# Patient Record
Sex: FEMALE | Race: WHITE | NOT HISPANIC OR LATINO | Employment: FULL TIME | URBAN - METROPOLITAN AREA
[De-identification: names, ages, dates, MRNs, and addresses within clinical notes are randomized per-mention and may not be internally consistent; named-entity substitution may affect disease eponyms.]

---

## 2021-04-08 ENCOUNTER — HOSPITAL ENCOUNTER (INPATIENT)
Facility: CLINIC | Age: 31
LOS: 7 days | Discharge: HOME | DRG: 271 | End: 2021-04-15
Attending: EMERGENCY MEDICINE | Admitting: HOSPITALIST
Payer: COMMERCIAL

## 2021-04-08 ENCOUNTER — APPOINTMENT (OUTPATIENT)
Dept: CT IMAGING | Facility: CLINIC | Age: 31
DRG: 271 | End: 2021-04-08
Attending: EMERGENCY MEDICINE
Payer: COMMERCIAL

## 2021-04-08 ENCOUNTER — APPOINTMENT (OUTPATIENT)
Dept: INTERVENTIONAL RADIOLOGY/VASCULAR | Facility: CLINIC | Age: 31
DRG: 271 | End: 2021-04-08
Attending: EMERGENCY MEDICINE
Payer: COMMERCIAL

## 2021-04-08 ENCOUNTER — APPOINTMENT (OUTPATIENT)
Dept: ULTRASOUND IMAGING | Facility: CLINIC | Age: 31
DRG: 271 | End: 2021-04-08
Attending: EMERGENCY MEDICINE
Payer: COMMERCIAL

## 2021-04-08 DIAGNOSIS — E78.1 HYPERTRIGLYCERIDEMIA: ICD-10-CM

## 2021-04-08 DIAGNOSIS — I70.209 ACUTE OCCLUSION OF ARTERY OF LOWER EXTREMITY (H): ICD-10-CM

## 2021-04-08 DIAGNOSIS — E03.8 SUBCLINICAL HYPOTHYROIDISM: ICD-10-CM

## 2021-04-08 DIAGNOSIS — I99.8 LIMB ISCHEMIA: Primary | ICD-10-CM

## 2021-04-08 LAB
ABO + RH BLD: NORMAL
ABO + RH BLD: NORMAL
ANION GAP SERPL CALCULATED.3IONS-SCNC: 7 MMOL/L (ref 3–14)
APTT PPP: 26 SEC (ref 22–37)
APTT PPP: 60 SEC (ref 22–37)
BASOPHILS # BLD AUTO: 0.1 10E9/L (ref 0–0.2)
BASOPHILS NFR BLD AUTO: 0.5 %
BLD GP AB SCN SERPL QL: NORMAL
BLOOD BANK CMNT PATIENT-IMP: NORMAL
BUN SERPL-MCNC: 10 MG/DL (ref 7–30)
CALCIUM SERPL-MCNC: 9.6 MG/DL (ref 8.5–10.1)
CHLORIDE SERPL-SCNC: 104 MMOL/L (ref 94–109)
CK SERPL-CCNC: 39 U/L (ref 30–225)
CO2 SERPL-SCNC: 23 MMOL/L (ref 20–32)
CREAT SERPL-MCNC: 0.75 MG/DL (ref 0.52–1.04)
D DIMER PPP FEU-MCNC: 0.3 UG/ML FEU (ref 0–0.5)
DIFFERENTIAL METHOD BLD: ABNORMAL
EOSINOPHIL # BLD AUTO: 0.1 10E9/L (ref 0–0.7)
EOSINOPHIL NFR BLD AUTO: 0.5 %
ERYTHROCYTE [DISTWIDTH] IN BLOOD BY AUTOMATED COUNT: 12.8 % (ref 10–15)
ERYTHROCYTE [DISTWIDTH] IN BLOOD BY AUTOMATED COUNT: 13 % (ref 10–15)
GFR SERPL CREATININE-BSD FRML MDRD: >90 ML/MIN/{1.73_M2}
GLUCOSE SERPL-MCNC: 104 MG/DL (ref 70–99)
HCG SERPL QL: NEGATIVE
HCT VFR BLD AUTO: 33 % (ref 35–47)
HCT VFR BLD AUTO: 35.9 % (ref 35–47)
HGB BLD-MCNC: 10.9 G/DL (ref 11.7–15.7)
HGB BLD-MCNC: 12 G/DL (ref 11.7–15.7)
IMM GRANULOCYTES # BLD: 0 10E9/L (ref 0–0.4)
IMM GRANULOCYTES NFR BLD: 0.3 %
INR PPP: 1.05 (ref 0.86–1.14)
INTERPRETATION ECG - MUSE: NORMAL
LABORATORY COMMENT REPORT: NORMAL
LYMPHOCYTES # BLD AUTO: 2 10E9/L (ref 0.8–5.3)
LYMPHOCYTES NFR BLD AUTO: 14.2 %
MCH RBC QN AUTO: 28.5 PG (ref 26.5–33)
MCH RBC QN AUTO: 28.5 PG (ref 26.5–33)
MCHC RBC AUTO-ENTMCNC: 33 G/DL (ref 31.5–36.5)
MCHC RBC AUTO-ENTMCNC: 33.4 G/DL (ref 31.5–36.5)
MCV RBC AUTO: 85 FL (ref 78–100)
MCV RBC AUTO: 86 FL (ref 78–100)
MONOCYTES # BLD AUTO: 0.5 10E9/L (ref 0–1.3)
MONOCYTES NFR BLD AUTO: 3.2 %
NEUTROPHILS # BLD AUTO: 11.6 10E9/L (ref 1.6–8.3)
NEUTROPHILS NFR BLD AUTO: 81.3 %
NRBC # BLD AUTO: 0 10*3/UL
NRBC BLD AUTO-RTO: 0 /100
PLATELET # BLD AUTO: 447 10E9/L (ref 150–450)
PLATELET # BLD AUTO: 576 10E9/L (ref 150–450)
POTASSIUM SERPL-SCNC: 3.9 MMOL/L (ref 3.4–5.3)
RBC # BLD AUTO: 3.83 10E12/L (ref 3.8–5.2)
RBC # BLD AUTO: 4.21 10E12/L (ref 3.8–5.2)
SARS-COV-2 RNA RESP QL NAA+PROBE: NEGATIVE
SODIUM SERPL-SCNC: 134 MMOL/L (ref 133–144)
SPECIMEN EXP DATE BLD: NORMAL
SPECIMEN SOURCE: NORMAL
WBC # BLD AUTO: 14.2 10E9/L (ref 4–11)
WBC # BLD AUTO: 21.9 10E9/L (ref 4–11)

## 2021-04-08 PROCEDURE — 258N000003 HC RX IP 258 OP 636: Performed by: RADIOLOGY

## 2021-04-08 PROCEDURE — 272N000567 HC SHEATH CR4

## 2021-04-08 PROCEDURE — 86901 BLOOD TYPING SEROLOGIC RH(D): CPT | Performed by: EMERGENCY MEDICINE

## 2021-04-08 PROCEDURE — C1769 GUIDE WIRE: HCPCS

## 2021-04-08 PROCEDURE — 04HN33Z INSERTION OF INFUSION DEVICE INTO LEFT POPLITEAL ARTERY, PERCUTANEOUS APPROACH: ICD-10-PCS | Performed by: RADIOLOGY

## 2021-04-08 PROCEDURE — 82550 ASSAY OF CK (CPK): CPT | Performed by: EMERGENCY MEDICINE

## 2021-04-08 PROCEDURE — 86900 BLOOD TYPING SEROLOGIC ABO: CPT | Performed by: EMERGENCY MEDICINE

## 2021-04-08 PROCEDURE — 3E05317 INTRODUCTION OF OTHER THROMBOLYTIC INTO PERIPHERAL ARTERY, PERCUTANEOUS APPROACH: ICD-10-PCS | Performed by: RADIOLOGY

## 2021-04-08 PROCEDURE — C1757 CATH, THROMBECTOMY/EMBOLECT: HCPCS

## 2021-04-08 PROCEDURE — 93005 ELECTROCARDIOGRAM TRACING: CPT

## 2021-04-08 PROCEDURE — 36415 COLL VENOUS BLD VENIPUNCTURE: CPT | Performed by: HOSPITALIST

## 2021-04-08 PROCEDURE — 272N000116 HC CATH CR1

## 2021-04-08 PROCEDURE — 75625 CONTRAST EXAM ABDOMINL AORTA: CPT

## 2021-04-08 PROCEDURE — 85025 COMPLETE CBC W/AUTO DIFF WBC: CPT | Performed by: EMERGENCY MEDICINE

## 2021-04-08 PROCEDURE — 272N000194 HC ACCESSORY CR3

## 2021-04-08 PROCEDURE — 36247 INS CATH ABD/L-EXT ART 3RD: CPT

## 2021-04-08 PROCEDURE — 37211 THROMBOLYTIC ART THERAPY: CPT

## 2021-04-08 PROCEDURE — 250N000009 HC RX 250: Performed by: EMERGENCY MEDICINE

## 2021-04-08 PROCEDURE — 99285 EMERGENCY DEPT VISIT HI MDM: CPT | Mod: 25

## 2021-04-08 PROCEDURE — 85730 THROMBOPLASTIN TIME PARTIAL: CPT | Performed by: HOSPITALIST

## 2021-04-08 PROCEDURE — 96365 THER/PROPH/DIAG IV INF INIT: CPT | Mod: 59

## 2021-04-08 PROCEDURE — 85610 PROTHROMBIN TIME: CPT | Performed by: EMERGENCY MEDICINE

## 2021-04-08 PROCEDURE — 250N000011 HC RX IP 250 OP 636: Performed by: RADIOLOGY

## 2021-04-08 PROCEDURE — 272N000196 HC ACCESSORY CR5

## 2021-04-08 PROCEDURE — 272N000123 HC CATH CR9

## 2021-04-08 PROCEDURE — 96366 THER/PROPH/DIAG IV INF ADDON: CPT

## 2021-04-08 PROCEDURE — 120N000001 HC R&B MED SURG/OB

## 2021-04-08 PROCEDURE — 87635 SARS-COV-2 COVID-19 AMP PRB: CPT | Performed by: EMERGENCY MEDICINE

## 2021-04-08 PROCEDURE — 96376 TX/PRO/DX INJ SAME DRUG ADON: CPT

## 2021-04-08 PROCEDURE — 75635 CT ANGIO ABDOMINAL ARTERIES: CPT

## 2021-04-08 PROCEDURE — 258N000003 HC RX IP 258 OP 636: Performed by: HOSPITALIST

## 2021-04-08 PROCEDURE — 93971 EXTREMITY STUDY: CPT | Mod: LT

## 2021-04-08 PROCEDURE — 99223 1ST HOSP IP/OBS HIGH 75: CPT | Mod: AI | Performed by: HOSPITALIST

## 2021-04-08 PROCEDURE — 250N000011 HC RX IP 250 OP 636: Performed by: EMERGENCY MEDICINE

## 2021-04-08 PROCEDURE — 80048 BASIC METABOLIC PNL TOTAL CA: CPT | Performed by: EMERGENCY MEDICINE

## 2021-04-08 PROCEDURE — 84703 CHORIONIC GONADOTROPIN ASSAY: CPT | Performed by: EMERGENCY MEDICINE

## 2021-04-08 PROCEDURE — 85027 COMPLETE CBC AUTOMATED: CPT | Performed by: HOSPITALIST

## 2021-04-08 PROCEDURE — 85379 FIBRIN DEGRADATION QUANT: CPT | Performed by: EMERGENCY MEDICINE

## 2021-04-08 PROCEDURE — 272N000570 HC SHEATH CR7

## 2021-04-08 PROCEDURE — 04CN3ZZ EXTIRPATION OF MATTER FROM LEFT POPLITEAL ARTERY, PERCUTANEOUS APPROACH: ICD-10-PCS | Performed by: RADIOLOGY

## 2021-04-08 PROCEDURE — 250N000009 HC RX 250: Performed by: RADIOLOGY

## 2021-04-08 PROCEDURE — 85730 THROMBOPLASTIN TIME PARTIAL: CPT | Performed by: EMERGENCY MEDICINE

## 2021-04-08 PROCEDURE — 86850 RBC ANTIBODY SCREEN: CPT | Performed by: EMERGENCY MEDICINE

## 2021-04-08 PROCEDURE — 96375 TX/PRO/DX INJ NEW DRUG ADDON: CPT

## 2021-04-08 PROCEDURE — 272N000190 HC ACCESSORY CR14

## 2021-04-08 PROCEDURE — 255N000002 HC RX 255 OP 636: Performed by: RADIOLOGY

## 2021-04-08 PROCEDURE — C9803 HOPD COVID-19 SPEC COLLECT: HCPCS

## 2021-04-08 PROCEDURE — 99222 1ST HOSP IP/OBS MODERATE 55: CPT | Performed by: SURGERY

## 2021-04-08 RX ORDER — PROCHLORPERAZINE 25 MG
25 SUPPOSITORY, RECTAL RECTAL EVERY 12 HOURS PRN
Status: DISCONTINUED | OUTPATIENT
Start: 2021-04-08 | End: 2021-04-15

## 2021-04-08 RX ORDER — POLYETHYLENE GLYCOL 3350 17 G/17G
17 POWDER, FOR SOLUTION ORAL DAILY
Status: DISCONTINUED | OUTPATIENT
Start: 2021-04-09 | End: 2021-04-15 | Stop reason: HOSPADM

## 2021-04-08 RX ORDER — NALOXONE HYDROCHLORIDE 0.4 MG/ML
0.2 INJECTION, SOLUTION INTRAMUSCULAR; INTRAVENOUS; SUBCUTANEOUS
Status: DISCONTINUED | OUTPATIENT
Start: 2021-04-08 | End: 2021-04-08

## 2021-04-08 RX ORDER — FERROUS SULFATE 325(65) MG
325 TABLET ORAL DAILY
Status: DISCONTINUED | OUTPATIENT
Start: 2021-04-09 | End: 2021-04-15 | Stop reason: HOSPADM

## 2021-04-08 RX ORDER — AMOXICILLIN 250 MG
1 CAPSULE ORAL 2 TIMES DAILY PRN
Status: DISCONTINUED | OUTPATIENT
Start: 2021-04-08 | End: 2021-04-15 | Stop reason: HOSPADM

## 2021-04-08 RX ORDER — VITAMIN B COMPLEX
1 TABLET ORAL DAILY
COMMUNITY
End: 2024-08-22

## 2021-04-08 RX ORDER — LIDOCAINE 40 MG/G
CREAM TOPICAL
Status: DISCONTINUED | OUTPATIENT
Start: 2021-04-08 | End: 2021-04-11

## 2021-04-08 RX ORDER — ONDANSETRON 4 MG/1
4 TABLET, ORALLY DISINTEGRATING ORAL EVERY 6 HOURS PRN
Status: DISCONTINUED | OUTPATIENT
Start: 2021-04-08 | End: 2021-04-15

## 2021-04-08 RX ORDER — HEPARIN SODIUM 10000 [USP'U]/100ML
500 INJECTION, SOLUTION INTRAVENOUS CONTINUOUS
Status: DISCONTINUED | OUTPATIENT
Start: 2021-04-08 | End: 2021-04-10

## 2021-04-08 RX ORDER — LORAZEPAM 0.5 MG/1
.5-1 TABLET ORAL EVERY 4 HOURS PRN
Status: DISCONTINUED | OUTPATIENT
Start: 2021-04-08 | End: 2021-04-15

## 2021-04-08 RX ORDER — HEPARIN SODIUM 10000 [USP'U]/100ML
0-5000 INJECTION, SOLUTION INTRAVENOUS CONTINUOUS
Status: DISCONTINUED | OUTPATIENT
Start: 2021-04-08 | End: 2021-04-08

## 2021-04-08 RX ORDER — NORGESTIMATE AND ETHINYL ESTRADIOL 7DAYSX3 28
1 KIT ORAL DAILY
Status: ON HOLD | COMMUNITY
End: 2021-04-15

## 2021-04-08 RX ORDER — BUSPIRONE HYDROCHLORIDE 10 MG/1
10 TABLET ORAL 2 TIMES DAILY
Status: DISCONTINUED | OUTPATIENT
Start: 2021-04-08 | End: 2021-04-15 | Stop reason: HOSPADM

## 2021-04-08 RX ORDER — LANOLIN ALCOHOL/MO/W.PET/CERES
3 CREAM (GRAM) TOPICAL
Status: DISCONTINUED | OUTPATIENT
Start: 2021-04-08 | End: 2021-04-15

## 2021-04-08 RX ORDER — HYDROMORPHONE HYDROCHLORIDE 1 MG/ML
.3-.5 INJECTION, SOLUTION INTRAMUSCULAR; INTRAVENOUS; SUBCUTANEOUS
Status: DISCONTINUED | OUTPATIENT
Start: 2021-04-08 | End: 2021-04-15

## 2021-04-08 RX ORDER — AMOXICILLIN 250 MG
2 CAPSULE ORAL 2 TIMES DAILY PRN
Status: DISCONTINUED | OUTPATIENT
Start: 2021-04-08 | End: 2021-04-15 | Stop reason: HOSPADM

## 2021-04-08 RX ORDER — ACETAMINOPHEN 325 MG/1
650 TABLET ORAL EVERY 4 HOURS PRN
Status: DISCONTINUED | OUTPATIENT
Start: 2021-04-08 | End: 2021-04-15 | Stop reason: HOSPADM

## 2021-04-08 RX ORDER — AMOXICILLIN 250 MG
2 CAPSULE ORAL 2 TIMES DAILY
Status: DISCONTINUED | OUTPATIENT
Start: 2021-04-08 | End: 2021-04-15 | Stop reason: HOSPADM

## 2021-04-08 RX ORDER — SODIUM CHLORIDE 9 MG/ML
INJECTION, SOLUTION INTRAVENOUS CONTINUOUS
Status: DISCONTINUED | OUTPATIENT
Start: 2021-04-08 | End: 2021-04-10

## 2021-04-08 RX ORDER — MULTIVITAMIN,THERAPEUTIC
1 TABLET ORAL DAILY
COMMUNITY
End: 2021-10-19

## 2021-04-08 RX ORDER — IODIXANOL 320 MG/ML
150 INJECTION, SOLUTION INTRAVASCULAR ONCE
Status: COMPLETED | OUTPATIENT
Start: 2021-04-08 | End: 2021-04-08

## 2021-04-08 RX ORDER — PNV NO.95/FERROUS FUM/FOLIC AC 28MG-0.8MG
1 TABLET ORAL DAILY
Status: ON HOLD | COMMUNITY
End: 2023-10-23

## 2021-04-08 RX ORDER — OXYCODONE HYDROCHLORIDE 5 MG/1
5-10 TABLET ORAL
Status: DISCONTINUED | OUTPATIENT
Start: 2021-04-08 | End: 2021-04-15 | Stop reason: HOSPADM

## 2021-04-08 RX ORDER — ONDANSETRON 2 MG/ML
4 INJECTION INTRAMUSCULAR; INTRAVENOUS EVERY 6 HOURS PRN
Status: DISCONTINUED | OUTPATIENT
Start: 2021-04-08 | End: 2021-04-15

## 2021-04-08 RX ORDER — FLUMAZENIL 0.1 MG/ML
0.2 INJECTION, SOLUTION INTRAVENOUS
Status: DISCONTINUED | OUTPATIENT
Start: 2021-04-08 | End: 2021-04-08

## 2021-04-08 RX ORDER — BUSPIRONE HYDROCHLORIDE 10 MG/1
10 TABLET ORAL 2 TIMES DAILY
COMMUNITY

## 2021-04-08 RX ORDER — NALOXONE HYDROCHLORIDE 0.4 MG/ML
0.4 INJECTION, SOLUTION INTRAMUSCULAR; INTRAVENOUS; SUBCUTANEOUS
Status: DISCONTINUED | OUTPATIENT
Start: 2021-04-08 | End: 2021-04-08

## 2021-04-08 RX ORDER — HEPARIN SODIUM 200 [USP'U]/100ML
2 INJECTION, SOLUTION INTRAVENOUS CONTINUOUS PRN
Status: DISCONTINUED | OUTPATIENT
Start: 2021-04-08 | End: 2021-04-08

## 2021-04-08 RX ORDER — AMOXICILLIN 250 MG
1 CAPSULE ORAL 2 TIMES DAILY
Status: DISCONTINUED | OUTPATIENT
Start: 2021-04-08 | End: 2021-04-15 | Stop reason: HOSPADM

## 2021-04-08 RX ORDER — LORAZEPAM 2 MG/ML
.5-1 INJECTION INTRAMUSCULAR EVERY 4 HOURS PRN
Status: DISCONTINUED | OUTPATIENT
Start: 2021-04-08 | End: 2021-04-15

## 2021-04-08 RX ORDER — ACETAMINOPHEN 650 MG/1
650 SUPPOSITORY RECTAL EVERY 4 HOURS PRN
Status: DISCONTINUED | OUTPATIENT
Start: 2021-04-08 | End: 2021-04-12

## 2021-04-08 RX ORDER — OMEGA-3 FATTY ACIDS/FISH OIL 300-1000MG
200-800 CAPSULE ORAL EVERY 4 HOURS PRN
Status: ON HOLD | COMMUNITY
End: 2021-04-15

## 2021-04-08 RX ORDER — FENTANYL CITRATE 50 UG/ML
25-50 INJECTION, SOLUTION INTRAMUSCULAR; INTRAVENOUS EVERY 5 MIN PRN
Status: DISCONTINUED | OUTPATIENT
Start: 2021-04-08 | End: 2021-04-08

## 2021-04-08 RX ORDER — SPIRONOLACTONE 50 MG/1
50 TABLET, FILM COATED ORAL DAILY
COMMUNITY
End: 2021-10-19

## 2021-04-08 RX ORDER — PROCHLORPERAZINE MALEATE 5 MG
10 TABLET ORAL EVERY 6 HOURS PRN
Status: DISCONTINUED | OUTPATIENT
Start: 2021-04-08 | End: 2021-04-15

## 2021-04-08 RX ORDER — POLYETHYLENE GLYCOL 3350 17 G/17G
17 POWDER, FOR SOLUTION ORAL DAILY PRN
Status: DISCONTINUED | OUTPATIENT
Start: 2021-04-08 | End: 2021-04-15 | Stop reason: HOSPADM

## 2021-04-08 RX ORDER — CALCIUM CARBONATE/VITAMIN D3 500-10/5ML
LIQUID (ML) ORAL DAILY
COMMUNITY
End: 2022-02-02

## 2021-04-08 RX ORDER — IOPAMIDOL 755 MG/ML
100 INJECTION, SOLUTION INTRAVASCULAR ONCE
Status: COMPLETED | OUTPATIENT
Start: 2021-04-08 | End: 2021-04-08

## 2021-04-08 RX ADMIN — FENTANYL CITRATE 50 MCG: 50 INJECTION, SOLUTION INTRAMUSCULAR; INTRAVENOUS at 19:50

## 2021-04-08 RX ADMIN — FENTANYL CITRATE 50 MCG: 50 INJECTION, SOLUTION INTRAMUSCULAR; INTRAVENOUS at 18:52

## 2021-04-08 RX ADMIN — HEPARIN SODIUM 1800 UNITS/HR: 10000 INJECTION, SOLUTION INTRAVENOUS at 17:28

## 2021-04-08 RX ADMIN — IODIXANOL 60 ML: 320 INJECTION, SOLUTION INTRAVASCULAR at 20:23

## 2021-04-08 RX ADMIN — SODIUM CHLORIDE: 9 INJECTION, SOLUTION INTRAVENOUS at 21:21

## 2021-04-08 RX ADMIN — MIDAZOLAM 0.5 MG: 1 INJECTION INTRAMUSCULAR; INTRAVENOUS at 19:31

## 2021-04-08 RX ADMIN — FENTANYL CITRATE 50 MCG: 50 INJECTION, SOLUTION INTRAMUSCULAR; INTRAVENOUS at 20:12

## 2021-04-08 RX ADMIN — FENTANYL CITRATE 25 MCG: 50 INJECTION, SOLUTION INTRAMUSCULAR; INTRAVENOUS at 19:35

## 2021-04-08 RX ADMIN — MIDAZOLAM 1 MG: 1 INJECTION INTRAMUSCULAR; INTRAVENOUS at 18:43

## 2021-04-08 RX ADMIN — FENTANYL CITRATE 50 MCG: 50 INJECTION, SOLUTION INTRAMUSCULAR; INTRAVENOUS at 18:42

## 2021-04-08 RX ADMIN — LIDOCAINE HYDROCHLORIDE 10 ML: 10 INJECTION, SOLUTION INFILTRATION; PERINEURAL at 18:55

## 2021-04-08 RX ADMIN — MIDAZOLAM 1 MG: 1 INJECTION INTRAMUSCULAR; INTRAVENOUS at 18:51

## 2021-04-08 RX ADMIN — MIDAZOLAM 1 MG: 1 INJECTION INTRAMUSCULAR; INTRAVENOUS at 20:23

## 2021-04-08 RX ADMIN — FENTANYL CITRATE 25 MCG: 50 INJECTION, SOLUTION INTRAMUSCULAR; INTRAVENOUS at 19:31

## 2021-04-08 RX ADMIN — MIDAZOLAM 1 MG: 1 INJECTION INTRAMUSCULAR; INTRAVENOUS at 19:40

## 2021-04-08 RX ADMIN — IOPAMIDOL 100 ML: 755 INJECTION, SOLUTION INTRAVENOUS at 17:03

## 2021-04-08 RX ADMIN — FENTANYL CITRATE 25 MCG: 50 INJECTION, SOLUTION INTRAMUSCULAR; INTRAVENOUS at 19:20

## 2021-04-08 RX ADMIN — ALTEPLASE 10 MG: 2.2 INJECTION, POWDER, LYOPHILIZED, FOR SOLUTION INTRAVENOUS at 19:45

## 2021-04-08 RX ADMIN — FENTANYL CITRATE 50 MCG: 50 INJECTION, SOLUTION INTRAMUSCULAR; INTRAVENOUS at 20:23

## 2021-04-08 RX ADMIN — MIDAZOLAM 0.5 MG: 1 INJECTION INTRAMUSCULAR; INTRAVENOUS at 19:20

## 2021-04-08 RX ADMIN — FENTANYL CITRATE 25 MCG: 50 INJECTION, SOLUTION INTRAMUSCULAR; INTRAVENOUS at 19:07

## 2021-04-08 RX ADMIN — HYDROMORPHONE HYDROCHLORIDE 1 MG: 1 INJECTION, SOLUTION INTRAMUSCULAR; INTRAVENOUS; SUBCUTANEOUS at 20:20

## 2021-04-08 RX ADMIN — FENTANYL CITRATE 50 MCG: 50 INJECTION, SOLUTION INTRAMUSCULAR; INTRAVENOUS at 19:40

## 2021-04-08 RX ADMIN — HEPARIN SODIUM 2 BAG: 200 INJECTION, SOLUTION INTRAVENOUS at 19:34

## 2021-04-08 RX ADMIN — MIDAZOLAM 0.5 MG: 1 INJECTION INTRAMUSCULAR; INTRAVENOUS at 19:07

## 2021-04-08 RX ADMIN — HEPARIN SODIUM 500 UNITS/HR: 10000 INJECTION, SOLUTION INTRAVENOUS at 20:01

## 2021-04-08 RX ADMIN — MIDAZOLAM 0.5 MG: 1 INJECTION INTRAMUSCULAR; INTRAVENOUS at 20:13

## 2021-04-08 RX ADMIN — ALTEPLASE 0.5 MG/HR: 2.2 INJECTION, POWDER, LYOPHILIZED, FOR SOLUTION INTRAVENOUS at 20:14

## 2021-04-08 RX ADMIN — ALTEPLASE 0.5 MG/HR: 2.2 INJECTION, POWDER, LYOPHILIZED, FOR SOLUTION INTRAVENOUS at 20:01

## 2021-04-08 RX ADMIN — SODIUM CHLORIDE 80 ML: 9 INJECTION, SOLUTION INTRAVENOUS at 17:04

## 2021-04-08 RX ADMIN — Medication: at 20:41

## 2021-04-08 SDOH — HEALTH STABILITY: MENTAL HEALTH: HOW OFTEN DO YOU HAVE A DRINK CONTAINING ALCOHOL?: 2-4 TIMES A MONTH

## 2021-04-08 SDOH — HEALTH STABILITY: MENTAL HEALTH: HOW MANY STANDARD DRINKS CONTAINING ALCOHOL DO YOU HAVE ON A TYPICAL DAY?: NOT ASKED

## 2021-04-08 SDOH — HEALTH STABILITY: MENTAL HEALTH: HOW OFTEN DO YOU HAVE 6 OR MORE DRINKS ON ONE OCCASION?: NOT ASKED

## 2021-04-08 ASSESSMENT — ENCOUNTER SYMPTOMS
COLOR CHANGE: 1
NUMBNESS: 1
SHORTNESS OF BREATH: 0

## 2021-04-08 ASSESSMENT — MIFFLIN-ST. JEOR: SCORE: 1999.09

## 2021-04-08 NOTE — ED NOTES
"[facility]  ED Nurse Handoff Report    ED Chief complaint: Leg Pain      ED Diagnosis:   Final diagnoses:   None       Code Status: Full Code    Allergies:   Allergies   Allergen Reactions     Erythromycin      Penicillins        Patient Story: Sruthi Mac is a 30 year old female who presents to the Emergency Department for an evaluation of leg pain.   Focused Assessment:  Cardiac: WDL  Resp: WDL  Neuro: A&Ox4, paraesthesias to LLE  Skin: discoloration and swelling of LLE    Treatments and/or interventions provided: Heparin 1800 units/hr, Heparin 8000 unit bolus  Patient's response to treatments and/or interventions: na    To be done/followed up on inpatient unit:  IR surgery, continue care    Does this patient have any cognitive concerns?: na    Activity level - Baseline/Home:  Independent  Activity Level - Current:   Independent    Patient's Preferred language: English   Needed?: No    Isolation: COVID r/o and special precautions *ASYMPTOMATIC COVID TEST PENDING  Infection: COVID r/o and special precautions *ASYMPTOMATIC COVID TEST PENDING  Patient tested for COVID 19 prior to admission: YES  Bariatric?: Yes    Vital Signs:   Vitals:    04/08/21 1424 04/08/21 1730   BP: (!) 164/92 (!) 132/100   Pulse: 97 98   Resp: 14 10   Temp: 98.8  F (37.1  C)    TempSrc: Oral    SpO2: 96% 98%   Weight: 122.5 kg (270 lb)    Height: 1.753 m (5' 9\")        Cardiac Rhythm:     Was the PSS-3 completed:   Yes  What interventions are required if any?               Family Comments:  at bedside  OBS brochure/video discussed/provided to patient/family: No              Name of person given brochure if not patient: na              Relationship to patient: na    For the majority of the shift this patient's behavior was Green.   Behavioral interventions performed were na.    ED NURSE PHONE NUMBER: *[phone]         "

## 2021-04-08 NOTE — ED PROVIDER NOTES
"  History   Chief Complaint:  Leg Pain       HPI   Sruthi Mac is a 30 year old female who presents with leg pain. Per the patient, five days ago she developed sudden onset left leg pain with discoloration. The patient notes the pain worsens significantly with movement to the point where she cannot walk, and she notes associated intermittent foot numbness. She called her PCP who recommended she present to the ER to rule out a blood clot. She has only taken ibuprofen and Tylenol for the pain. She is on birth control. She denies chest pain, shortness of breath, recent surgeries, or a personal history of DVT. Of note, she recently got her Pfizer COVID vaccine.    Review of Systems   Respiratory: Negative for shortness of breath.    Cardiovascular: Negative for chest pain.        Leg pain   Skin: Positive for color change.   Neurological: Positive for numbness.   All other systems reviewed and are negative.        Allergies:  The patient has no known allergies.     Medications:  Sertraline  Unspecified birth control  Spironolactone  Buspirone    Past Medical History:    Anxiety  The patient denies a personal history of DVT.     Social History:  The patient presents to to the ER with an unidentified female.  The patient works on a farm.    Physical Exam     Patient Vitals for the past 24 hrs:   BP Temp Temp src Pulse Resp SpO2 Height Weight   04/08/21 1812 -- -- -- 99 14 96 % -- --   04/08/21 1807 -- -- -- 89 10 96 % -- --   04/08/21 1800 120/86 -- -- 90 10 96 % -- --   04/08/21 1754 -- -- -- 98 16 97 % -- --   04/08/21 1730 (!) 132/100 -- -- 98 10 98 % -- --   04/08/21 1424 (!) 164/92 98.8  F (37.1  C) Oral 97 14 96 % 1.753 m (5' 9\") 122.5 kg (270 lb)       Physical Exam  Vitals signs and nursing note reviewed.   Constitutional:       Appearance: She is obese.   HENT:      Head: Normocephalic.   Eyes:      Pupils: Pupils are equal, round, and reactive to light.   Cardiovascular:      Rate and Rhythm: Normal " rate and regular rhythm.   Pulmonary:      Effort: Pulmonary effort is normal.      Breath sounds: Normal breath sounds.   Abdominal:      General: Abdomen is flat.      Palpations: Abdomen is soft.   Musculoskeletal:      Comments: There is an intact femoral pulse.  On examination there is some discoloration of the left 5 digits of her toes consistent with cyanosis compared to the right side.  There is decreased DP pulse compared to the right side.  There is mild calf pain but no significant swelling.   Skin:     General: Skin is warm.   Neurological:      General: No focal deficit present.      Mental Status: She is alert and oriented to person, place, and time.   Psychiatric:         Mood and Affect: Mood normal.           Emergency Department Course   ECG  ECG taken at 1716, ECG read at 1723  Normal sinus rhythm  Cannot rule out Anterior infarct, age undetermined  Abnormal ECG   Rate 87 bpm. UT interval 162 ms. QRS duration 98 ms. QT/QTc 354/425 ms. P-R-T axes 39 11 17.     Imaging:    US Lower Extremity Venous Duplex Left  1. Occlusion of the left popliteal artery, recommend CTA with runoff  for further evaluation.  2. No DVT in the left lower extremity.  Reading per radiology.    CTA Chest Abdomen Pelvis Runoff w Contrast  1.  Abrupt cut off of the opacification the left popliteal artery with associated filling defect. Findings consistent with arterial bolus. Suboptimal opacification of the infrapopliteal vasculature. I am uncertain if this is related to suboptimal   collateral flow or poor timing of the arterial bolus.  2.  Suboptimal opacification of the right infrapopliteal vasculature. I suspect this is related to  poor timing of the arterial bolus.  3. 4.3 x 5.7 cm left adnexal cyst. Recommend follow-up pelvic ultrasound.  Reading per radiology.    The above imaging workup was performed.     Laboratory:    CBC: WBC 14.2(H), HGB 12.0, (H)  BMP: Glucose 104(H) o/w WNL (Creatinine 0.75)  CK Total:  39  INR: 1.05  PTT: 26  D Dimer: 0.3   ABO RH Type and Screen: A Pos, antibody Neg  HCG Qualitative: Negative    Asymptomatic COVID19 Virus PCR by nasopharyngeal swab: Negative    Emergency Department Course:    Reviewed:  I reviewed nursing notes and vitals.    Assessments:  1505 I obtained history and examined the patient as noted above.   1607 I rechecked the patient and explained findings.   1734 I rechecked the patient and explained findings and plan.    Consults:   1603 I spoke with Dr. Bui of the interventional radiology service from [facility] regarding patient's ultrasound. She has a clot in her popliteal artery and she recommends CTA.  1720 I spoke with Dr. Ko of the hospitalist service from [facility] regarding patient's presentation, findings, and plan of care.    Interventions:  1728: Heparin, 1800 Units/hr, IV  1729: Heparin, 8000 Units, IV    Disposition:  The patient was admitted to the hospital under the care of Dr. Ko.       Impression & Plan       Medical Decision Making:  Patient presents with left calf pain and swelling.  Patient is an otherwise healthy 30-year-old female with a history of OCP use.  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  Ultimately ultrasound and D-dimer were sent and interestingly both were normal.  Patient's ultrasound of the popliteal artery did demonstrate concerns for vascular accused occlusion and therefore CT angiography was performed and was positive for popliteal arterial occlusion.  Care was discussed with the vascular fellow as well as interventional radiology.  Heparin was initiated emergently and patient was sent to the IR lab for considerations of mechanical thrombectomy and/or TPA.  Patient was admitted in guarded condition due to concerns for embolic event at her young age without clear cause.  Patient will be admitted after interventional radiology has  completed their treatment.    Covid-19  Sruthi Mac was evaluated during a global COVID-19 pandemic, which necessitated consideration that the patient might be at risk for infection with the SARS-CoV-2 virus that causes COVID-19.   Applicable protocols for evaluation were followed during the patient's care.   COVID-19 was considered as part of the patient's evaluation. The plan for testing is:  a test was obtained during this visit.    Diagnosis:    ICD-10-CM    1. Acute occlusion of artery of lower extremity (H)  I70.209 Asymptomatic SARS-CoV-2 COVID-19 Virus (Coronavirus) by PCR     Scribe Disclosure:  IWerner, am serving as a scribe at 3:05 PM on 4/8/2021 to document services personally performed by René Koehler MD based on my observations and the provider's statements to me.        René Koehler MD  04/10/21 7089

## 2021-04-08 NOTE — H&P
[facility]    History and Physical  Hospitalist       Date of Admission:  4/8/2021    Assessment & Plan   Sruthi Mac is a 30 year old female who presents with left foot numbness/pain and toe discoloration, found to have acute limb ischemia.    Acute limb ischemia, left lower extremity  5 day history of numbness and pain in her left leg/foot. Getting worse with walking and not resolving. Noted discoloration of her toes and came to ED. Pulses not palpable in foot EKG: NSR. Risk factors include OCPs and history of palpitations (however has no documented arrhythmias)  *US LLE: occlusion left popliteal artery, no DVT  *CTA CAP with left popliteal artery occlusion. Minimal atheromatous disease without aneurysm. Proximal trifurcation vasculature not well opacified, but distal contrast seen within vessels beyond mid calf  - admit inpatient  - appreciate vascular surgery and IR--plan for arteriography with thrombolysis likely tonight  - high dose heparin gtt  - telemetry to look for arrhythmia, likely plan for 30 day event monitor on discharge as well  - echocardiogram  - NPO until post IR (then okay for regular diet)  - IVF @100ml/hr  - pain medicine available with oxycodone/IV dilaudid    Anxiety  Depression  Hx Panic attacks  PTA buspirone BID and sertraline, both continued. She has done well with less panic attacks since starting buspirone  - IV/PO ativan also available if she has panic attack while here    Thrombocytosis  Leukocytosis  Likely secondary to inflammation with acute limb ischemia  -CBC in AM    Cystic acne   PTA spironolactone, on hold    DVT Prophylaxis: heparin gtt  Code Status: Full Code  Expected discharge: 3-4 days pending work-up for acute limb ischemia and post procedure course.    Karyn Ko DO    Primary Care Physician   Rahel Kirkpatrick    Chief Complaint   Painful and cold foot    History is obtained from the patient    History of Present Illness    Sruthi Mac is a 30 year old female who presents with 5 day history of left foot numbness and left leg pain. Felt like tight pain in back of her leg--almost like a cramp initially, but didn't go away. Got worse with walking. Initially only part of her foot was numb, but then it spread to most of the foot. She also noted that her feet were cold, left worse than right. 4/8 she noticed blue tint to her toes and she came to the ED for evaluation. Denies chest pain, shortness of breath, nausea, vomiting, abdominal pain, fevers, chills, change in bowel or bladder. She reports history of intermittent palpitations or fluttering feeling--usually lasting moments--minutes and resolved on its own. Usually associated with activity or anxiety, but sometimes occurred without provocation.    Past Medical History    I have reviewed this patient's medical history and updated it with pertinent information if needed.   Anxiety, panic attacks, cystic acne, depression    Past Surgical History   I have reviewed this patient's surgical history and updated it with pertinent information if needed.  Past Surgical History:   Procedure Laterality Date     wisdom teeth remova         Prior to Admission Medications   Prior to Admission Medications   Prescriptions Last Dose Informant Patient Reported? Taking?   Ferrous Sulfate (IRON) 325 (65 Fe) MG tablet 4/8/2021 at Unknown time Self Yes Yes   Sig: Take 1 tablet by mouth daily   Lactobacillus (PROBIOTIC ACIDOPHILUS PO) 4/8/2021 at Unknown time Self Yes Yes   Sig: Take by mouth daily   Vitamin D3 (CHOLECALCIFEROL) 25 mcg (1000 units) tablet 4/8/2021 at Unknown time Self Yes Yes   Sig: Take 1 tablet by mouth daily   busPIRone (BUSPAR) 10 MG tablet 4/8/2021 at Unknown time Self Yes Yes   Sig: Take 10 mg by mouth 2 times daily   ibuprofen (ADVIL/MOTRIN) 200 MG capsule 4/8/2021 at Unknown time Self Yes Yes   Sig: Take 200-800 mg by mouth every 4 hours as needed for pain or fever    magnesium oxide 400 MG CAPS 4/8/2021 at Unknown time Self Yes Yes   Sig: Take by mouth daily   multivitamin, therapeutic (THERA-VIT) TABS tablet 4/8/2021 at Unknown time Self Yes Yes   Sig: Take 1 tablet by mouth daily   norgestim-eth estrad triphasic (TRI FEMYNOR) 0.18/0.215/0.25 MG-35 MCG tablet 4/8/2021 at Unknown time Self Yes Yes   Sig: Take 1 tablet by mouth daily   sertraline (ZOLOFT) 50 MG tablet 4/8/2021 at Unknown time Self Yes Yes   Sig: Take 50 mg by mouth daily   spironolactone (ALDACTONE) 50 MG tablet 4/8/2021 at Unknown time Self Yes Yes   Sig: Take 50 mg by mouth daily      Facility-Administered Medications: None     Allergies   Allergies   Allergen Reactions     Erythromycin      Penicillins        Social History   I have reviewed this patient's social history and updated it with pertinent information if needed. Sruthi Mac  reports that she has never smoked. She does not have any smokeless tobacco history on file. She reports current alcohol use. She reports that she does not use drugs.    Family History   I have reviewed this patient's family history and updated it with pertinent information if needed.   Family History   Problem Relation Age of Onset     Hypertension Father      Diabetes Father      Myocardial Infarction Father    She thinks her mom might have hypertension or hyperlipidemia but isn't sure which    Review of Systems   The 10 point Review of Systems is negative other than noted in the HPI    Physical Exam   Temp: 98.8  F (37.1  C) Temp src: Oral BP: 120/86 Pulse: 99   Resp: 14 SpO2: 96 % O2 Device: None (Room air)    Vital Signs with Ranges  Temp:  [98.8  F (37.1  C)] 98.8  F (37.1  C)  Pulse:  [89-99] 99  Resp:  [10-16] 14  BP: (120-164)/() 120/86  SpO2:  [96 %-98 %] 96 %  270 lbs 0 oz    Constitutional: Awake, alert, cooperative, no apparent distress.  Eyes: Conjunctiva and pupils examined and normal.  HEENT: Moist mucous membranes, normal  dentition.  Respiratory: Clear to auscultation bilaterally, no crackles or wheezing.  Cardiovascular: Regular rate and rhythm, normal S1 and S2, and no murmur noted.  GI: Soft, non-distended, non-tender, normal bowel sounds, obese  Lymph/Hematologic: No anterior cervical or supraclavicular adenopathy.  Skin: No rashes, no cyanosis, no edema.  Musculoskeletal: nonpalpable pulses in LLE, faint pulse on right. Left toes with bluish tint. Both feet cool to touch, L>R.  Neurologic: Cranial nerves 2-12 intact, normal strength and sensation.  Psychiatric: Alert, oriented to person, place and time, no obvious anxiety or depression.    Data   Data reviewed today:  I personally reviewed US LLE: occlusion left popliteal artery, no DVT. CTA CAP with left popliteal artery occlusion. Minimal atheromatous disease without aneurysm. Proximal trifurcation vasculature not well opacified, but distal contrast seen within vessels beyond mid calf. EKG sinus rhythm  Recent Labs   Lab 04/08/21  1513   WBC 14.2*   HGB 12.0   MCV 85   *   INR 1.05      POTASSIUM 3.9   CHLORIDE 104   CO2 23   BUN 10   CR 0.75   ANIONGAP 7   CASSI 9.6   *       Recent Results (from the past 24 hour(s))   US Lower Extremity Venous Duplex Left    Narrative    VENOUS ULTRASOUND LEFT LEG  4/8/2021 4:01 PM     HISTORY: Patent left leg, leg pain, phlegmasia    COMPARISON: None.    FINDINGS:    Incidentally noted there is occlusive thrombus in the left popliteal  artery.    Examination of the deep veins with graded compression and color flow  Doppler with spectral wave form analysis shows no evidence of thrombus  in the common femoral vein, femoral vein, popliteal vein or calf  veins.       Impression    IMPRESSION:  1. Occlusion of the left popliteal artery, recommend CTA with runoff  for further evaluation.  2. No DVT in the left lower extremity.    Findings were discussed with Dr. Koehler 4:05 PM on 4/8/2021.    ALDEN BRAND DO

## 2021-04-08 NOTE — CONSULTS
Vascular Surgery Consultation     Sruthi Mac MRN# [id number]   YOB: 1990 Age: 30 year old   Date of Admission: 4/8/2021     Reason for consult: Acute limb ischemia                   Reason for Consult:   Acute limb ischemia  History is obtained from the patient         History of Present Illness:   Ms. Mac is a 30 year old female who presents with a numb left foot. She says that she started feeling numbness in her left foot on Saturday (about 5 days ago), with progressive worsening and now pain when she walks. The pain is in the leg; she does not have any pain in the foot when holding still. She had something similar happen to her right leg about 1 month ago, but this resolved spontaneously. She has noticed toe discoloration today only in the left foot.     She does not play sports or regularly exercise or complete repetitive tasks. She does occasionally feel a sensation of fluttering or chest palpitations, whenever she is anxious; the most recent episode of this was about 1 day ago. She is not aware of any personal cardiac or vascular problems and denies trauma to the leg. She is a nonsmoker.               Past Medical History:   I have reviewed and updated this patient's past medical history  No past medical history on file.   She reports hx of anxiety and is on OCPs.          Past Surgical History:   I have reviewed this patient's past surgical history  No past surgical history on file.   [place] tooth removal.          Social History:     Social History     Tobacco Use     Smoking status: Not on file   Substance Use Topics     Alcohol use: Not on file     Never a smoker. Drinks a few beers at most, perhaps once a month. No illicit drug use. 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           Family History:   No family history on file.   Father is on blood thinners after MI. No known family hx of aneurysms, hemophilias, hypercoagulabilities.           Allergies:      Allergies   Allergen Reactions     Erythromycin      Penicillins              Medications:     Current Facility-Administered Medications Ordered in Epic   Medication Dose Route Frequency Last Rate Last Admin     heparin 25,000 units in 0.45% NaCl 250 mL ANTICOAGULANT infusion  0-5,000 Units/hr Intravenous Continuous         heparin ANTICOAGULANT loading dose for  HIGH INTENSITY TREATMENT* Give BEFORE starting heparin infusion  8,000 Units Intravenous Once         iopamidol (ISOVUE-370) solution 100 mL  100 mL Intravenous Once         Saline Flush - CT  80 mL Intravenous Once         No current [facility]-ordered outpatient medications on file.             Review of Systems:   The 10 point Review of Systems is negative other than noted in the HPI          Physical Exam:   Vitals were reviewed  Temp: 98.8  F (37.1  C) Temp src: Oral BP: (!) 164/92 Pulse: 97   Resp: 14 SpO2: 96 % O2 Device: None (Room air)      General: sitting comfortably on gurney in ED, NAD. Accompanied by family. Obese.  Neuro/Psych: A&O x 4, pleasantly conversant   HENT: EOMI and conjugate. Moist mucous membranes.   Cardiac: Regular rate and rhythm, no m/g appreciated.   Pulm: Lungs CTAB, no w/r/r.  Abd: Soft, non-distended, no tenderness to palpation. No abdominal scars.  Extrem: Grossly normal and symmetric ROM in all four extremities. No edema. Able to wiggle toes BL.   Skin: Clean, dry, no rashes or wounds. Blue discoloration over left toes 1-5. Cool to touch over both feet, more so on left.  Vasc: Nonpalpable pedal pulses bilaterally. I am able to obtain only a monophasic right PT, and no right DP or left DP/PT. There may be faint pulsation at the left popliteal level and I do not feel the right popliteal pulse; femoral pulses bilaterally are difficult to feel secondary to habitus.          Data:          Lab Results   Component Value Date     04/08/2021    Lab Results   Component Value Date    CHLORIDE 104 04/08/2021    Lab Results  "  Component Value Date    BUN 10 04/08/2021      Lab Results   Component Value Date    POTASSIUM 3.9 04/08/2021    Lab Results   Component Value Date    CO2 23 04/08/2021    Lab Results   Component Value Date    CR 0.75 04/08/2021        Lab Results   Component Value Date    WBC 14.2 (H) 04/08/2021    HGB 12.0 04/08/2021    HCT 35.9 04/08/2021    MCV 85 04/08/2021     (H) 04/08/2021     I have reviewed the following imaging studies:   Venous duplex (4/8/21): \"1. Occlusion of the left popliteal artery, recommend CTA with runoff  for further evaluation. 2. No DVT in the left lower extremity.\"           Assessment and Plan:   Ms. Mac is a 30 year old female who presents with acute left foot ischemia, with pain in the leg while walking, a sensation of numbness of the foot and lower leg, and toes that are cold and blue.    I discussed the findings of popliteal artery occlusion with Ms. Mac, along with the unusual pedal pulse exam (in a healthy patient her age, I would anticipate the unaffected side to have strong palpable pulses). I explained that based on her age, a thrombosed popliteal artery aneurysm with thromboembolic phenomena is high on the considerations. Also in the differential are cardiogenic thrombi and more proximal arterial aneurysms with \"blue toe\" thromboembolic events; de bailee arterial thromboses even with a hypercoagulable state are quite unusual. Congenital arterial malformations are also possible.       Recommend STAT CTA with runoff to better clarify flow and potentially etiology    Start cardiac monitoring and continue with telemetry to rule out paroxysmal rhythm anomalies    Begin a STAT high-intensity (therapeutic) heparin gtt    Maintain NPO with IVF    Discussed potential open surgical thromboembolectomy vs catheter-directed thrombolytic therapy. Will discuss with IR as well, and final decision will be based on CTA.     Hannah Gibbs MD    "

## 2021-04-08 NOTE — PLAN OF CARE
INTERVENTIONAL RADIOLOGY    CTA reviewed showing left popliteal artery occlusion.  Minimal atheromatous disease with no apparent popliteal artery aneurysm.  Proximal trifurcation vasculature not well opacified but there is some distal contrast within these vessels beyond the mid calf.  Overall I favor an acute thromboembolic event.  Reviewed with Dr. Gibbs (vascular surgery).  Given the unclear distal runoff we will plan on arteriography with probable thrombolysis tonight.    Juan Kam MD  Vascular and Interventional Radiology

## 2021-04-08 NOTE — PHARMACY-ADMISSION MEDICATION HISTORY
Pharmacy Medication History  Admission medication history interview status for the 4/8/2021  admission is complete. See EPIC admission navigator for prior to admission medications     Location of Interview: Patient room  Medication history sources: Patient and Sure Scripts     Significant changes made to the medication list:      In the past week, patient estimated taking medication this percent of the time: greater than 90%    Additional medication history information:       Medication reconciliation completed by provider prior to medication history? No    Time spent in this activity: 10min     Prior to Admission medications    Medication Sig Last Dose Taking? Auth Provider   busPIRone (BUSPAR) 10 MG tablet Take 10 mg by mouth 2 times daily 4/8/2021 at Unknown time Yes Unknown, Entered By History   Ferrous Sulfate (IRON) 325 (65 Fe) MG tablet Take 1 tablet by mouth daily 4/8/2021 at Unknown time Yes Unknown, Entered By History   ibuprofen (ADVIL/MOTRIN) 200 MG capsule Take 200-800 mg by mouth every 4 hours as needed for pain or fever 4/8/2021 at Unknown time Yes Unknown, Entered By History   Lactobacillus (PROBIOTIC ACIDOPHILUS PO) Take by mouth daily 4/8/2021 at Unknown time Yes Unknown, Entered By History   magnesium oxide 400 MG CAPS Take by mouth daily 4/8/2021 at Unknown time Yes Unknown, Entered By History   multivitamin, therapeutic (THERA-VIT) TABS tablet Take 1 tablet by mouth daily 4/8/2021 at Unknown time Yes Unknown, Entered By History   norgestim-eth estrad triphasic (TRI FEMYNOR) 0.18/0.215/0.25 MG-35 MCG tablet Take 1 tablet by mouth daily 4/8/2021 at Unknown time Yes Unknown, Entered By History   sertraline (ZOLOFT) 50 MG tablet Take 50 mg by mouth daily 4/8/2021 at Unknown time Yes Unknown, Entered By History   spironolactone (ALDACTONE) 50 MG tablet Take 50 mg by mouth daily 4/8/2021 at Unknown time Yes Unknown, Entered By History   Vitamin D3 (CHOLECALCIFEROL) 25 mcg (1000 units) tablet Take 1  tablet by mouth daily 4/8/2021 at Unknown time Yes Unknown, Entered By History       The information provided in this note is only as accurate as the sources available at the time of update(s)   Georgia GonzalezD

## 2021-04-09 ENCOUNTER — APPOINTMENT (OUTPATIENT)
Dept: INTERVENTIONAL RADIOLOGY/VASCULAR | Facility: CLINIC | Age: 31
DRG: 271 | End: 2021-04-09
Attending: NURSE PRACTITIONER
Payer: COMMERCIAL

## 2021-04-09 ENCOUNTER — APPOINTMENT (OUTPATIENT)
Dept: CARDIOLOGY | Facility: CLINIC | Age: 31
DRG: 271 | End: 2021-04-09
Attending: HOSPITALIST
Payer: COMMERCIAL

## 2021-04-09 LAB
ANION GAP SERPL CALCULATED.3IONS-SCNC: 7 MMOL/L (ref 3–14)
BUN SERPL-MCNC: 6 MG/DL (ref 7–30)
CALCIUM SERPL-MCNC: 8.2 MG/DL (ref 8.5–10.1)
CHLORIDE SERPL-SCNC: 105 MMOL/L (ref 94–109)
CO2 SERPL-SCNC: 23 MMOL/L (ref 20–32)
CREAT SERPL-MCNC: 0.56 MG/DL (ref 0.52–1.04)
ERYTHROCYTE [DISTWIDTH] IN BLOOD BY AUTOMATED COUNT: 13.2 % (ref 10–15)
GFR SERPL CREATININE-BSD FRML MDRD: >90 ML/MIN/{1.73_M2}
GLUCOSE SERPL-MCNC: 116 MG/DL (ref 70–99)
HCT VFR BLD AUTO: 33 % (ref 35–47)
HGB BLD-MCNC: 10.8 G/DL (ref 11.7–15.7)
MCH RBC QN AUTO: 28.4 PG (ref 26.5–33)
MCHC RBC AUTO-ENTMCNC: 32.7 G/DL (ref 31.5–36.5)
MCV RBC AUTO: 87 FL (ref 78–100)
PLATELET # BLD AUTO: 414 10E9/L (ref 150–450)
POTASSIUM SERPL-SCNC: 4.2 MMOL/L (ref 3.4–5.3)
RBC # BLD AUTO: 3.8 10E12/L (ref 3.8–5.2)
SODIUM SERPL-SCNC: 135 MMOL/L (ref 133–144)
UFH PPP CHRO-ACNC: <0.1 IU/ML
WBC # BLD AUTO: 20.1 10E9/L (ref 4–11)

## 2021-04-09 PROCEDURE — 36415 COLL VENOUS BLD VENIPUNCTURE: CPT | Performed by: INTERNAL MEDICINE

## 2021-04-09 PROCEDURE — 85613 RUSSELL VIPER VENOM DILUTED: CPT | Performed by: HOSPITALIST

## 2021-04-09 PROCEDURE — 85390 FIBRINOLYSINS SCREEN I&R: CPT | Mod: 26 | Performed by: PATHOLOGY

## 2021-04-09 PROCEDURE — 250N000011 HC RX IP 250 OP 636: Performed by: RADIOLOGY

## 2021-04-09 PROCEDURE — 99152 MOD SED SAME PHYS/QHP 5/>YRS: CPT

## 2021-04-09 PROCEDURE — 86146 BETA-2 GLYCOPROTEIN ANTIBODY: CPT | Performed by: INTERNAL MEDICINE

## 2021-04-09 PROCEDURE — 99223 1ST HOSP IP/OBS HIGH 75: CPT | Performed by: INTERNAL MEDICINE

## 2021-04-09 PROCEDURE — 255N000002 HC RX 255 OP 636: Performed by: RADIOLOGY

## 2021-04-09 PROCEDURE — 85520 HEPARIN ASSAY: CPT | Performed by: HOSPITALIST

## 2021-04-09 PROCEDURE — 250N000013 HC RX MED GY IP 250 OP 250 PS 637: Performed by: HOSPITALIST

## 2021-04-09 PROCEDURE — 258N000003 HC RX IP 258 OP 636: Performed by: RADIOLOGY

## 2021-04-09 PROCEDURE — 85732 THROMBOPLASTIN TIME PARTIAL: CPT | Performed by: HOSPITALIST

## 2021-04-09 PROCEDURE — 83090 ASSAY OF HOMOCYSTEINE: CPT | Performed by: INTERNAL MEDICINE

## 2021-04-09 PROCEDURE — 86147 CARDIOLIPIN ANTIBODY EA IG: CPT | Performed by: INTERNAL MEDICINE

## 2021-04-09 PROCEDURE — 93306 TTE W/DOPPLER COMPLETE: CPT

## 2021-04-09 PROCEDURE — 999N001023 HC STATISTIC INR NC: Performed by: HOSPITALIST

## 2021-04-09 PROCEDURE — 83695 ASSAY OF LIPOPROTEIN(A): CPT | Performed by: INTERNAL MEDICINE

## 2021-04-09 PROCEDURE — 3E05317 INTRODUCTION OF OTHER THROMBOLYTIC INTO PERIPHERAL ARTERY, PERCUTANEOUS APPROACH: ICD-10-PCS | Performed by: RADIOLOGY

## 2021-04-09 PROCEDURE — 258N000003 HC RX IP 258 OP 636: Performed by: HOSPITALIST

## 2021-04-09 PROCEDURE — 250N000013 HC RX MED GY IP 250 OP 250 PS 637: Performed by: PHYSICIAN ASSISTANT

## 2021-04-09 PROCEDURE — 99356 PR PROLONGED SERV,INPATIENT,1ST HR: CPT | Performed by: INTERNAL MEDICINE

## 2021-04-09 PROCEDURE — C1769 GUIDE WIRE: HCPCS

## 2021-04-09 PROCEDURE — G0452 MOLECULAR PATHOLOGY INTERPR: HCPCS | Mod: 26 | Performed by: PATHOLOGY

## 2021-04-09 PROCEDURE — 80048 BASIC METABOLIC PNL TOTAL CA: CPT | Performed by: HOSPITALIST

## 2021-04-09 PROCEDURE — 81291 MTHFR GENE: CPT | Performed by: INTERNAL MEDICINE

## 2021-04-09 PROCEDURE — 93306 TTE W/DOPPLER COMPLETE: CPT | Mod: 26 | Performed by: INTERNAL MEDICINE

## 2021-04-09 PROCEDURE — 85525 HEPARIN NEUTRALIZATION: CPT | Performed by: HOSPITALIST

## 2021-04-09 PROCEDURE — 999N001035 HC STATISTIC THROMBIN TIME NC: Performed by: HOSPITALIST

## 2021-04-09 PROCEDURE — 85027 COMPLETE CBC AUTOMATED: CPT | Performed by: HOSPITALIST

## 2021-04-09 PROCEDURE — 120N000001 HC R&B MED SURG/OB

## 2021-04-09 PROCEDURE — 99233 SBSQ HOSP IP/OBS HIGH 50: CPT | Performed by: INTERNAL MEDICINE

## 2021-04-09 PROCEDURE — 85597 PHOSPHOLIPID PLTLT NEUTRALIZ: CPT | Performed by: HOSPITALIST

## 2021-04-09 PROCEDURE — 272N000194 HC ACCESSORY CR3

## 2021-04-09 PROCEDURE — 250N000013 HC RX MED GY IP 250 OP 250 PS 637: Performed by: RADIOLOGY

## 2021-04-09 PROCEDURE — 36415 COLL VENOUS BLD VENIPUNCTURE: CPT | Performed by: HOSPITALIST

## 2021-04-09 PROCEDURE — 85730 THROMBOPLASTIN TIME PARTIAL: CPT | Performed by: HOSPITALIST

## 2021-04-09 RX ORDER — NALOXONE HYDROCHLORIDE 0.4 MG/ML
0.2 INJECTION, SOLUTION INTRAMUSCULAR; INTRAVENOUS; SUBCUTANEOUS
Status: DISCONTINUED | OUTPATIENT
Start: 2021-04-09 | End: 2021-04-09 | Stop reason: HOSPADM

## 2021-04-09 RX ORDER — NALOXONE HYDROCHLORIDE 0.4 MG/ML
0.4 INJECTION, SOLUTION INTRAMUSCULAR; INTRAVENOUS; SUBCUTANEOUS
Status: DISCONTINUED | OUTPATIENT
Start: 2021-04-09 | End: 2021-04-09 | Stop reason: HOSPADM

## 2021-04-09 RX ORDER — HEPARIN SODIUM 200 [USP'U]/100ML
2 INJECTION, SOLUTION INTRAVENOUS CONTINUOUS PRN
Status: DISCONTINUED | OUTPATIENT
Start: 2021-04-09 | End: 2021-04-09 | Stop reason: HOSPADM

## 2021-04-09 RX ORDER — FLUMAZENIL 0.1 MG/ML
0.2 INJECTION, SOLUTION INTRAVENOUS
Status: DISCONTINUED | OUTPATIENT
Start: 2021-04-09 | End: 2021-04-09 | Stop reason: HOSPADM

## 2021-04-09 RX ORDER — FENTANYL CITRATE 50 UG/ML
25-50 INJECTION, SOLUTION INTRAMUSCULAR; INTRAVENOUS EVERY 5 MIN PRN
Status: DISCONTINUED | OUTPATIENT
Start: 2021-04-09 | End: 2021-04-09 | Stop reason: HOSPADM

## 2021-04-09 RX ORDER — IODIXANOL 320 MG/ML
50 INJECTION, SOLUTION INTRAVASCULAR ONCE
Status: COMPLETED | OUTPATIENT
Start: 2021-04-09 | End: 2021-04-09

## 2021-04-09 RX ORDER — DIPHENHYDRAMINE HCL 25 MG
25 CAPSULE ORAL EVERY 6 HOURS PRN
Status: DISCONTINUED | OUTPATIENT
Start: 2021-04-09 | End: 2021-04-15

## 2021-04-09 RX ADMIN — ALTEPLASE 0.5 MG/HR: 2.2 INJECTION, POWDER, LYOPHILIZED, FOR SOLUTION INTRAVENOUS at 04:18

## 2021-04-09 RX ADMIN — FERROUS SULFATE TAB 325 MG (65 MG ELEMENTAL FE) 325 MG: 325 (65 FE) TAB at 08:57

## 2021-04-09 RX ADMIN — BUSPIRONE HYDROCHLORIDE 10 MG: 10 TABLET ORAL at 20:21

## 2021-04-09 RX ADMIN — HEPARIN SODIUM 2 BAG: 200 INJECTION, SOLUTION INTRAVENOUS at 14:47

## 2021-04-09 RX ADMIN — BUSPIRONE HYDROCHLORIDE 10 MG: 10 TABLET ORAL at 08:56

## 2021-04-09 RX ADMIN — IODIXANOL 30 ML: 320 INJECTION, SOLUTION INTRAVASCULAR at 15:31

## 2021-04-09 RX ADMIN — FENTANYL CITRATE 50 MCG: 50 INJECTION, SOLUTION INTRAMUSCULAR; INTRAVENOUS at 14:49

## 2021-04-09 RX ADMIN — Medication: at 08:30

## 2021-04-09 RX ADMIN — MIDAZOLAM HYDROCHLORIDE 1 MG: 1 INJECTION, SOLUTION INTRAMUSCULAR; INTRAVENOUS at 14:57

## 2021-04-09 RX ADMIN — DIPHENHYDRAMINE HYDROCHLORIDE 25 MG: 25 CAPSULE ORAL at 17:49

## 2021-04-09 RX ADMIN — SODIUM CHLORIDE: 9 INJECTION, SOLUTION INTRAVENOUS at 11:27

## 2021-04-09 RX ADMIN — FENTANYL CITRATE 50 MCG: 50 INJECTION, SOLUTION INTRAMUSCULAR; INTRAVENOUS at 14:57

## 2021-04-09 RX ADMIN — ALTEPLASE 0.5 MG/HR: 2.2 INJECTION, POWDER, LYOPHILIZED, FOR SOLUTION INTRAVENOUS at 16:18

## 2021-04-09 RX ADMIN — SENNOSIDES AND DOCUSATE SODIUM 2 TABLET: 8.6; 5 TABLET ORAL at 08:56

## 2021-04-09 RX ADMIN — SENNOSIDES AND DOCUSATE SODIUM 1 TABLET: 8.6; 5 TABLET ORAL at 20:21

## 2021-04-09 RX ADMIN — SERTRALINE HYDROCHLORIDE 50 MG: 50 TABLET ORAL at 08:56

## 2021-04-09 RX ADMIN — SODIUM CHLORIDE: 9 INJECTION, SOLUTION INTRAVENOUS at 22:10

## 2021-04-09 ASSESSMENT — ACTIVITIES OF DAILY LIVING (ADL)
ADLS_ACUITY_SCORE: 15
ADLS_ACUITY_SCORE: 16
ADLS_ACUITY_SCORE: 14

## 2021-04-09 NOTE — PRE-PROCEDURE
GENERAL PRE-PROCEDURE:   Procedure:  TPA follow up today to left leg   Date/Time:  4/9/2021 8:30 AM    Written consent obtained?: Yes    Risks and benefits: Risks, benefits and alternatives were discussed    Consent given by:  Patient  Patient states understanding of procedure being performed: Yes    Patient's understanding of procedure matches consent: Yes    Procedure consent matches procedure scheduled: Yes    Expected level of sedation:  Moderate  Appropriately NPO:  Yes  ASA Class:  Class 2- mild systemic disease, no acute problems, no functional limitations  Mallampati  :  Grade 1- soft palate, uvula, tonsillar pillars, and posterior pharyngeal wall visible  Lungs:  Lungs clear with good breath sounds bilaterally  Heart:  Normal heart sounds with tachycardia  History & Physical reviewed:  History and physical reviewed and no updates needed  Statement of review:  I have reviewed the lab findings, diagnostic data, medications, and the plan for sedation    Patient is on IR schedule today Friday 4/9/21 for a Catheter directed lytic follow up to the left leg in the afternoon.     She is tolerating the left leg pain with PCA Dilaudid as it has improved overnight. She is comfortable waiting until the afternoon to have the IR f/up re check.     Physical exam:   General: Alert, oriented, pleasant, weepy young woman in some distress  Cardiac and Respiratory as above  Right groin: Dressing is saturated with blood which is unchanged, no ecchymosis or pain with palpation at the site, Heparin and TPA infusing in catheter, sheath and wire.   Bilateral lower extremities: Both = and warm to touch below knees. Skin coloration = through lower extremities. No mottling noted. Tips of left toes slightly reddened. Sensation intact. Slight numbness still.   Pain is mainly in the left calf    -Labs WNL for procedure.    -Orders for NPO will be entered after Sruthi has some breakfast.   -Consent was obtained with patient after  explaining the procedure, risks and benefits and consent is on the patient's chart.     [place] IR [place] at [phone] for procedural related questions.     Discussed with MCKINLEY Garzon today.    P: Reviewed with Dr Mcgovern. Since Sruthi is tolerating the catheter directed lytics with decreased pain will allow the TPA to infuse longer (started at 2030 4/8)  and bring her down to IR in the afternoon.     Thanks [facility] CNP ([phone]) (phone [phone])

## 2021-04-09 NOTE — PLAN OF CARE
VSS ex HTN. 2L NC overnight, desat while sleeping. A&Ox4, anxious at times. TPA infusing through sheath & wire, heparin infusing through sheath into right groin site. Site soft, gauze saturated with blood but did not extend. Bedrest. PCA for pain. When arrived to floor @ 9pm, left foot was numb, mottled/pale, toes purple and absent pulses. Since 2am foot has improved - still pale but toes drake/red, able to feel touch to foot & DP pulse present with doppler. Pain has also improved, still sore. Unable to void, straight cath'd for 1000cc. Tele SR with PVCs. LS clear. +BS. IVFs infusing. NPO since midnight for recheck today.

## 2021-04-09 NOTE — PROCEDURES
[facility]    Procedure: LLE angiogram and tPA    Date/Time: 4/9/2021 4:29 PM  Performed by: René Longoria MD  Authorized by: René Longoria MD     UNIVERSAL PROTOCOL   Site Marked: NA  Prior Images Obtained and Reviewed:  Yes  Required items: Required blood products, implants, devices and special equipment available    Patient identity confirmed:  Verbally with patient, arm band, provided demographic data and hospital-assigned identification number  Patient was reevaluated immediately before administering moderate or deep sedation or anesthesia  Confirmation Checklist:  Patient's identity using two indicators, relevant allergies, procedure was appropriate and matched the consent or emergent situation and correct equipment/implants were available  Time out: Immediately prior to the procedure a time out was called    Universal Protocol: the Joint Commission Universal Protocol was followed    Preparation: Patient was prepped and draped in usual sterile fashion    ESBL (mL):  5         ANESTHESIA    Anesthesia: Local infiltration  Local Anesthetic:  Lidocaine 1% without epinephrine      SEDATION    Patient Sedated: Yes    Sedation Type:  Moderate (conscious) sedation  Vital signs: Vital signs monitored during sedation    See dictated procedure note for full details.  Findings: LLE angiogram.  Multiple filling defects in the popliteal and runoff arteries.  Some improvement from yesterday, though showered into runoff arteries.  Do not have long infusion wires, therefore, unable to extend into runoff arteries.  20 cm infusion catheter throughout popliteal artery and into proximal peroneal artery.  Will plan for tPA at 0.5 mg/hour and heparin as before.    Specimens: none    Complications: None    Condition: Stable    PROCEDURE   Patient Tolerance:  Patient tolerated the procedure well with no immediate complications    Length of time physician/provider present for 1:1  monitoring during sedation: 45

## 2021-04-09 NOTE — PLAN OF CARE
IMC. A/OX4. VSS except slightly hypertensive. Tele NSR. On 1LO2 intermittently when laying flat. LS clear, promoting IS. Regular diet with good appetite. Strict bedrest. TPA infusing at 0.5mg/hr. Heparin running at 500 units/hr both running into sheath of right groin. Covered with gauze and Tegaderm. Site CDI and soft. Pain noted in mainly the right calf. PCA pump utilized with improvement. Pulses palpable with doppler besides Left PT, MD aware. Numbness noted in left foot, making improvement. Unable to void this afternoon, levy catheter placed per RN. Output adequate. Down to IR at 1430 for angiogram. Plan for NPO at midnight and reassessment tomorrow.

## 2021-04-09 NOTE — CONSULTS
[facility]  Vascular Medicine Consultation         Hollis Liu MD, KIKA, [facility]    Sruthi Mac MRN# [id number]   YOB: 1990 Age: 30 year old      Date of Admission:  4/8/2021  Date of Consult: 4/9/2021         Assessment and Plan:     1. Left Popliteal artery Thromboembolic occlusion ? Acute on chronic left leg ischemia   S/p   EMERGENT ABDOMINAL AORTIC AND LEFT LOWER EXTREMITY DIAGNOSTIC  ARTERIOGRAPHY and  MECHANICAL THROMBECTOMY OF THE LEFT POPLITEAL ARTERY  On 4/8/2020:  At present unclear etiology (she is obese, on birth control pills and recent first dose of Covid vaccine, no cardiac arrhythmias, no miscarriage, no family history of hypercoagulable status, Covid test negative) acute on chronic left leg ischemia with asymptomatic suspected prior right leg events, she has no palpable pulses at the time of admission and poor opacification of the right PFA and tibial vessels on CTA runoff. D dimer normal this admission.    Thrombocytosis at the time of admission ? Acute phase reactant ( 576 -->414  On lysis ? Consumption etc)   Follow daily platelets    2.Obesity ( weighs 270 lb)     3. Taking oral contraceptive pills (  For the last 8-9 YEARS)    4. Acne: continue PTA spiranolactone    5. Hx of Depression and Anxiety: continue PTA meds    6. 4.3 x 5.7 cm left adnexal cyst (she needs pelvic ultrasound before the discharge from the hospital and consider getting CA-125)    This is a very pleasant young 30-year-old female obese on oral contraceptive pills received first dose of Pfizer Covid vaccine a week ago presented to the hospital with 5-day history of numbness and pain in her left lower extremity work-up revealed left popliteal artery occlusion underwent emergent IR thrombectomy and initiation of catheter directed thrombolysis on April 8, 2021.  Per report yesterday mottling of toes but today left foot is well-perfused, mottling resolved, dopplerable RT PT and DP  today. No compartmental signs and CPK normal.  Left foot Dopplerable DP and PT this am, well perfused and she is in pain. Access site looks good.  She underwent echocardiogram today, normal LV function, RV function and no valvular abnormalities, dilatation of the inferior vena cava with abnormal respiratory variation ?  Interatrial septum is intact ?? No bubble study     At present or recommendations,  CD lysis check later today, monitor access site, monitor CMS etc  I have added arterial thrombus related APLA, ACLA, beta-2 glycoprotein antibodies, homocysteine, MTHFR and lipoprotein a labs this morning  We will check thyroid function tests  Cardiac monitoring, consider zio patch at the time of discharge and possible DEYVI to look for cardio embolic source and shunt etc (echo today did not comment on bubble study but intact septum?)  Discontinue oral contraceptive pills and avoid estrogen-containing substances consider other nonhormonal options of contraception like IUD etc.  Consider getting right lower extremity arterial duplex given abnormal CTA, suboptimal opacification of the infrapopliteal vasculature.  Check   And pelvic US before discharge (left adnexal cyst)  Rest of the medical management per hospitalist service  At the time of admission she has a thrombocytosis platelet count 576 and dropped to 414 today while getting lysis and possibly due to consumption monitor daily platelet count.    Addendum:  Reviewed lysis recheck, CD lysis continuing for another day  After completion of lysis initiate IV heparin per lysis protocol for 1-2 days  then start oral agents if clinically stable, will ask pharmacy tech to run the cost of DOACS  , lovenox etc    This note was dictated by utilizing Dragon software !    Thank you for the consultation    No vascular medicine rounder this weekend, we will follow this patient on Monday.    Copy of this note to Dr. Gibbs, Dr. Smith, hospitalist service and primary care  physician    Total patient care time spent today more than 100 minutes, prolonged service is due to medical complexity, review of imaging studies, discussion with vascular surgery service documentation etc.    Hollis Liu MD, [facility], [facility]  Vascular Medicine  Clinical Hypertension Specialist  Clinical Lipidologist         Code Status:   Full Code         Primary Care Physician:   Rahel Kirkpatrick [phone]         Requesting Physician:      Elba Smith MD /Dr. Gibbs         Chief Complaint:   Evaluation of arterial thrombosis in a young female non smoker on BCP , obese , admitted with ALI and she received covid vaccine prior to symptoms.     History is obtained from the patient, reviewed epic and discussed with vascular surgery fellow Dr. Smith          History of Present Illness:   Sruthi Mac is a 30 year old very pleasant morbidly obese female working from home as a  weighs approximately 270 pounds on birth control pills for more than 8 to 9 years received Covid vaccine approximately a week ago presented to the hospital with numbness of left foot started 5 days prior to the admission and this was progressively worsened now severe pain when she walks.  Pain is mostly in the leg.  She also gives a history of similar symptoms happened in her right leg but not this severe.  Yesterday at the time of presentation to the ER she also noticed discoloration in her left foot.  She never smoked, no personal history of malignancy, no children and no miscarriages, no family history of hypercoagulable status.  No injury to the knees and no history of athletic activities.     Her D-dimer is normal, COVID-19 test is negative this admission.  She has a mild thrombocytosis at the time of admission platelet count greater than 500    She underwent work-up in the ER , reviewed imaging studies, laboratory data, ER and vascular surgery evaluation.               Past Medical  History:     Past Medical History:   Diagnosis Date     Anxiety      Cystic acne      Depression      Panic attack                Past Surgical History:     Past Surgical History:   Procedure Laterality Date     IR LOWER EXTREMITY ANGIOGRAM LEFT  4/8/2021     wisdom teeth remova                Home Medications:     Prior to Admission medications    Medication Sig Last Dose Taking? Auth Provider   busPIRone (BUSPAR) 10 MG tablet Take 10 mg by mouth 2 times daily 4/8/2021 at Unknown time Yes Unknown, Entered By History   Ferrous Sulfate (IRON) 325 (65 Fe) MG tablet Take 1 tablet by mouth daily 4/8/2021 at Unknown time Yes Unknown, Entered By History   ibuprofen (ADVIL/MOTRIN) 200 MG capsule Take 200-800 mg by mouth every 4 hours as needed for pain or fever 4/8/2021 at Unknown time Yes Unknown, Entered By History   Lactobacillus (PROBIOTIC ACIDOPHILUS PO) Take by mouth daily 4/8/2021 at Unknown time Yes Unknown, Entered By History   magnesium oxide 400 MG CAPS Take by mouth daily 4/8/2021 at Unknown time Yes Unknown, Entered By History   multivitamin, therapeutic (THERA-VIT) TABS tablet Take 1 tablet by mouth daily 4/8/2021 at Unknown time Yes Unknown, Entered By History   norgestim-eth estrad triphasic (TRI FEMYNOR) 0.18/0.215/0.25 MG-35 MCG tablet Take 1 tablet by mouth daily 4/8/2021 at Unknown time Yes Unknown, Entered By History   sertraline (ZOLOFT) 50 MG tablet Take 50 mg by mouth daily 4/8/2021 at Unknown time Yes Unknown, Entered By History   spironolactone (ALDACTONE) 50 MG tablet Take 50 mg by mouth daily 4/8/2021 at Unknown time Yes Unknown, Entered By History   Vitamin D3 (CHOLECALCIFEROL) 25 mcg (1000 units) tablet Take 1 tablet by mouth daily 4/8/2021 at Unknown time Yes Unknown, Entered By History            Current Medications:           busPIRone  10 mg Oral BID     ferrous sulfate  325 mg Oral Daily     hydromorphone  1 mg Intravenous Once     polyethylene glycol  17 g Oral Daily     senna-docusate   "1 tablet Oral BID    Or     senna-docusate  2 tablet Oral BID     sertraline  50 mg Oral Daily     sodium chloride (PF)  3 mL Intracatheter Q8H     acetaminophen, acetaminophen, HYDROmorphone, lidocaine 4%, lidocaine (buffered or not buffered), LORazepam **OR** LORazepam, melatonin, ondansetron **OR** ondansetron, oxyCODONE, - MEDICATION INSTRUCTIONS -, polyethylene glycol, prochlorperazine **OR** prochlorperazine **OR** prochlorperazine, senna-docusate **OR** senna-docusate, sodium chloride (PF), sodium chloride (PF)         Allergies:     Allergies   Allergen Reactions     Erythromycin      Penicillins             Social History:   Sruthi Mac  reports that she has never smoked. She does not have any smokeless tobacco history on file. She reports current alcohol use. She reports that she does not use drugs.            Family History:     Family History   Problem Relation Age of Onset     Hypertension Father      Diabetes Father      Myocardial Infarction Father                Review of Systems:   The 10 point Review of Systems is negative other than noted in the HPI.             Physical Exam:    , Blood pressure (!) 148/86, pulse 98, temperature 98.2  F (36.8  C), temperature source Oral, resp. rate 11, height 1.753 m (5' 9\"), weight 122.5 kg (270 lb), SpO2 94 %.  270 lbs 0 oz    Constitutional:  She still having pain in the leg getting CD lytics the left lower extremity   Psych:  Normal affect and mood but worried   Neuro:  Alert awake oriented x3   Eyes:  PERRLA, EOMI   ENT:  Negative   Lymph:  No palpable lymphadenopathy   Cardiovascular:  RRR no murmurs no gallop no rub   Lungs:  Clear to auscultation   Abdomen:  Obese soft positive bowel sounds   Skin:  NAD   Musculoskeletal:  Ongoing pain in the left lower extremity and this morning her foot looks well perfused and toes looks normal and warm to touch   Other:           Data:   All new lab and imaging data was reviewed.    Results for orders " placed or performed during the hospital encounter of 04/08/21   IR Procedure Note     Status: None    Narrative    Juan Kam MD     4/8/2021  8:39 PM  [facility]    Procedure: IR Procedure Note    Date/Time: 4/8/2021 8:23 PM  Performed by: Juan Kam MD  Authorized by: Juan Kam MD     UNIVERSAL PROTOCOL   Site Marked: NA  Prior Images Obtained and Reviewed:  Yes  Required items: Required blood products, implants, devices and special   equipment available    Patient identity confirmed:  Verbally with patient, arm band, provided   demographic data and hospital-assigned identification number  Patient was reevaluated immediately before administering moderate or deep   sedation or anesthesia  Confirmation Checklist:  Patient's identity using two indicators, relevant   allergies, procedure was appropriate and matched the consent or emergent   situation and correct equipment/implants were available  Time out: Immediately prior to the procedure a time out was called    Universal Protocol: the Joint Commission Universal Protocol was followed    Preparation: Patient was prepped and draped in usual sterile fashion           ANESTHESIA    Anesthesia: Local infiltration  Local Anesthetic:  Lidocaine 1% without epinephrine      SEDATION    Patient Sedated: Yes    Sedation Type:  Moderate (conscious) sedation  Vital signs: Vital signs monitored during sedation    See dictated procedure note for full details.  Findings: Complete occlusion of left popliteal artery with leg below knee   supplied by small anirudh-geniculate collaterals.  Mechanical suction   thrombectomy performed with removal of small amount of clot however   significant residual clot below the knee.  Successful placement of a   coaxial infusion system across the popliteal occlusion and into the   posterior tibial artery at the mid calf (unthrombosed at this level).    Specimens: none    Complications: None and Arterial occlusion /  thrombosis, remote from   puncture site    Condition: Stable (Patient in guarded condition with acutely threatened   Left limb.)    Plan: Guarded condition as it is unclear if the thrombolysis will improve   the situation.  Monitor closely for signs of progressive mottling   (currently only foot and lateral skin below the knee (marked on the skin).    Monitor for compartment syndrome.  Patient's pain is progressing and I am   starting a PCA.  The current events and procedure findings were discussed   with Dr. Gibbs (vascular surgery) who is currently with the on call   physician (Dr. Clark).  At this time they do not feel that the OR is   necessary and recommend allowing the lytics time to work with close   surveillance.    PROCEDURE   Patient Tolerance:  Patient tolerated the procedure well with no immediate   complications    Length of time physician/provider present for 1:1 monitoring during   sedation: 60   US Lower Extremity Venous Duplex Left     Status: None    Narrative    VENOUS ULTRASOUND LEFT LEG  4/8/2021 4:01 PM     HISTORY: Patent left leg, leg pain, phlegmasia    COMPARISON: None.    FINDINGS:    Incidentally noted there is occlusive thrombus in the left popliteal  artery.    Examination of the deep veins with graded compression and color flow  Doppler with spectral wave form analysis shows no evidence of thrombus  in the common femoral vein, femoral vein, popliteal vein or calf  veins.       Impression    IMPRESSION:  1. Occlusion of the left popliteal artery, recommend CTA with runoff  for further evaluation.  2. No DVT in the left lower extremity.    Findings were discussed with Dr. Koehler 4:05 PM on 4/8/2021.    ALDEN BRAND,    CTA Chest Abdomen Pelvis Runoff w Contrast     Status: None    Narrative    EXAM: CTA CHEST ABDOMEN PELVIS RUNOFF W CONTRAST  LOCATION: [facility]  DATE/TIME: 4/8/2021 4:37 PM    INDICATION: Left leg pain.  COMPARISON: None.  TECHNIQUE: CT angiogram  through the abdomen, pelvis, and bilateral lower extremities was performed during the arterial phase of contrast enhancement. Second phase arterial imaging was performed through the bilateral lower extremities. 2D and 3D   reconstructions performed by the CT technologist. Dose reduction techniques were used.  CONTRAST: 100 mL Isovue-370    FINDINGS:   CTA CHEST ABDOMEN/PELVIS: Ascending thoracic aorta unremarkable. Conventional anatomy of the great vessels. Descending thoracic aorta unremarkable. The celiac artery, superior mesenteric artery and inferior mesenteric artery are patent. Single renal   arteries with no evidence of renal artery stenosis. Infrarenal abdominal aorta unremarkable. No evidence of abdominal aortic aneurysm. Common iliac, external iliac and internal iliac arteries are patent bilaterally.    RIGHT LEG: The common femoral, profunda femoral and superficial femoral arteries are patent. Right popliteal artery patent. There is suboptimal opacification of the infrapopliteal vasculature which I suspect is related to timing of the arterial bolus.    LEFT LEG: The common femoral, profunda femoral and superficial femoral arteries are patent. There is abrupt cut off of the opacification of the left popliteal artery with associated filling defect. Findings consistent with arterial embolus. There is   suboptimal opacification of the infrapopliteal vasculature which I am uncertain if this is related to suboptimal collateral flow or poor timing of the arterial bolus.    NONVASCULAR FINDINGS:  MEDIASTINUM: Normal    LUNGS/PLEURA: Normal    ABDOMEN: The liver, gallbladder, pancreas, spleen, adrenal glands and kidneys are unremarkable.    PELVIS: 4.3 x 5.7 cm left adnexal cyst. Uterus unremarkable. Bladder unremarkable.    MUSCULOSKELETAL: Degenerative change of the lumbar spine.      Impression    IMPRESSION:  1.  Abrupt cut off of the opacification the left popliteal artery with associated filling defect.  Findings consistent with arterial bolus. Suboptimal opacification of the infrapopliteal vasculature. I am uncertain if this is related to suboptimal   collateral flow or poor timing of the arterial bolus.  2.  Suboptimal opacification of the right infrapopliteal vasculature. I suspect this is related to  poor timing of the arterial bolus.  3. 4.3 x 5.7 cm left adnexal cyst. Recommend follow-up pelvic ultrasound.    REFERENCE:  Guidelines for incidental benign or probably benign adnexal cyst on CT/MRI.  Managing Incidental Findings on Abdominal and Pelvic CT and MRI, Part 1: White Paper of the ACR Incidental Findings Committee II on Adnexal Findings. J Am Simona Radiol 2013;10:675-681.    Early postmenopausal (under 55 years old):  Less than 3 cm: Benign, no follow up.    Greater than 3 cm: US follow up in 8 weeks.       IR Lower Extremity Angiogram Left     Status: None    Narrative    [facility] [place] [facility]  LOCATION: [facility]  DATE: 4/8/2021    PROCEDURE:   1.  EMERGENT ABDOMINAL AORTIC AND LEFT LOWER EXTREMITY DIAGNOSTIC  ARTERIOGRAPHY.  2.  MECHANICAL THROMBECTOMY OF THE LEFT POPLITEAL ARTERY WITH  EXTIRPATION OF MATTER (THROMBUS).  3.  PLACEMENT OF A COAXIAL INFUSION CATHETER EXTENDING FROM THE LEFT  SUPERFICIAL FEMORAL ARTERY THROUGH THE LEFT POSTERIOR TIBIAL ARTERY  WITH INITIATION OF ARTERIAL THROMBOLYTIC THERAPY TO THE LEFT LOWER  EXTREMITY.  4.  MODERATE SEDATION.    INTERVENTIONAL RADIOLOGIST: Juan Kam MD.    INDICATION: 30-year-old female with acute limb ischemia involving the  left lower extremity. The patient's symptoms initially developed 6  days ago however today she noted mottling of her left foot prompting  evaluation of the emergency room. Ultrasound and CT arteriography  reveal abrupt occlusion of the left popliteal artery with poor  perfusion distally.    CONSENT: The risks, benefits and alternatives of arteriography with  possible angioplasty, stent placement thrombectomy or  thrombolytics  were discussed with the patient and her fiance in detail. All  questions were answered. Informed consent was given to proceed with  the procedure.    MODERATE SEDATION: Versed 6 mg IV; Fentanyl 400 mcg IV.  Under  physician supervision, Versed and fentanyl were administered for  moderate sedation. Pulse oximetry, heart rate and blood pressure were  continuously monitored by an independent trained observer. The  physician spent 52 minutes of face-to-face sedation time with the  patient.    CONTRAST: 60 mL Visipaque 320 intra-arterially.  ANTIBIOTICS: None.  ADDITIONAL MEDICATIONS: TPA 10 mg directly into the left lower  extremity thrombus. Heparin 1800 units per hour infusing peripherally.    FLUOROSCOPIC TIME: 20 minutes.  RADIATION DOSE: Air Kerma: 381 mGy.    COMPLICATIONS: Worsening of the left foot mottling with progressive  pain throughout the procedure.    STERILE BARRIER TECHNIQUE: Maximum sterile barrier technique was used.  Cutaneous antisepsis was performed at the operative site with  application of 2% chlorhexidine and large sterile drape. Prior to the  procedure, the  and assistant performed hand hygiene and wore  hat, mask, sterile gown, and sterile gloves during the entire  procedure.    PROCEDURE:    Using real-time ultrasound guidance, access was achieved into the  right common femoral artery and conversion was made for a 5 Latvian  vascular sheath which was attached to continuous heparinized saline  infusion.    A flush catheter was positioned in the upper abdominal aorta and  digital subtraction abdominal aortography was obtained. The catheter  was repositioned above the aortoiliac bifurcation and bilateral  oblique pelvic digital subtraction arteriography was obtained. The  catheter was manipulated over a wire into the left external iliac  artery (contralateral second order). From this location digital  subtraction left lower extremity arteriography was performed through  the  foot. It was elected to proceed with limb salvage interventions.    The access sheath was exchanged for a 6 Green[place]ic Etienne sheath which was  positioned with its tip in the left external iliac artery. A Kumpe  catheter was then advanced into the distal superficial femoral artery  and repeat digital subtraction arteriography was obtained. Over wire  exchange was made for a CAT 6 penumbra suction thrombectomy device  which was situated within the popliteal thrombus with and without a  separator with extirpation of a small amount of thrombotic matter.  Repeat arteriography was obtained.    Exchange is made for a Kumpe catheter which was then directed through  the tibioperoneal artery and into the posterior tibial artery.  Endoluminal positioning was confirmed with an injection of contrast.  Over-the-wire exchange was then made for a coaxial infusion system  comprised of a 20 cm infusion catheter and 9 cm transverse and  infusion wire. The catheter was positioned extending from the distal  superficial femoral artery through the tibioperoneal artery with the  infusion wire extending into the posterior tibial artery. Arterial  thrombotic therapy was initiated.    FINDINGS:  Ultrasound demonstrates a patent pulsatile right common femoral  artery. A permanent image was stored.    The digital subtraction abdominal aortography shows minimal  atheromatous disease within a nonaneurysmal abdominal aorta. The  celiac, superior mesenteric, inferior mesenteric and renal arteries  are widely patent. No identified thromboembolic source.    The digital subtraction left lower extremity diagnostic arteriography  reveals minimal atheromatous disease. There is a high takeoff of the  profunda femoris artery at the level of the femoral head. Abrupt  occlusion of the mid popliteal artery at the level of the patella with  an endoluminal filling defect with an appearance most suggestive of a  thromboembolic occlusion. This is occluded over a  length of  approximately 20 cm with small diameter geniculate and skin  collaterals ultimately reconstituting the posterior tibial and  anterior tibial arteries at the level of the mid calf. Once  reconstituted there is sluggish antegrade flow within these vessels,  secondary to either poor perfusion pressure or distal thromboemboli in  the foot.    Images obtained during the mechanical suction thrombectomy showed a  CAT 6 device. A small amount of thrombus was removed. Arteriography  following removal of this thrombus show slight interval improvement  however there remains a large amount of thrombus with occlusion of the  distal below-knee popliteal artery and proximal trifurcation arterial  vasculature.    The completion images show the coaxial infusion catheter system as  detailed above.      Impression    IMPRESSION:    1.  Abrupt occlusion of the left popliteal artery extending inferiorly  to involve the tibial peroneal and proximal calf trifurcation arterial  vasculature. The appearances most suggestive of a thromboembolic  occlusion.  2.  Mechanical suction thrombectomy with extirpation of a small amount  of thrombus however a large amount of occlusive thrombus persists  below the knee. Tactilely and visually this thrombus appeared to have  a more from chronic component with superimposed acute thrombus.  3.  Initiation of arterial thrombotic therapy to the left lower  extremity in an effort of limb salvage as detailed above.    PLAN:  The patient did develop progressive mottling of the foot during the  procedure as well as significant pain. Additional pain medication as  well as a Dilaudid PCA have been started. Her condition remains quite  guarded were discussed with vascular surgery with a potential loss of  limb. The findings of the case were discussed with vascular surgery  who is also following.    TASIA YUAN MD   CBC with platelets differential     Status: Abnormal   Result Value Ref Range    WBC  14.2 (H) 4.0 - 11.0 10e9/L    RBC Count 4.21 3.8 - 5.2 10e12/L    Hemoglobin 12.0 11.7 - 15.7 g/dL    Hematocrit 35.9 35.0 - 47.0 %    MCV 85 78 - 100 fl    MCH 28.5 26.5 - 33.0 pg    MCHC 33.4 31.5 - 36.5 g/dL    RDW 12.8 10.0 - 15.0 %    Platelet Count 576 (H) 150 - 450 10e9/L    Diff Method Automated Method     % Neutrophils 81.3 %    % Lymphocytes 14.2 %    % Monocytes 3.2 %    % Eosinophils 0.5 %    % Basophils 0.5 %    % Immature Granulocytes 0.3 %    Nucleated RBCs 0 0 /100    Absolute Neutrophil 11.6 (H) 1.6 - 8.3 10e9/L    Absolute Lymphocytes 2.0 0.8 - 5.3 10e9/L    Absolute Monocytes 0.5 0.0 - 1.3 10e9/L    Absolute Eosinophils 0.1 0.0 - 0.7 10e9/L    Absolute Basophils 0.1 0.0 - 0.2 10e9/L    Abs Immature Granulocytes 0.0 0 - 0.4 10e9/L    Absolute Nucleated RBC 0.0    D dimer quantitative     Status: None   Result Value Ref Range    D Dimer 0.3 0.0 - 0.50 ug/ml FEU   INR     Status: None   Result Value Ref Range    INR 1.05 0.86 - 1.14   Partial thromboplastin time     Status: None   Result Value Ref Range    PTT 26 22 - 37 sec   Basic metabolic panel     Status: Abnormal   Result Value Ref Range    Sodium 134 133 - 144 mmol/L    Potassium 3.9 3.4 - 5.3 mmol/L    Chloride 104 94 - 109 mmol/L    Carbon Dioxide 23 20 - 32 mmol/L    Anion Gap 7 3 - 14 mmol/L    Glucose 104 (H) 70 - 99 mg/dL    Urea Nitrogen 10 7 - 30 mg/dL    Creatinine 0.75 0.52 - 1.04 mg/dL    GFR Estimate >90 >60 mL/min/[1.73_m2]    GFR Estimate If Black >90 >60 mL/min/[1.73_m2]    Calcium 9.6 8.5 - 10.1 mg/dL   CK total     Status: None   Result Value Ref Range    CK Total 39 30 - 225 U/L   HCG qualitative     Status: None   Result Value Ref Range    HCG Qualitative Serum Negative NEG^Negative   Asymptomatic SARS-CoV-2 COVID-19 Virus (Coronavirus) by PCR     Status: None    Specimen: Nasopharyngeal   Result Value Ref Range    SARS-CoV-2 Virus Specimen Source Nasopharyngeal     SARS-CoV-2 PCR Result NEGATIVE     SARS-CoV-2 PCR Comment  (Note)    Partial thromboplastin time     Status: Abnormal   Result Value Ref Range    PTT 60 (H) 22 - 37 sec   CBC with platelets     Status: Abnormal   Result Value Ref Range    WBC 21.9 (H) 4.0 - 11.0 10e9/L    RBC Count 3.83 3.8 - 5.2 10e12/L    Hemoglobin 10.9 (L) 11.7 - 15.7 g/dL    Hematocrit 33.0 (L) 35.0 - 47.0 %    MCV 86 78 - 100 fl    MCH 28.5 26.5 - 33.0 pg    MCHC 33.0 31.5 - 36.5 g/dL    RDW 13.0 10.0 - 15.0 %    Platelet Count 447 150 - 450 10e9/L   Basic metabolic panel     Status: Abnormal   Result Value Ref Range    Sodium 135 133 - 144 mmol/L    Potassium 4.2 3.4 - 5.3 mmol/L    Chloride 105 94 - 109 mmol/L    Carbon Dioxide 23 20 - 32 mmol/L    Anion Gap 7 3 - 14 mmol/L    Glucose 116 (H) 70 - 99 mg/dL    Urea Nitrogen 6 (L) 7 - 30 mg/dL    Creatinine 0.56 0.52 - 1.04 mg/dL    GFR Estimate >90 >60 mL/min/[1.73_m2]    GFR Estimate If Black >90 >60 mL/min/[1.73_m2]    Calcium 8.2 (L) 8.5 - 10.1 mg/dL   CBC with platelets     Status: Abnormal   Result Value Ref Range    WBC 20.1 (H) 4.0 - 11.0 10e9/L    RBC Count 3.80 3.8 - 5.2 10e12/L    Hemoglobin 10.8 (L) 11.7 - 15.7 g/dL    Hematocrit 33.0 (L) 35.0 - 47.0 %    MCV 87 78 - 100 fl    MCH 28.4 26.5 - 33.0 pg    MCHC 32.7 31.5 - 36.5 g/dL    RDW 13.2 10.0 - 15.0 %    Platelet Count 414 150 - 450 10e9/L   Heparin Unfractionated Anti Xa Level     Status: None   Result Value Ref Range    Heparin Unfractionated Anti Xa Level <0.10 IU/mL   EKG 12 lead     Status: None   Result Value Ref Range    Interpretation ECG Click View Image link to view waveform and result    ABO/Rh type and screen     Status: None   Result Value Ref Range    ABO A     RH(D) Pos     Antibody Screen Neg     Test Valid Only At [facility]        Specimen Expires 04/11/2021

## 2021-04-09 NOTE — PROCEDURES
[facility]    Procedure: IR Procedure Note    Date/Time: 4/8/2021 8:23 PM  Performed by: Juan Kam MD  Authorized by: Juan Kam MD     UNIVERSAL PROTOCOL   Site Marked: NA  Prior Images Obtained and Reviewed:  Yes  Required items: Required blood products, implants, devices and special equipment available    Patient identity confirmed:  Verbally with patient, arm band, provided demographic data and hospital-assigned identification number  Patient was reevaluated immediately before administering moderate or deep sedation or anesthesia  Confirmation Checklist:  Patient's identity using two indicators, relevant allergies, procedure was appropriate and matched the consent or emergent situation and correct equipment/implants were available  Time out: Immediately prior to the procedure a time out was called    Universal Protocol: the Joint Commission Universal Protocol was followed    Preparation: Patient was prepped and draped in usual sterile fashion           ANESTHESIA    Anesthesia: Local infiltration  Local Anesthetic:  Lidocaine 1% without epinephrine      SEDATION    Patient Sedated: Yes    Sedation Type:  Moderate (conscious) sedation  Vital signs: Vital signs monitored during sedation    See dictated procedure note for full details.  Findings: Complete occlusion of left popliteal artery with leg below knee supplied by small anirudh-geniculate collaterals.  Mechanical suction thrombectomy performed with removal of small amount of clot however significant residual clot below the knee.  Successful placement of a coaxial infusion system across the popliteal occlusion and into the posterior tibial artery at the mid calf (unthrombosed at this level).    Specimens: none    Complications: None and Arterial occlusion / thrombosis, remote from puncture site    Condition: Stable (Patient in guarded condition with acutely threatened Left limb.)    Plan: Guarded condition as it is unclear if the  thrombolysis will improve the situation.  Monitor closely for signs of progressive mottling (currently only foot and lateral skin below the knee (marked on the skin).  Monitor for compartment syndrome.  Patient's pain is progressing and I am starting a PCA.  The current events and procedure findings were discussed with Dr. Gibbs (vascular surgery) who is currently with the on call physician (Dr. Clark).  At this time they do not feel that the OR is necessary and recommend allowing the lytics time to work with close surveillance.    PROCEDURE   Patient Tolerance:  Patient tolerated the procedure well with no immediate complications    Length of time physician/provider present for 1:1 monitoring during sedation: 60

## 2021-04-09 NOTE — PRE-PROCEDURE
GENERAL PRE-PROCEDURE:   Procedure:  Left leg angiogram  Date/Time:  4/9/2021 2:43 PM    Verbal consent obtained?: Yes    Written consent obtained?: Yes    Risks and benefits: Risks, benefits and alternatives were discussed    Consent given by:  Patient  Patient states understanding of procedure being performed: Yes    Patient's understanding of procedure matches consent: Yes    Procedure consent matches procedure scheduled: Yes    Expected level of sedation:  Moderate  Appropriately NPO:  Yes  ASA Class:  Class 3- Severe systemic disease, definite functional limitations  Mallampati  :  Grade 3- soft palate visible, posterior pharyngeal wall not visible  Lungs:  Lungs clear with good breath sounds bilaterally  Heart:  Normal heart sounds and rate  History & Physical reviewed:  History and physical reviewed and no updates needed  Statement of review:  I have reviewed the lab findings, diagnostic data, medications, and the plan for sedation

## 2021-04-09 NOTE — IR NOTE
Interventional Radiology Intra-procedural Nursing Note    Patient Name: Sruthi Mac  Medical Record Number: [id number]  Today's Date: April 9, 2021    Procedure: Left lower extremity angiogram with possible intervention and intravenous moderate sedation.  Start time: 1449  End time: 1528  Report provided to: Station [facility] RN  Patient depart time and location: 1541 to Station [facility]    Note: Patient entered IR Suite number 2 via cart. Patient awake, alert and orientated.  Left calf circumference pre-procedure: 44.5 cm    Placed on procedural table in supine position. Prepped and draped.  Alteplase and Heparin infusions into sheath paused for procedure.  Dr. Longoria in room. Time out and procedure started. VSS. Telemetry reading SR.    Procedure well tolerated by patient without complications. Procedure end with debrief by Dr. Longoria.  Pressure held until hemostasis achieved. Gauze and tegaderm dressing applied.  Left calf circumference post-procedure: 45 cm    Administered medication totals:  Versed 1 mg  Fentanyl 100 mcg    Last dose of sedation administered at 1457.      Cheri Davison RN

## 2021-04-09 NOTE — PROGRESS NOTES
"VASCULAR SURGERY PROGRESS NOTE    Subjective:  No acute events overnight. Mottling has significantly improved and she has a monophasic L DP signal on exam.  Reports significant pain of her left lower extremity but no signs of compartment syndrome, no significant pain on passive flexion of left foot and compartment is soft. Able to dorsiflex and plantarflex, sensation improved.    Objective:    Intake/Output Summary (Last 24 hours) at 4/9/2021 0719  Last data filed at 4/9/2021 0652  Gross per 24 hour   Intake 1651 ml   Output 1000 ml   Net 651 ml     Labs:  ROUTINE IP LABS (Last four results)  BMP  Recent Labs   Lab 04/09/21  0607 04/08/21  1513    134   POTASSIUM 4.2 3.9   CHLORIDE 105 104   CASSI 8.2* 9.6   CO2 23 23   BUN 6* 10   CR 0.56 0.75   * 104*     CBC  Recent Labs   Lab 04/09/21  0607 04/08/21 2122 04/08/21  1513   WBC 20.1* 21.9* 14.2*   RBC 3.80 3.83 4.21   HGB 10.8* 10.9* 12.0   HCT 33.0* 33.0* 35.9   MCV 87 86 85   MCH 28.4 28.5 28.5   MCHC 32.7 33.0 33.4   RDW 13.2 13.0 12.8    447 576*     INR  Recent Labs   Lab 04/08/21  1513   INR 1.05     PHYSICAL EXAM:  BP (!) 155/93   Pulse 86   Temp 98.4  F (36.9  C) (Oral)   Resp 11   Ht 1.753 m (5' 9\")   Wt 122.5 kg (270 lb)   SpO2 99%   BMI 39.87 kg/m    General: The patient is alert and oriented. Appropriate. No acute distress  Psych: Pleasant affect, Answers questions appropriately  Skin: Color appropriate for race, warm, dry.  Respiratory: Breathing unlabored  GI:  Abdomen soft, nontender to light palpation.  Extremities: monophasic L AT and peroneal signal on exam, no PT. No significant pain on passive flexion of left foot, compartment soft.  Able to dorsiflex and plantarflex, sensation improved. No bleeding or hematoma at right groin site. R DP/PT signals on exam.    Imaging:   No new imaging.    ASSESSMENT:  30-year-old female with history of anxiety/depression, cystic acne present today with 5-day history of numbness and pain " in her left lower extremity, found to have left popliteal artery occlusion status post IR thrombectomy and thrombolysis catheter placement 4/8/21.    Mottling has improved and she has a monophasic L AT and peroneal signal on exam, no PT on exam.  Reports significant pain of her left lower extremity but no signs of compartment syndrome, no significant pain on passive flexion of left foot and compartment soft. Able to dorsiflex and plantarflex, sensation improved.    Unclear source of embolus/thrombosis but reported claudication symptoms for months. RLE also w/ nonpalp pulses and no visualization of profunda on CTA w/ poor outflow (?contrast timing).    Leukocytosis 20, afebrile. Likely reactive.    PLAN:  NPO for IR for thrombolysis check today  Vascular medicine consult to assist in diagnosing origin and postprocedure anticoagulation plan  Continue systemic heparin gtt  Pain control  Echo and telemetry    Elba Smith MD  Vascular Surgery Fellow   Pager: [phone]      Attestation:   Agree with the above. Seen and examined with Dr. Smith this AM.     Right foot: doppler found in PT and DP (I was unable to get a DP signal last night in ED--? Heparin effect or just missed on my exam yesterday). Asymptomatic on right side; CTA with poor tibial visualization.     Left foot: doppler signal now present in AT along lateral calf, toes are warm and pink with cap refill 3 sec. C/o pain in the calf, which is soft; no pain to palpation of the anterior or lateral compartments.     She is to RTOR today for repeat angiogram by IR after her overnight cath-directed thrombolysis. Based on physical exam, she has had clearance of some thrombus already, with significant improvement in the foot.     Idiopathic, suspected acute-on-chronic left leg ischemia with asymptomatic suspected prior right leg events (non-palp pulses, poor opacification of the right PFA and tibial vessels on CTA runoff).       We will ask [facility] to review  her case and assess for hypercoagulability.     She will need TTE --> DEYVI if no thrombus found on TTE.     Continue systemic heparin and will need anticoagulation for minimum 3 months and/or until definitive source found.     Instructed her to hold her OCP; if no other cause found, she should stay away from estrogen-containing preparations.    Needs close monitoring for reperfusion syndromes.     Hannah Gibbs MD

## 2021-04-09 NOTE — PROCEDURES
[facility]  LOCATION: [facility]  DATE: 4/8/2021    PROCEDURE:   EMERGENT ABDOMINAL AORTIC AND LEFT LOWER EXTREMITY DIAGNOSTIC ARTERIOGRAPHY.  MECHANICAL THROMBECTOMY OF THE LEFT POPLITEAL ARTERY WITH EXTIRPATION OF MATTER (THROMBUS).  PLACEMENT OF A COAXIAL INFUSION CATHETER EXTENDING FROM THE LEFT SUPERFICIAL FEMORAL ARTERY THROUGH THE LEFT POSTERIOR TIBIAL ARTERY WITH INITIATION OF ARTERIAL THROMBOLYTIC THERAPY TO THE LEFT LOWER EXTREMITY.  MODERATE SEDATION.    INTERVENTIONAL RADIOLOGIST: Juan Kam MD.    INDICATION: 30-year-old female with acute limb ischemia involving the left lower extremity. The patient's symptoms initially developed 6 days ago however today she noted mottling of her left foot prompting evaluation of the emergency room. Ultrasound and CT arteriography reveal abrupt occlusion of the left popliteal artery with poor perfusion distally.    CONSENT: The risks, benefits and alternatives of arteriography with possible angioplasty, stent placement thrombectomy or thrombolytics were discussed with the patient and her fiance in detail. All questions were answered. Informed consent was given to proceed with the procedure.    MODERATE SEDATION: Versed 6 mg IV; Fentanyl 400 mcg IV.  Under physician supervision, Versed and fentanyl were administered for moderate sedation. Pulse oximetry, heart rate and blood pressure were continuously monitored by an independent trained observer. The physician spent 52 minutes of face-to-face sedation time with the patient.    CONTRAST: 60 mL Visipaque 320 intra-arterially.  ANTIBIOTICS: None.  ADDITIONAL MEDICATIONS: TPA 10 mg directly into the left lower extremity thrombus. Heparin 1800 units per hour infusing peripherally.    FLUOROSCOPIC TIME: 20 minutes.  RADIATION DOSE: Air Kerma: 381 mGy.    COMPLICATIONS: Worsening of the left foot mottling with progressive pain throughout the procedure.    STERILE BARRIER TECHNIQUE: Maximum sterile barrier  technique was used. Cutaneous antisepsis was performed at the operative site with application of 2% chlorhexidine and large sterile drape. Prior to the procedure, the  and assistant performed hand hygiene and wore hat, mask, sterile gown, and sterile gloves during the entire procedure.    PROCEDURE:    Using real-time ultrasound guidance, access was achieved into the right common femoral artery and conversion was made for a 5 Liberian vascular sheath which was attached to continuous heparinized saline infusion.    A flush catheter was positioned in the upper abdominal aorta and digital subtraction abdominal aortography was obtained. The catheter was repositioned above the aortoiliac bifurcation and bilateral oblique pelvic digital subtraction arteriography was obtained. The catheter was manipulated over a wire into the left external iliac artery (contralateral second order). From this location digital subtraction left lower extremity arteriography was performed through the foot. It was elected to proceed with limb salvage interventions.    The access sheath was exchanged for a 6 Liberian Etienne sheath which was positioned with its tip in the left external iliac artery. A Kumpe catheter was then advanced into the distal superficial femoral artery and repeat digital subtraction arteriography was obtained. Over wire exchange was made for a CAT 6 penumbra suction thrombectomy device which was situated within the popliteal thrombus with and without a separator with extirpation of a small amount of thrombotic matter. Repeat arteriography was obtained.    Exchange is made for a Kumpe catheter which was then directed through the tibioperoneal artery and into the posterior tibial artery. Endoluminal positioning was confirmed with an injection of contrast. Over-the-wire exchange was then made for a coaxial infusion system comprised of a 20 cm infusion catheter and 9 cm transverse and infusion wire. The catheter was  positioned extending from the distal superficial femoral artery through the tibioperoneal artery with the infusion wire extending into the posterior tibial artery. Arterial thrombotic therapy was initiated.    FINDINGS:  Ultrasound demonstrates a patent pulsatile right common femoral artery. A permanent image was stored.    The digital subtraction abdominal aortography shows minimal atheromatous disease within a nonaneurysmal abdominal aorta. The celiac, superior mesenteric, inferior mesenteric and renal arteries are widely patent. No identified thromboembolic source.    The digital subtraction left lower extremity diagnostic arteriography reveals minimal atheromatous disease. There is a high takeoff of the profunda femoris artery at the level of the femoral head. Abrupt occlusion of the mid popliteal artery at the level of the patella with an endoluminal filling defect with an appearance most suggestive of a thromboembolic occlusion. This is occluded over a length of approximately 20 cm with small diameter geniculate and skin collaterals ultimately reconstituting the posterior tibial and anterior tibial arteries at the level of the mid calf. Once reconstituted there is sluggish antegrade flow within these vessels, secondary to either poor perfusion pressure or distal thromboemboli in the foot.    Images obtained during the mechanical suction thrombectomy showed a CAT 6 device. A small amount of thrombus was removed. Arteriography following removal of this thrombus show slight interval improvement however there remains a large amount of thrombus with occlusion of the distal below-knee popliteal artery and proximal trifurcation arterial vasculature.    The completion images show the coaxial infusion catheter system as detailed above.    IMPRESSION:    Abrupt occlusion of the left popliteal artery extending inferiorly to involve the tibial peroneal and proximal calf trifurcation arterial vasculature. The appearances  most suggestive of a thromboembolic occlusion.  Mechanical suction thrombectomy with extirpation of a small amount of thrombus however a large amount of occlusive thrombus persists below the knee. Tactilely and visually this thrombus appeared to have a more from chronic component with superimposed acute thrombus.  Initiation of arterial thrombotic therapy to the left lower extremity in an effort of limb salvage as detailed above.    PLAN:  The patient did develop progressive mottling of the foot during the procedure as well as significant pain. Additional pain medication as well as a Dilaudid PCA have been started. Her condition remains quite guarded were discussed with vascular surgery with a potential loss of limb. The findings of the case were discussed with vascular surgery who is also following.

## 2021-04-09 NOTE — IR NOTE
Interventional Radiology Intra-procedural Nursing Note    Patient Name: Sruthi Mac  Medical Record Number: [id number]  Today's Date: April 8, 2021    Start Time: 1853  End of procedure time: 1945  Procedure: left lower extremity angiogram, mechanical thrombectomy, thrombolytics   Report given to: MCKINLEY Lomeli, [facility] [address]  Time pt departs:  2045  : n/a    Other Notes: Patient arrives from ED to room 1 in IR. Dr. Kam discussed procedure with patient and consent was obtained. Of note, I was unable to obtain distal pulses (PT and DP, bilaterally) with doppler. Left foot cool to touch, pale. Patient denies pain at rest, reports pain occurs in left leg with walking. Pt transferred to procedure table, connected to monitoring equipment, bilateral groin areas prepped and pt draped under sterile technique. Pt hypertensive, 170's syst., anxious and crying.      Post-procedure- pt with intense pain in left leg and foot. Fentanyl ineffective. Dilaudid PCA started with bolus dose given, per Dr. Kam's orders. Mottled area above left knee to lower leg outlined with marker. Left foot cool and mottled as well. This improved once tPA began infusing. LLE soft, calf measuring 43 cm. Still unable to obtain pulses with doppler. Procedure access site (right groin) is c/d/i. 6f sheath in place, secure with infusion wire and catheter, under tegaderm dressings  Arvin Lomeli RN, at IR for transfer of care and report given. Patient was taken up to room on Station [address] with myself and Yani, on monitor. Dr. Kam to update pt's fiance.     Versed 6mg IV  Fentanyl 400mcg IV  Heparin IV infusion 1800 units/hr (d/c'ed at end of procedure)  Heparin 500 units/hr to right femoral arterial sheath  tPA 0.5mg/hr to Infusion catheter (left popliteal, arterial)  tPA 0.5mg/hr to Infusion wire (left popliteal, arterial)  tPA 10mg (total dose) IA given by Dr. Kam throughout procedure.  Dilaudid 1mg IV bolus via PCA pump  Dilaudid  PCA 0.3mg q 10min    Brittney Odonnell, RN  Interventional Radiology

## 2021-04-09 NOTE — PROGRESS NOTES
[facility]    Hospitalist Progress Note      Assessment & Plan   Sruthi Mac is a 30 year old female who presents with left foot numbness/pain and toe discoloration, found to have acute limb ischemia.     Acute limb ischemia, left lower extremity  S/p arteriography with mechanical thrombectomy left popliteal artery with initiation of lytic therapy 4/8/21  5 day history of numbness and pain in her left leg/foot. Getting worse with walking and not resolving. Noted discoloration of her toes and came to ED. Pulses not palpable in foot EKG: NSR. Risk factors include OCPs and history of palpitations (however has no documented arrhythmias)  *US LLE: occlusion left popliteal artery, no DVT  *CTA CAP with left popliteal artery occlusion. Minimal atheromatous disease without aneurysm. Proximal trifurcation vasculature not well opacified, but distal contrast seen within vessels beyond mid calf  - Vascular Surgery following, appreciate assistance  -- IR following, appreciate assistance. Plan for follow up study in IR today   --Vascular Medicine consulted  --NPO, plan for IR thombolysis check today   - high dose heparin gtt  - telemetry to look for arrhythmia, likely plan for 30 day event monitor on discharge as well  - echocardiogram pending   --on dilaudid PCA for pain     Urinary retention.   Bladder scans persistently elevated. Will place levy for now. Likely void trial in a few days.      Anxiety  Depression  Hx Panic attacks  PTA buspirone BID and sertraline, both continued. She has done well with less panic attacks since starting buspirone  - IV/PO ativan also available if she has panic attack while here     Leukocytosis  Likely secondary to inflammation with acute limb ischemia. Remains elevated at 20.1 this am.   --continue to monitor      Cystic acne   PTA spironolactone, on hold    DVT Prophylaxis: heparin  Code Status: Full Code    Disposition: Expected discharge in 2+ days  pending clinical course.     Patient was discussed with Dr. Horton who agrees with the above plan     Vidhya Rodriguez PA-C    Interval History   Doing ok this afternoon. Pain control fluctuates. Feeling anxious at times. Sensation of foot remains reduced. Color much improved. Denies dyspnea, dizziness.     -Data reviewed today: I reviewed all new labs and imaging results over the last 24 hours. I personally reviewed no images or EKG's today.    Physical Exam   Temp: 98.5  F (36.9  C) Temp src: Oral BP: (!) 155/93 Pulse: 86   Resp: 11 SpO2: 99 % O2 Device: Nasal cannula Oxygen Delivery: 2 LPM  Vitals:    04/08/21 1424   Weight: 122.5 kg (270 lb)     Vital Signs with Ranges  Temp:  [98.4  F (36.9  C)-98.8  F (37.1  C)] 98.5  F (36.9  C)  Pulse:  [] 86  Resp:  [8-44] 11  BP: (120-177)/() 155/93  SpO2:  [92 %-100 %] 99 %  I/O last 3 completed shifts:  In: 1651 [P.O.:700; I.V.:951]  Out: 1000 [Urine:1000]    Constitutional: Alert and oriented, laying down in bed. Appears comfortable, though tearful at times, and is pleasantly conversant   ENT:  moist mucous membranes  Eyes:  Sclera anicteric, EOMI  Respiratory: Lungs clear to auscultation bilaterally, no increased work of breathing  Cardiovascular: Regular rate and rhythm  GI: active bowel sounds, abdomen soft, non-tender  Skin/Integumen: left foot with food color. Toes mildly erythematous. Dressing to groin is saturated    MSK:  Moves all four extremities. Able to wiggle toes       Medications     alteplase (ACTIVASE) 5 mg/500 mL infusion (LINE 1) 0.5 mg/hr (04/09/21 0418)     alteplase (ACTIVASE) 5 mg/500 mL infusion (LINE 2) 0.5 mg/hr (04/09/21 0418)     heparin 500 Units/hr (04/08/21 2137)     HYDROmorphone       - MEDICATION INSTRUCTIONS -       sodium chloride 100 mL/hr at 04/08/21 2121       busPIRone  10 mg Oral BID     ferrous sulfate  325 mg Oral Daily     hydromorphone  1 mg Intravenous Once     polyethylene glycol  17 g Oral Daily      senna-docusate  1 tablet Oral BID    Or     senna-docusate  2 tablet Oral BID     sertraline  50 mg Oral Daily     sodium chloride (PF)  3 mL Intracatheter Q8H       Data   Recent Labs   Lab 04/09/21  0607 04/08/21 2122 04/08/21  1513   WBC 20.1* 21.9* 14.2*   HGB 10.8* 10.9* 12.0   MCV 87 86 85    447 576*   INR  --   --  1.05     --  134   POTASSIUM 4.2  --  3.9   CHLORIDE 105  --  104   CO2 23  --  23   BUN 6*  --  10   CR 0.56  --  0.75   ANIONGAP 7  --  7   CASSI 8.2*  --  9.6   *  --  104*       Recent Results (from the past 24 hour(s))   US Lower Extremity Venous Duplex Left    Narrative    VENOUS ULTRASOUND LEFT LEG  4/8/2021 4:01 PM     HISTORY: Patent left leg, leg pain, phlegmasia    COMPARISON: None.    FINDINGS:    Incidentally noted there is occlusive thrombus in the left popliteal  artery.    Examination of the deep veins with graded compression and color flow  Doppler with spectral wave form analysis shows no evidence of thrombus  in the common femoral vein, femoral vein, popliteal vein or calf  veins.       Impression    IMPRESSION:  1. Occlusion of the left popliteal artery, recommend CTA with runoff  for further evaluation.  2. No DVT in the left lower extremity.    Findings were discussed with Dr. Koehler 4:05 PM on 4/8/2021.    ALDEN BRAND, DO   CTA Chest Abdomen Pelvis Runoff w Contrast    Narrative    EXAM: CTA CHEST ABDOMEN PELVIS RUNOFF W CONTRAST  LOCATION: [facility]  DATE/TIME: 4/8/2021 4:37 PM    INDICATION: Left leg pain.  COMPARISON: None.  TECHNIQUE: CT angiogram through the abdomen, pelvis, and bilateral lower extremities was performed during the arterial phase of contrast enhancement. Second phase arterial imaging was performed through the bilateral lower extremities. 2D and 3D   reconstructions performed by the CT technologist. Dose reduction techniques were used.  CONTRAST: 100 mL [id number]    FINDINGS:   CTA CHEST ABDOMEN/PELVIS: Ascending  thoracic aorta unremarkable. Conventional anatomy of the great vessels. Descending thoracic aorta unremarkable. The celiac artery, superior mesenteric artery and inferior mesenteric artery are patent. Single renal   arteries with no evidence of renal artery stenosis. Infrarenal abdominal aorta unremarkable. No evidence of abdominal aortic aneurysm. Common iliac, external iliac and internal iliac arteries are patent bilaterally.    RIGHT LEG: The common femoral, profunda femoral and superficial femoral arteries are patent. Right popliteal artery patent. There is suboptimal opacification of the infrapopliteal vasculature which I suspect is related to timing of the arterial bolus.    LEFT LEG: The common femoral, profunda femoral and superficial femoral arteries are patent. There is abrupt cut off of the opacification of the left popliteal artery with associated filling defect. Findings consistent with arterial embolus. There is   suboptimal opacification of the infrapopliteal vasculature which I am uncertain if this is related to suboptimal collateral flow or poor timing of the arterial bolus.    NONVASCULAR FINDINGS:  MEDIASTINUM: Normal    LUNGS/PLEURA: Normal    ABDOMEN: The liver, gallbladder, pancreas, spleen, adrenal glands and kidneys are unremarkable.    PELVIS: 4.3 x 5.7 cm left adnexal cyst. Uterus unremarkable. Bladder unremarkable.    MUSCULOSKELETAL: Degenerative change of the lumbar spine.      Impression    IMPRESSION:  1.  Abrupt cut off of the opacification the left popliteal artery with associated filling defect. Findings consistent with arterial bolus. Suboptimal opacification of the infrapopliteal vasculature. I am uncertain if this is related to suboptimal   collateral flow or poor timing of the arterial bolus.  2.  Suboptimal opacification of the right infrapopliteal vasculature. I suspect this is related to  poor timing of the arterial bolus.  3. 4.3 x 5.7 cm left adnexal cyst. Recommend  follow-up pelvic ultrasound.    REFERENCE:  Guidelines for incidental benign or probably benign adnexal cyst on CT/MRI.  Managing Incidental Findings on Abdominal and Pelvic CT and MRI, Part 1: White Paper of the ACR Incidental Findings Committee II on Adnexal Findings. J Am Simona Radiol 2013;10:675-681.    Early postmenopausal (under 55 years old):  Less than 3 cm: Benign, no follow up.    Greater than 3 cm: US follow up in 8 weeks.       IR Lower Extremity Angiogram Left    [facility] RADIOLOGY  LOCATION: [facility]  DATE: 4/8/2021    PROCEDURE:   1.  EMERGENT ABDOMINAL AORTIC AND LEFT LOWER EXTREMITY DIAGNOSTIC  ARTERIOGRAPHY.  2.  MECHANICAL THROMBECTOMY OF THE LEFT POPLITEAL ARTERY WITH  EXTIRPATION OF MATTER (THROMBUS).  3.  PLACEMENT OF A COAXIAL INFUSION CATHETER EXTENDING FROM THE LEFT  SUPERFICIAL FEMORAL ARTERY THROUGH THE LEFT POSTERIOR TIBIAL ARTERY  WITH INITIATION OF ARTERIAL THROMBOLYTIC THERAPY TO THE LEFT LOWER  EXTREMITY.  4.  MODERATE SEDATION.    INTERVENTIONAL RADIOLOGIST: Juan Kam MD.    INDICATION: 30-year-old female with acute limb ischemia involving the  left lower extremity. The patient's symptoms initially developed 6  days ago however today she noted mottling of her left foot prompting  evaluation of the emergency room. Ultrasound and CT arteriography  reveal abrupt occlusion of the left popliteal artery with poor  perfusion distally.    CONSENT: The risks, benefits and alternatives of arteriography with  possible angioplasty, stent placement thrombectomy or thrombolytics  were discussed with the patient and her fiance in detail. All  questions were answered. Informed consent was given to proceed with  the procedure.    MODERATE SEDATION: Versed 6 mg IV; Fentanyl 400 mcg IV.  Under  physician supervision, Versed and fentanyl were administered for  moderate sedation. Pulse oximetry, heart rate and blood pressure were  continuously monitored by an independent trained  observer. The  physician spent 52 minutes of face-to-face sedation time with the  patient.    CONTRAST: 60 mL Visipaque 320 intra-arterially.  ANTIBIOTICS: None.  ADDITIONAL MEDICATIONS: TPA 10 mg directly into the left lower  extremity thrombus. Heparin 1800 units per hour infusing peripherally.    FLUOROSCOPIC TIME: 20 minutes.  RADIATION DOSE: Air Kerma: 381 mGy.    COMPLICATIONS: Worsening of the left foot mottling with progressive  pain throughout the procedure.    STERILE BARRIER TECHNIQUE: Maximum sterile barrier technique was used.  Cutaneous antisepsis was performed at the operative site with  application of 2% chlorhexidine and large sterile drape. Prior to the  procedure, the  and assistant performed hand hygiene and wore  hat, mask, sterile gown, and sterile gloves during the entire  procedure.    PROCEDURE:    Using real-time ultrasound guidance, access was achieved into the  right common femoral artery and conversion was made for a 5 Citizen of [place]  vascular sheath which was attached to continuous heparinized saline  infusion.    A flush catheter was positioned in the upper abdominal aorta and  digital subtraction abdominal aortography was obtained. The catheter  was repositioned above the aortoiliac bifurcation and bilateral  oblique pelvic digital subtraction arteriography was obtained. The  catheter was manipulated over a wire into the left external iliac  artery (contralateral second order). From this location digital  subtraction left lower extremity arteriography was performed through  the foot. It was elected to proceed with limb salvage interventions.    The access sheath was exchanged for a 6 Citizen of [place] Etienne sheath which was  positioned with its tip in the left external iliac artery. A Kumpe  catheter was then advanced into the distal superficial femoral artery  and repeat digital subtraction arteriography was obtained. Over wire  exchange was made for a CAT 6 penumbra suction thrombectomy  device  which was situated within the popliteal thrombus with and without a  separator with extirpation of a small amount of thrombotic matter.  Repeat arteriography was obtained.    Exchange is made for a Kumpe catheter which was then directed through  the tibioperoneal artery and into the posterior tibial artery.  Endoluminal positioning was confirmed with an injection of contrast.  Over-the-wire exchange was then made for a coaxial infusion system  comprised of a 20 cm infusion catheter and 9 cm transverse and  infusion wire. The catheter was positioned extending from the distal  superficial femoral artery through the tibioperoneal artery with the  infusion wire extending into the posterior tibial artery. Arterial  thrombotic therapy was initiated.    FINDINGS:  Ultrasound demonstrates a patent pulsatile right common femoral  artery. A permanent image was stored.    The digital subtraction abdominal aortography shows minimal  atheromatous disease within a nonaneurysmal abdominal aorta. The  celiac, superior mesenteric, inferior mesenteric and renal arteries  are widely patent. No identified thromboembolic source.    The digital subtraction left lower extremity diagnostic arteriography  reveals minimal atheromatous disease. There is a high takeoff of the  profunda femoris artery at the level of the femoral head. Abrupt  occlusion of the mid popliteal artery at the level of the patella with  an endoluminal filling defect with an appearance most suggestive of a  thromboembolic occlusion. This is occluded over a length of  approximately 20 cm with small diameter geniculate and skin  collaterals ultimately reconstituting the posterior tibial and  anterior tibial arteries at the level of the mid calf. Once  reconstituted there is sluggish antegrade flow within these vessels,  secondary to either poor perfusion pressure or distal thromboemboli in  the foot.    Images obtained during the mechanical suction thrombectomy  showed a  CAT 6 device. A small amount of thrombus was removed. Arteriography  following removal of this thrombus show slight interval improvement  however there remains a large amount of thrombus with occlusion of the  distal below-knee popliteal artery and proximal trifurcation arterial  vasculature.    The completion images show the coaxial infusion catheter system as  detailed above.      Impression    IMPRESSION:    1.  Abrupt occlusion of the left popliteal artery extending inferiorly  to involve the tibial peroneal and proximal calf trifurcation arterial  vasculature. The appearances most suggestive of a thromboembolic  occlusion.  2.  Mechanical suction thrombectomy with extirpation of a small amount  of thrombus however a large amount of occlusive thrombus persists  below the knee. Tactilely and visually this thrombus appeared to have  a more from chronic component with superimposed acute thrombus.  3.  Initiation of arterial thrombotic therapy to the left lower  extremity in an effort of limb salvage as detailed above.    PLAN:  The patient did develop progressive mottling of the foot during the  procedure as well as significant pain. Additional pain medication as  well as a Dilaudid PCA have been started. Her condition remains quite  guarded were discussed with vascular surgery with a potential loss of  limb. The findings of the case were discussed with vascular surgery  who is also following.    TASIA YUAN MD

## 2021-04-10 ENCOUNTER — APPOINTMENT (OUTPATIENT)
Dept: ULTRASOUND IMAGING | Facility: CLINIC | Age: 31
DRG: 271 | End: 2021-04-10
Attending: STUDENT IN AN ORGANIZED HEALTH CARE EDUCATION/TRAINING PROGRAM
Payer: COMMERCIAL

## 2021-04-10 ENCOUNTER — APPOINTMENT (OUTPATIENT)
Dept: INTERVENTIONAL RADIOLOGY/VASCULAR | Facility: CLINIC | Age: 31
DRG: 271 | End: 2021-04-10
Attending: NURSE PRACTITIONER
Payer: COMMERCIAL

## 2021-04-10 LAB
ALBUMIN SERPL-MCNC: 2.6 G/DL (ref 3.4–5)
ALBUMIN SERPL-MCNC: 2.7 G/DL (ref 3.4–5)
ALP SERPL-CCNC: 134 U/L (ref 40–150)
ALP SERPL-CCNC: 96 U/L (ref 40–150)
ALT SERPL W P-5'-P-CCNC: 75 U/L (ref 0–50)
ALT SERPL W P-5'-P-CCNC: 95 U/L (ref 0–50)
ANION GAP SERPL CALCULATED.3IONS-SCNC: 6 MMOL/L (ref 3–14)
APTT PPP: 30 SEC (ref 22–37)
APTT PPP: 30 SEC (ref 22–37)
APTT PPP: 40 SEC (ref 22–37)
AST SERPL W P-5'-P-CCNC: 396 U/L (ref 0–45)
AST SERPL W P-5'-P-CCNC: 399 U/L (ref 0–45)
BILIRUB DIRECT SERPL-MCNC: 0.2 MG/DL (ref 0–0.2)
BILIRUB SERPL-MCNC: 0.5 MG/DL (ref 0.2–1.3)
BILIRUB SERPL-MCNC: 0.5 MG/DL (ref 0.2–1.3)
BUN SERPL-MCNC: 3 MG/DL (ref 7–30)
CALCIUM SERPL-MCNC: 7.7 MG/DL (ref 8.5–10.1)
CANCER AG125 SERPL-ACNC: <5 U/ML (ref 0–30)
CHLORIDE SERPL-SCNC: 102 MMOL/L (ref 94–109)
CHOLEST SERPL-MCNC: 188 MG/DL
CO2 SERPL-SCNC: 27 MMOL/L (ref 20–32)
CREAT SERPL-MCNC: 0.51 MG/DL (ref 0.52–1.04)
ERYTHROCYTE [DISTWIDTH] IN BLOOD BY AUTOMATED COUNT: 13.2 % (ref 10–15)
ERYTHROCYTE [DISTWIDTH] IN BLOOD BY AUTOMATED COUNT: 13.2 % (ref 10–15)
ERYTHROCYTE [DISTWIDTH] IN BLOOD BY AUTOMATED COUNT: 13.3 % (ref 10–15)
GFR SERPL CREATININE-BSD FRML MDRD: >90 ML/MIN/{1.73_M2}
GLUCOSE BLDC GLUCOMTR-MCNC: 97 MG/DL (ref 70–99)
GLUCOSE SERPL-MCNC: 95 MG/DL (ref 70–99)
HCT VFR BLD AUTO: 27 % (ref 35–47)
HCT VFR BLD AUTO: 27.5 % (ref 35–47)
HCT VFR BLD AUTO: 29.6 % (ref 35–47)
HDLC SERPL-MCNC: 42 MG/DL
HGB BLD-MCNC: 9.1 G/DL (ref 11.7–15.7)
HGB BLD-MCNC: 9.2 G/DL (ref 11.7–15.7)
HGB BLD-MCNC: 9.6 G/DL (ref 11.7–15.7)
LDLC SERPL CALC-MCNC: ABNORMAL MG/DL
LDLC SERPL DIRECT ASSAY-MCNC: 80 MG/DL
LPA SERPL-MCNC: 63 MG/DL
MCH RBC QN AUTO: 28.7 PG (ref 26.5–33)
MCH RBC QN AUTO: 28.9 PG (ref 26.5–33)
MCH RBC QN AUTO: 29.7 PG (ref 26.5–33)
MCHC RBC AUTO-ENTMCNC: 32.4 G/DL (ref 31.5–36.5)
MCHC RBC AUTO-ENTMCNC: 33.1 G/DL (ref 31.5–36.5)
MCHC RBC AUTO-ENTMCNC: 34.1 G/DL (ref 31.5–36.5)
MCV RBC AUTO: 87 FL (ref 78–100)
MCV RBC AUTO: 87 FL (ref 78–100)
MCV RBC AUTO: 88 FL (ref 78–100)
NONHDLC SERPL-MCNC: 146 MG/DL
PLATELET # BLD AUTO: 240 10E9/L (ref 150–450)
PLATELET # BLD AUTO: 274 10E9/L (ref 150–450)
PLATELET # BLD AUTO: 275 10E9/L (ref 150–450)
POTASSIUM SERPL-SCNC: 3.5 MMOL/L (ref 3.4–5.3)
PROT SERPL-MCNC: 5.7 G/DL (ref 6.8–8.8)
PROT SERPL-MCNC: 6.1 G/DL (ref 6.8–8.8)
RBC # BLD AUTO: 3.1 10E12/L (ref 3.8–5.2)
RBC # BLD AUTO: 3.15 10E12/L (ref 3.8–5.2)
RBC # BLD AUTO: 3.35 10E12/L (ref 3.8–5.2)
SODIUM SERPL-SCNC: 135 MMOL/L (ref 133–144)
T4 FREE SERPL-MCNC: 1.02 NG/DL (ref 0.76–1.46)
TRIGL SERPL-MCNC: 471 MG/DL
TSH SERPL DL<=0.005 MIU/L-ACNC: 5.25 MU/L (ref 0.4–4)
UFH PPP CHRO-ACNC: <0.1 IU/ML
WBC # BLD AUTO: 11.2 10E9/L (ref 4–11)
WBC # BLD AUTO: 12.8 10E9/L (ref 4–11)
WBC # BLD AUTO: 9.9 10E9/L (ref 4–11)

## 2021-04-10 PROCEDURE — 99152 MOD SED SAME PHYS/QHP 5/>YRS: CPT

## 2021-04-10 PROCEDURE — 85730 THROMBOPLASTIN TIME PARTIAL: CPT | Performed by: STUDENT IN AN ORGANIZED HEALTH CARE EDUCATION/TRAINING PROGRAM

## 2021-04-10 PROCEDURE — 99233 SBSQ HOSP IP/OBS HIGH 50: CPT | Performed by: INTERNAL MEDICINE

## 2021-04-10 PROCEDURE — 80076 HEPATIC FUNCTION PANEL: CPT | Performed by: STUDENT IN AN ORGANIZED HEALTH CARE EDUCATION/TRAINING PROGRAM

## 2021-04-10 PROCEDURE — 258N000003 HC RX IP 258 OP 636: Performed by: RADIOLOGY

## 2021-04-10 PROCEDURE — 250N000009 HC RX 250: Performed by: RADIOLOGY

## 2021-04-10 PROCEDURE — 99233 SBSQ HOSP IP/OBS HIGH 50: CPT | Mod: 57 | Performed by: SURGERY

## 2021-04-10 PROCEDURE — 80053 COMPREHEN METABOLIC PANEL: CPT | Performed by: HOSPITALIST

## 2021-04-10 PROCEDURE — 250N000011 HC RX IP 250 OP 636: Performed by: INTERNAL MEDICINE

## 2021-04-10 PROCEDURE — B41G1ZZ FLUOROSCOPY OF LEFT LOWER EXTREMITY ARTERIES USING LOW OSMOLAR CONTRAST: ICD-10-PCS | Performed by: RADIOLOGY

## 2021-04-10 PROCEDURE — 80061 LIPID PANEL: CPT | Performed by: HOSPITALIST

## 2021-04-10 PROCEDURE — 120N000001 HC R&B MED SURG/OB

## 2021-04-10 PROCEDURE — C1769 GUIDE WIRE: HCPCS

## 2021-04-10 PROCEDURE — 86304 IMMUNOASSAY TUMOR CA 125: CPT | Performed by: HOSPITALIST

## 2021-04-10 PROCEDURE — 86022 PLATELET ANTIBODIES: CPT | Performed by: STUDENT IN AN ORGANIZED HEALTH CARE EDUCATION/TRAINING PROGRAM

## 2021-04-10 PROCEDURE — 85520 HEPARIN ASSAY: CPT | Performed by: HOSPITALIST

## 2021-04-10 PROCEDURE — 258N000003 HC RX IP 258 OP 636: Performed by: HOSPITALIST

## 2021-04-10 PROCEDURE — C1760 CLOSURE DEV, VASC: HCPCS

## 2021-04-10 PROCEDURE — 250N000011 HC RX IP 250 OP 636: Performed by: RADIOLOGY

## 2021-04-10 PROCEDURE — 36415 COLL VENOUS BLD VENIPUNCTURE: CPT | Performed by: RADIOLOGY

## 2021-04-10 PROCEDURE — 85730 THROMBOPLASTIN TIME PARTIAL: CPT | Performed by: RADIOLOGY

## 2021-04-10 PROCEDURE — 85027 COMPLETE CBC AUTOMATED: CPT | Performed by: HOSPITALIST

## 2021-04-10 PROCEDURE — 85027 COMPLETE CBC AUTOMATED: CPT | Performed by: STUDENT IN AN ORGANIZED HEALTH CARE EDUCATION/TRAINING PROGRAM

## 2021-04-10 PROCEDURE — 999N001017 HC STATISTIC GLUCOSE BY METER IP

## 2021-04-10 PROCEDURE — 36415 COLL VENOUS BLD VENIPUNCTURE: CPT | Performed by: HOSPITALIST

## 2021-04-10 PROCEDURE — 85027 COMPLETE CBC AUTOMATED: CPT | Performed by: RADIOLOGY

## 2021-04-10 PROCEDURE — 83721 ASSAY OF BLOOD LIPOPROTEIN: CPT | Performed by: HOSPITALIST

## 2021-04-10 PROCEDURE — B41F1ZZ FLUOROSCOPY OF RIGHT LOWER EXTREMITY ARTERIES USING LOW OSMOLAR CONTRAST: ICD-10-PCS | Performed by: RADIOLOGY

## 2021-04-10 PROCEDURE — 255N000002 HC RX 255 OP 636: Performed by: RADIOLOGY

## 2021-04-10 PROCEDURE — 84443 ASSAY THYROID STIM HORMONE: CPT | Performed by: HOSPITALIST

## 2021-04-10 PROCEDURE — 84439 ASSAY OF FREE THYROXINE: CPT | Performed by: HOSPITALIST

## 2021-04-10 PROCEDURE — 250N000013 HC RX MED GY IP 250 OP 250 PS 637: Performed by: HOSPITALIST

## 2021-04-10 PROCEDURE — 36415 COLL VENOUS BLD VENIPUNCTURE: CPT | Performed by: STUDENT IN AN ORGANIZED HEALTH CARE EDUCATION/TRAINING PROGRAM

## 2021-04-10 PROCEDURE — 93970 EXTREMITY STUDY: CPT

## 2021-04-10 PROCEDURE — 75710 ARTERY X-RAYS ARM/LEG: CPT

## 2021-04-10 RX ORDER — HEPARIN SODIUM 10000 [USP'U]/100ML
0-5000 INJECTION, SOLUTION INTRAVENOUS CONTINUOUS
Status: DISCONTINUED | OUTPATIENT
Start: 2021-04-10 | End: 2021-04-10

## 2021-04-10 RX ORDER — FENTANYL CITRATE 50 UG/ML
25-50 INJECTION, SOLUTION INTRAMUSCULAR; INTRAVENOUS EVERY 5 MIN PRN
Status: DISCONTINUED | OUTPATIENT
Start: 2021-04-10 | End: 2021-04-10

## 2021-04-10 RX ORDER — NALOXONE HYDROCHLORIDE 0.4 MG/ML
0.4 INJECTION, SOLUTION INTRAMUSCULAR; INTRAVENOUS; SUBCUTANEOUS
Status: DISCONTINUED | OUTPATIENT
Start: 2021-04-10 | End: 2021-04-10

## 2021-04-10 RX ORDER — CLINDAMYCIN PHOSPHATE 900 MG/50ML
900 INJECTION, SOLUTION INTRAVENOUS
Status: DISCONTINUED | OUTPATIENT
Start: 2021-04-11 | End: 2021-04-11 | Stop reason: HOSPADM

## 2021-04-10 RX ORDER — SODIUM CHLORIDE 9 MG/ML
INJECTION, SOLUTION INTRAVENOUS CONTINUOUS
Status: DISCONTINUED | OUTPATIENT
Start: 2021-04-10 | End: 2021-04-11

## 2021-04-10 RX ORDER — NALOXONE HYDROCHLORIDE 0.4 MG/ML
0.2 INJECTION, SOLUTION INTRAMUSCULAR; INTRAVENOUS; SUBCUTANEOUS
Status: DISCONTINUED | OUTPATIENT
Start: 2021-04-10 | End: 2021-04-10

## 2021-04-10 RX ORDER — ACETAMINOPHEN 500 MG
500 TABLET ORAL EVERY 6 HOURS PRN
Status: DISCONTINUED | OUTPATIENT
Start: 2021-04-10 | End: 2021-04-11

## 2021-04-10 RX ORDER — FLUMAZENIL 0.1 MG/ML
0.2 INJECTION, SOLUTION INTRAVENOUS
Status: DISCONTINUED | OUTPATIENT
Start: 2021-04-10 | End: 2021-04-10

## 2021-04-10 RX ORDER — IODIXANOL 320 MG/ML
150 INJECTION, SOLUTION INTRAVASCULAR ONCE
Status: COMPLETED | OUTPATIENT
Start: 2021-04-10 | End: 2021-04-10

## 2021-04-10 RX ADMIN — BUSPIRONE HYDROCHLORIDE 10 MG: 10 TABLET ORAL at 21:52

## 2021-04-10 RX ADMIN — LIDOCAINE HYDROCHLORIDE 20 ML: 10 INJECTION, SOLUTION INFILTRATION; PERINEURAL at 11:39

## 2021-04-10 RX ADMIN — FENTANYL CITRATE 50 MCG: 50 INJECTION, SOLUTION INTRAMUSCULAR; INTRAVENOUS at 11:16

## 2021-04-10 RX ADMIN — Medication: at 04:09

## 2021-04-10 RX ADMIN — MIDAZOLAM HYDROCHLORIDE 1 MG: 1 INJECTION, SOLUTION INTRAMUSCULAR; INTRAVENOUS at 11:34

## 2021-04-10 RX ADMIN — ALTEPLASE 0.5 MG/HR: 2.2 INJECTION, POWDER, LYOPHILIZED, FOR SOLUTION INTRAVENOUS at 03:07

## 2021-04-10 RX ADMIN — ARGATROBAN 0.5 MCG/KG/MIN: 50 INJECTION INTRAVENOUS at 16:11

## 2021-04-10 RX ADMIN — SODIUM CHLORIDE: 9 INJECTION, SOLUTION INTRAVENOUS at 21:55

## 2021-04-10 RX ADMIN — SODIUM CHLORIDE: 9 INJECTION, SOLUTION INTRAVENOUS at 08:14

## 2021-04-10 RX ADMIN — FENTANYL CITRATE 50 MCG: 50 INJECTION, SOLUTION INTRAMUSCULAR; INTRAVENOUS at 11:00

## 2021-04-10 RX ADMIN — MIDAZOLAM HYDROCHLORIDE 1 MG: 1 INJECTION, SOLUTION INTRAMUSCULAR; INTRAVENOUS at 11:00

## 2021-04-10 RX ADMIN — BUSPIRONE HYDROCHLORIDE 10 MG: 10 TABLET ORAL at 08:13

## 2021-04-10 RX ADMIN — SERTRALINE HYDROCHLORIDE 50 MG: 50 TABLET ORAL at 08:13

## 2021-04-10 RX ADMIN — FERROUS SULFATE TAB 325 MG (65 MG ELEMENTAL FE) 325 MG: 325 (65 FE) TAB at 08:13

## 2021-04-10 RX ADMIN — IODIXANOL 60 ML: 320 INJECTION, SOLUTION INTRAVASCULAR at 11:43

## 2021-04-10 RX ADMIN — FENTANYL CITRATE 50 MCG: 50 INJECTION, SOLUTION INTRAMUSCULAR; INTRAVENOUS at 11:34

## 2021-04-10 RX ADMIN — FENTANYL CITRATE 50 MCG: 50 INJECTION, SOLUTION INTRAMUSCULAR; INTRAVENOUS at 11:41

## 2021-04-10 ASSESSMENT — ACTIVITIES OF DAILY LIVING (ADL)
ADLS_ACUITY_SCORE: 14
ADLS_ACUITY_SCORE: 16
ADLS_ACUITY_SCORE: 16
ADLS_ACUITY_SCORE: 14
ADLS_ACUITY_SCORE: 16
ADLS_ACUITY_SCORE: 14

## 2021-04-10 NOTE — PROGRESS NOTES
"VASCULAR SURGERY PROGRESS NOTE    Subjective:  Underwent angiogram yesterday for lytics check that showed improved flow through the popliteal artery but showering of thrombus distally.  Lytics catheter placed in popliteal and proximal peroneal artery.  She is doing better although continues with decreased sensation and range of motion.  Biphasic PT signal and monophasic DP signal bilaterally.    Objective:    Intake/Output Summary (Last 24 hours) at 4/9/2021 0719  Last data filed at 4/9/2021 0652  Gross per 24 hour   Intake 1651 ml   Output 1000 ml   Net 651 ml     Labs:  ROUTINE IP LABS (Last four results)  BMP  Recent Labs   Lab 04/10/21  0622 04/09/21  0607 04/08/21  1513    135 134   POTASSIUM 3.5 4.2 3.9   CHLORIDE 102 105 104   CASSI 7.7* 8.2* 9.6   CO2 27 23 23   BUN 3* 6* 10   CR 0.51* 0.56 0.75   GLC 95 116* 104*     CBC  Recent Labs   Lab 04/10/21  0622 04/09/21  0607 04/08/21  2122 04/08/21  1513   WBC 9.9 20.1* 21.9* 14.2*   RBC 3.15* 3.80 3.83 4.21   HGB 9.1* 10.8* 10.9* 12.0   HCT 27.5* 33.0* 33.0* 35.9   MCV 87 87 86 85   MCH 28.9 28.4 28.5 28.5   MCHC 33.1 32.7 33.0 33.4   RDW 13.3 13.2 13.0 12.8    414 447 576*     INR  Recent Labs   Lab 04/08/21  1513   INR 1.05     PHYSICAL EXAM:  /79   Pulse 86   Temp 98.7  F (37.1  C) (Oral)   Resp 16   Ht 1.753 m (5' 9\")   Wt 122.5 kg (270 lb)   SpO2 98%   BMI 39.87 kg/m    General: The patient is alert and oriented. Appropriate. No acute distress  Psych: Pleasant affect, Answers questions appropriately  Skin: Color appropriate for race, warm, dry.  Respiratory: Breathing unlabored  GI:  Abdomen soft, nontender to light palpation.  Extremities: monophasic L AT and peroneal signal on exam, no PT. No significant pain on passive flexion of left foot, compartment soft.  Able to dorsiflex and plantarflex, sensation improved. No bleeding or hematoma at right groin site. R DP/PT signals on exam.    Imaging:   No new " imaging.    ASSESSMENT:  30-year-old female with history of anxiety/depression, cystic acne present today with 5-day history of numbness and pain in her left lower extremity, found to have left popliteal artery occlusion status post IR thrombectomy and thrombolysis catheter placement 4/8/21.    Mottling has improved and she has a monophasic L AT and peroneal signal on exam, no PT on exam.  Reports significant pain of her left lower extremity but no signs of compartment syndrome, no significant pain on passive flexion of left foot and compartment soft. Able to dorsiflex and plantarflex, sensation improved.    Unclear source of embolus/thrombosis but reported claudication symptoms for months. RLE also w/ nonpalp pulses and no visualization of profunda on CTA w/ poor outflow (?contrast timing).    Leukocytosis 9.9, afebrile. Likely reactive.    PLAN:  NPO for IR for thrombolysis re-check today   Vascular medicine on-board to assist in diagnosing origin and postprocedure anticoagulation plan  Continue systemic heparin gtt  Pain control  Echo and telemetry    Elba Smith MD  Vascular Surgery Fellow   Pager: [phone]

## 2021-04-10 NOTE — PROGRESS NOTES
[facility]    HOSPITALIST PROGRESS NOTE :   --------------------------------------------------    Date of Admission:  4/8/2021    Cumulative Summary: Sruthi Mac is a 30 year old female with past medical history significant for anxiety, depression and history of panic attacks who presented to the emergency room with sudden left foot numbness and pain along with toe discoloration and was found to have acute limb ischemia.  She was taken emergently for mechanical thrombectomy with initiation of lytic therapy on April 8 by interventional radiology.  Patient was admitted for further evaluation and management.  Vascular surgery and vascular medicine has also been consulted.    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  She also started to notice discoloration of her toes and came to the emergency room.  Her pulses were not palpable.    * Risk factor included oral contraceptive pills and history of palpitations.  * Ultrasound of left lower extremity: Occlusion of left popliteal artery.  And no DVT.  * CTA chest abdomen and pelvis with runoff contrast: Abrupt cut off of opacification of the left popliteal artery with associated filling defect consistent with arterial bolus 4.3 into 5.7 cm left adnexal cyst recommend follow-up pelvic ultrasound.  * 04/08 :Patient underwent emergent abdominal aortic and left lower extremity diagnostic arteriography, s/p mechanical thrombectomy of left popliteal artery with extirpation of thrombus  * Patient also underwent placement of infusion catheter extending from left superficial femoral artery through the left posterior tibial artery with initiation  of arterial thrombolytic therapy to left lower extremity.  * Also have claudication symptoms for months, right lower extremity also without nonpalpable pulses.  * 04/09: Bilateral lower extremity angiogram she was also noticed to have filling defects in right profundofemoral artery as well as right posterior tibial and peroneal arteries  * 04/09: IR angiogram on the Left side through catheter follow up, showing continued thrombolysis with numerous filling defects throughout the runoff arteries of the left lower extremity.  Improved thrombus burden in popliteal artery, additional length and TPA administration.    --Patient was seen and examined, coming back from interventional radiology, patient's fiance and Dr. Smith from vascular surgery also present in the room.  --Patient continues to have significant lower extremity discomfort, currently her pain is well controlled on PCA pump.  --Patient is not requiring any basal rate on the PCA pump at this time, will leave her on the PCA pump as patient is again planned to undergo left lower extremity surgical thrombectomy from BK popliteal artery, intraoperative angiogram, possible AT or PT cutdown tomorrow morning, Dr. Gonzalez following closely  --Vascular surgery has also discussed regarding getting ultrasound of bilateral lower extremities in case if patient needs to undergo bypass surgery for her right lower extremity which is also showing occlusion but patient is asymptomatic and most of her symptoms are present in left leg.  -- As above, at this time plan for stopping TPA.  --Patient will be continued on anticoagulation with argatroban.  --Continue patient on PCA pump.  --Continue patient on normal saline at 100 mL/h, patient will be n.p.o. after midnight.  --None of care was discussed in detail with patient and her fiancé, vascular surgery also answered all the questions, patient is slightly overwhelmed with her clinical status and plans for intervention but showed  understanding with explanation and would like to proceed with surgery.  --Check BMP and CBC tomorrow morning.  --LDL levels are checked, showing LDL of 80, total cholesterol of 146 and HDL of 42.  --Patient does have hypertriglyceridemia, will probably benefit from diet management first if not then probably need to be started on antitriglyceride medications like Lopid.   --TSH came back 5.25, slightly elevated from acute illness, free T4 is in normal range.  --Vascular medicine is consulted, they will be back on Monday, at this time I am not sure if patient does need DEYVI.  --Echocardiogram results are reviewed which are showing normal LV systolic function, ejection fraction of 55 to 60%, normal RV structure function and size, no pericardial effusion normal wall ventricular wall motion.  --Hypercoagulable work-up is ordered, in process, might need to be repeated in few months once patient is off the anticoagulation.    Urinary retention: Most likely secondary to being in pain and on bedrest.  --Continue Martinez catheter for now.    History of anxiety and depression.  History of panic attacks.  --Continue patient on PTA buspirone twice daily and sertraline.  --Patient has been reporting less panic attacks since the start of risperidone.  --IV/p.o. Ativan is also available in case of panic attack.    Leukocytosis: Most likely secondary to inflammation with acute limb ischemia, patient presented with leukocyte count of 20, counts improved to 9.9    Cystic acne   Ovarian Cyst   --PTA spironolactone, on hold  --Will need outpatient Gyneoncologist     Diet: Advance Diet as Tolerated: Regular Diet Adult    Martinez Catheter: in place, indication: Retention  DVT Prophylaxis: Heparin   Code Status: Full Code    The patient's care was discussed with the Bedside Nurse, Patient, Patient's Family and Vascular surgery Consultant.    Disposition Plan   Expected discharge: 2 - 3 days, recommended to prior living arrangement   More than  35 min of the time was spend in care today, more than 50% of the time was spent in patient care coordination and counseling.    Sydney Horton MD, FACP  Text Page (7am - 6pm)    ----------------------------------------------------------------------------------------------------------------------    Interval History   Was seen and examined, fancy and vascular surgery also present in the room, long discussion with patient regarding IR angiogram results from this morning which continues to show covering of clots and need for surgical intervention tomorrow.  There is also chronic occlusion on the right side which probably will need bypass surgery once left lower extremity thrombus is addressed.    -Data reviewed today: I reviewed all new labs and imaging results over the last 24 hours.    I personally reviewed imaging from this morning which continues to show clots on the left side.    Physical Exam   Temp: 98.7  F (37.1  C) Temp src: Oral BP: (!) 154/90 Pulse: 100   Resp: 14 SpO2: 96 % O2 Device: Nasal cannula Oxygen Delivery: 2 LPM  Vitals:    04/08/21 1424   Weight: 122.5 kg (270 lb)     Vital Signs with Ranges  Temp:  [98.7  F (37.1  C)-98.8  F (37.1  C)] 98.7  F (37.1  C)  Pulse:  [] 100  Resp:  [10-26] 14  BP: (119-184)/() 154/90  SpO2:  [92 %-100 %] 96 %  I/O last 3 completed shifts:  In: 522 [I.V.:522]  Out: 1925 [Urine:1925]    GENERAL: Alert , awake and oriented. NAD. Conversational, appropriate.   HEENT: Normocephalic. EOMI. No icterus or injection. Nares normal.   LUNGS: Clear to auscultation. No dyspnea at rest.   HEART: Regular rate. Extremities perfused.   ABDOMEN: Soft, nontender, and nondistended. Positive bowel sounds.   EXTREMITIES: Left lower extremity is swollen and painful, patient is wiggle the toes, nonpalpable pulses in both bilateral extremities  NEUROLOGIC: Moves extremities x4 on command. No acute focal neurologic abnormalities noted.     Medications     argatroban        HYDROmorphone       - MEDICATION INSTRUCTIONS -       sodium chloride 100 mL/hr at 04/10/21 1332       busPIRone  10 mg Oral BID     ferrous sulfate  325 mg Oral Daily     hydromorphone  1 mg Intravenous Once     polyethylene glycol  17 g Oral Daily     senna-docusate  1 tablet Oral BID    Or     senna-docusate  2 tablet Oral BID     sertraline  50 mg Oral Daily     sodium chloride (PF)  3 mL Intracatheter Q8H       Data   Recent Labs   Lab 04/10/21  1324 04/10/21  0622 04/09/21  0607 04/08/21  1513 04/08/21  1513   WBC 12.8* 9.9 20.1*   < > 14.2*   HGB 9.6* 9.1* 10.8*   < > 12.0   MCV 88 87 87   < > 85    240 414   < > 576*   INR  --   --   --   --  1.05   NA  --  135 135  --  134   POTASSIUM  --  3.5 4.2  --  3.9   CHLORIDE  --  102 105  --  104   CO2  --  27 23  --  23   BUN  --  3* 6*  --  10   CR  --  0.51* 0.56  --  0.75   ANIONGAP  --  6 7  --  7   CASSI  --  7.7* 8.2*  --  9.6   GLC  --  95 116*  --  104*   ALBUMIN  --  2.6*  --   --   --    PROTTOTAL  --  5.7*  --   --   --    BILITOTAL  --  0.5  --   --   --    ALKPHOS  --  96  --   --   --    ALT  --  75*  --   --   --    AST  --  396*  --   --   --     < > = values in this interval not displayed.       Imaging:   Recent Results (from the past 24 hour(s))   IR Angiogram through Catheter Follow Up    Narrative    IR ANGIOGRAM THROUGH CATHETER FOLLOW UP 4/9/2021 3:30 PM    HISTORY: 30-year-old woman with thrombus in the left popliteal artery,  undergoing thrombolysis treatment. Patient has undergone treatment for  approximately 20 hours, request made for repeat angiogram. In review  of CT of the lower extremities performed the day prior, also filling  defects noted in the right profunda femoral artery and likely within  the right posterior tibial and peroneal arteries. This raises greater  suspicion of showered emboli, of uncertain proximal source, though do  suspect both lower extremities are affected with emboli.    TECHNIQUE: Patient was again  brought to interventional radiology  department where informed consent reiterated. Angiogram was performed  through the infusion wire placed into the proximal posterior tibial  artery. Given the catheter in the posterior tibial artery is  occlusive, catheter was pulled back in the proximal posterior tibial  artery where angiogram repeated demonstrating improve blood flow  through the proximal posterior tibial artery, though remains no blood  flow in the mid to distal segments. Wire again pulled back into the  popliteal artery where angiogram performed. This demonstrates a slight  degree of antegrade blood flow through the popliteal artery, though  diffuse small luminal caliber and filling defects noted. Angiograms  then performed through the catheter demonstrating filling defects  throughout all runoff arteries. The intact runoff arteries, the  perineal runoff artery with proximal filling defect, the  reconstitution of the mid to distal segments. Majority of the anterior  tibial and posterior tibial arteries are either not patent or  diminutive in size. Distalmost posterior tibial artery reconstitutes  as does the distalmost anterior tibial artery. By completion, infusion  catheter advanced slightly into the proximal peroneal artery where  angiogram performed demonstrating displaced popliteal artery thrombus  into the bifurcation of peroneal and posterior tibial arteries. Plan  will be for continued TPA administration at this location. There are  no options for 175 cm infusion length wires which would be ideal in  this circumstance, though  of these wires have been  discontinued. Plan will be for TPA through the infusion catheter at  0.5 mg/hour and continued heparin through the sheath.    Sedation: A moderate level of sedation was achieved with 1 mg IV  Versed and 100 mcg IV fentanyl.  Sedation time: 39 minutes  Please note the above medications were administered by the  interventional radiology staff  under my direct supervision. The  patient's vital signs were monitored and remained stable throughout  the procedure.  Fluoroscopic time: 3 minutes  Air (Kerma): 34.7 mGy  Contrast: 30 mL of Visipaque administered intra-arterially without  complication.  Local anesthetic: None    FINDINGS: A total of 10 spot fluoroscopic images obtained throughout  the procedure, though not all imaging sequences are included. Initial  images demonstrate occlusion of the posterior tibial and runoff  arteries. Filling defects are noted throughout the runoff arteries.  The most intact outflow artery is the peroneal artery with a  relatively short segment proximal occlusion, the remaining segments  are patent. The posterior tibial and anterior tibial arteries are  either occluded or diminutive in size throughout the majority of their  lengths. Reconstitution of the distalmost posterior tibial and  anterior tibial arteries. Geniculate arterial branches are identified,  some of which have small filling defects, as well. 20 cm infusion  length catheter extends throughout the popliteal artery, tibioperoneal  trunk, and into the proximal peroneal artery.      Impression    IMPRESSION:   1. Continued thrombolysis with numerous filling defects throughout the  runoff arteries of the left lower extremity. Improved thrombus burden  in the popliteal artery, though additional length and TPA  administration required to improve blood flow.  2. In review of previous CT exam, patient has filling defects in right  profunda femoral artery as well as suspected to be in the right  posterior tibial and peroneal arteries. By completion of thrombolysis,  will perform right lower extremity angiogram.    JOHANNE ROMO MD

## 2021-04-10 NOTE — PLAN OF CARE
A/Ox4. VSS. Down to IR this morning. TPA and heparin discontinued. Angio seal CDI. Site soft with mild bruising around site. Numbness still noted in left foot. Unable to find Left PT pulse over the coarse of the day. MD is aware. Due to platelet drop, heparin products discontinued at 1100 and started on Argatroban drip. Running at 3.68ml/hr. HIT workup to be drawn this evening. PCA pump in use, pain level improving. Left leg/foot pink and warm to the touch. Calf circumference increased slightly to 37cm. Patient off bedrest at 1545. Up to commode with AX1. Martinez in place with good output. BM this shift. Plan for NPO at midnight and surgery tomorrow.

## 2021-04-10 NOTE — PRE-PROCEDURE
GENERAL PRE-PROCEDURE:   Procedure:  BIlateral LE angiogram  Date/Time:  4/10/2021 10:46 AM    Verbal consent obtained?: Yes    Written consent obtained?: Yes    Risks and benefits: Risks, benefits and alternatives were discussed    Consent given by:  Patient  Patient states understanding of procedure being performed: Yes    Patient's understanding of procedure matches consent: Yes    Procedure consent matches procedure scheduled: Yes    Expected level of sedation:  Moderate  Appropriately NPO:  Yes  ASA Class:  Class 3- Severe systemic disease, definite functional limitations  Mallampati  :  Grade 3- soft palate visible, posterior pharyngeal wall not visible  Lungs:  Lungs clear with good breath sounds bilaterally  Heart:  Normal heart sounds and rate  History & Physical reviewed:  History and physical reviewed and no updates needed  Statement of review:  I have reviewed the lab findings, diagnostic data, medications, and the plan for sedation

## 2021-04-10 NOTE — PLAN OF CARE
All pedal pulses palpable per doppler, Left  Dp intermittently though. Pt reports pain and numbness have improved. Right groin site C/D/I. NPO since MN for recheck in IR later this am. PCA Dilaudid effective for pain; rates it at 3-4   General

## 2021-04-10 NOTE — PROCEDURES
[facility]    Procedure: BIlateral LE angiogram    Date/Time: 4/10/2021 1:07 PM  Performed by: René Longoria MD  Authorized by: René Longoria MD     UNIVERSAL PROTOCOL   Site Marked: NA  Prior Images Obtained and Reviewed:  Yes  Required items: Required blood products, implants, devices and special equipment available    Patient identity confirmed:  Verbally with patient, arm band, provided demographic data and hospital-assigned identification number  Patient was reevaluated immediately before administering moderate or deep sedation or anesthesia  Confirmation Checklist:  Patient's identity using two indicators, relevant allergies, procedure was appropriate and matched the consent or emergent situation and correct equipment/implants were available  Time out: Immediately prior to the procedure a time out was called    Universal Protocol: the Joint Commission Universal Protocol was followed    Preparation: Patient was prepped and draped in usual sterile fashion    ESBL (mL):  10         ANESTHESIA    Anesthesia: Local infiltration  Local Anesthetic:  Lidocaine 1% without epinephrine      SEDATION    Patient Sedated: Yes    Sedation Type:  Moderate (conscious) sedation  Vital signs: Vital signs monitored during sedation    See dictated procedure note for full details.  Findings: LLE angiogram:  Thrombus burden has worsened since yesterday.  Poor outflow in runoff arteries with peroneal as dominant runoff.  Patient will require surgical thrombectomy of popliteal artery with balloon clearance of the runoff arteries as best as possible.    RLE angiogram:  Profunda femoral artery occluded at the origin.  Peroneal artery occluded most of length.  PTA occluded proximally, reconstitutes at mid segment.  LATESHA patent proximally, though long segment filling defects.  LATESHA occluded distally.    Angioseal closure device at right groin access site.    Heparin drip to restart at 1  PM.    Specimens: none    Complications: None    Condition: Stable    PROCEDURE   Patient Tolerance:  Patient tolerated the procedure well with no immediate complications    Length of time physician/provider present for 1:1 monitoring during sedation: 60

## 2021-04-10 NOTE — IR NOTE
Interventional Radiology Intra-procedural Nursing Note    Patient Name: Sruthi Mac  Medical Record Number: [id number]  Today's Date: April 10, 2021    Start Time: 1100  End of procedure time: 1145  Procedure: bilateral lower extremity angiogram, thrombolytics follow-up  Report given to: MCKINLEY Kingston, [facility]  Time pt departs:  1220  : n/a    Other Notes:     Versed 2mg IV  Fentanyl 200mcg IV    Patient tolerated procedure well. VSS. Patient alert, respirations regular and unlabored, no c/o pain at this time.     Procedure access site right groin is c/d/i. Right femoral arterial sheath was removed at 1145 with angio-seal closure device deployed, 2x2 quick clot gauze and tegaderm applied.    Patient transferred back to [facility] in stable condition accompanied by [facility] RN and NA.      Brittney Odonnell RN  Interventional Radiology

## 2021-04-10 NOTE — PROGRESS NOTES
VASCULAR SURGERY BRIEF UPDATE    IR thrombolytics check performed none showed worsening  filling defects of the popliteal artery with PT w/ peroneal runoff and collaterals to distal PT and AT. She continues to have some decreased sensation but able to wiggle toes and ankle.     RLE angiogram showed occluded profunda artery with patent SFA flow to BK pop. Only AT open proximally but occluded distally and PT flow reconstituted distally.       Lytics catheter pulled. Lay flat for 4hrs. High dose heparin gtt to restart at 1pm. Plan for surgical thrombectomy from BK popliteal artery, intraoperative angiogram, possible AT or PT cut-down tomorrow. Discussed w/ pt, paolo and Dr. Gonzalez.    Elba Smith MD  Vascular Surgery Fellow  Pager [phone]    STAFF:  As above.  In IR during angiograms.  No improvement left leg.  Trifurcation essentially occluded as mentioned above.  Normal collateral flow.  Left calf is mildly swollen but normal motor function mild tingling.  No evidence of compartment syndrome    Retrograde angiogram right side reveals occlusion of of the profundus femoral.  Thrombus located in the anterior tibial artery occluded distally.  Collaterals reconstitute posterior tibial.    Certainly evidence of distal embolization to both legs.  The right started at least 4 to 5 weeks ago where she noticed some mild discomfort that improved.  This would be very difficult to treat with embolectomy.  For now she is doing well on the right leg and we will follow this.  May eventually require popliteal to posterior tibial bypass if symptomatic.    More concerned about the left leg.  This is relatively acute has been going on for 5 to 6 days.  Fear long-term consequences of poor perfusion to the cath in a very young woman.  Thus, tomorrow morning we will plan popliteal/tibial embolectomy to see if we can improve blood supply.  This is been discussed with patient and all questions answered.    He has been removed from the  right groin.  Closed with Angio-Seal successfully.    Concerns about possible heparin allergy.  Initial platelet count 447 and now 274.  To be safe we have switched her over to argatroban.    Spent over 45 minutes with patient today.       Ulises Gonzalez MD

## 2021-04-11 ENCOUNTER — ANESTHESIA EVENT (OUTPATIENT)
Dept: SURGERY | Facility: CLINIC | Age: 31
DRG: 271 | End: 2021-04-11
Payer: COMMERCIAL

## 2021-04-11 ENCOUNTER — ANESTHESIA (OUTPATIENT)
Dept: SURGERY | Facility: CLINIC | Age: 31
DRG: 271 | End: 2021-04-11
Payer: COMMERCIAL

## 2021-04-11 ENCOUNTER — APPOINTMENT (OUTPATIENT)
Dept: GENERAL RADIOLOGY | Facility: CLINIC | Age: 31
DRG: 271 | End: 2021-04-11
Attending: INTERNAL MEDICINE
Payer: COMMERCIAL

## 2021-04-11 LAB
APTT PPP: 45 SEC (ref 22–37)
APTT PPP: 46 SEC (ref 22–37)
APTT PPP: 48 SEC (ref 22–37)
APTT PPP: 91 SEC (ref 22–37)
APTT PPP: >240 SEC (ref 22–37)
ERYTHROCYTE [DISTWIDTH] IN BLOOD BY AUTOMATED COUNT: 13.2 % (ref 10–15)
HCG UR QL: NEGATIVE
HCT VFR BLD AUTO: 25.8 % (ref 35–47)
HGB BLD-MCNC: 8.1 G/DL (ref 11.7–15.7)
HGB BLD-MCNC: 8.4 G/DL (ref 11.7–15.7)
MCH RBC QN AUTO: 28.2 PG (ref 26.5–33)
MCHC RBC AUTO-ENTMCNC: 32.6 G/DL (ref 31.5–36.5)
MCV RBC AUTO: 87 FL (ref 78–100)
PF4 HEPARIN CMPLX AB SER QL: NEGATIVE
PLATELET # BLD AUTO: 284 10E9/L (ref 150–450)
RBC # BLD AUTO: 2.98 10E12/L (ref 3.8–5.2)
UFH PPP CHRO-ACNC: 0.15 IU/ML
UFH PPP CHRO-ACNC: <0.1 IU/ML
WBC # BLD AUTO: 10.2 10E9/L (ref 4–11)

## 2021-04-11 PROCEDURE — C1757 CATH, THROMBECTOMY/EMBOLECT: HCPCS | Performed by: SURGERY

## 2021-04-11 PROCEDURE — 999N000141 HC STATISTIC PRE-PROCEDURE NURSING ASSESSMENT: Performed by: SURGERY

## 2021-04-11 PROCEDURE — 04CU0ZZ EXTIRPATION OF MATTER FROM LEFT PERONEAL ARTERY, OPEN APPROACH: ICD-10-PCS | Performed by: SURGERY

## 2021-04-11 PROCEDURE — 27602 DECOMPRESSION OF LOWER LEG: CPT | Mod: LT | Performed by: SURGERY

## 2021-04-11 PROCEDURE — 710N000009 HC RECOVERY PHASE 1, LEVEL 1, PER MIN: Performed by: SURGERY

## 2021-04-11 PROCEDURE — 250N000011 HC RX IP 250 OP 636: Performed by: SURGERY

## 2021-04-11 PROCEDURE — 250N000013 HC RX MED GY IP 250 OP 250 PS 637: Performed by: STUDENT IN AN ORGANIZED HEALTH CARE EDUCATION/TRAINING PROGRAM

## 2021-04-11 PROCEDURE — 04CQ0ZZ EXTIRPATION OF MATTER FROM LEFT ANTERIOR TIBIAL ARTERY, OPEN APPROACH: ICD-10-PCS | Performed by: SURGERY

## 2021-04-11 PROCEDURE — 250N000011 HC RX IP 250 OP 636: Performed by: NURSE ANESTHETIST, CERTIFIED REGISTERED

## 2021-04-11 PROCEDURE — 250N000025 HC SEVOFLURANE, PER MIN: Performed by: SURGERY

## 2021-04-11 PROCEDURE — 255N000002 HC RX 255 OP 636: Performed by: SURGERY

## 2021-04-11 PROCEDURE — 250N000009 HC RX 250: Performed by: NURSE ANESTHETIST, CERTIFIED REGISTERED

## 2021-04-11 PROCEDURE — 250N000009 HC RX 250: Performed by: STUDENT IN AN ORGANIZED HEALTH CARE EDUCATION/TRAINING PROGRAM

## 2021-04-11 PROCEDURE — 85730 THROMBOPLASTIN TIME PARTIAL: CPT | Performed by: STUDENT IN AN ORGANIZED HEALTH CARE EDUCATION/TRAINING PROGRAM

## 2021-04-11 PROCEDURE — 85730 THROMBOPLASTIN TIME PARTIAL: CPT | Performed by: INTERNAL MEDICINE

## 2021-04-11 PROCEDURE — 81025 URINE PREGNANCY TEST: CPT | Performed by: ANESTHESIOLOGY

## 2021-04-11 PROCEDURE — 258N000003 HC RX IP 258 OP 636: Performed by: NURSE ANESTHETIST, CERTIFIED REGISTERED

## 2021-04-11 PROCEDURE — 999N000179 XR SURGERY CARM FLUORO LESS THAN 5 MIN W STILLS

## 2021-04-11 PROCEDURE — 120N000001 HC R&B MED SURG/OB

## 2021-04-11 PROCEDURE — 88304 TISSUE EXAM BY PATHOLOGIST: CPT | Mod: 26 | Performed by: PATHOLOGY

## 2021-04-11 PROCEDURE — 250N000011 HC RX IP 250 OP 636: Performed by: ANESTHESIOLOGY

## 2021-04-11 PROCEDURE — 258N000003 HC RX IP 258 OP 636: Performed by: SURGERY

## 2021-04-11 PROCEDURE — 272N000001 HC OR GENERAL SUPPLY STERILE: Performed by: SURGERY

## 2021-04-11 PROCEDURE — 250N000011 HC RX IP 250 OP 636

## 2021-04-11 PROCEDURE — 36415 COLL VENOUS BLD VENIPUNCTURE: CPT | Performed by: STUDENT IN AN ORGANIZED HEALTH CARE EDUCATION/TRAINING PROGRAM

## 2021-04-11 PROCEDURE — 85027 COMPLETE CBC AUTOMATED: CPT | Performed by: HOSPITALIST

## 2021-04-11 PROCEDURE — 370N000017 HC ANESTHESIA TECHNICAL FEE, PER MIN: Performed by: SURGERY

## 2021-04-11 PROCEDURE — 88304 TISSUE EXAM BY PATHOLOGIST: CPT | Mod: TC | Performed by: SURGERY

## 2021-04-11 PROCEDURE — 85520 HEPARIN ASSAY: CPT | Performed by: HOSPITALIST

## 2021-04-11 PROCEDURE — 250N000013 HC RX MED GY IP 250 OP 250 PS 637: Performed by: HOSPITALIST

## 2021-04-11 PROCEDURE — 36415 COLL VENOUS BLD VENIPUNCTURE: CPT | Performed by: INTERNAL MEDICINE

## 2021-04-11 PROCEDURE — 85018 HEMOGLOBIN: CPT | Performed by: HOSPITALIST

## 2021-04-11 PROCEDURE — 36415 COLL VENOUS BLD VENIPUNCTURE: CPT | Performed by: HOSPITALIST

## 2021-04-11 PROCEDURE — 04CN0ZZ EXTIRPATION OF MATTER FROM LEFT POPLITEAL ARTERY, OPEN APPROACH: ICD-10-PCS | Performed by: SURGERY

## 2021-04-11 PROCEDURE — 85347 COAGULATION TIME ACTIVATED: CPT

## 2021-04-11 PROCEDURE — 85730 THROMBOPLASTIN TIME PARTIAL: CPT | Performed by: HOSPITALIST

## 2021-04-11 PROCEDURE — 85520 HEPARIN ASSAY: CPT | Performed by: STUDENT IN AN ORGANIZED HEALTH CARE EDUCATION/TRAINING PROGRAM

## 2021-04-11 PROCEDURE — 258N000003 HC RX IP 258 OP 636: Performed by: ANESTHESIOLOGY

## 2021-04-11 PROCEDURE — 360N000077 HC SURGERY LEVEL 4, PER MIN: Performed by: SURGERY

## 2021-04-11 PROCEDURE — 99232 SBSQ HOSP IP/OBS MODERATE 35: CPT | Performed by: INTERNAL MEDICINE

## 2021-04-11 PROCEDURE — 34203 REMOVAL OF LEG ARTERY CLOT: CPT | Mod: 22 | Performed by: SURGERY

## 2021-04-11 RX ORDER — HEPARIN SODIUM 1000 [USP'U]/ML
INJECTION, SOLUTION INTRAVENOUS; SUBCUTANEOUS PRN
Status: DISCONTINUED | OUTPATIENT
Start: 2021-04-11 | End: 2021-04-11 | Stop reason: HOSPADM

## 2021-04-11 RX ORDER — HEPARIN SODIUM 10000 [USP'U]/100ML
INJECTION, SOLUTION INTRAVENOUS CONTINUOUS
Status: DISCONTINUED | OUTPATIENT
Start: 2021-04-11 | End: 2021-04-11

## 2021-04-11 RX ORDER — NALOXONE HYDROCHLORIDE 0.4 MG/ML
0.4 INJECTION, SOLUTION INTRAMUSCULAR; INTRAVENOUS; SUBCUTANEOUS
Status: DISCONTINUED | OUTPATIENT
Start: 2021-04-11 | End: 2021-04-15 | Stop reason: HOSPADM

## 2021-04-11 RX ORDER — IOPAMIDOL 612 MG/ML
INJECTION, SOLUTION INTRAVASCULAR PRN
Status: DISCONTINUED | OUTPATIENT
Start: 2021-04-11 | End: 2021-04-11 | Stop reason: HOSPADM

## 2021-04-11 RX ORDER — SODIUM CHLORIDE, SODIUM LACTATE, POTASSIUM CHLORIDE, CALCIUM CHLORIDE 600; 310; 30; 20 MG/100ML; MG/100ML; MG/100ML; MG/100ML
INJECTION, SOLUTION INTRAVENOUS CONTINUOUS PRN
Status: DISCONTINUED | OUTPATIENT
Start: 2021-04-11 | End: 2021-04-11

## 2021-04-11 RX ORDER — ARGATROBAN 1 MG/ML
350 INJECTION, SOLUTION INTRAVENOUS ONCE
Status: COMPLETED | OUTPATIENT
Start: 2021-04-11 | End: 2021-04-11

## 2021-04-11 RX ORDER — LIDOCAINE HYDROCHLORIDE 20 MG/ML
INJECTION, SOLUTION INFILTRATION; PERINEURAL PRN
Status: DISCONTINUED | OUTPATIENT
Start: 2021-04-11 | End: 2021-04-11

## 2021-04-11 RX ORDER — NEOSTIGMINE METHYLSULFATE 1 MG/ML
VIAL (ML) INJECTION PRN
Status: DISCONTINUED | OUTPATIENT
Start: 2021-04-11 | End: 2021-04-11

## 2021-04-11 RX ORDER — HEPARIN SODIUM 10000 [USP'U]/100ML
1000 INJECTION, SOLUTION INTRAVENOUS CONTINUOUS
Status: DISCONTINUED | OUTPATIENT
Start: 2021-04-11 | End: 2021-04-12

## 2021-04-11 RX ORDER — NALOXONE HYDROCHLORIDE 0.4 MG/ML
0.2 INJECTION, SOLUTION INTRAMUSCULAR; INTRAVENOUS; SUBCUTANEOUS
Status: DISCONTINUED | OUTPATIENT
Start: 2021-04-11 | End: 2021-04-15 | Stop reason: HOSPADM

## 2021-04-11 RX ORDER — SODIUM CHLORIDE, SODIUM LACTATE, POTASSIUM CHLORIDE, CALCIUM CHLORIDE 600; 310; 30; 20 MG/100ML; MG/100ML; MG/100ML; MG/100ML
INJECTION, SOLUTION INTRAVENOUS CONTINUOUS
Status: DISCONTINUED | OUTPATIENT
Start: 2021-04-11 | End: 2021-04-11 | Stop reason: HOSPADM

## 2021-04-11 RX ORDER — LIDOCAINE 40 MG/G
CREAM TOPICAL
Status: DISCONTINUED | OUTPATIENT
Start: 2021-04-11 | End: 2021-04-15

## 2021-04-11 RX ORDER — CLOPIDOGREL BISULFATE 75 MG/1
75 TABLET ORAL DAILY
Status: DISCONTINUED | OUTPATIENT
Start: 2021-04-11 | End: 2021-04-14

## 2021-04-11 RX ORDER — ONDANSETRON 2 MG/ML
4 INJECTION INTRAMUSCULAR; INTRAVENOUS EVERY 30 MIN PRN
Status: DISCONTINUED | OUTPATIENT
Start: 2021-04-11 | End: 2021-04-11 | Stop reason: HOSPADM

## 2021-04-11 RX ORDER — HEPARIN SODIUM 1000 [USP'U]/ML
INJECTION, SOLUTION INTRAVENOUS; SUBCUTANEOUS PRN
Status: DISCONTINUED | OUTPATIENT
Start: 2021-04-11 | End: 2021-04-11

## 2021-04-11 RX ORDER — HYDROMORPHONE HYDROCHLORIDE 1 MG/ML
.3-.5 INJECTION, SOLUTION INTRAMUSCULAR; INTRAVENOUS; SUBCUTANEOUS EVERY 5 MIN PRN
Status: DISCONTINUED | OUTPATIENT
Start: 2021-04-11 | End: 2021-04-11 | Stop reason: HOSPADM

## 2021-04-11 RX ORDER — PROPOFOL 10 MG/ML
INJECTION, EMULSION INTRAVENOUS PRN
Status: DISCONTINUED | OUTPATIENT
Start: 2021-04-11 | End: 2021-04-11

## 2021-04-11 RX ORDER — FENTANYL CITRATE 50 UG/ML
INJECTION, SOLUTION INTRAMUSCULAR; INTRAVENOUS PRN
Status: DISCONTINUED | OUTPATIENT
Start: 2021-04-11 | End: 2021-04-11

## 2021-04-11 RX ORDER — DEXAMETHASONE SODIUM PHOSPHATE 4 MG/ML
INJECTION, SOLUTION INTRA-ARTICULAR; INTRALESIONAL; INTRAMUSCULAR; INTRAVENOUS; SOFT TISSUE PRN
Status: DISCONTINUED | OUTPATIENT
Start: 2021-04-11 | End: 2021-04-11

## 2021-04-11 RX ORDER — ONDANSETRON 4 MG/1
4 TABLET, ORALLY DISINTEGRATING ORAL EVERY 30 MIN PRN
Status: DISCONTINUED | OUTPATIENT
Start: 2021-04-11 | End: 2021-04-11 | Stop reason: HOSPADM

## 2021-04-11 RX ORDER — CLINDAMYCIN PHOSPHATE 900 MG/50ML
900 INJECTION, SOLUTION INTRAVENOUS EVERY 8 HOURS
Status: COMPLETED | OUTPATIENT
Start: 2021-04-11 | End: 2021-04-12

## 2021-04-11 RX ORDER — ONDANSETRON 2 MG/ML
INJECTION INTRAMUSCULAR; INTRAVENOUS PRN
Status: DISCONTINUED | OUTPATIENT
Start: 2021-04-11 | End: 2021-04-11

## 2021-04-11 RX ORDER — FENTANYL CITRATE 50 UG/ML
25-50 INJECTION, SOLUTION INTRAMUSCULAR; INTRAVENOUS
Status: DISCONTINUED | OUTPATIENT
Start: 2021-04-11 | End: 2021-04-11 | Stop reason: HOSPADM

## 2021-04-11 RX ORDER — GLYCOPYRROLATE 0.2 MG/ML
INJECTION, SOLUTION INTRAMUSCULAR; INTRAVENOUS PRN
Status: DISCONTINUED | OUTPATIENT
Start: 2021-04-11 | End: 2021-04-11

## 2021-04-11 RX ORDER — CLINDAMYCIN PHOSPHATE 900 MG/50ML
INJECTION, SOLUTION INTRAVENOUS PRN
Status: DISCONTINUED | OUTPATIENT
Start: 2021-04-11 | End: 2021-04-11

## 2021-04-11 RX ORDER — ASPIRIN 81 MG/1
81 TABLET ORAL DAILY
Status: DISCONTINUED | OUTPATIENT
Start: 2021-04-12 | End: 2021-04-14

## 2021-04-11 RX ORDER — ARGATROBAN 1 MG/ML
350 INJECTION, SOLUTION INTRAVENOUS ONCE
Status: DISCONTINUED | OUTPATIENT
Start: 2021-04-11 | End: 2021-04-11

## 2021-04-11 RX ORDER — ASPIRIN 600 MG/1
600 SUPPOSITORY RECTAL ONCE
Status: CANCELLED | OUTPATIENT
Start: 2021-04-11 | End: 2021-04-11

## 2021-04-11 RX ORDER — ASPIRIN 81 MG/1
81 TABLET ORAL ONCE
Status: COMPLETED | OUTPATIENT
Start: 2021-04-11 | End: 2021-04-11

## 2021-04-11 RX ORDER — SODIUM CHLORIDE 9 MG/ML
INJECTION, SOLUTION INTRAVENOUS CONTINUOUS
Status: DISCONTINUED | OUTPATIENT
Start: 2021-04-11 | End: 2021-04-13

## 2021-04-11 RX ADMIN — LIDOCAINE HYDROCHLORIDE 100 MG: 20 INJECTION, SOLUTION INFILTRATION; PERINEURAL at 10:09

## 2021-04-11 RX ADMIN — ROCURONIUM BROMIDE 10 MG: 10 INJECTION INTRAVENOUS at 11:31

## 2021-04-11 RX ADMIN — HYDROMORPHONE HYDROCHLORIDE 0.5 MG: 1 INJECTION, SOLUTION INTRAMUSCULAR; INTRAVENOUS; SUBCUTANEOUS at 15:52

## 2021-04-11 RX ADMIN — FENTANYL CITRATE 50 MCG: 50 INJECTION, SOLUTION INTRAMUSCULAR; INTRAVENOUS at 10:28

## 2021-04-11 RX ADMIN — PHENYLEPHRINE HYDROCHLORIDE 150 MCG: 10 INJECTION INTRAVENOUS at 10:09

## 2021-04-11 RX ADMIN — HEPARIN SODIUM 4000 UNITS: 1000 INJECTION INTRAVENOUS; SUBCUTANEOUS at 14:24

## 2021-04-11 RX ADMIN — DEXAMETHASONE SODIUM PHOSPHATE 4 MG: 4 INJECTION, SOLUTION INTRA-ARTICULAR; INTRALESIONAL; INTRAMUSCULAR; INTRAVENOUS; SOFT TISSUE at 10:28

## 2021-04-11 RX ADMIN — SODIUM CHLORIDE, SODIUM LACTATE, POTASSIUM CHLORIDE, CALCIUM CHLORIDE: 600; 310; 30; 20 INJECTION, SOLUTION INTRAVENOUS at 14:08

## 2021-04-11 RX ADMIN — CLINDAMYCIN PHOSPHATE 900 MG: 900 INJECTION, SOLUTION INTRAVENOUS at 18:23

## 2021-04-11 RX ADMIN — PROPOFOL 250 MG: 10 INJECTION, EMULSION INTRAVENOUS at 10:09

## 2021-04-11 RX ADMIN — HYDROMORPHONE HYDROCHLORIDE 0.5 MG: 1 INJECTION, SOLUTION INTRAMUSCULAR; INTRAVENOUS; SUBCUTANEOUS at 11:31

## 2021-04-11 RX ADMIN — ONDANSETRON 4 MG: 2 INJECTION INTRAMUSCULAR; INTRAVENOUS at 10:28

## 2021-04-11 RX ADMIN — SERTRALINE HYDROCHLORIDE 50 MG: 50 TABLET ORAL at 08:17

## 2021-04-11 RX ADMIN — NEOSTIGMINE METHYLSULFATE 5 MG: 1 INJECTION, SOLUTION INTRAVENOUS at 15:20

## 2021-04-11 RX ADMIN — ROCURONIUM BROMIDE 70 MG: 10 INJECTION INTRAVENOUS at 10:09

## 2021-04-11 RX ADMIN — PROPOFOL 50 MG: 10 INJECTION, EMULSION INTRAVENOUS at 14:09

## 2021-04-11 RX ADMIN — FERROUS SULFATE TAB 325 MG (65 MG ELEMENTAL FE) 325 MG: 325 (65 FE) TAB at 08:17

## 2021-04-11 RX ADMIN — SODIUM CHLORIDE, POTASSIUM CHLORIDE, SODIUM LACTATE AND CALCIUM CHLORIDE: 600; 310; 30; 20 INJECTION, SOLUTION INTRAVENOUS at 12:58

## 2021-04-11 RX ADMIN — HEPARIN SODIUM 1000 UNITS/HR: 10000 INJECTION, SOLUTION INTRAVENOUS at 15:17

## 2021-04-11 RX ADMIN — FENTANYL CITRATE 50 MCG: 50 INJECTION, SOLUTION INTRAMUSCULAR; INTRAVENOUS at 10:05

## 2021-04-11 RX ADMIN — CLINDAMYCIN PHOSPHATE 900 MG: 900 INJECTION, SOLUTION INTRAVENOUS at 10:19

## 2021-04-11 RX ADMIN — ONDANSETRON 4 MG: 2 INJECTION INTRAMUSCULAR; INTRAVENOUS at 15:20

## 2021-04-11 RX ADMIN — Medication: at 17:04

## 2021-04-11 RX ADMIN — SENNOSIDES AND DOCUSATE SODIUM 1 TABLET: 8.6; 5 TABLET ORAL at 20:30

## 2021-04-11 RX ADMIN — ARGATROBAN 42880 MCG: 1 INJECTION, SOLUTION INTRAVENOUS at 12:27

## 2021-04-11 RX ADMIN — BUSPIRONE HYDROCHLORIDE 10 MG: 10 TABLET ORAL at 08:15

## 2021-04-11 RX ADMIN — MIDAZOLAM 1 MG: 1 INJECTION INTRAMUSCULAR; INTRAVENOUS at 09:59

## 2021-04-11 RX ADMIN — BUSPIRONE HYDROCHLORIDE 10 MG: 10 TABLET ORAL at 20:30

## 2021-04-11 RX ADMIN — HEPARIN SODIUM 4000 UNITS: 1000 INJECTION INTRAVENOUS; SUBCUTANEOUS at 14:34

## 2021-04-11 RX ADMIN — MIDAZOLAM HYDROCHLORIDE 2 MG: 1 INJECTION, SOLUTION INTRAMUSCULAR; INTRAVENOUS at 09:51

## 2021-04-11 RX ADMIN — SODIUM CHLORIDE, POTASSIUM CHLORIDE, SODIUM LACTATE AND CALCIUM CHLORIDE: 600; 310; 30; 20 INJECTION, SOLUTION INTRAVENOUS at 09:47

## 2021-04-11 RX ADMIN — HYDROMORPHONE HYDROCHLORIDE 0.5 MG: 1 INJECTION, SOLUTION INTRAMUSCULAR; INTRAVENOUS; SUBCUTANEOUS at 14:05

## 2021-04-11 RX ADMIN — ASPIRIN 81 MG: 81 TABLET, DELAYED RELEASE ORAL at 16:22

## 2021-04-11 RX ADMIN — PHENYLEPHRINE HYDROCHLORIDE 100 MCG: 10 INJECTION INTRAVENOUS at 14:26

## 2021-04-11 RX ADMIN — HEPARIN SODIUM 3000 UNITS: 1000 INJECTION INTRAVENOUS; SUBCUTANEOUS at 15:25

## 2021-04-11 RX ADMIN — CLOPIDOGREL BISULFATE 75 MG: 75 TABLET, FILM COATED ORAL at 16:22

## 2021-04-11 RX ADMIN — HYDROMORPHONE HYDROCHLORIDE 0.5 MG: 1 INJECTION, SOLUTION INTRAMUSCULAR; INTRAVENOUS; SUBCUTANEOUS at 16:13

## 2021-04-11 RX ADMIN — ARGATROBAN 0.75 MCG/KG/MIN: 50 INJECTION INTRAVENOUS at 02:48

## 2021-04-11 RX ADMIN — GLYCOPYRROLATE 0.8 MG: 0.2 INJECTION, SOLUTION INTRAMUSCULAR; INTRAVENOUS at 15:20

## 2021-04-11 ASSESSMENT — LIFESTYLE VARIABLES: TOBACCO_USE: 0

## 2021-04-11 ASSESSMENT — ACTIVITIES OF DAILY LIVING (ADL)
ADLS_ACUITY_SCORE: 14

## 2021-04-11 ASSESSMENT — ENCOUNTER SYMPTOMS
SEIZURES: 0
DYSRHYTHMIAS: 0

## 2021-04-11 NOTE — PLAN OF CARE
A/Ox4. AVSS. Tele NSR. Up with AX1 and GB. Minimal weight bearing tolerated on left leg. Left calf mildly swollen and tender. Patient down for surgery this morning. Back at 1730. Pulses now all palpable with doppler. CMS intact.  Dressing to left lower leg CDI. Bear hugger in place for increased profusion. Foot is pink and well perfused. Minimal pain, PCA in place. Heparin running at 1000 units an hour.  Hep10A scheduled for this evening. Martinez in place and patent. regular diet good appetite.

## 2021-04-11 NOTE — PROGRESS NOTES
Patient down to surgery at 0910. Patient's engagement ring and dominga earrings put in container with name on it and locked in med cabinet by charge desk. Patient aware.

## 2021-04-11 NOTE — ANESTHESIA PREPROCEDURE EVALUATION
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]   Procedure : Procedure(s):  EMBOLECTOMY, LOWER EXTREMITY     Past Medical History:   Diagnosis Date     Anxiety      Cystic acne      Depression      Panic attack       Past Surgical History:   Procedure Laterality Date     IR ANGIOGRAM THROUGH CATHETER FOLLOW UP  2021     IR ANGIOGRAM THROUGH CATHETER FOLLOW UP  4/10/2021     IR LOWER EXTREMITY ANGIOGRAM LEFT  2021     wisdom teeth remova        Allergies   Allergen Reactions     Erythromycin Unknown     Heparin      Concern for HIT     Penicillins Unknown      Social History     Tobacco Use     Smoking status: Never Smoker   Substance Use Topics     Alcohol use: Yes     Frequency: 2-4 times a month      Wt Readings from Last 1 Encounters:   21 122.5 kg (270 lb)        Allergies   Allergen Reactions     Erythromycin Unknown     Heparin      Concern for HIT     Penicillins Unknown     Prior to Admission medications    Medication Sig Start Date End Date Taking? Authorizing Provider   busPIRone (BUSPAR) 10 MG tablet Take 10 mg by mouth 2 times daily   Yes Unknown, Entered By History   Ferrous Sulfate (IRON) 325 (65 Fe) MG tablet Take 1 tablet by mouth daily   Yes Unknown, Entered By History   ibuprofen (ADVIL/MOTRIN) 200 MG capsule Take 200-800 mg by mouth every 4 hours as needed for pain or fever   Yes Unknown, Entered By History   Lactobacillus (PROBIOTIC ACIDOPHILUS PO) Take by mouth daily   Yes Unknown, Entered By History   magnesium oxide 400 MG CAPS Take by mouth daily   Yes Unknown, Entered By History   multivitamin, therapeutic (THERA-VIT) TABS tablet Take 1 tablet by mouth daily   Yes Unknown, Entered By History   norgestim-eth estrad triphasic (TRI FEMYNOR) 0.18/0.215/0.25 MG-35 MCG tablet Take 1 tablet by mouth daily   Yes Unknown, Entered By History    sertraline (ZOLOFT) 50 MG tablet Take 50 mg by mouth daily   Yes Unknown, Entered By History   spironolactone (ALDACTONE) 50 MG tablet Take 50 mg by mouth daily   Yes Unknown, Entered By History   Vitamin D3 (CHOLECALCIFEROL) 25 mcg (1000 units) tablet Take 1 tablet by mouth daily   Yes Unknown, Entered By History     RECENT LABS:   ECG:   ECHO:   CXR:      Anesthesia Evaluation   Pt has not had prior anesthetic         ROS/MED HX  ENT/Pulmonary:    (-) tobacco use, asthma and sleep apnea   Neurologic:     (+) migraines,  (-) no seizures and no CVA   Cardiovascular:    (-) hypertension, CAD, CASTILLO, arrhythmias, valvular problems/murmurs and dyslipidemia   METS/Exercise Tolerance: >4 METS    Hematologic:     (+) History of blood clots,     Musculoskeletal:  - neg musculoskeletal ROS     GI/Hepatic:    (-) GERD and liver disease   Renal/Genitourinary:       Endo:    (-) Type I DM, Type II DM, thyroid disease and obesity   Psychiatric/Substance Use:     (+) psychiatric history anxiety and depression     Infectious Disease:    (-) Recent Fever   Malignancy:       Other:            Physical Exam    Airway        Mallampati: II   TM distance: > 3 FB   Neck ROM: full   Mouth opening: > 3 cm    Respiratory Devices and Support         Dental  no notable dental history         Cardiovascular   cardiovascular exam normal       Rhythm and rate: normal     Pulmonary   pulmonary exam normal                OUTSIDE LABS:  CBC:   Lab Results   Component Value Date    WBC 10.2 04/11/2021    WBC 11.2 (H) 04/10/2021    HGB 8.4 (L) 04/11/2021    HGB 9.2 (L) 04/10/2021    HCT 25.8 (L) 04/11/2021    HCT 27.0 (L) 04/10/2021     04/11/2021     04/10/2021     BMP:   Lab Results   Component Value Date     04/10/2021     04/09/2021    POTASSIUM 3.5 04/10/2021    POTASSIUM 4.2 04/09/2021    CHLORIDE 102 04/10/2021    CHLORIDE 105 04/09/2021    CO2 27 04/10/2021    CO2 23 04/09/2021    BUN 3 (L) 04/10/2021    BUN 6 (L)  04/09/2021    CR 0.51 (L) 04/10/2021    CR 0.56 04/09/2021    GLC 95 04/10/2021     (H) 04/09/2021     COAGS:   Lab Results   Component Value Date    PTT 46 (H) 04/11/2021    INR 1.05 04/08/2021     POC:   Lab Results   Component Value Date    BGM 97 04/10/2021    HCG Negative 04/11/2021    HCGS Negative 04/08/2021     HEPATIC:   Lab Results   Component Value Date    ALBUMIN 2.7 (L) 04/10/2021    PROTTOTAL 6.1 (L) 04/10/2021    ALT 95 (H) 04/10/2021     (H) 04/10/2021    ALKPHOS 134 04/10/2021    BILITOTAL 0.5 04/10/2021     OTHER:   Lab Results   Component Value Date    CASSI 7.7 (L) 04/10/2021    TSH 5.25 (H) 04/10/2021    T4 1.02 04/10/2021       Anesthesia Plan    ASA Status:  2   NPO Status:  NPO Appropriate    Anesthesia Type: General.     - Airway: ETT   Induction: Propofol.   Maintenance: Balanced.   Techniques and Equipment:     - Lines/Monitors: 2nd IV     Consents    Anesthesia Plan(s) and associated risks, benefits, and realistic alternatives discussed. Questions answered and patient/representative(s) expressed understanding.     - Discussed with:  Patient         Postoperative Care    Pain management: Multi-modal analgesia.   PONV prophylaxis: Ondansetron (or other 5HT-3), Background Propofol Infusion, Dexamethasone or Solumedrol     Comments:                Lisa Azevedo MD

## 2021-04-11 NOTE — ANESTHESIA POSTPROCEDURE EVALUATION
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    Anesthesia Type:  General    Note:  Disposition: Admission   Postop Pain Control: Uneventful            Sign Out: Well controlled pain   PONV: No   Neuro/Psych: Uneventful            Sign Out: Acceptable/Baseline neuro status   Airway/Respiratory: Uneventful            Sign Out: Acceptable/Baseline resp. status   CV/Hemodynamics: Uneventful            Sign Out: Acceptable CV status   Other NRE: NONE   DID A NON-ROUTINE EVENT OCCUR? No         Last vitals:  Vitals:    04/11/21 1545 04/11/21 1550 04/11/21 1600   BP: (!) 144/88 (!) 148/90 (!) 144/84   Pulse: 116 105 103   Resp: 11 16 10   Temp: 37.1  C (98.7  F)     SpO2: 97% 98% 99%       Last vitals prior to Anesthesia Care Transfer:  CRNA VITALS  4/11/2021 1511 - 4/11/2021 1611      4/11/2021             Resp Rate (set):  10          Electronically Signed By: Lisa Azevedo MD  April 11, 2021  4:24 PM

## 2021-04-11 NOTE — PROGRESS NOTES
Vascular Surgery Progress Note    S: Still some tingling and tightness to the left calf.  Perhaps somewhat better.  Right foot feels fine.       Concerned about her left leg being as problematic where she cannot walk forever.  Patient was normal with no problems with ambulation prior to the events over the last several weeks.  Reported that the right leg which had initial symptoms is not bothersome to her presently.    O: Left leg is elevated throughout.  Alert and appropriate.  Somewhat tearful.  Vitals:  BP  Min: 120/73  Max: 184/109  Temp  Av.6  F (37  C)  Min: 98.3  F (36.8  C)  Max: 98.8  F (37.1  C)  Pulse  Av.1  Min: 85  Max: 107  I/O last 3 completed shifts:  In: 42 [I.V.:42]  Out: 4650 [Urine:4650]    Physical Exam: Right groin access site=A       Biphasic signal in the right ankle PT with very weak DP       Left calf with less swelling.  No evidence of compartment syndrome.  Able to move toes and feet though somewhat painful.  Very weak trace left DP Doppler signal with no PT signal.      Some bruising on left proximal posterior lateral calf.    Platelet= 284 Hgb= 8.4                Reviewed images of yesterday's angiogram of both the right and left legs with patient.    Assessment/Plan: #1.  Concerned about significant thrombus/emboli to left popliteal and trifurcation vessels.  This will not resolve on its own.  She does reconstitute the distal DP though there may be a distal clot in the plantar arch vessels.  The medial and lateral posterior tibial branches reconstitute.  I do not feel that this will improve with time.  Patient has enough blood to flow to prevent amputation however, would expect ongoing claudication type symptoms may 30-year-old patient.  We still feel that surgical embolectomy of the popliteal and tibial vessels would be indicated.  Certainly risk this would not work since we do not know the etiology.  Would not do a distal bypass at this time--see how patient does with  embolectomy and anticoagulation.  Discussed with patient.  She is decided to proceed with surgery which will be performed this morning.              #2.  Suspect same issue in the right leg with emboli to profundus femoral branches and to tibial vessels.  However, collateralization to the ankle PT is very adequate with biphasic Doppler signals.  Would not recommend any specific treatment at this time.              #3.  Decreased platelets from admission though this may be a reactive response.  On argatroban this will be continued.  Will require oral anticoagulation postoperatively.               #4.  Acute blood loss anemia suspected from angiograms.  However, hemoglobin only 10.9 upon admission.  Will require iron supplements.      30 minutes with patient this morning discussing the situation.                     Wm. Lisa MD

## 2021-04-11 NOTE — OR NURSING
Critical value for PTT with a result of >240.  Notified Dr. Gonzalez and he stated patient had just been bolused and was not concerned. Do not stop drip and order a repeat PTT in 2 hours @ 1800.

## 2021-04-11 NOTE — PROGRESS NOTES
[facility]    HOSPITALIST PROGRESS NOTE :   --------------------------------------------------    Date of Admission:  4/8/2021    Cumulative Summary: Sruthi Mac is a 30 year old female with past medical history significant for anxiety, depression and history of panic attacks who presented to the emergency room with sudden left foot numbness and pain along with toe discoloration and was found to have acute limb ischemia.  She was taken emergently for mechanical thrombectomy with initiation of lytic therapy on April 8 by interventional radiology.  Patient was admitted for further evaluation and management.  Vascular surgery and vascular medicine has also been consulted.    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  She also started to notice discoloration of her toes and came to the emergency room.  Her pulses were not palpable.    * Risk factor included oral contraceptive pills and history of palpitations.  * Ultrasound of left lower extremity: Occlusion of left popliteal artery.  And no DVT.  * CTA chest abdomen and pelvis with runoff contrast: Abrupt cut off of opacification of the left popliteal artery with associated filling defect consistent with arterial bolus 4.3 into 5.7 cm left adnexal cyst recommend follow-up pelvic ultrasound.  * 04/08 :Patient underwent emergent abdominal aortic and left lower extremity diagnostic arteriography, s/p mechanical thrombectomy of left popliteal artery with extirpation of thrombus  * Patient also underwent placement of infusion catheter extending from left superficial femoral artery through the left posterior tibial artery with initiation  of arterial thrombolytic therapy to left lower extremity.  * Also have claudication symptoms for months, right lower extremity also without nonpalpable pulses but weak presence on doppler    * 04/09: Bilateral lower extremity angiogram, filling defects in right profundofemoral artery as well as right posterior tibial and peroneal arteries  * 04/09: IR angiogram on the Left side through catheter follow up, showing continued thrombolysis with numerous filling defects throughout the runoff arteries of the left lower extremity.  Improved thrombus burden in popliteal artery, additional length and TPA administration.  * 04/10: IR guided angiogram of Left leg through catheter follow-up showing worsening thrombus burden despite thrombolysis with TPA, thrombolysis was discontinued, concern for thrombus/embolus to be minimum subacute and may be chronic.  Plan for surgical embolectomy with cutdown to popliteal artery and passage of balloons into the runoff arteries was discussed with patient.  * 04/10: Multiple emboli in right lower extremity including the profundofemoral artery which is occluded.    *04/10: Bilateral lower extremity venous mapping done  * 04/11: Patient is currently n.p.o. plan to undergo vascular procedure soon.    --Patient was seen and examined this morning, currently n.p.o.  --Plan to undergo left lower extremity surgical thrombectomy, intraoperative angiogram, possible AT or PT cutdown by Dr. Gonzalez this morning, highly appreciate their help.  --Due to the concern for thrombocytopenia since admission, her heparin has been changed to argatroban.  -- MAHENDRA antibodies have been send , F/U on the results   --PCA pump in place, patient currently is not requiring too much pain medication and does not need any basal rate, will leave her on PCA pump as patient might have more discomfort after the surgery.  --Continue patient on normal saline as currently NPO.  --Check BMP and CBC tomorrow morning.  --LDL levels  are checked, showing LDL of 80, total cholesterol of 146 and HDL of 42.  --Patient does have hypertriglyceridemia, will probably benefit from diet management first if not then probably need to be started on antitriglyceride medications like Lopid.   --TSH came back 5.25, slightly elevated from acute illness, free T4 is in normal range.  --Vascular medicine is consulted, they will be back on Monday, will discuss with vascular medicine if patient will benefit from getting DEYVI before discharge.  --Echocardiogram results are reviewed which are showing normal LV systolic function, ejection fraction of 55 to 60%, normal RV structure function and size, no pericardial effusion normal wall ventricular wall motion.  --Hypercoagulable work-up is ordered, in process, might need to be repeated in few months once patient is off the anticoagulation.    Urinary retention: Most likely secondary to being in pain and on bedrest.  --Continue Martinez catheter for now.    History of anxiety and depression.  History of panic attacks.  --Continue patient on PTA buspirone twice daily and sertraline.  --Patient has been reporting less panic attacks since the start of risperidone.  --IV/p.o. Ativan is also available in case of panic attack.    Leukocytosis: Most likely secondary to inflammation with acute limb ischemia, patient presented with leukocyte count of 20, counts improved to 9.9    Cystic acne   Ovarian Cyst   --PTA spironolactone, on hold  --CA-125 was checked which came back negative  --Will need outpatient Gyneoncologist follow-up for ovarian cyst, she is also taken off the oral contraceptive pills    Diet: NPO per Anesthesia Guidelines for Procedure/Surgery Except for: Meds    Martinez Catheter: in place, indication: Retention  DVT Prophylaxis: Heparin   Code Status: Full Code    The patient's care was discussed with the Bedside Nurse, Patient, Patient's Family and Vascular surgery Consultant.    Disposition Plan   Expected discharge:  2 - 3 days, recommended to prior living arrangement   About 25 minutes of the time was a spent in patient care, more than 50% of the time was a spent in counseling and coordination of the care.    Sydney Horton MD, FACP  Text Page (7am - 6pm)    ----------------------------------------------------------------------------------------------------------------------    Interval History    Patient was seen and examined, currently n.p.o., aware about plan for going to the OR soon, slightly anxious, emotional support was provided she is otherwise denying any worsening of her leg pain, we discussed about her current medications and plan, patient is in agreement.  She is denying any chest pain, palpitations, shortness of breath.  Her mother and sister has also arrived from [place] but are currently in home due to visitors restriction.    -Data reviewed today: I reviewed all new labs and imaging results over the last 24 hours.    I personally reviewed imaging from this morning which continues to show clots on the left side.    Physical Exam   Temp: 98.3  F (36.8  C) Temp src: Oral BP: 126/80 Pulse: 93   Resp: 18 SpO2: 98 % O2 Device: None (Room air) Oxygen Delivery: 2 LPM  Vitals:    04/08/21 1424   Weight: 122.5 kg (270 lb)     Vital Signs with Ranges  Temp:  [98.3  F (36.8  C)-98.8  F (37.1  C)] 98.3  F (36.8  C)  Pulse:  [] 93  Resp:  [11-26] 18  BP: (120-184)/() 126/80  SpO2:  [92 %-100 %] 98 %  I/O last 3 completed shifts:  In: 42 [I.V.:42]  Out: 4650 [Urine:4650]    GENERAL: Alert , awake and oriented. NAD. Conversational, appropriate.   HEENT: Normocephalic. EOMI. No icterus or injection. Nares normal.   LUNGS: Clear to auscultation. No dyspnea at rest.   HEART: Regular rate. Extremities perfused.   ABDOMEN: Soft, nontender, and nondistended. Positive bowel sounds.   EXTREMITIES: Left lower extremity is swollen and painful, patient is wiggle the toes, nonpalpable pulses in both bilateral  extremities  NEUROLOGIC: Moves extremities x4 on command. No acute focal neurologic abnormalities noted.     Medications     argatroban 0.75 mcg/kg/min (04/11/21 0733)     HYDROmorphone       - MEDICATION INSTRUCTIONS -       sodium chloride 100 mL/hr at 04/10/21 2155       busPIRone  10 mg Oral BID     clindamycin  900 mg Intravenous Pre-Op/Pre-procedure x 1 dose     ferrous sulfate  325 mg Oral Daily     hydromorphone  1 mg Intravenous Once     polyethylene glycol  17 g Oral Daily     senna-docusate  1 tablet Oral BID    Or     senna-docusate  2 tablet Oral BID     sertraline  50 mg Oral Daily     sodium chloride (PF)  3 mL Intracatheter Q8H       Data   Recent Labs   Lab 04/11/21  0537 04/10/21  1445 04/10/21  1324 04/10/21  0622 04/09/21  0607 04/08/21  1513 04/08/21  1513   WBC 10.2 11.2* 12.8* 9.9 20.1*   < > 14.2*   HGB 8.4* 9.2* 9.6* 9.1* 10.8*   < > 12.0   MCV 87 87 88 87 87   < > 85    274 275 240 414   < > 576*   INR  --   --   --   --   --   --  1.05   NA  --   --   --  135 135  --  134   POTASSIUM  --   --   --  3.5 4.2  --  3.9   CHLORIDE  --   --   --  102 105  --  104   CO2  --   --   --  27 23  --  23   BUN  --   --   --  3* 6*  --  10   CR  --   --   --  0.51* 0.56  --  0.75   ANIONGAP  --   --   --  6 7  --  7   CASSI  --   --   --  7.7* 8.2*  --  9.6   GLC  --   --   --  95 116*  --  104*   ALBUMIN  --  2.7*  --  2.6*  --   --   --    PROTTOTAL  --  6.1*  --  5.7*  --   --   --    BILITOTAL  --  0.5  --  0.5  --   --   --    ALKPHOS  --  134  --  96  --   --   --    ALT  --  95*  --  75*  --   --   --    AST  --  399*  --  396*  --   --   --     < > = values in this interval not displayed.       Imaging:   Recent Results (from the past 24 hour(s))   IR Angiogram through Catheter Follow Up    Narrative    INTERVENTIONAL RADIOLOGY ANGIOGRAM THROUGH CATHETER FOLLOW UP  4/10/2021 11:42 AM    HISTORY:  30-year-old woman with multiple bilateral lower extremity  areas of thrombus, clearly emboli  given multiple locations. Patient  has undergone nearly 40 hours of TPA administration for acute  occlusive thrombus in left popliteal artery. Fear is that thrombus has  showered the runoff arteries and plan is for follow-up evaluation with  TPA. If TPA unsuccessful, likely surgical intervention.    TECHNIQUE: Patient was brought to the interventional radiology  department and informed consent reiterated. The existing right common  femoral arterial sheath and catheter as well as surrounding skin were  prepped and draped in standard sterile fashion. 1% lidocaine was used  for local anesthesia surrounding the sheath, including use of a  22-gauge spinal needle given depth of tract to the artery. A total of  20 mL of lidocaine was administered during the procedure. Angiogram  was performed initially, at and below the level of the knee through  the infusion catheter. This demonstrates increased thrombus throughout  the popliteal artery relative to previous exam. Outflow remains via  single vessel popliteal with reconstitution after proximal area of  occlusion. Peroneal artery then collateralizes with the dorsalis pedis  artery and the plantar arch of the posterior tibial artery. A  fluoroscopic sequence was held demonstrating stagnant blood flow in  the popliteal artery and trifurcation. The catheter was then pulled  back demonstrating rethrombosis of the popliteal artery at and below  the level of the knee. Again, this is worsened since previous exam  despite thrombolytic treatment. The images and findings were reviewed  with Dr. Gonzalez of vascular surgery. Plan will be for surgical  thrombectomy of the popliteal artery as well as the trifurcation. The  infusion catheter was removed and the sheath was pulled back to the  right external iliac artery where angiogram performed. This  demonstrates filling defect in the profunda femoral artery at/just  beyond the origin of the profunda femoral artery. Angiogram  then  performed throughout the right lower extremity demonstrating  reconstitution of the more distal profunda femoral arterial branches.  The tibioperoneal trunk, proximal posterior tibial, and proximal  peroneal arteries are occluded. Reconstitution of the posterior tibial  artery is noted in the mid to distal segment as well as a mid peroneal  artery, though small in caliber. Proximal anterior tibial artery is  patent, though filling defects throughout the proximal segment.  Anterior tibial artery essentially terminates in occlusion in the  distal lower leg.    An Angio-Seal closure device was used at the access site. Ultrasound  was used to visualize the superficial femoral artery beyond the access  site demonstrating normal-appearing triphasic waveforms. Manual  pressure was applied for an additional 15 minutes. Plan will be for  heparin to restart in 1 hour, high intensity. Patient will have  surgical evaluation the following day, per Dr. Gonzalez.    Sedation: A moderate level of sedation was achieved with 2 mg IV  Versed and 200 mcg IV fentanyl.  Sedation time: 45 minutes  Please note the above medications were administered by the  interventional radiology staff under my direct supervision. The  patient's vital signs were monitored and remained stable throughout  the procedure.  Fluoroscopic time: 3.5 minutes  Air kerma: 142 mGy  Contrast: 60 mL of Visipaque administered intra-arterially without  complication.  Local anesthetic: 20 mL of 1% lidocaine.    FINDINGS: Total of 13 spot fluoroscopic images and angiogram sequences  obtained throughout the procedure. Initial angiogram of the left lower  extremity demonstrates overall worsened appearance. Despite  thrombolysis, filling defects noted throughout the below-knee  popliteal artery and tibioperoneal trunk, this is worsened from the  day prior. Peroneal artery again fills beyond a short segment proximal  occlusion and reconstitutes the distal anterior tibial  artery, though  dorsalis pedis artery appears incomplete with filling defects in  distal branches. Posterior tibial artery is occluded and does not  reconstitute. The plantar artery reconstitutes via the peroneal  artery. The proximal anterior tibial artery is occluded. There is a  short segment proximal segment which appears patent, though small in  caliber. Given worsened appearance with thrombolysis therapy, clearly  this thrombus/embolus not amenable to thrombolysis.    Right lower extremity angiogram performed demonstrating conclusion of  the profunda femoral artery just beyond the origin. Right common  femoral, superficial femoral, and popliteal arteries are patent. There  is reconstitution of distal branches of the profundofemoral artery via  branches of the SFA. The tibioperoneal trunk, proximal peroneal, and  proximal to mid posterior tibial arteries are occluded. Reconstitution  of the posterior tibial artery in the mid segment which is dominant  runoff artery. Peritoneal artery reconstitutes in the proximal to mid  segment, though is small in caliber. The proximal-most anterior tibial  artery is patent, though with filling defects and the LATESHA terminates  in occlusion in the distal lower extremity.      Impression    IMPRESSION:  1. Discontinued thrombolysis. Patient has worsening thrombus burden  despite thrombolysis with TPA. Therefore, consider this  thrombus/embolus to the at minimum subacute, and may be chronic. Plan  will be for surgical embolectomy the following day with cut down to  the popliteal artery and passage of balloons into the runoff arteries  to improve blood flow.  2. Segmental areas of occlusion throughout the left runoff arteries,  majority of the anterior tibial and entirety of the posterior tibial  arteries. There is reconstitution of distal anterior tibial artery and  dorsalis pedis artery, though filling defects in the distal dorsalis  pedis artery. Reconstitution of the plantar  arch via peroneal artery  collaterals.  3. Multiple emboli in the right lower extremity including the profunda  femoral artery which is occluded. The tibioperoneal trunk, proximal  peritoneal, and proximal to mid posterior tibial arteries are  occluded. Reconstitution of the posterior tibial artery in the mid  segment which is the dominant runoff artery to the foot. Filling  defects noted in the proximal anterior tibial artery which is patent,  though LATESHA terminates in occlusion in the distal lower leg.  4. Deployment of Angio-Seal device at right groin arterial access.  Patient will have bed rest for 4 hours and heparin to be restarted in  1 hour.    JOHANNE ROMO MD    Lower Extremity Venous Mapping Bilateral    Narrative    ULTRASOUND BILATERAL LOWER EXTREMITY VENOUS MAPPING  4/10/2021 3:34 PM    HISTORY:  30-year-old woman with multiple emboli throughout both lower  extremity arterial systems, request made for vein evaluation for  possible surgical arterial bypass graft.    FINDINGS: The right common femoral vein ranges from 1.5 to 3.2 mm  throughout the thigh. The vein in the proximal calf is relatively  small measuring 0.8 mm. The vein is difficult to visualize beyond this  level. At least two branches noted in the thigh segment.    Left greater saphenous vein: Diameter measurements range from 3.2 to  5.5 mm throughout the thigh and 2.3 to 3 mm at and below the level of  the knee. At least two branches noted in the proximal thigh segment  and two branches at and below the level of the knee.      Impression    IMPRESSION:  1. Bilateral greater saphenous vein measurements as described.  2. Right greater saphenous vein is relatively small and becomes  particularly small below the level of the knee.    JOHANNE ROMO MD

## 2021-04-11 NOTE — ANESTHESIA CARE TRANSFER NOTE
Patient: Sruthi Mac    Procedure(s):  LEFT LEG ANTERIOR TIBIAL AND POSTERIOR TIBIAL EMBOLECTOMIES, FEMORAL POPITEAL VEIN PATCH, ANGIOGRAM, FASCIOTOMIES    Diagnosis: Acute occlusion of artery of lower extremity (H) [I70.209]  Diagnosis Additional Information: No value filed.    Anesthesia Type:   General     Note:    Oropharynx: oropharynx clear of all foreign objects  Level of Consciousness: awake  Oxygen Supplementation: face mask  Level of Supplemental Oxygen (L/min / FiO2): 8  Independent Airway: airway patency satisfactory and stable  Dentition: dentition unchanged  Vital Signs Stable: post-procedure vital signs reviewed and stable  Report to RN Given: handoff report given  Patient transferred to: PACU    Handoff Report: Identifed the Patient, Identified the Reponsible Provider, Reviewed the pertinent medical history, Discussed the surgical course, Reviewed Intra-OP anesthesia mangement and issues during anesthesia, Set expectations for post-procedure period and Allowed opportunity for questions and acknowledgement of understanding      Vitals: (Last set prior to Anesthesia Care Transfer)  CRNA VITALS  4/11/2021 1511 - 4/11/2021 1549      4/11/2021             Resp Rate (set):  10        Electronically Signed By: VESTA Perales CRNA  April 11, 2021  3:49 PM

## 2021-04-11 NOTE — PLAN OF CARE
Doing well, minimal use of PCA. Argatroban dose adjusted. To have thrombectomy early this am. Pulses per doppler versus palpation weakly.

## 2021-04-11 NOTE — ANESTHESIA PROCEDURE NOTES
Airway       Patient location during procedure: OR  Staff -        Anesthesiologist:  Lisa Azevedo MD       CRNA: Cookie Bellamy       Performed By: CRNA  Consent for Airway        Urgency: elective  Indications and Patient Condition       Indications for airway management: anirudh-procedural       Induction type:intravenous       Mask difficulty assessment: 2 - vent by mask + OA or adjuvant +/- NMBA    Final Airway Details       Final airway type: endotracheal airway       Successful airway: ETT - single and Oral  Endotracheal Airway Details        ETT size (mm): 7.0       Cuffed: yes       Successful intubation technique: direct laryngoscopy       DL Blade Type: MAC 3       Grade View of Cords: 1       Adjucts: stylet       Position: Right       Measured from: lips       Secured at (cm): 22       Bite block used: None    Post intubation assessment        Placement verified by: capnometry, equal breath sounds and chest rise        Number of attempts at approach: 1       Secured with: pink tape       Ease of procedure: easy       Dentition: Intact and Unchanged    Medication(s) Administered   Medication Administration Time: 4/11/2021 10:12 AM

## 2021-04-12 ENCOUNTER — SURGERY (OUTPATIENT)
Age: 31
End: 2021-04-12
Payer: COMMERCIAL

## 2021-04-12 ENCOUNTER — ANESTHESIA (OUTPATIENT)
Dept: SURGERY | Facility: CLINIC | Age: 31
DRG: 271 | End: 2021-04-12
Payer: COMMERCIAL

## 2021-04-12 ENCOUNTER — ANESTHESIA EVENT (OUTPATIENT)
Dept: SURGERY | Facility: CLINIC | Age: 31
DRG: 271 | End: 2021-04-12
Payer: COMMERCIAL

## 2021-04-12 LAB
ABO + RH BLD: NORMAL
ABO + RH BLD: NORMAL
ANION GAP SERPL CALCULATED.3IONS-SCNC: 5 MMOL/L (ref 3–14)
APTT PPP: 31 SEC (ref 22–37)
APTT PPP: 48 SEC (ref 22–37)
APTT PPP: 73 SEC (ref 22–37)
BLD GP AB SCN SERPL QL: NORMAL
BLOOD BANK CMNT PATIENT-IMP: NORMAL
BUN SERPL-MCNC: 3 MG/DL (ref 7–30)
CALCIUM SERPL-MCNC: 8.6 MG/DL (ref 8.5–10.1)
CARDIOLIPIN ANTIBODY IGG: <1.6 GPL-U/ML (ref 0–19.9)
CARDIOLIPIN ANTIBODY IGM: 0.9 MPL-U/ML (ref 0–19.9)
CHLORIDE SERPL-SCNC: 106 MMOL/L (ref 94–109)
CO2 SERPL-SCNC: 29 MMOL/L (ref 20–32)
CREAT SERPL-MCNC: 0.55 MG/DL (ref 0.52–1.04)
ERYTHROCYTE [DISTWIDTH] IN BLOOD BY AUTOMATED COUNT: 13.3 % (ref 10–15)
GFR SERPL CREATININE-BSD FRML MDRD: >90 ML/MIN/{1.73_M2}
GLUCOSE SERPL-MCNC: 112 MG/DL (ref 70–99)
HCT VFR BLD AUTO: 22.7 % (ref 35–47)
HGB BLD-MCNC: 7.5 G/DL (ref 11.7–15.7)
MCH RBC QN AUTO: 29 PG (ref 26.5–33)
MCHC RBC AUTO-ENTMCNC: 33 G/DL (ref 31.5–36.5)
MCV RBC AUTO: 88 FL (ref 78–100)
PLATELET # BLD AUTO: 354 10E9/L (ref 150–450)
POTASSIUM SERPL-SCNC: 3.6 MMOL/L (ref 3.4–5.3)
RBC # BLD AUTO: 2.59 10E12/L (ref 3.8–5.2)
SODIUM SERPL-SCNC: 140 MMOL/L (ref 133–144)
SPECIMEN EXP DATE BLD: NORMAL
UFH PPP CHRO-ACNC: 0.29 IU/ML
UFH PPP CHRO-ACNC: 0.66 IU/ML
UFH PPP CHRO-ACNC: <0.1 IU/ML
WBC # BLD AUTO: 12 10E9/L (ref 4–11)

## 2021-04-12 PROCEDURE — 120N000001 HC R&B MED SURG/OB

## 2021-04-12 PROCEDURE — 250N000013 HC RX MED GY IP 250 OP 250 PS 637: Performed by: STUDENT IN AN ORGANIZED HEALTH CARE EDUCATION/TRAINING PROGRAM

## 2021-04-12 PROCEDURE — 04CN0ZZ EXTIRPATION OF MATTER FROM LEFT POPLITEAL ARTERY, OPEN APPROACH: ICD-10-PCS | Performed by: SURGERY

## 2021-04-12 PROCEDURE — 710N000010 HC RECOVERY PHASE 1, LEVEL 2, PER MIN: Performed by: SURGERY

## 2021-04-12 PROCEDURE — 04CS0ZZ EXTIRPATION OF MATTER FROM LEFT POSTERIOR TIBIAL ARTERY, OPEN APPROACH: ICD-10-PCS | Performed by: SURGERY

## 2021-04-12 PROCEDURE — 99233 SBSQ HOSP IP/OBS HIGH 50: CPT | Performed by: INTERNAL MEDICINE

## 2021-04-12 PROCEDURE — 80048 BASIC METABOLIC PNL TOTAL CA: CPT | Performed by: STUDENT IN AN ORGANIZED HEALTH CARE EDUCATION/TRAINING PROGRAM

## 2021-04-12 PROCEDURE — 370N000017 HC ANESTHESIA TECHNICAL FEE, PER MIN: Performed by: SURGERY

## 2021-04-12 PROCEDURE — 250N000009 HC RX 250: Performed by: STUDENT IN AN ORGANIZED HEALTH CARE EDUCATION/TRAINING PROGRAM

## 2021-04-12 PROCEDURE — 34203 REMOVAL OF LEG ARTERY CLOT: CPT | Mod: 78 | Performed by: SURGERY

## 2021-04-12 PROCEDURE — 86901 BLOOD TYPING SEROLOGIC RH(D): CPT | Performed by: STUDENT IN AN ORGANIZED HEALTH CARE EDUCATION/TRAINING PROGRAM

## 2021-04-12 PROCEDURE — 36415 COLL VENOUS BLD VENIPUNCTURE: CPT | Performed by: INTERNAL MEDICINE

## 2021-04-12 PROCEDURE — 86850 RBC ANTIBODY SCREEN: CPT | Performed by: STUDENT IN AN ORGANIZED HEALTH CARE EDUCATION/TRAINING PROGRAM

## 2021-04-12 PROCEDURE — 250N000011 HC RX IP 250 OP 636: Performed by: STUDENT IN AN ORGANIZED HEALTH CARE EDUCATION/TRAINING PROGRAM

## 2021-04-12 PROCEDURE — 85520 HEPARIN ASSAY: CPT | Performed by: STUDENT IN AN ORGANIZED HEALTH CARE EDUCATION/TRAINING PROGRAM

## 2021-04-12 PROCEDURE — 250N000025 HC SEVOFLURANE, PER MIN: Performed by: SURGERY

## 2021-04-12 PROCEDURE — 272N000001 HC OR GENERAL SUPPLY STERILE: Performed by: SURGERY

## 2021-04-12 PROCEDURE — 258N000001 HC RX 258: Performed by: SURGERY

## 2021-04-12 PROCEDURE — 258N000003 HC RX IP 258 OP 636: Performed by: NURSE ANESTHETIST, CERTIFIED REGISTERED

## 2021-04-12 PROCEDURE — 258N000003 HC RX IP 258 OP 636: Performed by: ANESTHESIOLOGY

## 2021-04-12 PROCEDURE — 85027 COMPLETE CBC AUTOMATED: CPT | Performed by: STUDENT IN AN ORGANIZED HEALTH CARE EDUCATION/TRAINING PROGRAM

## 2021-04-12 PROCEDURE — 250N000011 HC RX IP 250 OP 636: Performed by: SURGERY

## 2021-04-12 PROCEDURE — 36415 COLL VENOUS BLD VENIPUNCTURE: CPT | Performed by: STUDENT IN AN ORGANIZED HEALTH CARE EDUCATION/TRAINING PROGRAM

## 2021-04-12 PROCEDURE — 250N000009 HC RX 250: Performed by: NURSE ANESTHETIST, CERTIFIED REGISTERED

## 2021-04-12 PROCEDURE — 86900 BLOOD TYPING SEROLOGIC ABO: CPT | Performed by: STUDENT IN AN ORGANIZED HEALTH CARE EDUCATION/TRAINING PROGRAM

## 2021-04-12 PROCEDURE — 360N000077 HC SURGERY LEVEL 4, PER MIN: Performed by: SURGERY

## 2021-04-12 PROCEDURE — C1757 CATH, THROMBECTOMY/EMBOLECT: HCPCS | Performed by: SURGERY

## 2021-04-12 PROCEDURE — 250N000011 HC RX IP 250 OP 636: Performed by: NURSE ANESTHETIST, CERTIFIED REGISTERED

## 2021-04-12 PROCEDURE — 85730 THROMBOPLASTIN TIME PARTIAL: CPT | Performed by: INTERNAL MEDICINE

## 2021-04-12 PROCEDURE — 04CQ0ZZ EXTIRPATION OF MATTER FROM LEFT ANTERIOR TIBIAL ARTERY, OPEN APPROACH: ICD-10-PCS | Performed by: SURGERY

## 2021-04-12 PROCEDURE — 85730 THROMBOPLASTIN TIME PARTIAL: CPT | Performed by: STUDENT IN AN ORGANIZED HEALTH CARE EDUCATION/TRAINING PROGRAM

## 2021-04-12 PROCEDURE — 258N000003 HC RX IP 258 OP 636: Performed by: SURGERY

## 2021-04-12 RX ORDER — HYDROMORPHONE HYDROCHLORIDE 1 MG/ML
.3-.5 INJECTION, SOLUTION INTRAMUSCULAR; INTRAVENOUS; SUBCUTANEOUS EVERY 5 MIN PRN
Status: DISCONTINUED | OUTPATIENT
Start: 2021-04-12 | End: 2021-04-12 | Stop reason: HOSPADM

## 2021-04-12 RX ORDER — DEXAMETHASONE SODIUM PHOSPHATE 4 MG/ML
INJECTION, SOLUTION INTRA-ARTICULAR; INTRALESIONAL; INTRAMUSCULAR; INTRAVENOUS; SOFT TISSUE PRN
Status: DISCONTINUED | OUTPATIENT
Start: 2021-04-12 | End: 2021-04-12

## 2021-04-12 RX ORDER — ONDANSETRON 4 MG/1
4 TABLET, ORALLY DISINTEGRATING ORAL EVERY 30 MIN PRN
Status: DISCONTINUED | OUTPATIENT
Start: 2021-04-12 | End: 2021-04-12 | Stop reason: HOSPADM

## 2021-04-12 RX ORDER — FENTANYL CITRATE 50 UG/ML
25-50 INJECTION, SOLUTION INTRAMUSCULAR; INTRAVENOUS
Status: DISCONTINUED | OUTPATIENT
Start: 2021-04-12 | End: 2021-04-12 | Stop reason: HOSPADM

## 2021-04-12 RX ORDER — ONDANSETRON 2 MG/ML
INJECTION INTRAMUSCULAR; INTRAVENOUS PRN
Status: DISCONTINUED | OUTPATIENT
Start: 2021-04-12 | End: 2021-04-12

## 2021-04-12 RX ORDER — ONDANSETRON 2 MG/ML
4 INJECTION INTRAMUSCULAR; INTRAVENOUS EVERY 30 MIN PRN
Status: DISCONTINUED | OUTPATIENT
Start: 2021-04-12 | End: 2021-04-12 | Stop reason: HOSPADM

## 2021-04-12 RX ORDER — LIDOCAINE HYDROCHLORIDE 20 MG/ML
INJECTION, SOLUTION INFILTRATION; PERINEURAL PRN
Status: DISCONTINUED | OUTPATIENT
Start: 2021-04-12 | End: 2021-04-12

## 2021-04-12 RX ORDER — ACETAMINOPHEN 325 MG/1
975 TABLET ORAL EVERY 8 HOURS SCHEDULED
Status: DISCONTINUED | OUTPATIENT
Start: 2021-04-12 | End: 2021-04-15 | Stop reason: HOSPADM

## 2021-04-12 RX ORDER — HEPARIN SODIUM 1000 [USP'U]/ML
INJECTION, SOLUTION INTRAVENOUS; SUBCUTANEOUS PRN
Status: DISCONTINUED | OUTPATIENT
Start: 2021-04-12 | End: 2021-04-12

## 2021-04-12 RX ORDER — CLOPIDOGREL BISULFATE 75 MG/1
150 TABLET ORAL ONCE
Status: COMPLETED | OUTPATIENT
Start: 2021-04-12 | End: 2021-04-12

## 2021-04-12 RX ORDER — SODIUM CHLORIDE, SODIUM LACTATE, POTASSIUM CHLORIDE, CALCIUM CHLORIDE 600; 310; 30; 20 MG/100ML; MG/100ML; MG/100ML; MG/100ML
INJECTION, SOLUTION INTRAVENOUS CONTINUOUS
Status: DISCONTINUED | OUTPATIENT
Start: 2021-04-12 | End: 2021-04-12 | Stop reason: HOSPADM

## 2021-04-12 RX ORDER — FENTANYL CITRATE 50 UG/ML
INJECTION, SOLUTION INTRAMUSCULAR; INTRAVENOUS PRN
Status: DISCONTINUED | OUTPATIENT
Start: 2021-04-12 | End: 2021-04-12

## 2021-04-12 RX ORDER — PROPOFOL 10 MG/ML
INJECTION, EMULSION INTRAVENOUS PRN
Status: DISCONTINUED | OUTPATIENT
Start: 2021-04-12 | End: 2021-04-12

## 2021-04-12 RX ORDER — HEPARIN SODIUM 10000 [USP'U]/100ML
0-5000 INJECTION, SOLUTION INTRAVENOUS CONTINUOUS
Status: DISCONTINUED | OUTPATIENT
Start: 2021-04-12 | End: 2021-04-14

## 2021-04-12 RX ORDER — SODIUM CHLORIDE, SODIUM LACTATE, POTASSIUM CHLORIDE, CALCIUM CHLORIDE 600; 310; 30; 20 MG/100ML; MG/100ML; MG/100ML; MG/100ML
INJECTION, SOLUTION INTRAVENOUS CONTINUOUS PRN
Status: DISCONTINUED | OUTPATIENT
Start: 2021-04-12 | End: 2021-04-12

## 2021-04-12 RX ADMIN — HEPARIN SODIUM 1700 UNITS/HR: 10000 INJECTION, SOLUTION INTRAVENOUS at 09:12

## 2021-04-12 RX ADMIN — BUSPIRONE HYDROCHLORIDE 10 MG: 10 TABLET ORAL at 21:22

## 2021-04-12 RX ADMIN — HEPARIN SODIUM 1850 UNITS/HR: 10000 INJECTION, SOLUTION INTRAVENOUS at 22:44

## 2021-04-12 RX ADMIN — SUCCINYLCHOLINE CHLORIDE 140 MG: 20 INJECTION, SOLUTION INTRAMUSCULAR; INTRAVENOUS; PARENTERAL at 02:24

## 2021-04-12 RX ADMIN — CLINDAMYCIN PHOSPHATE 900 MG: 900 INJECTION, SOLUTION INTRAVENOUS at 09:13

## 2021-04-12 RX ADMIN — SENNOSIDES AND DOCUSATE SODIUM 1 TABLET: 8.6; 5 TABLET ORAL at 21:22

## 2021-04-12 RX ADMIN — PHENYLEPHRINE HYDROCHLORIDE 100 MCG: 10 INJECTION INTRAVENOUS at 02:48

## 2021-04-12 RX ADMIN — ONDANSETRON 4 MG: 2 INJECTION INTRAMUSCULAR; INTRAVENOUS at 03:45

## 2021-04-12 RX ADMIN — LIDOCAINE HYDROCHLORIDE 100 MG: 20 INJECTION, SOLUTION INFILTRATION; PERINEURAL at 02:24

## 2021-04-12 RX ADMIN — MIDAZOLAM 2 MG: 1 INJECTION INTRAMUSCULAR; INTRAVENOUS at 02:15

## 2021-04-12 RX ADMIN — DEXAMETHASONE SODIUM PHOSPHATE 4 MG: 4 INJECTION, SOLUTION INTRA-ARTICULAR; INTRALESIONAL; INTRAMUSCULAR; INTRAVENOUS; SOFT TISSUE at 03:00

## 2021-04-12 RX ADMIN — HEPARIN SODIUM 500 ML: 1000 INJECTION INTRAVENOUS; SUBCUTANEOUS at 02:38

## 2021-04-12 RX ADMIN — CLOPIDOGREL BISULFATE 75 MG: 75 TABLET, FILM COATED ORAL at 08:52

## 2021-04-12 RX ADMIN — HEPARIN SODIUM 5000 UNITS: 1000 INJECTION INTRAVENOUS; SUBCUTANEOUS at 02:42

## 2021-04-12 RX ADMIN — CLOPIDOGREL BISULFATE 150 MG: 75 TABLET ORAL at 05:35

## 2021-04-12 RX ADMIN — FENTANYL CITRATE 50 MCG: 50 INJECTION, SOLUTION INTRAMUSCULAR; INTRAVENOUS at 04:07

## 2021-04-12 RX ADMIN — BUSPIRONE HYDROCHLORIDE 10 MG: 10 TABLET ORAL at 08:52

## 2021-04-12 RX ADMIN — FENTANYL CITRATE 50 MCG: 50 INJECTION, SOLUTION INTRAMUSCULAR; INTRAVENOUS at 03:58

## 2021-04-12 RX ADMIN — PHENYLEPHRINE HYDROCHLORIDE 100 MCG: 10 INJECTION INTRAVENOUS at 02:34

## 2021-04-12 RX ADMIN — PROPOFOL 200 MG: 10 INJECTION, EMULSION INTRAVENOUS at 02:24

## 2021-04-12 RX ADMIN — ASPIRIN 81 MG: 81 TABLET, DELAYED RELEASE ORAL at 08:52

## 2021-04-12 RX ADMIN — POLYETHYLENE GLYCOL 3350 17 G: 17 POWDER, FOR SOLUTION ORAL at 08:53

## 2021-04-12 RX ADMIN — SODIUM CHLORIDE 500 ML: 900 IRRIGANT IRRIGATION at 02:38

## 2021-04-12 RX ADMIN — SODIUM CHLORIDE, POTASSIUM CHLORIDE, SODIUM LACTATE AND CALCIUM CHLORIDE: 600; 310; 30; 20 INJECTION, SOLUTION INTRAVENOUS at 04:56

## 2021-04-12 RX ADMIN — SENNOSIDES AND DOCUSATE SODIUM 1 TABLET: 8.6; 5 TABLET ORAL at 08:52

## 2021-04-12 RX ADMIN — ACETAMINOPHEN 975 MG: 325 TABLET, FILM COATED ORAL at 12:10

## 2021-04-12 RX ADMIN — HEPARIN SODIUM 1000 UNITS/HR: 10000 INJECTION, SOLUTION INTRAVENOUS at 02:15

## 2021-04-12 RX ADMIN — FENTANYL CITRATE 100 MCG: 50 INJECTION, SOLUTION INTRAMUSCULAR; INTRAVENOUS at 02:24

## 2021-04-12 RX ADMIN — FERROUS SULFATE TAB 325 MG (65 MG ELEMENTAL FE) 325 MG: 325 (65 FE) TAB at 08:52

## 2021-04-12 RX ADMIN — SODIUM CHLORIDE, POTASSIUM CHLORIDE, SODIUM LACTATE AND CALCIUM CHLORIDE: 600; 310; 30; 20 INJECTION, SOLUTION INTRAVENOUS at 02:15

## 2021-04-12 RX ADMIN — SERTRALINE HYDROCHLORIDE 50 MG: 50 TABLET ORAL at 08:52

## 2021-04-12 RX ADMIN — PHENYLEPHRINE HYDROCHLORIDE 100 MCG: 10 INJECTION INTRAVENOUS at 02:59

## 2021-04-12 RX ADMIN — ROCURONIUM BROMIDE 5 MG: 10 INJECTION INTRAVENOUS at 02:24

## 2021-04-12 RX ADMIN — CLINDAMYCIN PHOSPHATE 900 MG: 900 INJECTION, SOLUTION INTRAVENOUS at 02:30

## 2021-04-12 ASSESSMENT — LIFESTYLE VARIABLES: TOBACCO_USE: 0

## 2021-04-12 ASSESSMENT — ACTIVITIES OF DAILY LIVING (ADL)
ADLS_ACUITY_SCORE: 16
DIFFICULTY_EATING/SWALLOWING: NO
TOILETING_ISSUES: NO
ADLS_ACUITY_SCORE: 14
FALL_HISTORY_WITHIN_LAST_SIX_MONTHS: NO
DIFFICULTY_COMMUNICATING: NO
DRESSING/BATHING_DIFFICULTY: NO
ADLS_ACUITY_SCORE: 14
DOING_ERRANDS_INDEPENDENTLY_DIFFICULTY: NO
WALKING_OR_CLIMBING_STAIRS_DIFFICULTY: NO

## 2021-04-12 ASSESSMENT — ENCOUNTER SYMPTOMS
DYSRHYTHMIAS: 0
SEIZURES: 0

## 2021-04-12 NOTE — BRIEF OP NOTE
[facility]    Brief Operative Note    Pre-operative diagnosis: Thrombosis [I82.90]  Post-operative diagnosis Same as pre-operative diagnosis    Procedure: Procedure(s):  Re-Exploration Left Lower Extremity With Popliteal and Tibial Embolectomy.  Surgeon: Surgeon(s) and Role:     * Ulises Gonzalez - Primary     * Elba Smith MD - Fellow - Assisting  Anesthesia: General   Estimated blood loss: Less than 10 ml  Drains: None  Specimens: * No specimens in log *  Findings:   Acute thrombosis of the popliteal artery extending into AT and PT. BK pop and AT+PT incisions opened w/ anna catheter thrombectomy performed. Palpable AT pulse and PT monophasic signal on exam.  Complications: None.  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

## 2021-04-12 NOTE — PROVIDER NOTIFICATION
MD Notification    Notified Person: MD    Notified Person Name:Elba Smith    Notification Date/Time:04/12 0045    Notification Interaction:paged through answering service    Purpose of Notification: Unable to find pulses with doppler, L toes and upper part of foot pale and numb    Orders Received:pt to go to OR emergently for reexploration of LLE    Comments:

## 2021-04-12 NOTE — ANESTHESIA PROCEDURE NOTES
Airway       Patient location during procedure: OR       Procedure Start/Stop Times: 4/12/2021 2:26 AM  Staff -        Anesthesiologist:  Raleigh Cheng MD       CRNA: Lissett Astudillo APRN CRNA       Performed By: anesthesiologist and CRNA  Consent for Airway        Urgency: emergent  Indications and Patient Condition       Indications for airway management: anirudh-procedural       Induction type:RSI       Mask difficulty assessment: 0 - not attempted    Final Airway Details       Final airway type: endotracheal airway       Successful airway: ETT - single and Oral  Endotracheal Airway Details        ETT size (mm): 7.0       Cuffed: yes       Successful intubation technique: direct laryngoscopy       DL Blade Type: MAC 3       Grade View of Cords: 1       Adjucts: stylet       Position: Right       Measured from: gums/teeth       Secured at (cm): 22       Bite block used: None    Post intubation assessment        Placement verified by: capnometry, equal breath sounds and chest rise        Number of attempts at approach: 1       Number of other approaches attempted: 0       Secured with: silk tape       Ease of procedure: easy       Dentition: Intact and Unchanged    Medication(s) Administered   Medication Administration Time: 4/12/2021 2:26 AM

## 2021-04-12 NOTE — OP NOTE
Procedure Date: 04/12/2021      PREOPERATIVE DIAGNOSIS:  Re-thrombosis, left popliteal/tibial arteries.      POSTOPERATIVE DIAGNOSIS:  Re-thrombosis, left popliteal/tibial arteries.      PROCEDURES:  Reexploration left leg.   a.  Left popliteal/tibial embolectomy.   b.  Left distal anterior tibial embolectomy.   c.  Left distal posterior tibial artery embolectomy.      SURGEON:  Ulises Gonzalez MD.      ASSISTANT:  Elba Smith MD (Vascular fellow).      PREOPERATIVE MEDICATIONS:  Cleocin 900 mg IV.      INDICATIONS:  A 30-year-old patient has extensive suffered extensive embolization to both her legs, particularly on the left leg.  She underwent extensive embolectomy yesterday for over 5-1/2 hours to remove thrombus from the popliteal/tibial arteries with a patch angioplasty at the below-knee popliteal area along with distal anterior tibial and distal posterior tibial embolectomies.  Very organized thrombus was removed.  We had very adequate inflow  on the smaller tibial arteries.  She had a question of a HIT and workup was negative.  She has been on intravenous heparin along with Plavix.  She was noted early this morning to have a loss of her Doppler signals in her foot and increasing tingling and numbness.  PTT was slightly subtherapeutic on her heparin drip.  We felt that she most likely rethrombosed these is very irritated arteries from the extensive embolectomies yesterday and reexploration was indicated under informed consent.      DESCRIPTION OF PROCEDURE:  The patient was brought to the operating room and induced under general anesthesia, intubated without difficulty.  Martinez catheter was already in place.  Calf pneumatic compression boot was used on the right along with appropriate padding.  Dressings were removed from the left.  There was not extensive swelling or evidence of any bleeding.  Left leg was prepped and draped.  Timeout was called and sites were identified.      Reexploration popliteal artery:   We had only nylon sutures and her skin on all sites from the previous surgeries.  These were removed at the calf.  Katty popliteal retractor was placed.  There was no significant hematoma, but obviously the popliteal and tibioperoneal trunk were occluded with a thrombus in our vein patch.  We easily encircled.  The below-knee popliteal artery/anterior tibial artery/posterior tibial and arteries with Silastic vessel loops.      At the same time, we opened up the incisions in the ankle.  Anterior tibial and posterior tibial sites.  These arteries were noted to be thrombosed.      POPLITEAL/TIBIAL embolectomy:  The patient was on intravenous heparin drip and we had increased the rate from 2300 units an hour.  She was given 4000 units of intravenous heparin bolus.  We then opened up the medial aspect of the vein patch over the popliteal/tibioperoneal trunk.  Fresh thrombus was noted.  A #3 Jean-Paul was passed in the popliteal artery and removal of a organized thrombus with a plug noted.  Excellent inflow was appreciated.  This was irrigated with heparinized saline solution.  We then passed a #3 Jean-Paul partially down the anterior tibial artery with a small amount of thrombus being noted.      ANTERIOR TIBIAL EMBOLECTOMY:  We opened up the transverse arteriotomy in the distal anterior tibial artery,  a #2 Jean-Paul was passed up towards the origin of the anterior tibial artery with thrombus being noted, but relatively fresh.  This was passed distally into the DP with only a small amount of thrombus and some backbleeding noted.  We flushed the anterior tibial artery from its origin with heparinized saline solution and also from the ankle with heparin coming out both sites with no evidence of thrombus.  Heparin solution was flushed distally.      POSTERIOR TIBIAL EMBOLECTOMY:  We opened the arteriotomy the ankle.  the posterior tibial artery.  Again, it was noted to be thrombosed.  We passed a #2 Jean-Paul up from the ankle  into the popliteal area and followed the balloon down the posterior tibial artery several times with a large amount of fresh thrombus being removed.  This again was flushed proximally and distally with heparinized saline solution with good flow noted.  Jean-Paul was passed distally into the posterior tibial artery with a small amount of thrombus being noted.  Heparinized saline solution was injected.      I then reclosed the patch angioplasty with running 7-0 Prolene suture and loupe magnification.  We reestablish blood flow and had good pulsatile flow in the ankle.  Anterior tibial and posterior tibial arteries.  Both arteriotomies were closed with running 7-0 Prolene suture and loupe magnification.      Upon closure, we had palpable pulses and good Doppler signals in the distal anterior tibial and posterior tibial artery.  Excellent hemostasis was noted.      The 2 ankle incisions and the calf incision were closed with interrupted simple and mattress 3-0 nylon sutures.  We did leave a yellow vessel loop in the calf incision and secured this distally to act as a wick drain for serous fluid.  ABD pads, 4x4s, and Kerlix rolls were applied.      The patient tolerated the procedure well.  Estimated blood loss was 20 mL      COMPLICATIONS:  Re-thrombosis of uncertain etiology.        Needle and sponge counts were correct x 2.         FRANCISCO ROBERTO MD             D: 2021   T: 2021   MT: CHELLY      Name:     DELON BORJA   MRN:      [id number]        Account:        [id number]   :      1990           Procedure Date: 2021      Document: [id number]

## 2021-04-12 NOTE — PROGRESS NOTES
Vascular Surgery Brief Incident    Called by nursing around 1245am regarding inability to find signals in L foot with numbness and coolness of her toes. I arrived around 1:30am and foot was warm but unable to find AT signal or pulse previously heard on exam. Able to find faint monophasic PT signal. She states she her toes became white and cool w/ numbness/tingling that resolved. However signal still not present concerning for thrombosis. Foot warm, able to wiggle toes. Compartments soft.     Of note, PTT initially >240 and kept on heparin gtt 1000U/hr. PTT at 6pm was supratherapeutic at 91, however Heparin Xa at 9pm was subtherapeutic 0.15; no one notified of this finding and heparin gtt order remained at 1000U/hr. PTT in preop was 31, Factor Xa <10.    Dr. Gonzalez was notified of the findings. Boarded for re-exploration of LLE w/ thrombectomy emergently.     Elba Smith MD  Vascular Surgery Fellow  Pager [phone]

## 2021-04-12 NOTE — PROGRESS NOTES
[facility]    Vascular Medicine Progress Note    Date of Service (when I saw the patient): 04/12/2021     Assessment & Plan   1. Left popliteal artery thromboembolic occlusion with acute on chronic left leg ischemia s/p:  -emergent angiogram with mechanical thrombectomy of the left popliteal artery 4/8/21 and catheter-directed lysis 4/8/21-4/10/21  -open embolectomy left popliteal, anterior tibial, and tibioperoneal arteries with vein patch angioplasty and fasciotomies 4/11/21  -reexploration of left leg with left popliteal/tibial embolectomy and left distal anterior and posterior tibial artery embolectomy 4/12/21    -At present, unclear etiology. She is obese, on birth control pills and just had her first dose of Pfizer Covid vaccine one day prior to symptoms onset. She has no cardiac arrhythmias, no miscarriage, no family history of hypercoagulable status, and Covid test negative.   -D. Dimer normal.   -Echo ok, no bubble study done but states septum is intact.   -Thrombocytosis at time of admission -> likely acute phase reactant. Platelets dropped briefly. HIT was negative. Platelets stable on IV heparin now.   -Hypercoagulable labs are all still pending.   -Pain under control with PCA  -Multiphasic pulses with doppler this morning. Foot is warm.   -Questionable right lower extremity claudication in recent past and non-palpable right pedal pulses.     Plan:   -Continue IV heparin for now. Also on Plavix and aspirin.   -Await hypercoagulable work-up.   -Post-operative cares per Dr. Gonzalez / Vascular Surgery.   -Stop oral contraceptives.   -Cardiac monitoring, consider zio patch at the time of discharge and possible DEYVI to look for cardio embolic source and shunt   -Consider getting right lower extremity arterial duplex given abnormal CTA, suboptimal opacification of the infrapopliteal vasculature.  -Rest of the medical management per hospitalist service     2. Hx of Depression and  Anxiety    -continue PTA meds     3. 4.3 x 5.7 cm left adnexal cyst    -She needs a pelvic ultrasound at some point and needs Gyn follow-up as outpatient.   -CA-125 is normal.     4. Acne    -On Spironolactone as an outpatient.        Interval History   Brought to surgery twice this weekend for embolectomy. Doing well now. Left foot is warm with good pulses. Pain is under control.     Physical Exam   Temp: 98.6  F (37  C) Temp src: Oral BP: (!) 139/101 Pulse: 95   Resp: 16 SpO2: 94 % O2 Device: Nasal cannula Oxygen Delivery: 4 LPM  Vitals:    04/08/21 1424   Weight: 122.5 kg (270 lb)     Vital Signs with Ranges  Temp:  [98.5  F (36.9  C)-100.2  F (37.9  C)] 98.6  F (37  C)  Pulse:  [] 95  Resp:  [9-21] 16  BP: (127-160)/() 139/101  SpO2:  [94 %-100 %] 94 %  I/O last 3 completed shifts:  In: 2758 [I.V.:2758]  Out: 5395 [Urine:5295; Blood:100]    Constitutional: Awake, alert, cooperative, no apparent distress, and appears stated age.  Eyes: Lids and lashes normal, sclera clear, conjunctiva normal.  ENT: Normocephalic, without obvious abnormality, atraumatic, oral pharynx with moist mucus membranes  Respiratory: No increased work of breathing, good air exchange, clear to auscultation bilaterally, no crackles or wheezing.  Cardiovascular: Regular rate and rhythm, normal S1 and S2, no S3 or S4, and no murmur noted.  GI: Normal bowel sounds, soft, non-distended, non-tender  Genitourinary:  Martinez catheter in place.   Skin: No bruising or bleeding, normal skin color, texture, turgor, no redness, warmth, or swelling, no rashes, all surgical sites bandaged and dry.   Musculoskeletal: There is no redness, warmth, or swelling of the joints. Edema noted in LLE with incisions dressed. Left foot warm.   Neurologic: Awake, alert, oriented to name, place and time.  Cranial nerves II-XII are grossly intact.    Neuropsychiatric: Calm, normal eye contact, alert, normal affect, oriented to self, place, time and situation,  memory for past and recent events intact and thought process normal.  Pulses: Multiphasic DP and PT pulses in left foot by doppler.     Medications     heparin 1,800 Units/hr (04/12/21 0912)     HYDROmorphone       - MEDICATION INSTRUCTIONS -       sodium chloride 70 mL/hr at 04/11/21 1804       aspirin  81 mg Oral Daily     busPIRone  10 mg Oral BID     clopidogrel  75 mg Oral Daily     ferrous sulfate  325 mg Oral Daily     heparin ANTICOAGULANT  4,000 Units Intravenous Once     hydromorphone  1 mg Intravenous Once     polyethylene glycol  17 g Oral Daily     senna-docusate  1 tablet Oral BID    Or     senna-docusate  2 tablet Oral BID     sertraline  50 mg Oral Daily     sodium chloride (PF)  3 mL Intracatheter Q8H       Data   Recent Labs   Lab 04/12/21  0609 04/12/21  0237 04/11/21  1314 04/11/21  0537 04/10/21  1445 04/10/21  0622 04/10/21  0622 04/09/21  0607 04/08/21  1513 04/08/21  1513   WBC  --  12.0*  --  10.2 11.2*   < > 9.9 20.1*   < > 14.2*   HGB  --  7.5* 8.1* 8.4* 9.2*   < > 9.1* 10.8*   < > 12.0   MCV  --  88  --  87 87   < > 87 87   < > 85   PLT  --  354  --  284 274   < > 240 414   < > 576*   INR  --   --   --   --   --   --   --   --   --  1.05     --   --   --   --   --  135 135  --  134   POTASSIUM 3.6  --   --   --   --   --  3.5 4.2  --  3.9   CHLORIDE 106  --   --   --   --   --  102 105  --  104   CO2 29  --   --   --   --   --  27 23  --  23   BUN 3*  --   --   --   --   --  3* 6*  --  10   CR 0.55  --   --   --   --   --  0.51* 0.56  --  0.75   ANIONGAP 5  --   --   --   --   --  6 7  --  7   CASSI 8.6  --   --   --   --   --  7.7* 8.2*  --  9.6   *  --   --   --   --   --  95 116*  --  104*   ALBUMIN  --   --   --   --  2.7*  --  2.6*  --   --   --    PROTTOTAL  --   --   --   --  6.1*  --  5.7*  --   --   --    BILITOTAL  --   --   --   --  0.5  --  0.5  --   --   --    ALKPHOS  --   --   --   --  134  --  96  --   --   --    ALT  --   --   --   --  95*  --  75*  --   --    --    AST  --   --   --   --  399*  --  396*  --   --   --     < > = values in this interval not displayed.     Recent Results (from the past 24 hour(s))   XR Surgery MOR L/T 5 Min Fluoro w Stills    Narrative    SURGERY C-ARM FLUORO LESS THAN 5 MIN W STILLS  April 11, 2021 2:00 PM     HISTORY: Left lower extremity embolectomy.    COMPARISON: None.      Impression    IMPRESSION: Five spot fluoroscopic images obtained intraoperatively  during embolectomy. Images demonstrate a patent popliteal artery in  the visible segments. More distal runoff arteries somewhat difficult  to visualize, though the proximal anterior tibial artery appears  widely patent. Total fluoroscopic time is 2 minutes.    JOHANNE ROMO MD

## 2021-04-12 NOTE — PLAN OF CARE
A&O x4. VSS ex HTN on 4L. At beginning of shift, L foot was pale and cool to the touch, pulses unable to be palpated. Vascular surgery contacted and pt brought emergently back to OR. Pt arrived to floor from OR at 0630. L foot warm and good cap refill, pulses palpable. Tolerating clear liquid diet. Tele SR. LR infusing at 100, Heparin infusing at 1700, and PCA pump with Dilaudid.. Dressings CDI.  Pain controlled with PCA pump. Martinez catheter with good output.  Continue to monitor.

## 2021-04-12 NOTE — PROGRESS NOTES
VASCULAR SURGERY    Doing well this afternoon.   Minimal pain.  Strong Biphasic Doppler left distal AT and PT.  Foot/toes are hyperemic     Keep using Jag Emanuel.    Ulises Gonzalez MD

## 2021-04-12 NOTE — ANESTHESIA CARE TRANSFER NOTE
Patient: Sruthi Mac    Procedure(s):  Re-Exploration Left Lower Extremity With Popliteal and Tibial Embolectomy.    Diagnosis: Thrombosis [I82.90]  Diagnosis Additional Information: No value filed.    Anesthesia Type:   General     Note:    Oropharynx: oropharynx clear of all foreign objects and spontaneously breathing  Level of Consciousness: awake  Oxygen Supplementation: face mask  Level of Supplemental Oxygen (L/min / FiO2): 10  Independent Airway: airway patency satisfactory and stable  Dentition: dentition unchanged  Vital Signs Stable: post-procedure vital signs reviewed and stable  Report to RN Given: handoff report given  Patient transferred to: PACU  Comments: Transferred to PACU, spontaneous respirations, 10L oxygen via facemask.  All monitors and alarms on and functioning, VSS.  Patient awake, comfortable.  Report to PACU RN.  Handoff Report: Identifed the Patient, Identified the Reponsible Provider, Reviewed the pertinent medical history, Discussed the surgical course, Reviewed Intra-OP anesthesia mangement and issues during anesthesia, Set expectations for post-procedure period and Allowed opportunity for questions and acknowledgement of understanding      Vitals: (Last set prior to Anesthesia Care Transfer)  CRNA VITALS  4/12/2021 0329 - 4/12/2021 0413      4/12/2021             Resp Rate (set):  10        Electronically Signed By: VESTA Turner CRNA  April 12, 2021  4:13 AM

## 2021-04-12 NOTE — ANESTHESIA PREPROCEDURE EVALUATION
Anesthesia Pre-Procedure Evaluation    Patient: Sruthi Mac   MRN: [id number] : 1990        Preoperative Diagnosis: Thrombosis [I82.90]   Procedure : Procedure(s):  Re-Exploration Left Lower Extremity; Possible Thrombectomy; Possible Fasciotomy     Past Medical History:   Diagnosis Date     Anxiety      Cystic acne      Depression      Panic attack       Past Surgical History:   Procedure Laterality Date     IR ANGIOGRAM THROUGH CATHETER FOLLOW UP  2021     IR ANGIOGRAM THROUGH CATHETER FOLLOW UP  4/10/2021     IR LOWER EXTREMITY ANGIOGRAM LEFT  2021     wisdom teeth remova        Allergies   Allergen Reactions     Erythromycin Unknown     Penicillins Unknown      Social History     Tobacco Use     Smoking status: Never Smoker   Substance Use Topics     Alcohol use: Yes     Frequency: 2-4 times a month      Wt Readings from Last 1 Encounters:   21 122.5 kg (270 lb)        Anesthesia Evaluation   Pt has not had prior anesthetic         ROS/MED HX  ENT/Pulmonary:    (-) tobacco use, asthma and sleep apnea   Neurologic:     (+) migraines,  (-) no seizures and no CVA   Cardiovascular:    (-) hypertension, CAD, CASTILLO, arrhythmias, valvular problems/murmurs and dyslipidemia   METS/Exercise Tolerance: >4 METS    Hematologic:     (+) History of blood clots,     Musculoskeletal:  - neg musculoskeletal ROS     GI/Hepatic:    (-) GERD and liver disease   Renal/Genitourinary:       Endo:    (-) Type I DM, Type II DM, thyroid disease and obesity   Psychiatric/Substance Use:     (+) psychiatric history anxiety and depression     Infectious Disease:    (-) Recent Fever   Malignancy:       Other:            Physical Exam    Airway        Mallampati: II   TM distance: > 3 FB   Neck ROM: full   Mouth opening: > 3 cm    Respiratory Devices and Support         Dental  no notable dental history         Cardiovascular   cardiovascular exam normal       Rhythm and rate: normal     Pulmonary   pulmonary exam  normal                OUTSIDE LABS:  CBC:   Lab Results   Component Value Date    WBC 10.2 04/11/2021    WBC 11.2 (H) 04/10/2021    HGB 8.1 (L) 04/11/2021    HGB 8.4 (L) 04/11/2021    HCT 25.8 (L) 04/11/2021    HCT 27.0 (L) 04/10/2021     04/11/2021     04/10/2021     BMP:   Lab Results   Component Value Date     04/10/2021     04/09/2021    POTASSIUM 3.5 04/10/2021    POTASSIUM 4.2 04/09/2021    CHLORIDE 102 04/10/2021    CHLORIDE 105 04/09/2021    CO2 27 04/10/2021    CO2 23 04/09/2021    BUN 3 (L) 04/10/2021    BUN 6 (L) 04/09/2021    CR 0.51 (L) 04/10/2021    CR 0.56 04/09/2021    GLC 95 04/10/2021     (H) 04/09/2021     COAGS:   Lab Results   Component Value Date    PTT 91 (H) 04/11/2021    INR 1.05 04/08/2021     POC:   Lab Results   Component Value Date    BGM 97 04/10/2021    HCG Negative 04/11/2021    HCGS Negative 04/08/2021     HEPATIC:   Lab Results   Component Value Date    ALBUMIN 2.7 (L) 04/10/2021    PROTTOTAL 6.1 (L) 04/10/2021    ALT 95 (H) 04/10/2021     (H) 04/10/2021    ALKPHOS 134 04/10/2021    BILITOTAL 0.5 04/10/2021     OTHER:   Lab Results   Component Value Date    CASSI 7.7 (L) 04/10/2021    TSH 5.25 (H) 04/10/2021    T4 1.02 04/10/2021       Anesthesia Plan    ASA Status:  3, emergent    NPO Status:  NPO Appropriate    Anesthesia Type: General.     - Airway: ETT   Induction: Propofol, RSI, Intravenous.   Maintenance: Balanced.   Techniques and Equipment:     - Lines/Monitors: 2nd IV     Consents    Anesthesia Plan(s) and associated risks, benefits, and realistic alternatives discussed. Questions answered and patient/representative(s) expressed understanding.     - Discussed with:  Patient         Postoperative Care    Pain management: Multi-modal analgesia.   PONV prophylaxis: Ondansetron (or other 5HT-3), Background Propofol Infusion, Dexamethasone or Solumedrol     Comments:                Raleigh Cheng MD

## 2021-04-12 NOTE — PLAN OF CARE
Alert and oriented x4. VSS. Pain controled with PCA. Bedrest today. Pulses per doppler, left leg with heat to it throughout the day. Martinez patent with good output. Tolerating diet. Heparin running at 1700 units per hour, awaiting recheck. Will continue to monitor tonight.

## 2021-04-12 NOTE — PROGRESS NOTES
[facility]    Hospitalist Progress Note      Assessment & Plan   Sruthi Mac is a 30 year old female with past medical history significant for anxiety, depression and history of panic attacks who presented to the emergency room with sudden left foot numbness and pain along with toe discoloration and was found to have acute limb ischemia.  She was taken emergently for mechanical thrombectomy with initiation of lytic therapy on April 8 by interventional radiology.  Patient was admitted for further evaluation and management.  Vascular surgery and vascular medicine has also been consulted.        Assessment & Plan        Acute occlusion of left popliteal artery of lower extremity (H) (4/8/2021)  Limb ischemia (4/8/2021)  Right lower extremity arterial occlusion with patent SFA flow to BK popliteal:  Presented with 5-day history of numbness and pain in her left leg and foot which was getting worse with walking and was not getting resolved.  She also started to notice discoloration of her toes and came to the emergency room.  Her pulses were not palpable. Risk factor included oral contraceptive pills and history of palpitations.  * Ultrasound of left lower extremity: Occlusion of left popliteal artery.  And no DVT.  *Echocardiogram results are reviewed which are showing normal LV systolic function, ejection fraction of 55 to 60%, normal RV structure function and size, no pericardial effusion normal wall ventricular wall motion.  * CTA chest abdomen and pelvis with runoff contrast: Abrupt cut off of opacification of the left popliteal artery with associated filling defect consistent with arterial embolus. 4.3 x 5.7 cm left adnexal cyst, recommend follow-up pelvic ultrasound.  * 04/08 :Patient underwent emergent abdominal aortic and left lower extremity diagnostic arteriography, s/p mechanical thrombectomy of left popliteal artery with extraction of thrombus  * Patient also underwent  overall improving  renal u/s results appreciated  avoid nephrotoxic rx placement of infusion catheter extending from left superficial femoral artery through the left posterior tibial artery with initiation of arterial thrombolytic therapy to left lower extremity.  * Also have claudication symptoms for months, right lower extremity also without nonpalpable pulses but weak presence on doppler    * 04/09: Bilateral lower extremity angiogram, filling defects in right profundofemoral artery as well as right posterior tibial and peroneal arteries  * 04/09: IR angiogram on the Left side through catheter follow up, showing continued thrombolysis with numerous filling defects throughout the runoff arteries of the left lower extremity.  Improved thrombus burden in popliteal artery, additional length and TPA administration.  * 04/10: IR guided angiogram of Left leg through catheter follow-up showing worsening thrombus burden despite thrombolysis with TPA, thrombolysis was discontinued, concern for thrombus/embolus to be minimum subacute and may be chronic.  Plan for surgical embolectomy with cutdown to popliteal artery and passage of balloons into the runoff arteries was discussed with patient.  * 04/10: Multiple emboli in right lower extremity including the profundofemoral artery which is occluded.    *04/10: Bilateral lower extremity venous mapping done  * 4/11: open embolectomy left popliteal, anterior tibial, and tibioperoneal arteries with vein patch angioplasty and fasciotomies   *4/12: reexploration of left leg with left popliteal/tibial embolectomy and left distal anterior and posterior tibial artery embolectomy      --continues on IV heparin - was briefly on argatroban but switched back to heparin  -- MAHENDRA antibodies have been send , F/U on the results   --continue PCA pain pump for now but consider discontinue this tomorrow if pain is well controlled  --LDL levels are checked, showing LDL of 80, total cholesterol of 146 and HDL of 42.  --Patient does have hypertriglyceridemia, will  probably benefit from diet management first if not then probably need to be started on antitriglyceride medications like Lopid.   --TSH came back 5.25, slightly elevated from acute illness, free T4 is in normal range.  --defer to vascular medicine if DEYVI would be required  --Hypercoagulable work-up is ordered, in process, might need to be repeated in few months once patient is off the anticoagulation.     Urinary retention: Most likely secondary to being in pain and on bedrest.  --Continue Martinez catheter for now.  --voiding trial when more ambulatory and decreased pain meds - maybe tomorrow     History of anxiety and depression.  History of panic attacks.  --Continue patient on PTA buspirone twice daily and sertraline.  --Patient has been reporting less panic attacks since the start of risperidone.  --IV/p.o. Ativan is also available in case of panic attack.     Leukocytosis: Most likely secondary to inflammation with acute limb ischemia, patient presented with leukocyte count of 20, counts improved to 9.9     Cystic acne   Ovarian Cyst   --PTA spironolactone, on hold  --CA-125 was checked which came back negative  --Will need outpatient Gyneoncologist follow-up for ovarian cyst, she is also taken off the oral contraceptive pills    DVT Prophylaxis: therapeutic heparin  Code Status: Full Code    Disposition: Expected discharge in 2-3 days once on oral anticoagulant, cleared by vascular surgery.    Christian Jolly      Interval History   Went to the OR again this morning, doing fairly well after.  Ordering lunch and pain is well controlled on her current regimen of on demand PCA. No chest pain or shortness of breath.     -Data reviewed today: I reviewed all new labs and imaging results over the last 24 hours. I personally reviewed no images or EKG's today.    Physical Exam   Temp: 98.5  F (36.9  C) Temp src: Oral BP: (!) 143/89 Pulse: 85   Resp: 16 SpO2: 96 % O2 Device: None (Room air) Oxygen Delivery: 4  LPM  Vitals:    04/08/21 1424   Weight: 122.5 kg (270 lb)     Vital Signs with Ranges  Temp:  [98.5  F (36.9  C)-100.2  F (37.9  C)] 98.5  F (36.9  C)  Pulse:  [] 85  Resp:  [9-21] 16  BP: (127-160)/() 143/89  SpO2:  [94 %-100 %] 96 %  I/O last 3 completed shifts:  In: 2758 [I.V.:2758]  Out: 5395 [Urine:5295; Blood:100]    Constitutional: awake, alert, cooperative    Respiratory: Clear to auscultation bilaterally, no crackles or wheezing noted.  Good air exchange.     Cardiovascular: Regular rate and rhythm.  No murmur, rub or gallop noted.     GI: Positive bowel sounds, soft, non-distended, non-tender.  No masses or organomegaly noted.     Musculoskeletal: able to move all extremities    Neurologic: Awake, alert and oriented to name, place and time.  Cranial nerves II-XII are grossly intact.      Lymphatic: No edema noted    Skin: legs in bandages from surgery    Medications     heparin 1,700 Units/hr (04/12/21 0912)     HYDROmorphone       - MEDICATION INSTRUCTIONS -       sodium chloride 70 mL/hr at 04/11/21 1804       acetaminophen  975 mg Oral Q8H ERICA     aspirin  81 mg Oral Daily     busPIRone  10 mg Oral BID     clopidogrel  75 mg Oral Daily     ferrous sulfate  325 mg Oral Daily     heparin ANTICOAGULANT  4,000 Units Intravenous Once     hydromorphone  1 mg Intravenous Once     polyethylene glycol  17 g Oral Daily     senna-docusate  1 tablet Oral BID    Or     senna-docusate  2 tablet Oral BID     sertraline  50 mg Oral Daily     sodium chloride (PF)  3 mL Intracatheter Q8H       Data   Recent Labs   Lab 04/12/21  0609 04/12/21  0237 04/11/21  1314 04/11/21  0537 04/10/21  1445 04/10/21  0622 04/10/21  0622 04/09/21  0607 04/08/21  1513 04/08/21  1513   WBC  --  12.0*  --  10.2 11.2*   < > 9.9 20.1*   < > 14.2*   HGB  --  7.5* 8.1* 8.4* 9.2*   < > 9.1* 10.8*   < > 12.0   MCV  --  88  --  87 87   < > 87 87   < > 85   PLT  --  354  --  284 274   < > 240 414   < > 576*   INR  --   --   --   --   --    --   --   --   --  1.05     --   --   --   --   --  135 135  --  134   POTASSIUM 3.6  --   --   --   --   --  3.5 4.2  --  3.9   CHLORIDE 106  --   --   --   --   --  102 105  --  104   CO2 29  --   --   --   --   --  27 23  --  23   BUN 3*  --   --   --   --   --  3* 6*  --  10   CR 0.55  --   --   --   --   --  0.51* 0.56  --  0.75   ANIONGAP 5  --   --   --   --   --  6 7  --  7   CASSI 8.6  --   --   --   --   --  7.7* 8.2*  --  9.6   *  --   --   --   --   --  95 116*  --  104*   ALBUMIN  --   --   --   --  2.7*  --  2.6*  --   --   --    PROTTOTAL  --   --   --   --  6.1*  --  5.7*  --   --   --    BILITOTAL  --   --   --   --  0.5  --  0.5  --   --   --    ALKPHOS  --   --   --   --  134  --  96  --   --   --    ALT  --   --   --   --  95*  --  75*  --   --   --    AST  --   --   --   --  399*  --  396*  --   --   --     < > = values in this interval not displayed.       No results found for this or any previous visit (from the past 24 hour(s)).

## 2021-04-12 NOTE — OP NOTE
Procedure Date: 04/11/2021      PREOPERATIVE DIAGNOSES:  Embolic occlusion of the left popliteal artery and tibial arteries with extensive distal embolization.      POSTOPERATIVE DIAGNOSES:  Embolic occlusion of the left popliteal artery and tibial arteries with extensive distal embolization.      PROCEDURES:   1.  Left popliteal/anterior tibial/tibioperoneal open embolectomy.   a.  Intraoperative angiogram.   b.  Vein patch angioplasty.   c.  [place] left ankle greater saphenous vein.   2.  Distal anterior tibial embolectomy.   a.  Intraoperative angiogram.   3.  Distal posterior tibial embolectomy.   4.  Rethrombectomy, left popliteal/tibioperoneal trunk with reexploration/embolectomy.   5.  Fasciotomies  (D-10).      SURGEON:  Ulises Gonzalez MD      ASSISTANT:  Elba Smith (Vascular fellow)      ANESTHESIA:  General.      PREOPERATIVE MEDICATIONS:  Cleocin 900 mg IV.      INDICATIONS:  A 30-year-old patient has extensive embolic occlusion of her left popliteal artery down into her tibial arteries.  Attempts at lytic therapy has been unsuccessful.  She still has motor and sensory function with very minimal if any Doppler flow.  The anterior tibial artery down to the posterior tibial artery.  We did see collaterals reconstituted the very distal tibial vessels with a very diminutive peroneal artery being reconstituted, but not the anterior tibial or posterior tibial artery.  She has somewhat similar findings on the right side, but has much better Doppler flow.  This is not clinically significant at this time.  Extensive workup has been negative.  She decreased her platelet count, and there was a question of a heparin antibody and she was switched over to argatroban from IV heparin yesterday  and motor function is still intact to her left leg.  We are concerned with the extent of clot that she will have ongoing severe issues with her leg and that surgical embolectomy was indicated with informed consent of the patient.       DESCRIPTION OF PROCEDURE:  The patient was brought to the operating room, induced under general anesthesia, oriented without difficulty.  Calf pneumatic compression boot was placed on the right leg with appropriate padding.  The entire left leg and foot were prepped and draped.  Timeout was called and the sites were identified.      VASCULAR EXPOSURE:  We held the IV argatroban as we entered the operating room until we had vascular exposure.  We opened up the proximal medial calf.  The patient was markedly edematous.  She is also a very heavy legs with evidence of morbid obesity.  We dissected down to the fascia, which was split.  The musculature was very viable, though very edematous.  There was no hematoma in the calf from her angiograms and attempt at lytic therapy.  We then took down to the deep posterior compartment with electrocautery, ligating multiple crossing venous branches between 3-0 silk suture with excellent hemostasis.  There was an inflammatory change around the popliteal and tibial vessels, but no evidence of atherosclerosis.  There was obviously thrombosed.  We divided the anterior tibial veins between 3-0 silk sutures and metal clips.  I dissected free around a large anterior tibial artery and vessel loop was placed.  Below-knee popliteal artery was also dissected free of the inflammatory adjacent encircled with Silastic vessel loop.  We dissected the entire tibioperoneal trunk.  Both the posterior tibial and peroneal arteries were dissected free and encircled with Silastic vessel loops.        POPLITEAL/TIBIAL EMBOLECTOMY:  We restarted the argatroban.  We opened the tibioperoneal trunk and extended this to the distal popliteal artery.  There is a well-organized thrombus noted.  We passed a #3 Jean-Paul up the popliteal artery and removed a large amount of thrombus with excellent inflow.  Saline was used to the use of the argatroban.  We extended our arteriotomy down the distal tibioperoneal  trunk before the branching of the posterior tibial and peroneal arteries.  The posterior tibial artery with an angle that we could not pass a #2 Jean-Paul.  We did pass #2 Jean-Paul down the peroneal artery with some initial difficulty and removed a large amount of very organized thrombus.  However, with repeat passing we did have some difficulty with the angles and there was a question of the dissection from her angiogram and catheter that had been placed into this artery when she was getting lytic therapy.  We irrigated these vessels with saline.  We then passed the Jean-Paul, but could only get down about 20 cm in the anterior tibial artery with a large amount of thrombus being noted and some backbleeding.      INTRAOPERATIVE ANGIOGRAM:  We performed a retrograde angiogram with C-arm up the popliteal artery and superficial femoral artery was widely patent.  This was performed on the anterior tibial artery, which only went to the midcalf.  We could not place the catheter was appropriate for the angiogram down the peroneal artery and abandon this in fear that we would not damage the vessel further.      VEIN PATCH ANGIOPLASTY:  We made an incision in the medial ankle and dissected free an excellent greater saphenous vein for a length of 3.5 cm.  Several branches were divided between 3-0 silk suture.  The vein was dilated with saline and then transected.  This was then sewn to the arteriotomy for this length of the distal popliteal down to the tibioperoneal trunk just proximal to its bifurcation with running 7-0 Prolene suture.  With release of the vessel loops, we had a strong pulse within the popliteal/vein patch and also palpable and the peroneal arteries and anterior tibial arteries with good Doppler signals.      DISTAL ANTERIOR TIBIAL EMBOLECTOMY:  We knew there was residual thrombus that we could not remove the anterior tibial artery in order to reestablish direct loupe of her foot.  We made an incision over the  distal anterior lateral ankle.  Dissection was carried down to identify the anterior tibial artery, which was disease free, but had no pulse.  Proximal and distal Silastic vessels were placed.  A transverse arteriotomy was made with a #11 blade scalpel.  We passed our #2 Jean-Paul several times and removed some very well organized thrombus in the anterior tibial artery.  This was passed distally into the dorsalis pedis artery with only a small amount of thrombus noted and very minimal back bleeding.  We did break 2 balloons on the #2 Jean-Paul.  I therefore passed a #3 Jean-Paul removing more organized thrombus.  The arteriotomy was closed with running 7-0 Prolene suture.  We initially had a good pulse but this diminished.      We had finally got back our hit study when the heparin was discontinued last evening and this was negative.  We rechecked our argatroban levels and they were low.  We had given an additional bolus but were concerned about subtherapeutic levels or rethrombosis.      ANKLE POSTERIOR TIBIAL EMBOLECTOMY:  We then made an incision in the medial ankle.  Dissection was carried down to identify the posterior tibial artery, which again was disease free, but with no pulse.  A transverse arteriotomy was made.  We passed a #2 Jean-Paul up into the posterior tibial artery, which we could visualize via the knee incision and withdrew a large amount of very well organized thrombus with a somewhat unusual appearance and this was sent to pathology.  Organized thrombus was also removed.  We could watch the Jean-Paul go down to the posterior tibial artery.  This was passed distally for approximately 4-5 cm, again with no significant distal thrombus and some backbleeding being noted.  We closed the arteriotomy with running 7-0 Prolene suture.      RETHROMBECTOMY POPLITEAL/TIBIAL PERONEAL:  Despite giving a bolus of argatroban, we noticed at the distal patch of the tibioperoneal trunk was occluded.  We had not received the  HIT testing and this was negative.  We felt that we were subtherapeutic and thus went back to IV heparin given 4000 followed by 3000 units of intravenous heparin.  I reopened the vein patch on the popliteal/tibioperoneal trunk and there was organized thrombus.  This actually extended up into the popliteal artery and into the peroneal and posterior tibial arteries.  We performed a rethrombectomy.  We passed the #3 Jean-Paul up the popliteal artery and removed a small amount of organized thrombus with excellent inflow.  We now used heparinized saline to irrigate this.  We passed a #2 Jean-Paul down the anterior tibial artery and by length into the dorsum of the foot with a small amount of thrombus being noted and adequate backbleeding and this was irrigated with heparinized saline solution.  We could not pass the Jean-Paul any distance down the peroneal artery, but there was no obvious thrombus in this artery, but the backbleeding we noticed previously was quite poor.  We were concerned about intimal flap and did not feel that this should be manipulated further.      We had reopened the ankle posterior tibial arteriotomy.  I passed a #2 Jean-Paul up into the popliteal artery and could see the thrombus being removed all the way down to the ankle.  We irrigated this retrograde with heparinized saline solution and placed vessel loops proximally and distally.  I then reclosed the patch again with running 7-0 Prolene suture.  With release of the vessel loops, we had a strong pulse in the popliteal/tibial peroneal trunk/anterior tibial/posterior tibial/peroneal arteries in the knee level and biphasic Doppler flow.      RECLOSURE POSTERIOR TIBIAL ARTERY:  We then opened up the ankle posterior tibial inflow and excellent pulsatile flow.  Heparinized saline solution was injected.  We injected heparinized saline distally and the posterior tibial down to the foot.  This arteriotomy was again closed with running 7-0 Prolene suture and  loupe magnification.      We now had palpable pulses in the anterior tibial and posterior tibial artery.  Foot was warm and pink.  Our various incisions now had some bleeding now that we were on heparin.  We had a low PTT and gave an additional 3000 units of intravenous heparin and started on a heparin drip at 1000 units an hour.      FASCIOTOMY:  We had performed the superficial and deep fasciotomy  on the medial calf incision to expose the arteries.  She did have a large amount of swelling and were concerned about compartment syndrome developing.  We made an incision in the midcalf and dissected down to the fascia.  We opened the fascia proximally and distally on the anterior compartment with a Metzenbaum scissors and the muscle was very viable and not overly edematous.  We decided that we did not need to do a lateral compartment fasciotomy in this patient had extensive anticoagulation.      We reinspected the popliteal vessels and vein patch and we had a strong pulse in all vessels including the patch and visually no evidence of recurrent thrombus.  We closed this incision, leaving the fascia and compartments opened with interrupted simple and mattress type 3-0 nylon sutures and several 5-0 Monocryl sutures.  We had a palpable anterior tibial pulse and posterior tibial pulse through the skin.  These incisions along with the vein harvest were closed with interrupted mattress 3-0 nylon sutures also.  We loosely closed the fasciotomy site in the anterolateral calf, again with 3-0 nylon sutures.      Foot was warm and pink.  Gauze, ABD pads and Kerlix rolls were applied to the sites.  The patient was extubated and returned to the recovery room in satisfactory condition.  Needle and sponge count correct x2.      COMPLICATIONS:  Need for multiple rethrombectomies/embolectomies.  We will keep on full dose intravenous heparin along with offloading of Plavix and aspirin.      ESTIMATED BLOOD LOSS:  100 mL      Operative  procedure was very difficult to reasons described above.  Operative time:  5 hours 30 minutes.         FRANCISCO ROBERTO MD             D: 2021   T: 2021   MT:       Name:     DELON BORJA   MRN:      [id number]        Account:        [id number]   :      1990           Procedure Date: 2021      Document: [id number]

## 2021-04-12 NOTE — PLAN OF CARE
A&Ox4, bear hugger in place, doppler pulses, incisions CDI, tele SR, hep @ 10H, PCA controlling pain, levy patent, terri reg diet. Will continue to monitor.

## 2021-04-12 NOTE — ANESTHESIA POSTPROCEDURE EVALUATION
Patient: Sruthi Mac    Procedure(s):  Re-Exploration Left Lower Extremity With Popliteal and Tibial Embolectomy.    Diagnosis:Thrombosis [I82.90]  Diagnosis Additional Information: No value filed.    Anesthesia Type:  General    Note:  Disposition: Inpatient   Postop Pain Control: Uneventful            Sign Out: Well controlled pain   PONV: No   Neuro/Psych: Uneventful            Sign Out: Acceptable/Baseline neuro status   Airway/Respiratory: Uneventful            Sign Out: Acceptable/Baseline resp. status   CV/Hemodynamics: Uneventful            Sign Out: Acceptable CV status   Other NRE: NONE   DID A NON-ROUTINE EVENT OCCUR? No         Last vitals:  Vitals:    04/12/21 0515 04/12/21 0520 04/12/21 0530   BP: 133/82 133/82 127/66   Pulse: 92 92 92   Resp: 12 10 11   Temp:  37.9  C (100.2  F)    SpO2: 97% 100% 95%       Last vitals prior to Anesthesia Care Transfer:  CRNA VITALS  4/12/2021 0329 - 4/12/2021 0429      4/12/2021             Resp Rate (set):  10          Electronically Signed By: Raleigh Cheng MD  April 12, 2021  5:32 AM

## 2021-04-12 NOTE — PROGRESS NOTES
VASCULAR SURGERY    Back in room from repeat popliteal/tibial thrombectomy earlier this morning.  Some soreness in her calf but overall feels fine.  No tingling or numbness to her toes.    Very comfortable.  Martinez catheter with excellent urine output    Left leg elevated on pillows.  Jag hugger on left leg to keep this warm and encourage vasodilatation.    Toes are all warm and pink.  Normal CMS.  OR dressings intact with minimal bleeding.  +3 biphasic signals in distal left AT and PT (improved)    A.m. lab  K= 3.6  SCr= 0.55   Glucose= 112                 Heparin Xa= 0.29     Impression: #1.  Patent left leg arteries.  Good flow to the foot with good biphasic signals in the AT/PT and ankle.  On IV heparin no evidence HIT (with OR Plat= 354).  Will increase heparin and keep on high intensity dose due to condition.  On aspirin/Plavix.                 #2.  Acute blood loss anemia.  Hemodynamically stable with hemoglobin= 7.5.  Start iron.               #3.  Primarily bedrest today with leg elevated.  Leave Martinez catheter in another day.      Ulises Gonzalez MD

## 2021-04-13 ENCOUNTER — APPOINTMENT (OUTPATIENT)
Dept: CARDIOLOGY | Facility: CLINIC | Age: 31
DRG: 271 | End: 2021-04-13
Attending: SURGERY
Payer: COMMERCIAL

## 2021-04-13 ENCOUNTER — APPOINTMENT (OUTPATIENT)
Dept: PHYSICAL THERAPY | Facility: CLINIC | Age: 31
DRG: 271 | End: 2021-04-13
Attending: STUDENT IN AN ORGANIZED HEALTH CARE EDUCATION/TRAINING PROGRAM
Payer: COMMERCIAL

## 2021-04-13 LAB
ANION GAP SERPL CALCULATED.3IONS-SCNC: 5 MMOL/L (ref 3–14)
APTT PPP: 34 SEC (ref 22–37)
APTT PPP: 70 SEC (ref 22–37)
B2 GLYCOPROT1 IGG SERPL IA-ACNC: 0.7 U/ML
B2 GLYCOPROT1 IGM SERPL IA-ACNC: <2.9 U/ML
BASOPHILS # BLD AUTO: 0.1 10E9/L (ref 0–0.2)
BASOPHILS NFR BLD AUTO: 0.6 %
BUN SERPL-MCNC: 5 MG/DL (ref 7–30)
CALCIUM SERPL-MCNC: 8.6 MG/DL (ref 8.5–10.1)
CHLORIDE SERPL-SCNC: 106 MMOL/L (ref 94–109)
CO2 SERPL-SCNC: 29 MMOL/L (ref 20–32)
COPATH REPORT: NORMAL
CREAT SERPL-MCNC: 0.55 MG/DL (ref 0.52–1.04)
DIFFERENTIAL METHOD BLD: ABNORMAL
EOSINOPHIL # BLD AUTO: 0.2 10E9/L (ref 0–0.7)
EOSINOPHIL NFR BLD AUTO: 2.3 %
ERYTHROCYTE [DISTWIDTH] IN BLOOD BY AUTOMATED COUNT: 13.6 % (ref 10–15)
GFR SERPL CREATININE-BSD FRML MDRD: >90 ML/MIN/{1.73_M2}
GLUCOSE BLDC GLUCOMTR-MCNC: 95 MG/DL (ref 70–99)
GLUCOSE SERPL-MCNC: 91 MG/DL (ref 70–99)
HCT VFR BLD AUTO: 26.1 % (ref 35–47)
HGB BLD-MCNC: 8.3 G/DL (ref 11.7–15.7)
IMM GRANULOCYTES # BLD: 0.1 10E9/L (ref 0–0.4)
IMM GRANULOCYTES NFR BLD: 1.2 %
KCT BLD-ACNC: 136 SEC (ref 75–150)
LYMPHOCYTES # BLD AUTO: 2.3 10E9/L (ref 0.8–5.3)
LYMPHOCYTES NFR BLD AUTO: 22.2 %
MCH RBC QN AUTO: 29 PG (ref 26.5–33)
MCHC RBC AUTO-ENTMCNC: 31.8 G/DL (ref 31.5–36.5)
MCV RBC AUTO: 91 FL (ref 78–100)
MONOCYTES # BLD AUTO: 0.6 10E9/L (ref 0–1.3)
MONOCYTES NFR BLD AUTO: 5.4 %
NEUTROPHILS # BLD AUTO: 7.1 10E9/L (ref 1.6–8.3)
NEUTROPHILS NFR BLD AUTO: 68.3 %
NRBC # BLD AUTO: 0 10*3/UL
NRBC BLD AUTO-RTO: 0 /100
PLATELET # BLD AUTO: 498 10E9/L (ref 150–450)
POTASSIUM SERPL-SCNC: 3.4 MMOL/L (ref 3.4–5.3)
RBC # BLD AUTO: 2.86 10E12/L (ref 3.8–5.2)
SODIUM SERPL-SCNC: 140 MMOL/L (ref 133–144)
UFH PPP CHRO-ACNC: 0.62 IU/ML
WBC # BLD AUTO: 10.5 10E9/L (ref 4–11)

## 2021-04-13 PROCEDURE — 99152 MOD SED SAME PHYS/QHP 5/>YRS: CPT | Performed by: INTERNAL MEDICINE

## 2021-04-13 PROCEDURE — 250N000013 HC RX MED GY IP 250 OP 250 PS 637: Performed by: STUDENT IN AN ORGANIZED HEALTH CARE EDUCATION/TRAINING PROGRAM

## 2021-04-13 PROCEDURE — 36415 COLL VENOUS BLD VENIPUNCTURE: CPT | Performed by: INTERNAL MEDICINE

## 2021-04-13 PROCEDURE — 250N000011 HC RX IP 250 OP 636: Performed by: STUDENT IN AN ORGANIZED HEALTH CARE EDUCATION/TRAINING PROGRAM

## 2021-04-13 PROCEDURE — 85730 THROMBOPLASTIN TIME PARTIAL: CPT | Performed by: INTERNAL MEDICINE

## 2021-04-13 PROCEDURE — 97161 PT EVAL LOW COMPLEX 20 MIN: CPT | Mod: GP | Performed by: PHYSICAL THERAPIST

## 2021-04-13 PROCEDURE — 93325 DOPPLER ECHO COLOR FLOW MAPG: CPT

## 2021-04-13 PROCEDURE — 250N000011 HC RX IP 250 OP 636: Performed by: INTERNAL MEDICINE

## 2021-04-13 PROCEDURE — 120N000001 HC R&B MED SURG/OB

## 2021-04-13 PROCEDURE — 99233 SBSQ HOSP IP/OBS HIGH 50: CPT | Performed by: INTERNAL MEDICINE

## 2021-04-13 PROCEDURE — 97530 THERAPEUTIC ACTIVITIES: CPT | Mod: GP | Performed by: PHYSICAL THERAPIST

## 2021-04-13 PROCEDURE — 93320 DOPPLER ECHO COMPLETE: CPT | Mod: 26 | Performed by: INTERNAL MEDICINE

## 2021-04-13 PROCEDURE — 93312 ECHO TRANSESOPHAGEAL: CPT | Mod: 26 | Performed by: INTERNAL MEDICINE

## 2021-04-13 PROCEDURE — 85730 THROMBOPLASTIN TIME PARTIAL: CPT | Performed by: STUDENT IN AN ORGANIZED HEALTH CARE EDUCATION/TRAINING PROGRAM

## 2021-04-13 PROCEDURE — 85025 COMPLETE CBC W/AUTO DIFF WBC: CPT | Performed by: PHYSICIAN ASSISTANT

## 2021-04-13 PROCEDURE — 999N000183 HC STATISTIC TEE INCLUDES SEDATION

## 2021-04-13 PROCEDURE — 250N000013 HC RX MED GY IP 250 OP 250 PS 637: Performed by: INTERNAL MEDICINE

## 2021-04-13 PROCEDURE — 36415 COLL VENOUS BLD VENIPUNCTURE: CPT | Performed by: STUDENT IN AN ORGANIZED HEALTH CARE EDUCATION/TRAINING PROGRAM

## 2021-04-13 PROCEDURE — 999N000184 HC STATISTIC TELEMETRY

## 2021-04-13 PROCEDURE — 80048 BASIC METABOLIC PNL TOTAL CA: CPT | Performed by: PHYSICIAN ASSISTANT

## 2021-04-13 PROCEDURE — 85520 HEPARIN ASSAY: CPT | Performed by: STUDENT IN AN ORGANIZED HEALTH CARE EDUCATION/TRAINING PROGRAM

## 2021-04-13 PROCEDURE — 36415 COLL VENOUS BLD VENIPUNCTURE: CPT | Performed by: PHYSICIAN ASSISTANT

## 2021-04-13 PROCEDURE — 258N000003 HC RX IP 258 OP 636: Performed by: INTERNAL MEDICINE

## 2021-04-13 PROCEDURE — 250N000009 HC RX 250: Performed by: INTERNAL MEDICINE

## 2021-04-13 PROCEDURE — 93325 DOPPLER ECHO COLOR FLOW MAPG: CPT | Mod: 26 | Performed by: INTERNAL MEDICINE

## 2021-04-13 PROCEDURE — 99232 SBSQ HOSP IP/OBS MODERATE 35: CPT | Performed by: INTERNAL MEDICINE

## 2021-04-13 PROCEDURE — 999N001017 HC STATISTIC GLUCOSE BY METER IP

## 2021-04-13 RX ORDER — LIDOCAINE 40 MG/G
CREAM TOPICAL
Status: DISCONTINUED | OUTPATIENT
Start: 2021-04-13 | End: 2021-04-13

## 2021-04-13 RX ORDER — LIDOCAINE 50 MG/G
OINTMENT TOPICAL ONCE
Status: COMPLETED | OUTPATIENT
Start: 2021-04-13 | End: 2021-04-13

## 2021-04-13 RX ORDER — FENTANYL CITRATE 50 UG/ML
25 INJECTION, SOLUTION INTRAMUSCULAR; INTRAVENOUS
Status: DISCONTINUED | OUTPATIENT
Start: 2021-04-13 | End: 2021-04-13

## 2021-04-13 RX ORDER — DEXTROSE MONOHYDRATE 25 G/50ML
9.5 INJECTION, SOLUTION INTRAVENOUS
Status: DISCONTINUED | OUTPATIENT
Start: 2021-04-13 | End: 2021-04-13

## 2021-04-13 RX ORDER — IBUPROFEN 600 MG/1
600 TABLET, FILM COATED ORAL EVERY 6 HOURS PRN
Status: DISCONTINUED | OUTPATIENT
Start: 2021-04-13 | End: 2021-04-14

## 2021-04-13 RX ORDER — NALOXONE HYDROCHLORIDE 0.4 MG/ML
0.4 INJECTION, SOLUTION INTRAMUSCULAR; INTRAVENOUS; SUBCUTANEOUS
Status: DISCONTINUED | OUTPATIENT
Start: 2021-04-13 | End: 2021-04-13

## 2021-04-13 RX ORDER — FLUMAZENIL 0.1 MG/ML
0.2 INJECTION, SOLUTION INTRAVENOUS
Status: DISCONTINUED | OUTPATIENT
Start: 2021-04-13 | End: 2021-04-13

## 2021-04-13 RX ORDER — LIDOCAINE HYDROCHLORIDE 40 MG/ML
1.5 SOLUTION TOPICAL ONCE
Status: COMPLETED | OUTPATIENT
Start: 2021-04-13 | End: 2021-04-13

## 2021-04-13 RX ORDER — NALOXONE HYDROCHLORIDE 0.4 MG/ML
0.2 INJECTION, SOLUTION INTRAMUSCULAR; INTRAVENOUS; SUBCUTANEOUS
Status: DISCONTINUED | OUTPATIENT
Start: 2021-04-13 | End: 2021-04-13

## 2021-04-13 RX ORDER — SODIUM CHLORIDE 9 MG/ML
INJECTION, SOLUTION INTRAVENOUS CONTINUOUS PRN
Status: DISCONTINUED | OUTPATIENT
Start: 2021-04-13 | End: 2021-04-13

## 2021-04-13 RX ORDER — GLYCOPYRROLATE 0.2 MG/ML
0.1 INJECTION, SOLUTION INTRAMUSCULAR; INTRAVENOUS ONCE
Status: COMPLETED | OUTPATIENT
Start: 2021-04-13 | End: 2021-04-13

## 2021-04-13 RX ORDER — FENTANYL CITRATE 50 UG/ML
50 INJECTION, SOLUTION INTRAMUSCULAR; INTRAVENOUS ONCE
Status: DISCONTINUED | OUTPATIENT
Start: 2021-04-13 | End: 2021-04-13

## 2021-04-13 RX ADMIN — FENTANYL CITRATE 25 MCG: 50 INJECTION, SOLUTION INTRAMUSCULAR; INTRAVENOUS at 14:44

## 2021-04-13 RX ADMIN — Medication 1 LOZENGE: at 23:43

## 2021-04-13 RX ADMIN — SERTRALINE HYDROCHLORIDE 50 MG: 50 TABLET ORAL at 09:26

## 2021-04-13 RX ADMIN — BUSPIRONE HYDROCHLORIDE 10 MG: 10 TABLET ORAL at 09:26

## 2021-04-13 RX ADMIN — MIDAZOLAM 0.5 MG: 1 INJECTION INTRAMUSCULAR; INTRAVENOUS at 14:48

## 2021-04-13 RX ADMIN — SENNOSIDES AND DOCUSATE SODIUM 1 TABLET: 8.6; 5 TABLET ORAL at 09:26

## 2021-04-13 RX ADMIN — TOPICAL ANESTHETIC 1 ML: 200 SPRAY DENTAL; PERIODONTAL at 14:10

## 2021-04-13 RX ADMIN — SODIUM CHLORIDE: 9 INJECTION, SOLUTION INTRAVENOUS at 14:15

## 2021-04-13 RX ADMIN — CLOPIDOGREL BISULFATE 75 MG: 75 TABLET, FILM COATED ORAL at 09:26

## 2021-04-13 RX ADMIN — ACETAMINOPHEN 975 MG: 325 TABLET, FILM COATED ORAL at 21:21

## 2021-04-13 RX ADMIN — GLYCOPYRROLATE 0.1 MG: 0.2 INJECTION, SOLUTION INTRAMUSCULAR; INTRAVENOUS at 14:09

## 2021-04-13 RX ADMIN — FERROUS SULFATE TAB 325 MG (65 MG ELEMENTAL FE) 325 MG: 325 (65 FE) TAB at 09:26

## 2021-04-13 RX ADMIN — SENNOSIDES AND DOCUSATE SODIUM 2 TABLET: 8.6; 5 TABLET ORAL at 21:22

## 2021-04-13 RX ADMIN — HEPARIN SODIUM 1850 UNITS/HR: 10000 INJECTION, SOLUTION INTRAVENOUS at 10:39

## 2021-04-13 RX ADMIN — ASPIRIN 81 MG: 81 TABLET, DELAYED RELEASE ORAL at 09:26

## 2021-04-13 RX ADMIN — ACETAMINOPHEN 975 MG: 325 TABLET, FILM COATED ORAL at 16:16

## 2021-04-13 RX ADMIN — ACETAMINOPHEN 975 MG: 325 TABLET, FILM COATED ORAL at 05:50

## 2021-04-13 RX ADMIN — BUSPIRONE HYDROCHLORIDE 10 MG: 10 TABLET ORAL at 21:21

## 2021-04-13 RX ADMIN — MIDAZOLAM 1 MG: 1 INJECTION INTRAMUSCULAR; INTRAVENOUS at 14:47

## 2021-04-13 RX ADMIN — FENTANYL CITRATE 25 MCG: 50 INJECTION, SOLUTION INTRAMUSCULAR; INTRAVENOUS at 14:50

## 2021-04-13 RX ADMIN — FENTANYL CITRATE 25 MCG: 50 INJECTION, SOLUTION INTRAMUSCULAR; INTRAVENOUS at 14:47

## 2021-04-13 RX ADMIN — FENTANYL CITRATE 25 MCG: 50 INJECTION, SOLUTION INTRAMUSCULAR; INTRAVENOUS at 14:39

## 2021-04-13 RX ADMIN — LIDOCAINE HYDROCHLORIDE 1.5 ML: 40 SOLUTION TOPICAL at 14:10

## 2021-04-13 RX ADMIN — MIDAZOLAM 1 MG: 1 INJECTION INTRAMUSCULAR; INTRAVENOUS at 14:38

## 2021-04-13 RX ADMIN — MIDAZOLAM 1 MG: 1 INJECTION INTRAMUSCULAR; INTRAVENOUS at 14:43

## 2021-04-13 ASSESSMENT — ACTIVITIES OF DAILY LIVING (ADL)
ADLS_ACUITY_SCORE: 16

## 2021-04-13 NOTE — PLAN OF CARE
Alert and oriented x4. Vital signs stable on 1L 02 while asleep. Pt. On bedrest. Tolerating diet. Lungs sounds clear. BS+. No BM this shift. LLE incisions, CDI. Pain controlled with scheduled Tylenol. Denies nausea. Pulses present with doppler. Heparin gtt @18.5ml/hr.

## 2021-04-13 NOTE — PROGRESS NOTES
"VASCULAR SURGERY PROGRESS NOTE    Subjective:  No acute events overnight. Biphasic signals this morning. Foot is warm and reports not numbness. Doing well overall. PTT and Xa therapeutic. Tolerating diet.    Objective:    Intake/Output Summary (Last 24 hours) at 4/9/2021 0719  Last data filed at 4/9/2021 0652  Gross per 24 hour   Intake 1651 ml   Output 1000 ml   Net 651 ml     Labs:  ROUTINE IP LABS (Last four results)  BMP  Recent Labs   Lab 04/12/21  0609 04/10/21  0622 04/09/21  0607 04/08/21  1513    135 135 134   POTASSIUM 3.6 3.5 4.2 3.9   CHLORIDE 106 102 105 104   CASSI 8.6 7.7* 8.2* 9.6   CO2 29 27 23 23   BUN 3* 3* 6* 10   CR 0.55 0.51* 0.56 0.75   * 95 116* 104*     CBC  Recent Labs   Lab 04/12/21  0237 04/11/21  1314 04/11/21  0537 04/10/21  1445 04/10/21  1324   WBC 12.0*  --  10.2 11.2* 12.8*   RBC 2.59*  --  2.98* 3.10* 3.35*   HGB 7.5* 8.1* 8.4* 9.2* 9.6*   HCT 22.7*  --  25.8* 27.0* 29.6*   MCV 88  --  87 87 88   MCH 29.0  --  28.2 29.7 28.7   MCHC 33.0  --  32.6 34.1 32.4   RDW 13.3  --  13.2 13.2 13.2     --  284 274 275     INR  Recent Labs   Lab 04/08/21  1513   INR 1.05     PHYSICAL EXAM:  /80 (BP Location: Left arm)   Pulse 85   Temp 98.3  F (36.8  C) (Oral)   Resp 17   Ht 1.753 m (5' 9\")   Wt 122.5 kg (270 lb)   LMP 04/09/2021   SpO2 98%   BMI 39.87 kg/m    General: The patient is alert and oriented. Appropriate. No acute distress  Psych: Pleasant affect, Answers questions appropriately  Skin: Color appropriate for race, warm, dry.  Respiratory: Breathing unlabored  GI:  Abdomen soft, nontender to light palpation.  Extremities: biphasic AT/PT signal, foot is warm, able to plantar and dorsiflex foot, intact sensation    Imaging:   No new imaging.    ASSESSMENT:  30-year-old female with history of anxiety/depression, cystic acne present today with 5-day history of numbness and pain in her left lower extremity, found to have left popliteal artery occlusion s/p IR " thrombectomy and thrombolysis catheter placement 4/8/21 w/ resulting continued occlusion of the outflow requiring open thrombectomy of popliteal, AT and PT arteries 4/11 complicated by re-thrombosis and repeat thrombectomy 4/12. Biphasic signals AT and PT postop.    Unclear source of embolus/thrombosis but reported claudication symptoms for weeks-months. Previous embolic event on RLE as well as seen on CTA and angiogram.    PLAN:  Diet as tolerated, discontinue IVF  Vascular medicine   Continue systemic heparin gtt - transition to PO AC on discharge  Pain control - discontinue PCA, oxycodone PRN and tylenol gianni  Discontinue levy  OOB w/ PT today    Elba Smith MD  Vascular Surgery Fellow   Pager: [phone]    STAFF:  As above.  Looks great.                Good Biphasic Doppler in both AT/PTs.                Wds=A  Drain pulled from Popliteal incision.                Levy out.  Increase activity.                IV Heparin/Plavix. Eventual Xarelto.                No Rx for now for right tibial emboli.             ??? Etiology.  She ot the Phizer vaccine but only shortly before left leg events. Had right leg emboli prior to this.Discussed with patient.       Keep Heparin at High-dose level(Xa=0.62 this AM)       Ulises Gonzalez MD

## 2021-04-13 NOTE — PRE-PROCEDURE
GENERAL PRE-PROCEDURE:   Procedure:  DEYVI    Verbal consent obtained?: Yes    Written consent obtained?: Yes    Risks and benefits: Risks, benefits and alternatives were discussed    Consent given by:  Patient  Patient states understanding of procedure being performed: Yes    Patient's understanding of procedure matches consent: Yes    Procedure consent matches procedure scheduled: Yes    Expected level of sedation:  Moderate  Appropriately NPO:  Yes  ASA Class:  Class 2- mild systemic disease, no acute problems, no functional limitations  Mallampati  :  Grade 2- soft palate, base of uvula, tonsillar pillars, and portion of posterior pharyngeal wall visible  Lungs:  Lungs clear with good breath sounds bilaterally  Heart:  Normal heart sounds and rate  History & Physical reviewed:  History and physical reviewed and no updates needed  Statement of review:  I have reviewed the lab findings, diagnostic data, medications, and the plan for sedation

## 2021-04-13 NOTE — PLAN OF CARE
Alert and oriented x4. Vital signs stable on room air. Assist of 1. Advancing diet as tolerated post DEYVI.  Lung sounds clear. Bowel sounds active, passing flatus, BM during shift, Martinez catheter removed, due to void. Hematoma to left forearm and groin area. Leg incisions CDI, dressings changed. Pain under control, denies need for PRN pain medication. Denies nausea.

## 2021-04-13 NOTE — PROGRESS NOTES
04/13/21 1530   Quick Adds   Type of Visit Initial PT Evaluation   Living Environment   People in home significant other;child(mariam), dependent   Current Living Arrangements house   Home Accessibility stairs to enter home;stairs within home   Number of Stairs, Main Entrance 2   Number of Stairs, Within Home, Primary 10   Transportation Anticipated car, drives self;family or friend will provide   Living Environment Comments independent with ADLs, IADLs, works from home   Self-Care   Usual Activity Tolerance good   Current Activity Tolerance fair   Regular Exercise Yes   Activity/Exercise Type strength training   Equipment Currently Used at Home none   Activity/Exercise/Self-Care Comment independent,   starting at home sculpt class   Disability/Function   Walking or Climbing Stairs Difficulty no   Fall history within last six months no   Change in Functional Status Since Onset of Current Illness/Injury yes   General Information   Onset of Illness/Injury or Date of Surgery 04/08/21   Referring Physician Dr. Gonzalez   Patient/Family Therapy Goals Statement (PT) to go home and have sister assist    Pertinent History of Current Problem (include personal factors and/or comorbidities that impact the POC) 30-year-old female with history of anxiety/depression, cystic acne present today with 5-day history of numbness and pain in her left lower extremity, found to have left popliteal artery occlusion s/p IR thrombectomy and thrombolysis catheter placement 4/8/21 w/ resulting continued occlusion of the outflow requiring open thrombectomy of popliteal, AT and PT arteries 4/11 complicated by re-thrombosis and repeat thrombectomy 4/12. Biphasic signals AT and PT postop.   Existing Precautions/Restrictions fall   Cognition   Orientation Status (Cognition) oriented x 4   Affect/Mental Status (Cognition) WNL   Pain Assessment   Patient Currently in Pain Yes, see Vital Sign flowsheet   Integumentary/Edema   Integumentary/Edema Comments  left leg in gauze bandage   Range of Motion (ROM)   ROM Comment WFL except for left LE consistent with surgery   Strength   Strength Comments WFL except for left LE consistent with surgery   Bed Mobility   Comment (Bed Mobility) supine to sit: CGA for left LE   Transfers   Transfer Safety Comments sit to stand : Diane with walker for support   Gait/Stairs (Locomotion)   Comment (Gait/Stairs) Pt took 1 step but increased pain and could not tolerate gait at this time   Balance   Balance Comments requires support for static and dynamic   Sensory Examination   Sensory Perception Comments Decreased sensation left LE   Coordination   Coordination no deficits were identified   Clinical Impression   Criteria for Skilled Therapeutic Intervention yes, treatment indicated   PT Diagnosis (PT) impaired gait   Influenced by the following impairments pain, decreased ROM and strength in left LE, decreased tolerance for weightbearing   Functional limitations due to impairments below baseline for transfers and gait   Clinical Presentation Stable/Uncomplicated   Clinical Presentation Rationale clinical judgment   Clinical Decision Making (Complexity) low complexity   Therapy Frequency (PT) Daily   Predicted Duration of Therapy Intervention (days/wks) 2 -3 days   Planned Therapy Interventions (PT) balance training;gait training;patient/family education;transfer training   Anticipated Equipment Needs at Discharge (PT)   (walker or crutches)   Risk & Benefits of therapy have been explained evaluation/treatment results reviewed;care plan/treatment goals reviewed;risks/benefits reviewed;participants voiced agreement with care plan;patient   PT Discharge Planning    PT Discharge Recommendation (DC Rec) home with assist   PT Rationale for DC Rec Pt is below baseline for mobility and currently is unable to tolerate gait due to pain in dependent position and with weightbearing. Anticipate pt will be able to return home with assist of sister who  will come to assist because paolo is out of house in day but will assist at night. Pt needs to be able to maneuver steps to get in home and to bedroom.    PT Brief overview of current status  supine to and from sit: CGA, Diane sit to stand, unable to tolerate steps and clarifying WB status and ability for pt to use a post op shoe for gait.    Total Evaluation Time   Total Evaluation Time (Minutes) 8

## 2021-04-13 NOTE — PLAN OF CARE
VSS. A/O. Bedrest. L leg Incisions dressing CDI, some dry drainage. R groin bruises. Doppler pulses. Denies pain, PCA infusing, 0 usage. Hep gtt @ 18.5 ml/hr. Tolerating diet. Martinez good UOP. L toes tingling, improving. Having menses cycle.

## 2021-04-13 NOTE — PROGRESS NOTES
Care Suites Post Procedure Note    Patient Information  Name: Sruthi Mac  Age: 30 year old    Patient received 3.5 mg versed and 100 mcg fentanyl for sedation time of 20 minutes. Bedside report taken by receiving RN.

## 2021-04-13 NOTE — PROGRESS NOTES
[facility]    Vascular Medicine Progress Note    Date of Service (when I saw the patient): 04/13/2021     Assessment & Plan   1. Left popliteal artery thromboembolic occlusion with acute on chronic left leg ischemia s/p:  -emergent angiogram with mechanical thrombectomy of the left popliteal artery 4/8/21 and catheter-directed lysis 4/8/21-4/10/21  -open embolectomy left popliteal, anterior tibial, and tibioperoneal arteries with vein patch angioplasty and fasciotomies 4/11/21  -reexploration of left leg with left popliteal/tibial embolectomy and left distal anterior and posterior tibial artery embolectomy 4/12/21    -At present, unclear etiology. She is obese, on birth control pills and just had her first dose of Pfizer Covid vaccine one day prior to symptoms onset. She has no cardiac arrhythmias, no miscarriage, no family history of hypercoagulable status, and Covid test negative.   -D. Dimer normal.   -Echo ok, no bubble study done but states septum is intact.   -Thrombocytosis at time of admission -> likely acute phase reactant. Platelets dropped briefly. HIT was negative. Platelets stable on IV heparin now.   -Hypercoagulable labs are still pending. Beta 2 glycoprotein and Cardiolipin antibodies negative.   -Pain under control  -Multiphasic pulses with doppler this morning. Foot is warm, but still with some numbness in toes and heel.   -Questionable right lower extremity claudication in recent past and non-palpable right pedal pulses.     Plan:   -Continue IV heparin for now. Also on Plavix and aspirin.   -Await hypercoagulable work-up.   -Post-operative cares per Dr. Gonzalez / Vascular Surgery.   -DEYVI scheduled for today.   -Stop oral contraceptives.   -Cardiac monitoring, consider zio patch at the time of discharge   -Consider getting right lower extremity arterial duplex given abnormal CTA, suboptimal opacification of the infrapopliteal vasculature.  -Rest of the medical management  per hospitalist service     2. Hx of Depression and Anxiety    -continue PTA meds     3. 4.3 x 5.7 cm left adnexal cyst    -She needs a pelvic ultrasound at some point and needs Gyn follow-up as outpatient.   -CA-125 is normal.     4. Acne    -On Spironolactone as an outpatient.        Interval History   Brought to surgery twice this past weekend for embolectomy after unsuccessful lysis and mechanical thrombectomy in IR. Doing well now. Left foot is warm with good pulses but states toes and heel are still numb. Pain is under control.     Physical Exam   Temp: 99  F (37.2  C) Temp src: Oral BP: (!) 143/83 Pulse: 85   Resp: 20 SpO2: 98 % O2 Device: None (Room air) Oxygen Delivery: 1 LPM  Vitals:    04/08/21 1424   Weight: 122.5 kg (270 lb)     Vital Signs with Ranges  Temp:  [97.9  F (36.6  C)-99  F (37.2  C)] 99  F (37.2  C)  Pulse:  [85-90] 85  Resp:  [16-20] 20  BP: (132-143)/(80-89) 143/83  SpO2:  [92 %-98 %] 98 %  I/O last 3 completed shifts:  In: 361 [P.O.:350; I.V.:11]  Out: 6400 [Urine:6400]    Constitutional: Awake, alert, cooperative, no apparent distress, and appears stated age.  Eyes: Lids and lashes normal, sclera clear, conjunctiva normal.  ENT: Normocephalic, without obvious abnormality, atraumatic, oral pharynx with moist mucus membranes  Respiratory: No increased work of breathing, good air exchange, clear to auscultation bilaterally, no crackles or wheezing.  Cardiovascular: Regular rate and rhythm, normal S1 and S2, no S3 or S4, and no murmur noted.  GI: Normal bowel sounds, soft, non-distended, non-tender  Genitourinary:  Martinez catheter in place.   Skin: No bruising or bleeding, normal skin color, texture, turgor, no redness, warmth, or swelling, no rashes, all surgical sites bandaged and dry.   Musculoskeletal: There is no redness, warmth, or swelling of the joints. Edema noted in LLE with incisions dressed. Left foot warm.   Neurologic: Awake, alert, oriented to name, place and time.  Cranial  nerves II-XII are grossly intact.    Neuropsychiatric: Calm, normal eye contact, alert, normal affect, oriented to self, place, time and situation, memory for past and recent events intact and thought process normal.  Pulses: Multiphasic DP and PT pulses in left foot by doppler.     Medications     heparin 1,850 Units/hr (04/13/21 1039)     - MEDICATION INSTRUCTIONS -         acetaminophen  975 mg Oral Q8H ERICA     aspirin  81 mg Oral Daily     busPIRone  10 mg Oral BID     clopidogrel  75 mg Oral Daily     ferrous sulfate  325 mg Oral Daily     polyethylene glycol  17 g Oral Daily     senna-docusate  1 tablet Oral BID    Or     senna-docusate  2 tablet Oral BID     sertraline  50 mg Oral Daily     sodium chloride (PF)  3 mL Intracatheter Q8H       Data   Recent Labs   Lab 04/12/21  0609 04/12/21  0237 04/11/21  1314 04/11/21  0537 04/10/21  1445 04/10/21  0622 04/10/21  0622 04/09/21  0607 04/08/21  1513 04/08/21  1513   WBC  --  12.0*  --  10.2 11.2*   < > 9.9 20.1*   < > 14.2*   HGB  --  7.5* 8.1* 8.4* 9.2*   < > 9.1* 10.8*   < > 12.0   MCV  --  88  --  87 87   < > 87 87   < > 85   PLT  --  354  --  284 274   < > 240 414   < > 576*   INR  --   --   --   --   --   --   --   --   --  1.05     --   --   --   --   --  135 135  --  134   POTASSIUM 3.6  --   --   --   --   --  3.5 4.2  --  3.9   CHLORIDE 106  --   --   --   --   --  102 105  --  104   CO2 29  --   --   --   --   --  27 23  --  23   BUN 3*  --   --   --   --   --  3* 6*  --  10   CR 0.55  --   --   --   --   --  0.51* 0.56  --  0.75   ANIONGAP 5  --   --   --   --   --  6 7  --  7   CASSI 8.6  --   --   --   --   --  7.7* 8.2*  --  9.6   *  --   --   --   --   --  95 116*  --  104*   ALBUMIN  --   --   --   --  2.7*  --  2.6*  --   --   --    PROTTOTAL  --   --   --   --  6.1*  --  5.7*  --   --   --    BILITOTAL  --   --   --   --  0.5  --  0.5  --   --   --    ALKPHOS  --   --   --   --  134  --  96  --   --   --    ALT  --   --   --   --   95*  --  75*  --   --   --    AST  --   --   --   --  399*  --  396*  --   --   --     < > = values in this interval not displayed.     No results found for this or any previous visit (from the past 24 hour(s)).

## 2021-04-13 NOTE — PLAN OF CARE
A/OX4. AVSS. Back from DEYVI at 1520. Tele NSR. Up with AX1 GB and walker. Minimal weight bearing on left foot due to tightness and pain. Pivots to commode. Martinez out today, voiding adequately Formed BM's x2 today. Left leg dressing CDI, changed this morning. Pulses found with doppler. Heparin running at 1850 units an hour. Started working with physical therapy today. Bruised groin and perineum.

## 2021-04-13 NOTE — PROGRESS NOTES
[facility]    Hospitalist Progress Note      Assessment & Plan   Sruthi Mac is a 30 year old female with past medical history significant for anxiety, depression and history of panic attacks who presented to the emergency room with sudden left foot numbness and pain along with toe discoloration and was found to have acute limb ischemia.  She was taken emergently for mechanical thrombectomy with initiation of lytic therapy on April 8 by interventional radiology.  Patient was admitted for further evaluation and management.  Vascular surgery and vascular medicine has also been consulted.        Assessment & Plan     Acute occlusion of left popliteal artery of lower extremity (H) (4/8/2021)  Limb ischemia (4/8/2021)  Right lower extremity arterial occlusion with patent SFA flow to BK popliteal:  Presented with 5-day history of numbness and pain in her left leg and foot which was getting worse with walking and was not getting resolved.  She also started to notice discoloration of her toes and came to the emergency room.  Her pulses were not palpable. Risk factor included oral contraceptive pills and history of palpitations.  * Ultrasound of left lower extremity: Occlusion of left popliteal artery.  And no DVT.  *Echocardiogram results are reviewed which are showing normal LV systolic function, ejection fraction of 55 to 60%, normal RV structure function and size, no pericardial effusion normal wall ventricular wall motion.  * CTA chest abdomen and pelvis with runoff contrast: Abrupt cut off of opacification of the left popliteal artery with associated filling defect consistent with arterial embolus. 4.3 x 5.7 cm left adnexal cyst, recommend follow-up pelvic ultrasound.  * 04/08 :Patient underwent emergent abdominal aortic and left lower extremity diagnostic arteriography, s/p mechanical thrombectomy of left popliteal artery with extraction of thrombus  * Patient also underwent  placement of infusion catheter extending from left superficial femoral artery through the left posterior tibial artery with initiation of arterial thrombolytic therapy to left lower extremity.  * Also have claudication symptoms for months, right lower extremity also without nonpalpable pulses but weak presence on doppler    * 04/09: Bilateral lower extremity angiogram, filling defects in right profundofemoral artery as well as right posterior tibial and peroneal arteries  * 04/09: IR angiogram on the Left side through catheter follow up, showing continued thrombolysis with numerous filling defects throughout the runoff arteries of the left lower extremity.  Improved thrombus burden in popliteal artery, additional length and TPA administration.  * 04/10: IR guided angiogram of Left leg through catheter follow-up showing worsening thrombus burden despite thrombolysis with TPA, thrombolysis was discontinued, concern for thrombus/embolus to be minimum subacute and may be chronic.  Plan for surgical embolectomy with cutdown to popliteal artery and passage of balloons into the runoff arteries was discussed with patient.  * 04/10: Multiple emboli in right lower extremity including the profundofemoral artery which is occluded.    *04/10: Bilateral lower extremity venous mapping done  * 4/11: open embolectomy left popliteal, anterior tibial, and tibioperoneal arteries with vein patch angioplasty and fasciotomies   *4/12: reexploration of left leg with left popliteal/tibial embolectomy and left distal anterior and posterior tibial artery embolectomy      --continues on IV heparin - was briefly on argatroban but switched back to heparin - plan switch to Xarelto on discharge  -- MAHENDRA antibodies have been send , F/U on the results   --continue PCA pain pump for now but consider discontinue this tomorrow if pain is well controlled  -- LDL of 80, total cholesterol of 146 and HDL of 42.  --Patient does have hypertriglyceridemia,  will probably benefit from diet management first if not then probably need to be started on antitriglyceride medications like Lopid.   --TSH came back 5.25, slightly elevated from acute illness, free T4 is in normal range.  --defer to vascular medicine if DEYVI would be required  --Hypercoagulable work-up is ordered, in process, might need to be repeated in few months once patient is off the anticoagulation.  --pharmacy consult for cost of DOACs  --ambulate today     Urinary retention: Most likely secondary to being in pain and on bedrest.  --voiding trial today     History of anxiety and depression.  History of panic attacks.  --Continue patient on PTA buspirone twice daily and sertraline.  --Patient has been reporting less panic attacks since the start of risperidone.  --IV/p.o. Ativan is also available in case of panic attack.     Leukocytosis: Most likely secondary to inflammation with acute limb ischemia, patient presented with leukocyte count of 20, counts improved to 9.9     Cystic acne   Ovarian Cyst   --PTA spironolactone, on hold  --CA-125 was checked which came back negative  --Will need outpatient Gyneoncologist follow-up for ovarian cyst, she is also taken off the oral contraceptive pills    DVT Prophylaxis: therapeutic heparin  Code Status: Full Code    Disposition: Expected discharge as early as Wednesday once on oral anticoagulant, ambulating, cleared by vascular surgery.    Christian Borjas Maresh      Interval History   Feeling better today, PCA discontinued and taking some tylenol as needed with adequate control.  No chest pain or shortness of breath.  Wasn't all that hungry yesterday but motivated to eat today after DEYVI.      -Data reviewed today: I reviewed all new labs and imaging results over the last 24 hours. I personally reviewed no images or EKG's today.    Physical Exam   Temp: 99  F (37.2  C) Temp src: Oral BP: (!) 143/83 Pulse: 85   Resp: 20 SpO2: 98 % O2 Device: None (Room air) Oxygen  Delivery: 1 LPM  Vitals:    04/08/21 1424   Weight: 122.5 kg (270 lb)     Vital Signs with Ranges  Temp:  [97.9  F (36.6  C)-99  F (37.2  C)] 99  F (37.2  C)  Pulse:  [85-90] 85  Resp:  [16-20] 20  BP: (132-143)/(80-89) 143/83  SpO2:  [92 %-98 %] 98 %  I/O last 3 completed shifts:  In: 361 [P.O.:350; I.V.:11]  Out: 6400 [Urine:6400]    Constitutional: awake, alert, cooperative    Respiratory: Clear to auscultation bilaterally, no crackles or wheezing noted.  Good air exchange.     Cardiovascular: Regular rate and rhythm.  No murmur, rub or gallop noted.     GI: Positive bowel sounds, soft, non-distended, non-tender.  No masses or organomegaly noted.     Musculoskeletal: able to move all extremities    Neurologic: Awake, alert and oriented to name, place and time.  Cranial nerves II-XII are grossly intact.      Lymphatic: No edema noted    Skin: legs in bandages from surgery    Medications     heparin 1,850 Units/hr (04/13/21 1039)     - MEDICATION INSTRUCTIONS -         acetaminophen  975 mg Oral Q8H ERICA     aspirin  81 mg Oral Daily     busPIRone  10 mg Oral BID     clopidogrel  75 mg Oral Daily     ferrous sulfate  325 mg Oral Daily     polyethylene glycol  17 g Oral Daily     senna-docusate  1 tablet Oral BID    Or     senna-docusate  2 tablet Oral BID     sertraline  50 mg Oral Daily     sodium chloride (PF)  3 mL Intracatheter Q8H       Data   Recent Labs   Lab 04/12/21  0609 04/12/21  0237 04/11/21  1314 04/11/21  0537 04/10/21  1445 04/10/21  0622 04/10/21  0622 04/09/21  0607 04/08/21  1513 04/08/21  1513   WBC  --  12.0*  --  10.2 11.2*   < > 9.9 20.1*   < > 14.2*   HGB  --  7.5* 8.1* 8.4* 9.2*   < > 9.1* 10.8*   < > 12.0   MCV  --  88  --  87 87   < > 87 87   < > 85   PLT  --  354  --  284 274   < > 240 414   < > 576*   INR  --   --   --   --   --   --   --   --   --  1.05     --   --   --   --   --  135 135  --  134   POTASSIUM 3.6  --   --   --   --   --  3.5 4.2  --  3.9   CHLORIDE 106  --   --    --   --   --  102 105  --  104   CO2 29  --   --   --   --   --  27 23  --  23   BUN 3*  --   --   --   --   --  3* 6*  --  10   CR 0.55  --   --   --   --   --  0.51* 0.56  --  0.75   ANIONGAP 5  --   --   --   --   --  6 7  --  7   CASSI 8.6  --   --   --   --   --  7.7* 8.2*  --  9.6   *  --   --   --   --   --  95 116*  --  104*   ALBUMIN  --   --   --   --  2.7*  --  2.6*  --   --   --    PROTTOTAL  --   --   --   --  6.1*  --  5.7*  --   --   --    BILITOTAL  --   --   --   --  0.5  --  0.5  --   --   --    ALKPHOS  --   --   --   --  134  --  96  --   --   --    ALT  --   --   --   --  95*  --  75*  --   --   --    AST  --   --   --   --  399*  --  396*  --   --   --     < > = values in this interval not displayed.       No results found for this or any previous visit (from the past 24 hour(s)).

## 2021-04-14 ENCOUNTER — APPOINTMENT (OUTPATIENT)
Dept: PHYSICAL THERAPY | Facility: CLINIC | Age: 31
DRG: 271 | End: 2021-04-14
Attending: STUDENT IN AN ORGANIZED HEALTH CARE EDUCATION/TRAINING PROGRAM
Payer: COMMERCIAL

## 2021-04-14 LAB
APTT PPP: 69 SEC (ref 22–37)
APTT PPP: 79 SEC (ref 22–37)
ERYTHROCYTE [DISTWIDTH] IN BLOOD BY AUTOMATED COUNT: 13.5 % (ref 10–15)
HCT VFR BLD AUTO: 25.8 % (ref 35–47)
HGB BLD-MCNC: 8 G/DL (ref 11.7–15.7)
LA PPP-IMP: NEGATIVE
MCH RBC QN AUTO: 28.2 PG (ref 26.5–33)
MCHC RBC AUTO-ENTMCNC: 31 G/DL (ref 31.5–36.5)
MCV RBC AUTO: 91 FL (ref 78–100)
PLATELET # BLD AUTO: 556 10E9/L (ref 150–450)
RBC # BLD AUTO: 2.84 10E12/L (ref 3.8–5.2)
UFH PPP CHRO-ACNC: 0.54 IU/ML
UFH PPP CHRO-ACNC: 0.67 IU/ML
UFH PPP CHRO-ACNC: 0.75 IU/ML
WBC # BLD AUTO: 12 10E9/L (ref 4–11)

## 2021-04-14 PROCEDURE — 85520 HEPARIN ASSAY: CPT | Performed by: INTERNAL MEDICINE

## 2021-04-14 PROCEDURE — 250N000013 HC RX MED GY IP 250 OP 250 PS 637: Performed by: STUDENT IN AN ORGANIZED HEALTH CARE EDUCATION/TRAINING PROGRAM

## 2021-04-14 PROCEDURE — 81270 JAK2 GENE: CPT | Performed by: INTERNAL MEDICINE

## 2021-04-14 PROCEDURE — 99232 SBSQ HOSP IP/OBS MODERATE 35: CPT | Performed by: INTERNAL MEDICINE

## 2021-04-14 PROCEDURE — 36415 COLL VENOUS BLD VENIPUNCTURE: CPT | Performed by: INTERNAL MEDICINE

## 2021-04-14 PROCEDURE — 85520 HEPARIN ASSAY: CPT | Performed by: STUDENT IN AN ORGANIZED HEALTH CARE EDUCATION/TRAINING PROGRAM

## 2021-04-14 PROCEDURE — 81219 CALR GENE COM VARIANTS: CPT | Performed by: INTERNAL MEDICINE

## 2021-04-14 PROCEDURE — 36415 COLL VENOUS BLD VENIPUNCTURE: CPT | Performed by: STUDENT IN AN ORGANIZED HEALTH CARE EDUCATION/TRAINING PROGRAM

## 2021-04-14 PROCEDURE — 81403 MOPATH PROCEDURE LEVEL 4: CPT | Performed by: INTERNAL MEDICINE

## 2021-04-14 PROCEDURE — 85730 THROMBOPLASTIN TIME PARTIAL: CPT | Performed by: INTERNAL MEDICINE

## 2021-04-14 PROCEDURE — 97116 GAIT TRAINING THERAPY: CPT | Mod: GP

## 2021-04-14 PROCEDURE — 120N000001 HC R&B MED SURG/OB

## 2021-04-14 PROCEDURE — 85027 COMPLETE CBC AUTOMATED: CPT | Performed by: STUDENT IN AN ORGANIZED HEALTH CARE EDUCATION/TRAINING PROGRAM

## 2021-04-14 PROCEDURE — 85730 THROMBOPLASTIN TIME PARTIAL: CPT | Performed by: STUDENT IN AN ORGANIZED HEALTH CARE EDUCATION/TRAINING PROGRAM

## 2021-04-14 PROCEDURE — G0452 MOLECULAR PATHOLOGY INTERPR: HCPCS | Mod: 26 | Performed by: PATHOLOGY

## 2021-04-14 PROCEDURE — 99233 SBSQ HOSP IP/OBS HIGH 50: CPT | Performed by: INTERNAL MEDICINE

## 2021-04-14 PROCEDURE — 97530 THERAPEUTIC ACTIVITIES: CPT | Mod: GP

## 2021-04-14 PROCEDURE — 250N000011 HC RX IP 250 OP 636: Performed by: STUDENT IN AN ORGANIZED HEALTH CARE EDUCATION/TRAINING PROGRAM

## 2021-04-14 RX ORDER — CLOPIDOGREL BISULFATE 75 MG/1
75 TABLET ORAL DAILY
Status: DISCONTINUED | OUTPATIENT
Start: 2021-04-15 | End: 2021-04-15 | Stop reason: HOSPADM

## 2021-04-14 RX ORDER — HEPARIN SODIUM 10000 [USP'U]/100ML
0-5000 INJECTION, SOLUTION INTRAVENOUS CONTINUOUS
Status: DISCONTINUED | OUTPATIENT
Start: 2021-04-14 | End: 2021-04-15

## 2021-04-14 RX ORDER — LIDOCAINE 40 MG/G
CREAM TOPICAL
Status: DISCONTINUED | OUTPATIENT
Start: 2021-04-14 | End: 2021-04-15

## 2021-04-14 RX ORDER — NITROGLYCERIN 0.4 MG/1
0.4 TABLET SUBLINGUAL EVERY 5 MIN PRN
Status: DISCONTINUED | OUTPATIENT
Start: 2021-04-14 | End: 2021-04-15

## 2021-04-14 RX ADMIN — BUSPIRONE HYDROCHLORIDE 10 MG: 10 TABLET ORAL at 20:29

## 2021-04-14 RX ADMIN — HEPARIN SODIUM 2250 UNITS/HR: 10000 INJECTION, SOLUTION INTRAVENOUS at 00:49

## 2021-04-14 RX ADMIN — HEPARIN SODIUM 2250 UNITS/HR: 10000 INJECTION, SOLUTION INTRAVENOUS at 12:20

## 2021-04-14 RX ADMIN — ASPIRIN 81 MG: 81 TABLET, DELAYED RELEASE ORAL at 08:57

## 2021-04-14 RX ADMIN — OXYCODONE HYDROCHLORIDE 5 MG: 5 TABLET ORAL at 13:13

## 2021-04-14 RX ADMIN — OXYCODONE HYDROCHLORIDE 5 MG: 5 TABLET ORAL at 20:29

## 2021-04-14 RX ADMIN — ACETAMINOPHEN 975 MG: 325 TABLET, FILM COATED ORAL at 14:51

## 2021-04-14 RX ADMIN — ACETAMINOPHEN 975 MG: 325 TABLET, FILM COATED ORAL at 06:43

## 2021-04-14 RX ADMIN — SERTRALINE HYDROCHLORIDE 50 MG: 50 TABLET ORAL at 08:57

## 2021-04-14 RX ADMIN — BUSPIRONE HYDROCHLORIDE 10 MG: 10 TABLET ORAL at 08:56

## 2021-04-14 RX ADMIN — FERROUS SULFATE TAB 325 MG (65 MG ELEMENTAL FE) 325 MG: 325 (65 FE) TAB at 08:57

## 2021-04-14 RX ADMIN — SENNOSIDES AND DOCUSATE SODIUM 2 TABLET: 8.6; 5 TABLET ORAL at 20:28

## 2021-04-14 RX ADMIN — ACETAMINOPHEN 975 MG: 325 TABLET, FILM COATED ORAL at 22:03

## 2021-04-14 RX ADMIN — CLOPIDOGREL BISULFATE 75 MG: 75 TABLET, FILM COATED ORAL at 08:56

## 2021-04-14 ASSESSMENT — ACTIVITIES OF DAILY LIVING (ADL)
ADLS_ACUITY_SCORE: 16

## 2021-04-14 NOTE — PROGRESS NOTES
[facility]    Medicine Progress Note - Hospitalist Service       Date of Admission:  4/8/2021  Assessment & Plan       Sruthi Mac is a 30 year old female with past medical history significant for anxiety, depression and history of panic attacks who presented to the emergency room with sudden left foot numbness and pain along with toe discoloration and was found to have acute limb ischemia.  She was taken emergently for mechanical thrombectomy with initiation of lytic therapy on April 8 by interventional radiology.  Patient was admitted for further evaluation and management.  Vascular surgery and vascular medicine has also been consulted.     Acute occlusion of left popliteal artery of lower extremity (H) (4/8/2021)  Limb ischemia (4/8/2021)  Right lower extremity arterial occlusion with patent SFA flow to BK popliteal:  Presented with 5-day history of numbness and pain in her left leg and foot which was getting worse with walking and was not getting resolved.  She also started to notice discoloration of her toes and came to the emergency room.  Her pulses were not palpable. Risk factor included oral contraceptive pills and history of palpitations.  * Ultrasound of left lower extremity: Occlusion of left popliteal artery.  And no DVT.  *Echocardiogram results are reviewed which are showing normal LV systolic function, ejection fraction of 55 to 60%, normal RV structure function and size, no pericardial effusion normal wall ventricular wall motion.  * CTA chest abdomen and pelvis with runoff contrast: Abrupt cut off of opacification of the left popliteal artery with associated filling defect consistent with arterial embolus. 4.3 x 5.7 cm left adnexal cyst, recommend follow-up pelvic ultrasound.  * 04/08 :Patient underwent emergent abdominal aortic and left lower extremity diagnostic arteriography, s/p mechanical thrombectomy of left popliteal artery with extraction of thrombus  *  Patient also underwent placement of infusion catheter extending from left superficial femoral artery through the left posterior tibial artery with initiation of arterial thrombolytic therapy to left lower extremity.  * Also have claudication symptoms for months, right lower extremity also without nonpalpable pulses but weak presence on doppler    * 04/09: Bilateral lower extremity angiogram, filling defects in right profundofemoral artery as well as right posterior tibial and peroneal arteries  * 04/09: IR angiogram on the Left side through catheter follow up, showing continued thrombolysis with numerous filling defects throughout the runoff arteries of the left lower extremity.  Improved thrombus burden in popliteal artery, additional length and TPA administration.  * 04/10: IR guided angiogram of Left leg through catheter follow-up showing worsening thrombus burden despite thrombolysis with TPA, thrombolysis was discontinued, concern for thrombus/embolus to be minimum subacute and may be chronic.  Plan for surgical embolectomy with cutdown to popliteal artery and passage of balloons into the runoff arteries was discussed with patient.  * 04/10: Multiple emboli in right lower extremity including the profundofemoral artery which is occluded.    *04/10: Bilateral lower extremity venous mapping done  * 4/11: open embolectomy left popliteal, anterior tibial, and tibioperoneal arteries with vein patch angioplasty and fasciotomies   *4/12: reexploration of left leg with left popliteal/tibial embolectomy and left distal anterior and posterior tibial artery embolectomy   -- IV heparin initially - was briefly on argatroban but switched back to heparin - plan switch to PO but will defer to vascular medicine   -- MAHENDRA antibodies have been send , F/U on the results   --continue PCA pain pump for now but consider discontinue this tomorrow if pain is well controlled  -- LDL of 80, total cholesterol of 146 and HDL of 42.   --Patient does have hypertriglyceridemia, will probably benefit from diet management first if not then probably need to be started on antitriglyceride medications like Lopid.   --TSH came back 5.25, slightly elevated from acute illness, free T4 is in normal range.  --Hypercoagulable work-up is ordered, in process, might need to be repeated in few months once patient is off the anticoagulation.  --pharmacy liaison for DOAC coverage  --Stop oral contraceptives   --Ambulate as able.  Patient is improving  --Vascular medicine following and appreciate their recommendations      Urinary retention  Most likely secondary to being in pain and on bedrest.     History of anxiety and depression.  History of panic attacks.  --Continue patient on PTA buspirone twice daily and sertraline.  --Patient has been reporting less panic attacks since the start of risperidone.  --IV/p.o. Ativan is also available in case of panic attack.     Leukocytosis  Most likely secondary to inflammation with acute limb ischemia, patient presented with leukocyte count of 20, counts improved to 9.9     Cystic acne   Ovarian Cyst   --PTA spironolactone, on hold  --CA-125 was checked which came back negative  --Will need outpatient Gyneoncologist follow-up for ovarian cyst, she is also taken off the oral contraceptive pills       Diet: Regular Diet Adult    DVT Prophylaxis: Heparin drip  Martinez Catheter: not present  Code Status: Full Code           Disposition Plan   Expected discharge: 2 - 3 days, recommended to prior living arrangement once ambulating on own and anticoagulation determiend.  Entered: Aime Marquez DO 04/14/2021, 3:06 PM       The patient's care was discussed with the Bedside Nurse, Care Coordinator/, Patient and Patient's Family.    Aime Marquez DO  Hospitalist Service  [facility]  Contact information available via [facility]  Emerita/y    ______________________________________________________________________    Interval History   Patient seen and examined.  Still having some pain in her leg but notes it is improving. No chest pain or trouble breathing. No fevers or chills.  Tolerating diet.    Data reviewed today: I reviewed all medications, new labs and imaging results over the last 24 hours. I personally reviewed no images or EKG's today.    Physical Exam   Vital Signs: Temp: 98.6  F (37  C) Temp src: Oral BP: 135/82 Pulse: 90   Resp: 20 SpO2: 96 % O2 Device: None (Room air)    Weight: 270 lbs 0 oz  General Appearance: Resting comfortably. NAD   Respiratory: Clear to auscultation.  No respiratory distress  Cardiovascular: RRR.  No obvious murmurs  GI: Soft.  Non-tender  Skin: No obvious rashes or cyanosis. Dressings in place to left lower leg  Other: Alert.  Answering questions appropriately     Data   Recent Labs   Lab 04/14/21  0643 04/13/21  1024 04/12/21  0609 04/12/21  0237 04/10/21  1445 04/10/21  1445 04/10/21  0622 04/10/21  0622 04/08/21  1513 04/08/21  1513   WBC 12.0* 10.5  --  12.0*   < > 11.2*   < > 9.9   < > 14.2*   HGB 8.0* 8.3*  --  7.5*   < > 9.2*   < > 9.1*   < > 12.0   MCV 91 91  --  88   < > 87   < > 87   < > 85   * 498*  --  354   < > 274   < > 240   < > 576*   INR  --   --   --   --   --   --   --   --   --  1.05   NA  --  140 140  --   --   --   --  135   < > 134   POTASSIUM  --  3.4 3.6  --   --   --   --  3.5   < > 3.9   CHLORIDE  --  106 106  --   --   --   --  102   < > 104   CO2  --  29 29  --   --   --   --  27   < > 23   BUN  --  5* 3*  --   --   --   --  3*   < > 10   CR  --  0.55 0.55  --   --   --   --  0.51*   < > 0.75   ANIONGAP  --  5 5  --   --   --   --  6   < > 7   CASSI  --  8.6 8.6  --   --   --   --  7.7*   < > 9.6   GLC  --  91 112*  --   --   --   --  95   < > 104*   ALBUMIN  --   --   --   --   --  2.7*  --  2.6*  --   --    PROTTOTAL  --   --   --   --   --  6.1*  --  5.7*  --    --    BILITOTAL  --   --   --   --   --  0.5  --  0.5  --   --    ALKPHOS  --   --   --   --   --  134  --  96  --   --    ALT  --   --   --   --   --  95*  --  75*  --   --    AST  --   --   --   --   --  399*  --  396*  --   --     < > = values in this interval not displayed.     Recent Results (from the past 24 hour(s))   Transesophageal Echocardiogram    [facility]    [id number]  [id number]  [id number]^MARISA^CASH^GUTIERREZ     [facility]  Echocardiography Laboratory  [address] [place] [address]     Name: DELON BORJA  MRN: [id number]  : 1990  Study Date: 2021 02:26 PM  Age: 30 yrs  Gender: Female  Patient Location: [facility]  Reason For Study: Acute occlusion of left popliteal artery of lower extremity  Ordering Physician: CASH CUNNINGHAM  Referring Physician: Rahel Kirkpatrick  Performed By: Momo Nieto RDCS     BSA: 2.3 m2  Height: 69 in  Weight: 270 lb  HR: 107  BP: 144/91 mmHg  ______________________________________________________________________________  Procedure  Complete DEYVI Adult. DEYVI Probe serial #[id number] was used during the procedure.  The heart rate, respiratory rate and response to care were monitored  throughout the procedure with the assistance of the nurse.  ______________________________________________________________________________  Interpretation Summary     The left ventricle is normal in size. Left ventricular systolic function is  normal. The visual ejection fraction is estimated at 55-60%.  The right ventricle is normal size. The right ventricular systolic function is  normal.  The left atrium is mildly dilated. Right atrial size is normal. There is no  color Doppler evidence of an atrial shunt. A contrast injection (Bubble Study)  was performed that was negative for flow across the interatrial septum. No  thrombus is detected in the left atrial appendage.  Trace mitral and tricuspid regurgitation.  No pericardial  effusion. No cardiac source of emboli noted.  ______________________________________________________________________________  DEYVI  Consent to the procedure was obtained prior to sedation. Prior to the exam,  the oral cavity was checked and no overcrowding was noted. I determined this  patient to be an appropriate candidate for the planned sedation and procedure  and have reassessed the patient immediately prior to sedation and procedure.  Total sedation time: 20 minutes of continuous bedside 1:1 monitoring. Versed  (3.5mg) was given intravenously. Fentanyl (100mcg) was given intravenously.  The transesophageal probe was passed without difficulty. There were no  complications associated with this procedure.     Left Ventricle  The left ventricle is normal in size. Left ventricular systolic function is  normal. The visual ejection fraction is estimated at 55-60%.     Right Ventricle  The right ventricle is normal size. The right ventricular systolic function is  normal.     Atria  The left atrium is mildly dilated. Right atrial size is normal. There is no  color Doppler evidence of an atrial shunt. A contrast injection (Bubble Study)  was performed that was negative for flow across the interatrial septum. No  thrombus is detected in the left atrial appendage.     Mitral Valve  The mitral valve leaflets appear normal. There is no evidence of stenosis,  fluttering, or prolapse. There is trace mitral regurgitation.     Tricuspid Valve  Normal tricuspid valve. There is trace tricuspid regurgitation. Right  ventricular systolic pressure could not be approximated due to inadequate  tricuspid regurgitation.     Aortic Valve  The aortic valve is trileaflet. No aortic regurgitation is present.     Pulmonic Valve  There is trace pulmonic valvular regurgitation.     Vessels  Normal ascending, transverse (arch), and descending aorta.     Pericardial/Pleural  There is no pericardial  effusion.  ______________________________________________________________________________  Report approved by: Bilal Ali, MDon 04/13/2021 04:37 PM     ______________________________________________________________________________

## 2021-04-14 NOTE — PROGRESS NOTES
[facility]  Vascular Medicine Progress Note          Assessment and Plan:       1. Left popliteal artery thromboembolic occlusion with acute on chronic left leg ischemia s/p:  A.emergent angiogram with mechanical thrombectomy of the left popliteal artery 4/8/21 and catheter-directed lysis 4/8/21-4/10/21  B.open embolectomy left popliteal, anterior tibial, and tibioperoneal arteries with vein patch angioplasty and fasciotomies 4/11/21  C.reexploration of left leg with left popliteal/tibial embolectomy and left distal anterior and posterior tibial artery embolectomy 4/12/21       -Pt reports palpitations earlier today.  -Etiology not immediately apparent. Obese, on birth control pills and just had her first dose of Pfizer Covid vaccine one day prior to symptoms onset. No cardiac arrhythmias, no miscarriage, no family history of hypercoagulable status, and Covid test negative.   -D Dimer normal.   -DEYVI with mild LA dilation. Bubble study negative.   -Thrombocytosis at time of admission -> likely acute phase reactant. Platelets dropped briefly. MAHENDRA screen negative. Platelets stable on IV heparin now.   -Hypercoagulable labs are still pending. Lupus inhibitor, Beta 2 glycoprotein and Cardiolipin antibodies negative.   -Pain under control at rest. Mild pain with ambulation.   -Multiphasic pulses with doppler this morning. Foot is warm, some numbness in toes and heel.   -Questionable right lower extremity claudication in recent past and non-palpable right pedal pulses.      Plan:   Monitor on telemetry to r/o AF.  -Change IV heparin To Eliquis. Also on Plavix and aspirin. Does not need triple therapy. Would use baby ASA and Eliquis. Agrees to (presently - need to r/o AF) off label use of Eliquis.  -Await remainder of hypercoagulable work-up.   -Stop oral contraceptives.   -Will do zio patch at the time of discharge if no AF seen on tele,  as LA is dilated.   -Rest of the medical management per hospitalist  service. Will likely need several more days in house to improve ambulation.      2. Hx of Depression and Anxiety     -continue PTA meds     3. 4.3 x 5.7 cm left adnexal cyst     -She needs a pelvic ultrasound at some point and needs Gyn follow-up as outpatient.   -CA-125 is normal.      4. Acne     -On Spironolactone as an outpatient.                 Interval History:   doing well; no cp, sob, n/v/d, or abd pain.              Review of Systems:   The 10 point Review of Systems is negative other than noted in the HPI             Medications:       acetaminophen  975 mg Oral Q8H ERICA     aspirin  81 mg Oral Daily     busPIRone  10 mg Oral BID     clopidogrel  75 mg Oral Daily     ferrous sulfate  325 mg Oral Daily     polyethylene glycol  17 g Oral Daily     senna-docusate  1 tablet Oral BID    Or     senna-docusate  2 tablet Oral BID     sertraline  50 mg Oral Daily     sodium chloride (PF)  3 mL Intracatheter Q8H                  Physical Exam:     Patient Vitals for the past 24 hrs:   BP Temp Temp src Pulse Resp SpO2   04/14/21 0830 (!) 149/91 98.8  F (37.1  C) Oral 87 16 98 %   04/14/21 0421 122/76 98.9  F (37.2  C) Oral 90 -- 95 %   04/13/21 2344 (!) 143/82 97.4  F (36.3  C) Oral 80 16 96 %   04/13/21 2335 100/56 99.4  F (37.4  C) Oral 88 17 92 %   04/13/21 1700 132/80 -- -- 75 16 97 %   04/13/21 1600 134/84 98.5  F (36.9  C) Oral 78 20 94 %   04/13/21 1530 139/80 -- -- 92 16 93 %   04/13/21 1510 (!) 144/92 -- -- 82 -- 91 %   04/13/21 1505 (!) 139/90 -- -- 86 -- 92 %   04/13/21 1500 (!) 148/90 -- -- 84 -- 91 %   04/13/21 1458 (!) (P) 151/85 -- -- 92 -- (P) 91 %   04/13/21 1456 (!) 149/94 -- -- 92 -- 97 %   04/13/21 1454 (!) 151/100 -- -- 97 -- 97 %   04/13/21 1452 (!) 154/100 -- -- 91 14 97 %   04/13/21 1450 (!) 169/107 -- -- 94 -- 98 %   04/13/21 1448 (!) 143/89 -- -- 73 -- 100 %   04/13/21 1444 (!) 149/96 -- -- 95 -- 100 %   04/13/21 1442 (!) 156/99 -- -- 84 -- 100 %   04/13/21 1440 (!) 157/107 -- -- 84 -- 100 %    21 1438 (!) 150/108 -- -- 87 -- 100 %   21 1436 (!) 150/89 -- -- 88 -- 100 %   21 1406 (!) 144/91 -- -- -- 16 97 %   21 1400 (!) 144/91 -- -- 92 -- 98 %   21 1330 139/85 -- -- 84 16 96 %   21 1122 133/79 98.5  F (36.9  C) Oral 90 16 99 %     Wt Readings from Last 4 Encounters:   21 122.5 kg (270 lb)       Intake/Output Summary (Last 24 hours) at 2021 1108  Last data filed at 2021 0834  Gross per 24 hour   Intake 240 ml   Output 2700 ml   Net -2460 ml     Constitutional: normal  Eyes: normal  ENT: normal  Neck: Supple, symmetrical, trachea midline, no adenopathy, thyroid symmetric, not enlarged and no tenderness, skin normal.  Hematologic / Lymphatic: normal  Back: normal  Lungs: No increased work of breathing, good air exchange, clear to auscultation bilaterally, no crackles or wheezing.  Cardiovascular: Regular rate and rhythm, normal S1 and S2, no S3 or S4, and no murmur noted.  Chest / Breast: normal  Abdomen: normal  Genitourinary: normal  Musculoskeletal: No redness, warmth, or swelling of the joints.  Full range of motion noted.  Motor strength is 5 out of 5 all extremities bilaterally.  Tone is normal.  Neurologic: Nonfocal except for mild foot numbness  Neuropsychiatric: normal  Skin: normal           Data:     Results for orders placed or performed during the hospital encounter of 21 (from the past 24 hour(s))   Transesophageal Echocardiogram    Narrative    [id number]  [id number]  [id number]  [id number]^MARISA^CASH^GUTIERREZ     [facility]  Echocardiography Laboratory  [address] [place] [address]     Name: DELON BORJA  MRN: [id number]  : 1990  Study Date: 2021 02:26 PM  Age: 30 yrs  Gender: Female  Patient Location: [facility]  Reason For Study: Acute occlusion of left popliteal artery of lower extremity  Ordering Physician: CASH CUNNINGHAM  Referring Physician: Rahel Kirkpatrick  Performed By:  Momo Nieto, [facility]     BSA: 2.3 m2  Height: 69 in  Weight: 270 lb  HR: 107  BP: 144/91 mmHg  ______________________________________________________________________________  Procedure  Complete DEYVI Adult. DEYVI Probe serial #[id number] was used during the procedure.  The heart rate, respiratory rate and response to care were monitored  throughout the procedure with the assistance of the nurse.  ______________________________________________________________________________  Interpretation Summary     The left ventricle is normal in size. Left ventricular systolic function is  normal. The visual ejection fraction is estimated at 55-60%.  The right ventricle is normal size. The right ventricular systolic function is  normal.  The left atrium is mildly dilated. Right atrial size is normal. There is no  color Doppler evidence of an atrial shunt. A contrast injection (Bubble Study)  was performed that was negative for flow across the interatrial septum. No  thrombus is detected in the left atrial appendage.  Trace mitral and tricuspid regurgitation.  No pericardial effusion. No cardiac source of emboli noted.  ______________________________________________________________________________  DEYVI  Consent to the procedure was obtained prior to sedation. Prior to the exam,  the oral cavity was checked and no overcrowding was noted. I determined this  patient to be an appropriate candidate for the planned sedation and procedure  and have reassessed the patient immediately prior to sedation and procedure.  Total sedation time: 20 minutes of continuous bedside 1:1 monitoring. Versed  (3.5mg) was given intravenously. Fentanyl (100mcg) was given intravenously.  The transesophageal probe was passed without difficulty. There were no  complications associated with this procedure.     Left Ventricle  The left ventricle is normal in size. Left ventricular systolic function is  normal. The visual ejection fraction is estimated at 55-60%.      Right Ventricle  The right ventricle is normal size. The right ventricular systolic function is  normal.     Atria  The left atrium is mildly dilated. Right atrial size is normal. There is no  color Doppler evidence of an atrial shunt. A contrast injection (Bubble Study)  was performed that was negative for flow across the interatrial septum. No  thrombus is detected in the left atrial appendage.     Mitral Valve  The mitral valve leaflets appear normal. There is no evidence of stenosis,  fluttering, or prolapse. There is trace mitral regurgitation.     Tricuspid Valve  Normal tricuspid valve. There is trace tricuspid regurgitation. Right  ventricular systolic pressure could not be approximated due to inadequate  tricuspid regurgitation.     Aortic Valve  The aortic valve is trileaflet. No aortic regurgitation is present.     Pulmonic Valve  There is trace pulmonic valvular regurgitation.     Vessels  Normal ascending, transverse (arch), and descending aorta.     Pericardial/Pleural  There is no pericardial effusion.  ______________________________________________________________________________  Report approved by: Laurel Matos 04/13/2021 04:37 PM     ______________________________________________________________________________      Partial thromboplastin time   Result Value Ref Range    PTT 34 22 - 37 sec   Partial thromboplastin time   Result Value Ref Range    PTT 79 (H) 22 - 37 sec   Heparin Unfractionated Anti Xa Level   Result Value Ref Range    Heparin Unfractionated Anti Xa Level 0.67 IU/mL

## 2021-04-14 NOTE — PROGRESS NOTES
"VASCULAR SURGERY PROGRESS NOTE    Subjective:  DEYVI negative yesterday for cardiac thrombus, bubble study negative.  Continues on heparin drip at 2250 units per hour.  Able to stand with PT but mobility was limited to pain.  Encouraged to take additional pain medications prior to ambulating to participate.  Martinez removed and voiding spontaneously.  Reports some numbness in her heel and medial lower calf.     Objective:    Intake/Output Summary (Last 24 hours) at 4/9/2021 0719  Last data filed at 4/9/2021 0652  Gross per 24 hour   Intake 1651 ml   Output 1000 ml   Net 651 ml     Labs:  ROUTINE IP LABS (Last four results)  BMP  Recent Labs   Lab 04/13/21  1024 04/12/21  0609 04/10/21  0622 04/09/21  0607    140 135 135   POTASSIUM 3.4 3.6 3.5 4.2   CHLORIDE 106 106 102 105   CASSI 8.6 8.6 7.7* 8.2*   CO2 29 29 27 23   BUN 5* 3* 3* 6*   CR 0.55 0.55 0.51* 0.56   GLC 91 112* 95 116*     CBC  Recent Labs   Lab 04/13/21  1024 04/12/21  0237 04/11/21  1314 04/11/21  0537 04/10/21  1445   WBC 10.5 12.0*  --  10.2 11.2*   RBC 2.86* 2.59*  --  2.98* 3.10*   HGB 8.3* 7.5* 8.1* 8.4* 9.2*   HCT 26.1* 22.7*  --  25.8* 27.0*   MCV 91 88  --  87 87   MCH 29.0 29.0  --  28.2 29.7   MCHC 31.8 33.0  --  32.6 34.1   RDW 13.6 13.3  --  13.2 13.2   * 354  --  284 274     INR  Recent Labs   Lab 04/08/21  1513   INR 1.05     PHYSICAL EXAM:  /76 (BP Location: Left arm)   Pulse 90   Temp 98.9  F (37.2  C) (Oral)   Resp 16   Ht 1.753 m (5' 9\")   Wt 122.5 kg (270 lb)   LMP 04/09/2021   SpO2 95%   BMI 39.87 kg/m    General: The patient is alert and oriented. Appropriate. No acute distress  Psych: Pleasant affect, Answers questions appropriately  Skin: Color appropriate for race, warm, dry.  Respiratory: Breathing unlabored  GI:  Abdomen soft, nontender to light palpation.  Extremities: biphasic AT/PT signal, foot is warm, able to plantar and dorsiflex foot, intact sensation    Imaging:   No new " imaging.    ASSESSMENT:  30-year-old female with history of anxiety/depression, cystic acne present today with 5-day history of numbness and pain in her left lower extremity, found to have left popliteal artery occlusion s/p IR thrombectomy and thrombolysis catheter placement 4/8/21 w/ resulting continued occlusion of the outflow requiring open thrombectomy of popliteal, AT and PT arteries 4/11 complicated by re-thrombosis and repeat thrombectomy 4/12. Biphasic signals AT and PT postop.    Unclear source of embolus/thrombosis but reported claudication symptoms for weeks-months. Previous embolic event on RLE as well as seen on CTA and angiogram.  Echo/DEYVI negati cardioembolic source.  Hypercoagulable work-up pending    PLAN:  Diet as tolerated, discontinue IVF  Vascular medicine on board  Continue systemic heparin gtt - transition to PO AC on discharge  Pain control -Tylenol scheduled, advil/oxycodone/dilaudid PRN   OOB w/ PT    Elba Smith MD  Vascular Surgery Fellow   Pager: [phone]    STAFF:  Doping well.  ??? Etiology.                 Good Dopplers.                 IV Heparin/Plavix.                 Home soon on Xarelto/Plavix.                     Ulises Gonzalez MD

## 2021-04-14 NOTE — PLAN OF CARE
A&Ox4. VSS. Clear lung sounds. Denies SOB. Hyperactive bowel sounds. Last BM 4/14/2021. Voiding adequately. Regular diet. Leg incisions dressings were changed; CDI. Pre-medicated before physical therapy. Heparin drip is infusing.

## 2021-04-14 NOTE — PROVIDER NOTIFICATION
MD Notification    Notified Person: MD    Notified Person Name: Elba Amador    Notification Date/Time: 4/14/2021 @ 5165; called back at 1630.    Notification Interaction: Page sent    Purpose of Notification: Physical Therapist wants to clarify weight bearing status.    Orders Received: WBAT    Comments:

## 2021-04-14 NOTE — PROGRESS NOTES
SPIRITUAL HEALTH SERVICES Progress Note  [facility]    Visited pt due to length of stay. Staff was present in room and asked pt if she would like a visit from  and she declined.    SHS remains available.      Tita Alexandra  Chaplain Resident     do you have any questions about your prescribed diet?

## 2021-04-14 NOTE — PLAN OF CARE
A&Ox4  VSS  Pain: reported 3/10 pain on her L leg  Respiration: clear, bilaterally equal, no SOB  Heart rate: regular  Urination: Voiding without difficulty using bedside commode  Bowel movement: passing gas  Ambulation: assist of 1 using assistive device  Current Problem: pain  Management Provided: Scheduled Tylenol given  Patient response: Pt denied pain when reassessed for pain.

## 2021-04-15 ENCOUNTER — APPOINTMENT (OUTPATIENT)
Dept: PHYSICAL THERAPY | Facility: CLINIC | Age: 31
DRG: 271 | End: 2021-04-15
Payer: COMMERCIAL

## 2021-04-15 VITALS
SYSTOLIC BLOOD PRESSURE: 149 MMHG | HEIGHT: 69 IN | TEMPERATURE: 99.3 F | DIASTOLIC BLOOD PRESSURE: 85 MMHG | OXYGEN SATURATION: 96 % | BODY MASS INDEX: 39.99 KG/M2 | HEART RATE: 96 BPM | WEIGHT: 270 LBS | RESPIRATION RATE: 18 BRPM

## 2021-04-15 LAB
HCYS SERPL-SCNC: 3.1 UMOL/L (ref 4–12)
UFH PPP CHRO-ACNC: 0.57 IU/ML

## 2021-04-15 PROCEDURE — 250N000013 HC RX MED GY IP 250 OP 250 PS 637: Performed by: STUDENT IN AN ORGANIZED HEALTH CARE EDUCATION/TRAINING PROGRAM

## 2021-04-15 PROCEDURE — 99239 HOSP IP/OBS DSCHRG MGMT >30: CPT | Performed by: STUDENT IN AN ORGANIZED HEALTH CARE EDUCATION/TRAINING PROGRAM

## 2021-04-15 PROCEDURE — 99233 SBSQ HOSP IP/OBS HIGH 50: CPT | Performed by: INTERNAL MEDICINE

## 2021-04-15 PROCEDURE — 250N000011 HC RX IP 250 OP 636: Performed by: STUDENT IN AN ORGANIZED HEALTH CARE EDUCATION/TRAINING PROGRAM

## 2021-04-15 PROCEDURE — 85520 HEPARIN ASSAY: CPT | Performed by: INTERNAL MEDICINE

## 2021-04-15 PROCEDURE — 36415 COLL VENOUS BLD VENIPUNCTURE: CPT | Performed by: INTERNAL MEDICINE

## 2021-04-15 PROCEDURE — 97116 GAIT TRAINING THERAPY: CPT | Mod: GP | Performed by: PHYSICAL THERAPIST

## 2021-04-15 RX ORDER — ACETAMINOPHEN 325 MG/1
650 TABLET ORAL EVERY 4 HOURS PRN
COMMUNITY
Start: 2021-04-15 | End: 2021-05-20

## 2021-04-15 RX ORDER — ATORVASTATIN CALCIUM 20 MG/1
20 TABLET, FILM COATED ORAL DAILY
Qty: 90 TABLET | Refills: 3 | Status: SHIPPED | OUTPATIENT
Start: 2021-04-15 | End: 2022-02-02

## 2021-04-15 RX ORDER — LEVOTHYROXINE SODIUM 25 UG/1
25 TABLET ORAL DAILY
Qty: 90 TABLET | Refills: 1 | Status: SHIPPED | OUTPATIENT
Start: 2021-04-15 | End: 2021-10-12

## 2021-04-15 RX ORDER — OXYCODONE HYDROCHLORIDE 5 MG/1
5-10 TABLET ORAL EVERY 6 HOURS PRN
Qty: 20 TABLET | Refills: 0 | Status: SHIPPED | OUTPATIENT
Start: 2021-04-15 | End: 2021-04-19

## 2021-04-15 RX ORDER — AMOXICILLIN 250 MG
1 CAPSULE ORAL 2 TIMES DAILY PRN
Qty: 30 TABLET | Refills: 0 | Status: SHIPPED | OUTPATIENT
Start: 2021-04-15 | End: 2021-05-20

## 2021-04-15 RX ORDER — CLOPIDOGREL BISULFATE 75 MG/1
75 TABLET ORAL DAILY
Qty: 30 TABLET | Refills: 3 | Status: SHIPPED | OUTPATIENT
Start: 2021-04-15 | End: 2021-07-27

## 2021-04-15 RX ADMIN — DIPHENHYDRAMINE HYDROCHLORIDE 25 MG: 25 CAPSULE ORAL at 00:00

## 2021-04-15 RX ADMIN — ACETAMINOPHEN 975 MG: 325 TABLET, FILM COATED ORAL at 13:23

## 2021-04-15 RX ADMIN — LORAZEPAM 1 MG: 0.5 TABLET ORAL at 00:07

## 2021-04-15 RX ADMIN — OXYCODONE HYDROCHLORIDE 10 MG: 5 TABLET ORAL at 09:39

## 2021-04-15 RX ADMIN — BUSPIRONE HYDROCHLORIDE 10 MG: 10 TABLET ORAL at 08:36

## 2021-04-15 RX ADMIN — ACETAMINOPHEN 975 MG: 325 TABLET, FILM COATED ORAL at 06:13

## 2021-04-15 RX ADMIN — MELATONIN 3 MG: 3 TAB ORAL at 00:00

## 2021-04-15 RX ADMIN — OXYCODONE HYDROCHLORIDE 10 MG: 5 TABLET ORAL at 04:40

## 2021-04-15 RX ADMIN — SERTRALINE HYDROCHLORIDE 50 MG: 50 TABLET ORAL at 08:36

## 2021-04-15 RX ADMIN — OXYCODONE HYDROCHLORIDE 10 MG: 5 TABLET ORAL at 01:36

## 2021-04-15 RX ADMIN — HYDROMORPHONE HYDROCHLORIDE 0.5 MG: 1 INJECTION, SOLUTION INTRAMUSCULAR; INTRAVENOUS; SUBCUTANEOUS at 04:41

## 2021-04-15 RX ADMIN — ACETAMINOPHEN 650 MG: 325 TABLET, FILM COATED ORAL at 02:25

## 2021-04-15 RX ADMIN — CLOPIDOGREL BISULFATE 75 MG: 75 TABLET ORAL at 08:36

## 2021-04-15 RX ADMIN — APIXABAN 10 MG: 5 TABLET, FILM COATED ORAL at 08:36

## 2021-04-15 RX ADMIN — HEPARIN SODIUM 2150 UNITS/HR: 10000 INJECTION, SOLUTION INTRAVENOUS at 01:30

## 2021-04-15 RX ADMIN — FERROUS SULFATE TAB 325 MG (65 MG ELEMENTAL FE) 325 MG: 325 (65 FE) TAB at 08:36

## 2021-04-15 RX ADMIN — HYDROMORPHONE HYDROCHLORIDE 0.5 MG: 1 INJECTION, SOLUTION INTRAMUSCULAR; INTRAVENOUS; SUBCUTANEOUS at 02:21

## 2021-04-15 ASSESSMENT — ACTIVITIES OF DAILY LIVING (ADL)
ADLS_ACUITY_SCORE: 16

## 2021-04-15 NOTE — DISCHARGE SUMMARY
[facility]    Discharge Summary  Hospitalist    Date of Admission:  4/8/2021  Date of Discharge:  4/15/2021  Discharging Provider: Conchita Downing DO  Date of Service (when I saw the patient): 04/15/21    Discharge Diagnoses    See below    History of Present Illness   Sruthi Mac is a 30 year old female with past medical history significant for anxiety, depression and history of panic attacks who presented to the emergency room with sudden left foot numbness and pain along with toe discoloration and was found to have acute limb ischemia.  She was taken emergently for mechanical thrombectomy with initiation of lytic therapy on April 8 by interventional radiology.  Patient was admitted for further evaluation and management.  Vascular surgery and vascular medicine followed and provided further recommendations regarding her discharge plan.     Problems addressed are below.     Acute occlusion of left popliteal artery of lower extremity (H) (4/8/2021)  Limb ischemia (4/8/2021)  Right lower extremity arterial occlusion with patent SFA flow to BK popliteal:  Presented with 5-day history of numbness and pain in her left leg and foot which was getting worse with walking and was not getting resolved.  She also started to notice discoloration of her toes and came to the emergency room.  Her pulses were not palpable. Risk factor included oral contraceptive pills and history of palpitations.  * Ultrasound of left lower extremity: Occlusion of left popliteal artery.  And no DVT.  *Echocardiogram results are reviewed which are showing normal LV systolic function, ejection fraction of 55 to 60%, normal RV structure function and size, no pericardial effusion normal wall ventricular wall motion.  * CTA chest abdomen and pelvis with runoff contrast: Abrupt cut off of opacification of the left popliteal artery with associated filling defect consistent with arterial embolus. 4.3 x 5.7 cm left  adnexal cyst, recommend follow-up pelvic ultrasound.  * 04/08 :Patient underwent emergent abdominal aortic and left lower extremity diagnostic arteriography, s/p mechanical thrombectomy of left popliteal artery with extraction of thrombus  * Patient also underwent placement of infusion catheter extending from left superficial femoral artery through the left posterior tibial artery with initiation of arterial thrombolytic therapy to left lower extremity.  * Also have claudication symptoms for months, right lower extremity also without nonpalpable pulses but weak presence on doppler    * 04/09: Bilateral lower extremity angiogram, filling defects in right profundofemoral artery as well as right posterior tibial and peroneal arteries  * 04/09: IR angiogram on the Left side through catheter follow up, showing continued thrombolysis with numerous filling defects throughout the runoff arteries of the left lower extremity.  Improved thrombus burden in popliteal artery, additional length and TPA administration.  * 04/10: IR guided angiogram of Left leg through catheter follow-up showing worsening thrombus burden despite thrombolysis with TPA, thrombolysis was discontinued, concern for thrombus/embolus to be minimum subacute and may be chronic.  Plan for surgical embolectomy with cutdown to popliteal artery and passage of balloons into the runoff arteries was discussed with patient.  * 04/10: Multiple emboli in right lower extremity including the profundofemoral artery which is occluded.    *04/10: Bilateral lower extremity venous mapping done  * 4/11: open embolectomy left popliteal, anterior tibial, and tibioperoneal arteries with vein patch angioplasty and fasciotomies   *4/12: reexploration of left leg with left popliteal/tibial embolectomy and left distal anterior and posterior tibial artery embolectomy   -- IV heparin initially - was briefly on argatroban but switched back to heparin - plan switch to PO but will defer  to vascular medicine   -- MAHENDRA antibodies have been send , F/U on the results   --Hypercoagulable work-up is ordered, in process, might need to be repeated in few months once patient is off the anticoagulation.    -vascular surgery has started patient on plavix and eliquis, she will continue on this and follow up with vascular medicine in clinic  -thus far hypercoagulable work up has been negative, however some labs still pending  --Stop oral contraceptives      Urinary retention  Most likely secondary to being in pain and on bedrest.     History of anxiety and depression.  History of panic attacks.  --Continue patient on PTA buspirone twice daily and sertraline.  --Patient has been reporting less panic attacks since the start of risperidone.  --IV/p.o. Ativan is also available in case of panic attack.     Leukocytosis  -likely due to ongoing acute medical illness no signs of infection     Cystic acne   Ovarian Cyst   --PTA spironolactone, on hold  --CA-125 was checked which came back negative  --Will need outpatient Gyneoncologist follow-up for ovarian cyst, she is also taken off the oral contraceptive pills  -patient has her own OB and will make appt on discharge    Conchita Downing, DO    Significant Results and Procedures   As above    Pending Results   By vascular  Unresulted Labs Ordered in the Past 30 Days of this Admission     Date and Time Order Name Status Description    4/14/2021 0000 Myeloproliferative Neoplasms focused NGS Panel In process     4/9/2021 0949 MTHFR Genotype In process           Code Status   Full Code       Primary Care Physician   Rahel Kirkpatrick        Discharge Disposition   Discharged to home  Condition at discharge: Stable    Consultations This Hospital Stay   PHARMACY IP CONSULT  PHARMACY IP CONSULT  VASCULAR SURGERY IP CONSULT  PHARMACY IP CONSULT  PHARMACY IP CONSULT  [place] [facility] IP CONSULT  PHARMACY IP CONSULT  PHARMACY IP CONSULT  PHARMACY TO DOSE  ARGATROBAN  PHARMACY IP CONSULT  PHARMACY IP CONSULT  SMOKING CESSATION PROGRAM IP CONSULT  PHARMACY IP CONSULT  PHARMACY IP CONSULT  PHYSICAL THERAPY ADULT IP CONSULT  PHARMACY LIAISON FOR MEDICATION COVERAGE CONSULT  PHARMACY LIAISON FOR MEDICATION COVERAGE CONSULT  PHARMACY IP CONSULT  PHARMACY IP CONSULT    Time Spent on this Encounter   Conchita POSEY DO, personally saw the patient today and spent greater than 30 minutes discharging this patient.    Discharge Orders      Activity    Your activity upon discharge: activity as tolerated.  May shower.  Elevate leg frequently.     Follow-up and recommended labs and tests     Please call [phone] to setup a followup appointment with Dr. Gonzalez (Vascular Surgery) in 2 weeks, unless previously scheduled.    [facility] [address]  T: [phone]     Activity    Resume your normal activity level prior to your hospital visit- no restrictions.     Wound care and dressings    Your incision was closed using surgical sutures that will be removed at your follow up appointment. You can leave the sutures incision open to air 24 hours after surgery as long as there is no active drainage from the site. If there is any active drainage from the site, keep a dressing over the site until the drainage has stopped for 24 hours. There are no specific cares needed aside from keeping the area clean and dry, but you can use an over the counter ointment such as bacitracin or neosporin on the incisions if you prefer (as long as there is no active drainage). You may shower normally once the site has been dry for 24 hours (otherwise keep site dry), but avoid scrubbing that area or using harsh soaps. Pat dry after shower. Do not soak in the tub, swim, or otherwise submerge your surgical site in water until your sutures have been removed and your incision is well healed.  If at the incision site you start having new or  different discharge/drainage, foul smell, or new redness and warmth to the touch, you should call the vascular surgery office. Also call the office if you have a fever of 100.5F or greater.     Follow-up and recommended labs and tests     Follow up with me,  Ulises Gonzalez, within 2 weeks. to evaluate after surgery.  The following labs/tests are recommended: Arterial Duplex in 3 months,  SR in two weeks.    Follow up with Dr. Liu of Vascular Medicine after Zio Patch completed - in 3-4 weeks. Please call [phone] to schedule this appointment where you will review any pending lab results for clotting and the heart monitor results.     Brief Discharge Instructions    1. Continue Eliquis (Apixaban). Take 10 mg (2 tablets) twice daily for 7 days followed by 5 mg (1 tablet) twice daily thereafter.     2. You will also be taking Plavix in addition to the Eliquis.     3. Start taking Atorvastatin for your cholesterol.     4. Start taking Levothyroxine for your thyroid.     5. Follow-up with Dr. Gonzalez in 2 weeks and then Dr. Liu of Vascular Medicine in 3-4 weeks. Call [phone] to set up both of these appointments.     6. Please call [phone] and ask for Dr. Gonzalez's or Dr. Liu's nurses if you ever have any questions or concerns.     Reason for your hospital stay    You were found to have a blood clot in your leg that caused pain and reduced blood flow.     Follow-up and recommended labs and tests     Follow up with primary care provider, Rahel Kirkpatrick, within 7 days to evaluate medication change, to evaluate treatment change and for hospital follow- up.Follow up with OB to discuss cyst and contraceptive.     Activity    Your activity upon discharge: activity as tolerated     When to contact your care team    Call your primary doctor if you have any of the following:  increased shortness of breath or increased pain severely from baseline not reduced with oral oxycodone.     Leadless  EKG Monitor 8 to 14 Days     Walker Order    DME Documentation:   Describe the reason for need to support medical necessity: impaired gait, wide FWW as pt's width is >17.5 inches     I, the undersigned, certify that the above prescribed supplies are medically necessary for this patient and is both reasonable and necessary in reference to accepted standards of medical and necessary in reference to accepted standards of medical practice in the treatment of this patient's condition and is not prescribed as a convenience.     Diet    Follow this diet upon discharge: Orders Placed This Encounter      Regular Diet Adult     Discharge Medications   Current Discharge Medication List      START taking these medications    Details   acetaminophen (TYLENOL) 325 MG tablet Take 2 tablets (650 mg) by mouth every 4 hours as needed for mild pain  Qty:      Associated Diagnoses: Acute occlusion of artery of lower extremity (H)      apixaban ANTICOAGULANT (ELIQUIS) 5 MG tablet Take 2 tablets (10 mg) by mouth 2 times daily For 7 days, then decrease to 1 tablet (5 mg) twice daily thereafter  Qty: 75 tablet, Refills: 1    Associated Diagnoses: Acute occlusion of artery of lower extremity (H)      atorvastatin (LIPITOR) 20 MG tablet Take 1 tablet (20 mg) by mouth daily  Qty: 90 tablet, Refills: 3    Associated Diagnoses: Hypertriglyceridemia      clopidogrel (PLAVIX) 75 MG tablet Take 1 tablet (75 mg) by mouth daily  Qty: 30 tablet, Refills: 3    Associated Diagnoses: Acute occlusion of artery of lower extremity (H)      levothyroxine (SYNTHROID/LEVOTHROID) 25 MCG tablet Take 1 tablet (25 mcg) by mouth daily  Qty: 90 tablet, Refills: 1    Associated Diagnoses: Subclinical hypothyroidism; Hypertriglyceridemia      oxyCODONE (ROXICODONE) 5 MG tablet Take 1-2 tablets (5-10 mg) by mouth every 6 hours as needed for moderate to severe pain  Qty: 20 tablet, Refills: 0    Associated Diagnoses: Acute occlusion of artery of lower extremity (H)       senna-docusate (SENOKOT-S/PERICOLACE) 8.6-50 MG tablet Take 1 tablet by mouth 2 times daily as needed for constipation  Qty: 30 tablet, Refills: 0    Comments: One bottle  Associated Diagnoses: Acute occlusion of artery of lower extremity (H)         CONTINUE these medications which have NOT CHANGED    Details   busPIRone (BUSPAR) 10 MG tablet Take 10 mg by mouth 2 times daily      Ferrous Sulfate (IRON) 325 (65 Fe) MG tablet Take 1 tablet by mouth daily      Lactobacillus (PROBIOTIC ACIDOPHILUS PO) Take by mouth daily      magnesium oxide 400 MG CAPS Take by mouth daily      multivitamin, therapeutic (THERA-VIT) TABS tablet Take 1 tablet by mouth daily      sertraline (ZOLOFT) 50 MG tablet Take 50 mg by mouth daily      spironolactone (ALDACTONE) 50 MG tablet Take 50 mg by mouth daily      Vitamin D3 (CHOLECALCIFEROL) 25 mcg (1000 units) tablet Take 1 tablet by mouth daily         STOP taking these medications       ibuprofen (ADVIL/MOTRIN) 200 MG capsule Comments:   Reason for Stopping:         norgestim-eth estrad triphasic (TRI FEMYNOR) 0.18/0.215/0.25 MG-35 MCG tablet Comments:   Reason for Stopping:             Allergies   Allergies   Allergen Reactions     Erythromycin Unknown     Penicillins Unknown     Data   Most Recent 3 CBC's:  Recent Labs   Lab Test 04/14/21  0643 04/13/21  1024 04/12/21  0237   WBC 12.0* 10.5 12.0*   HGB 8.0* 8.3* 7.5*   MCV 91 91 88   * 498* 354      Most Recent 3 BMP's:  Recent Labs   Lab Test 04/13/21  1024 04/12/21  0609 04/10/21  0622    140 135   POTASSIUM 3.4 3.6 3.5   CHLORIDE 106 106 102   CO2 29 29 27   BUN 5* 3* 3*   CR 0.55 0.55 0.51*   ANIONGAP 5 5 6   CASSI 8.6 8.6 7.7*   GLC 91 112* 95     Most Recent 2 LFT's:  Recent Labs   Lab Test 04/10/21  1445 04/10/21  0622   * 396*   ALT 95* 75*   ALKPHOS 134 96   BILITOTAL 0.5 0.5     Most Recent INR's and Anticoagulation Dosing History:  Anticoagulation Dose History     Recent Dosing and Labs Latest Ref Rng &  Units 4/8/2021    INR 0.86 - 1.14 1.05        Most Recent 3 Troponin's:No lab results found.  Most Recent Cholesterol Panel:  Recent Labs   Lab Test 04/10/21  0622   CHOL 188   LDL Cannot estimate LDL when triglyceride exceeds 400 mg/dL  80   HDL 42*   TRIG 471*     Most Recent 6 Bacteria Isolates From Any Culture (See EPIC Reports for Culture Details):No lab results found.  Most Recent TSH, T4 and A1c Labs:  Recent Labs   Lab Test 04/10/21  0622   TSH 5.25*   T4 1.02

## 2021-04-15 NOTE — PROGRESS NOTES
Vascular Medicine Progress Note    Date of Service (when I saw the patient): 04/15/2021          Physician Supervisory Attestation:   I have reviewed and discussed with the physician assistant their history, physical and plan and independently interviewed and examined Sruthi Mac and agree with the plan as stated in the physician assistant note.    She is doing well clinically, ambulating, pain well controlled.  This morning switched from IV heparin to Eliquis with Plavix   Reviewed recent imaging studies, laboratory tests, DEYVI echo etc.  JAK2 mutation and MTHFR still pending, all other arterial related hypercoagulable studies are negative.  Elevated lipoprotein A greater than 60  High triglycerides greater than 400  Mildly elevated TSH subclinical hypothyroidism  Reviewed vascular surgery evaluation      Plan:  Continue Eliquis 10 twice daily for 7 days then followed by 5 twice daily and plan for Plavix 75 mg daily for a month then followed by switch Plavix to baby aspirin  Postop follow-up with  at [facility] approximately in 2 weeks  Suggested patient to discuss with the OB/GYN and go for nonhormonal contraceptive IUD etc.  Zio patch  Initiate atorvastatin 20 mg daily and also low-dose levothyroxine 25 mcg daily, plan for repeating thyroid function tests in 6+ weeks.  Follow-up with Dr. Liu approximately in a month at [facility], I have given my card.  Vascular medicine service will sign off  Okay to discharge home later today    Hollis Liu MD, [facility], [facility]  Vascular medicine   4/15/2021           Assessment & Plan   1. Left popliteal artery thromboembolic occlusion with acute on chronic left leg ischemia s/p:  A.emergent angiogram with mechanical thrombectomy of the left popliteal artery 4/8/21 and catheter-directed lysis 4/8/21-4/10/21  B.open embolectomy left popliteal, anterior tibial, and tibioperoneal arteries with vein patch angioplasty and  fasciotomies 4/11/21  C.reexploration of left leg with left popliteal/tibial embolectomy and left distal anterior and posterior tibial artery embolectomy 4/12/21        -Pt reports palpitations earlier today.  -Etiology not immediately apparent. Obese, on birth control pills and just had her first dose of Pfizer Covid vaccine one day prior to symptoms onset. No cardiac arrhythmias, no miscarriage, no family history of hypercoagulable status, and Covid test negative.   -D Dimer normal.   -DEYVI with mild LA dilation. Bubble study negative.   -Thrombocytosis at time of admission -> likely acute phase reactant. Platelets dropped briefly. MAHENDRA screen negative. Platelets stable on IV heparin now.   -Hypercoagulable labs are still pending (MTHFR and LENA-2). Lupus inhibitor, Beta 2 glycoprotein and Cardiolipin antibodies negative.   -Pain under control at rest. Mild pain with ambulation.   -Multiphasic pulses with doppler this morning. Foot is warm, some numbness in toes and heel.   -Questionable right lower extremity claudication in recent past and non-palpable right pedal pulses.      Plan:   -Change IV heparin To Eliquis. Also on Plavix for the next month per Dr. Gonzalez, then will switch to ASA 81 mg daily in addition to Eliquis.  Agrees to (presently - need to r/o AF) off label use of Eliquis.  -Will need at least 6 months of anticoagulation. She is getting  in 4 months. Advised that if she tries to get pregnant, she would need to contact us as she would then need anticoagulation changed from Eliquis.    -Await remainder of hypercoagulable work-up. Pending results will be discussed at follow-up appointments.   -Stop oral contraceptives.   -Will do zio patch at the time of discharge as LA is dilated.   -Rest of the medical management per hospitalist service.   -Likely discharge today.   -Follow up in 3-4 weeks with Dr. Liu at the [facility] to review pending hypercoagulable results and to review  the results of the Ziopatch.    -Follow-up with Dr. Gonzalez in 2 weeks.     2. Hx of Depression and Anxiety     -continue PTA meds     3. 4.3 x 5.7 cm left adnexal cyst     -She needs a pelvic ultrasound at some point and needs Gyn follow-up as outpatient.   -CA-125 is normal.      4. Acne     -On Spironolactone as an outpatient.     5. Hyperlipidemia/Hypertriglyceridemia    -LDL 80, triglycerides 471  -Lipoprotein a is elevated at 63  -Will start her on Atorvastatin 20 mg daily.   -Repeat lipid panel in 6 months.     6. Subclinical hypothyroidism    TSH mildly elevated at 5.25.  Given hypertriglyceridemia, will start her also on Levothyroxine 25 micrograms daily.   Repeat TSH after 6 weeks - will place order when seen in follow-up by Dr. Liu.       Interval History   Doing well. Pain controlled. Emotional. Feels as if she could go home.     Physical Exam   Temp: 99.2  F (37.3  C) Temp src: Oral BP: (!) 154/90(Notified RN) Pulse: 87   Resp: 20 SpO2: 96 % O2 Device: None (Room air)    Vitals:    04/08/21 1424   Weight: 122.5 kg (270 lb)     Vital Signs with Ranges  Temp:  [98.3  F (36.8  C)-99.2  F (37.3  C)] 99.2  F (37.3  C)  Pulse:  [67-90] 87  Resp:  [17-20] 20  BP: (135-170)/() 154/90  SpO2:  [95 %-99 %] 96 %  I/O last 3 completed shifts:  In: 693 [P.O.:690; I.V.:3]  Out: 300 [Urine:300]    Constitutional: Awake, alert, cooperative, no apparent distress, and appears stated age.  Eyes: Lids and lashes normal, sclera clear, conjunctiva normal.  ENT: Normocephalic, without obvious abnormality, atraumatic, oral pharynx with moist mucus membranes  Respiratory: No increased work of breathing, good air exchange, clear to auscultation bilaterally, no crackles or wheezing.  Cardiovascular: Regular rate and rhythm, normal S1 and S2, no S3 or S4, and no murmur noted.  GI: Normal bowel sounds, soft, non-distended, non-tender  Skin: No bruising or bleeding, normal skin color, texture, turgor, no redness, warmth, or  swelling, no rashes, surgical site on leg dressed.   Musculoskeletal: There is no redness, warmth, or swelling of the joints.    Neurologic: Awake, alert, oriented to name, place and time.  Cranial nerves II-XII are grossly intact.    Neuropsychiatric: Calm, normal eye contact, alert, normal affect, oriented to self, place, time and situation, memory for past and recent events intact and thought process normal.  Pulses: Multiphasic pedal pulses by doppler.     Medications     - MEDICATION INSTRUCTIONS -         acetaminophen  975 mg Oral Q8H ERICA     apixaban ANTICOAGULANT  10 mg Oral BID     busPIRone  10 mg Oral BID     clopidogrel  75 mg Oral Daily     ferrous sulfate  325 mg Oral Daily     polyethylene glycol  17 g Oral Daily     senna-docusate  1 tablet Oral BID    Or     senna-docusate  2 tablet Oral BID     sertraline  50 mg Oral Daily     sodium chloride (PF)  3 mL Intracatheter Q8H     sodium chloride (PF)  3 mL Intracatheter Q8H       Data   Recent Labs   Lab 04/14/21  0643 04/13/21  1024 04/12/21  0609 04/12/21  0237 04/10/21  1445 04/10/21  1445 04/10/21  0622 04/10/21  0622 04/08/21  1513 04/08/21  1513   WBC 12.0* 10.5  --  12.0*   < > 11.2*   < > 9.9   < > 14.2*   HGB 8.0* 8.3*  --  7.5*   < > 9.2*   < > 9.1*   < > 12.0   MCV 91 91  --  88   < > 87   < > 87   < > 85   * 498*  --  354   < > 274   < > 240   < > 576*   INR  --   --   --   --   --   --   --   --   --  1.05   NA  --  140 140  --   --   --   --  135   < > 134   POTASSIUM  --  3.4 3.6  --   --   --   --  3.5   < > 3.9   CHLORIDE  --  106 106  --   --   --   --  102   < > 104   CO2  --  29 29  --   --   --   --  27   < > 23   BUN  --  5* 3*  --   --   --   --  3*   < > 10   CR  --  0.55 0.55  --   --   --   --  0.51*   < > 0.75   ANIONGAP  --  5 5  --   --   --   --  6   < > 7   CASSI  --  8.6 8.6  --   --   --   --  7.7*   < > 9.6   GLC  --  91 112*  --   --   --   --  95   < > 104*   ALBUMIN  --   --   --   --   --  2.7*  --  2.6*  --    --    PROTTOTAL  --   --   --   --   --  6.1*  --  5.7*  --   --    BILITOTAL  --   --   --   --   --  0.5  --  0.5  --   --    ALKPHOS  --   --   --   --   --  134  --  96  --   --    ALT  --   --   --   --   --  95*  --  75*  --   --    AST  --   --   --   --   --  399*  --  396*  --   --     < > = values in this interval not displayed.     No results found for this or any previous visit (from the past 24 hour(s)).

## 2021-04-15 NOTE — PROGRESS NOTES
[id number]  A&Ox4.  AVSS, on RA.  Tele SR.  Scheduled Tylenol for pain.  Heparin drip infusing at 2150 units/hr.  Dressings CDI.  Pulses per doppler.  Up with 1 assist, GB & walker.

## 2021-04-15 NOTE — PLAN OF CARE
A&Ox4  VSS, had an increased an episode of increased BP d/t anxiety  Pain: 8/10 on her L leg incisions  Respiration: clear, bilaterally equal, and no SOB  Heart rate: regular  Urination: Voiding without difficulty to the bathroom  Bowel movement: passing gas  Ambulation: assist of 1 using walker  Current Problem: pain and increased BP  Management Provided: Scheduled Tylenol, PRN dilaudid, PRN oxycodone given for pain. PRN lorazepam given for anxiety.  Patient response: Pt verbalized decreased in pain level and BP decreased as  noted on the VS chart.

## 2021-04-15 NOTE — PLAN OF CARE
Physical Therapy Discharge Summary    Reason for therapy discharge:    Discharged to home.    Progress towards therapy goal(s). See goals on Care Plan in [facility] electronic health record for goal details.  Goals partially met.  Barriers to achieving goals:   discharge from facility.    Therapy recommendation(s):    No further therapy is recommended.

## 2021-04-15 NOTE — PLAN OF CARE
Alert and oriented x4. Vital signs stable on room air. Assist of 1 with gait belt and walker. Tolerating regular diet. Lung sounds clear. Bowel sounds active, passing flatus, no BM during shift, adequate urine output. Leg incision sites CDI. Doppler pulses intact. Pain managed with scheduled tylenol and PRN oxycodone. Denies nausea. Tele NSR. Discharge instructions provided to patient. Pain management, wound/incision care, follow up appointments, and medication instruction provided. All questions and concerns address with patient. Patient discharged to home.

## 2021-04-15 NOTE — PROGRESS NOTES
"BRIEF NUTRITION ASSESSMENT      REASON FOR ASSESSMENT:  Sruthi Mac is a 30 year old female seen by Registered Dietitian for LOS      CURRENT DIET AND INTAKE:  Diet:  Regular              Chart reviewed  Visited with pt this morning  Pt reports tolerating po  Just finished breakfast - oatmeal, sausage, fruit, milk  Follows a regular diet at home    ANTHROPOMETRICS:  Height: 5' 9\"  Weight:(4/8) 122.5 kg /  270 lbs 0 oz  Body mass index is 39.87 kg/m .   Weight Status: Obesity Grade II BMI 35-39.9  IBW:  65.9 kg  %IBW: 186%  Weight History:   Wt Readings from Last 10 Encounters:   04/08/21 122.5 kg (270 lb)         LABS:  Labs noted    MALNUTRITION:  Visual Nutrition Focused Physical Assessment (NFPA) not completed due to restrictions on face-to-face patient care during COVID-19 Pandemic. Do not suspect muscle/fat losses.  Patient does not meet two of the following criteria necessary for diagnosing malnutrition.     % Weight Loss:  None noted  % Intake:  No decreased intake noted  Subcutaneous Fat Loss:  None observed  Muscle Loss:  None observed  Fluid Retention:  None noted    NUTRITION INTERVENTION:  Nutrition Diagnosis:  No nutrition diagnosis at this time.    Implementation:  Nutrition Education ---> Reviewed diet order and meal ordering process    FOLLOW UP/MONITORING:   Will re-evaluate in 7 - 10 days, or sooner, if re-consulted.          "

## 2021-04-15 NOTE — PROGRESS NOTES
"VASCULAR SURGERY PROGRESS NOTE    Subjective:  Continues on heparin drip.  Able to ambulate with walker with PT yesterday.  Reports paresthesias intermittently.  Overall improving but still requiring IV pain medications.    Objective:    Intake/Output Summary (Last 24 hours) at 4/9/2021 0719  Last data filed at 4/9/2021 0652  Gross per 24 hour   Intake 1651 ml   Output 1000 ml   Net 651 ml     Labs:  ROUTINE IP LABS (Last four results)  BMP  Recent Labs   Lab 04/13/21  1024 04/12/21  0609 04/10/21  0622 04/09/21  0607    140 135 135   POTASSIUM 3.4 3.6 3.5 4.2   CHLORIDE 106 106 102 105   CASSI 8.6 8.6 7.7* 8.2*   CO2 29 29 27 23   BUN 5* 3* 3* 6*   CR 0.55 0.55 0.51* 0.56   GLC 91 112* 95 116*     CBC  Recent Labs   Lab 04/14/21  0643 04/13/21  1024 04/12/21  0237 04/11/21  1314 04/11/21  0537   WBC 12.0* 10.5 12.0*  --  10.2   RBC 2.84* 2.86* 2.59*  --  2.98*   HGB 8.0* 8.3* 7.5* 8.1* 8.4*   HCT 25.8* 26.1* 22.7*  --  25.8*   MCV 91 91 88  --  87   MCH 28.2 29.0 29.0  --  28.2   MCHC 31.0* 31.8 33.0  --  32.6   RDW 13.5 13.6 13.3  --  13.2   * 498* 354  --  284     INR  Recent Labs   Lab 04/08/21  1513   INR 1.05     PHYSICAL EXAM:  BP (!) 144/88 (BP Location: Right arm)   Pulse 67   Temp 98.7  F (37.1  C) (Oral)   Resp 17   Ht 1.753 m (5' 9\")   Wt 122.5 kg (270 lb)   LMP 04/09/2021   SpO2 97%   BMI 39.87 kg/m    General: The patient is alert and oriented. Appropriate. No acute distress  Psych: Pleasant affect, Answers questions appropriately  Skin: Color appropriate for race, warm, dry.  Respiratory: Breathing unlabored  GI:  Abdomen soft, nontender to light palpation.  Extremities: biphasic AT/PT signal, foot is warm, able to plantar and dorsiflex foot, intact sensation    Imaging:   No new imaging.    ASSESSMENT:  30-year-old female with history of anxiety/depression, cystic acne present today with 5-day history of numbness and pain in her left lower extremity, found to have left popliteal " artery occlusion s/p IR thrombectomy and thrombolysis catheter placement 4/8/21 w/ resulting continued occlusion of the outflow requiring open thrombectomy of popliteal, AT and PT arteries 4/11 complicated by re-thrombosis and repeat thrombectomy 4/12. Biphasic signals AT and PT postop.    Unclear source of embolus/thrombosis but reported claudication symptoms for weeks-months. Previous embolic event on RLE as well as seen on CTA and angiogram.  Echo/DEYVI negati cardioembolic source.  Hypercoagulable work-up pending    PLAN:   Diet as tolerated, KVO  Vascular medicine on board  Transition from heparin drip to PO AC today; continue Plavix - home on Eliquis/Plavix  Pain control -Tylenol scheduled, advil/oxycodone/dilaudid PRN   OOB w/ PT WBAT    Dispo: home when pain controlled and improved ambulation status safe for home    Elba Smith MD  Vascular Surgery Fellow   Pager: [phone]    STAFF: Overall doing well.  Occasionally soreness and tingling to left foot as expected.  Did well with physical therapy and walker.  Surgical wounds all look fine.  Normal CMS.  Biphasic Doppler flow in the left distal PT and AT.    Plan Home today on  Eliquis and Plavix.  Activities as tolerated though frequent leg elevation due to swelling. We will see me in the office in 2 weeks for suture removal.  Home on iron for acute blood loss anemia.    Ulises Gonzalez MD

## 2021-04-19 ENCOUNTER — TELEPHONE (OUTPATIENT)
Dept: OTHER | Facility: CLINIC | Age: 31
End: 2021-04-19

## 2021-04-19 DIAGNOSIS — I70.209 ACUTE OCCLUSION OF ARTERY OF LOWER EXTREMITY (H): ICD-10-CM

## 2021-04-19 LAB
COPATH REPORT: NORMAL
COPATH REPORT: NORMAL

## 2021-04-19 RX ORDER — OXYCODONE HYDROCHLORIDE 5 MG/1
5-10 TABLET ORAL EVERY 6 HOURS PRN
Qty: 20 TABLET | Refills: 0 | Status: SHIPPED | OUTPATIENT
Start: 2021-04-19 | End: 2021-04-23

## 2021-04-20 ENCOUNTER — OFFICE VISIT (OUTPATIENT)
Dept: OTHER | Facility: CLINIC | Age: 31
End: 2021-04-20
Attending: SURGERY
Payer: COMMERCIAL

## 2021-04-20 VITALS — RESPIRATION RATE: 18 BRPM | SYSTOLIC BLOOD PRESSURE: 109 MMHG | DIASTOLIC BLOOD PRESSURE: 81 MMHG | HEART RATE: 114 BPM

## 2021-04-20 DIAGNOSIS — I74.3: Primary | ICD-10-CM

## 2021-04-20 PROCEDURE — 99024 POSTOP FOLLOW-UP VISIT: CPT | Performed by: SURGERY

## 2021-04-20 PROCEDURE — G0463 HOSPITAL OUTPT CLINIC VISIT: HCPCS

## 2021-04-20 NOTE — PROGRESS NOTES
[place] [facility]    Sruthi Mac and her fiancé came to see me today.  She had extensive emboli of uncertain etiology to her left popliteal and trifurcation vessels and also the right profundus and trifurcation vessels.  Had adequate blood supply via collaterals to the right mid posterior tibial artery reconstituted with severe probable left did not improve with attempted lytic therapy.    Underwent emergent left popliteal embolectomy with vein patch and embolectomies to the distal anterior tibial and ankle PT.  Questionable of HIT but this was negative.  Treated initially with heparin and then to argatroban then when testing negative back to heparin.  We did have to reexplore her arteries early the following morning after her extensive embolectomies that had rethrombosed but perhaps due to the lower factor Xa.    We also performed a fasciotomy over the anterior compartment and by definition both the deep and superficial fasciotomies in the posterior compartment.    He has had extensive work-up which includes all coagulopathy testing which to date is negative though some tests are still pending.  Echocardiogram and DEYVI were negative.  Scheduled for a Zio patch tomorrow.  CTA revealed no thoracic or aortic disease as a source of emboli.    She was discharged on Eliquis and Plavix.  Noticed more swelling at home along with more ecchymosis and bruising on the right medial calf and also in the ankle region.  He wanted to come in to evaluate this further.    Exam: Alert and appropriate.  Comfortable though still is requiring oxycodone that was refilled last night by myself.               Blood pressure 109/81.  Pulse 114 (anxious)             We do see ecchymosis and mottling of the right medial calf and also the ankle regions.  Very little over the left anterior lateral fasciotomy site.  Expect this was from extravasation of some blood from the procedures with viable skin.  Mild ankle edema.              Biphasic signal within the right posterior tibial artery via collaterals.           Also biphasic Doppler signal in the left distal anterior tibial and DP with a somewhat weaker ankle PT signal but these are very small arteries and have some swelling and probable ecchymosis making bedside Doppler less accurate.      Impression: #1.  Embolic occlusion of left popliteal and tibial vessels status post extensive embolectomies and patch angioplasties.  Will be held blood flow down to her foot.  Continue on Eliquis and Plavix.  Eliquis and aspirin may be indefinite unless we find an etiology after discussion of vascular conference and this was discussed with the patient and her fiancé.  Elevate her leg is much as possible but may walk.  Ecchymosis will gradually resolve.  Return in 10 days for partial suture removal.                       #2.  Issues with the right leg as described above.  She is asymptomatic.  Fairly good collateral flow in the posterior tibial artery.  No attempted surgical embolectomy would be successful this long from the episode which is at least 6 to 7 weeks.  If in the future she has issues consider a below-knee popliteal to posterior tibial bypass.       Ulises Gonzalez MD

## 2021-04-20 NOTE — PROGRESS NOTES
"Sruthi Mac is a 30 year old female who presents for:  Chief Complaint   Patient presents with     RECHECK     f/u due to left leg swelling and pain. s/p  LEFT LEG ANTERIOR TIBIAL AND POSTERIOR TIBIAL EMBOLECTOMIES, FEMORAL POPITEAL VEIN PATCH, ANGIOGRAM, FASCIOTOMIES *mo        Vitals:    Vitals:    04/20/21 1050   BP: 109/81   BP Location: Left arm   Patient Position: Chair   Cuff Size: Adult Large   Pulse: 114   Resp: 18       BMI:  Estimated body mass index is 39.87 kg/m  as calculated from the following:    Height as of 4/8/21: 5' 9\" (1.753 m).    Weight as of 4/8/21: 270 lb (122.5 kg).    Pain Score:  Data Unavailable        Bijal Welch CMA    "

## 2021-04-21 ENCOUNTER — HOSPITAL ENCOUNTER (OUTPATIENT)
Dept: CARDIOLOGY | Facility: CLINIC | Age: 31
Discharge: HOME | End: 2021-04-21
Attending: PHYSICIAN ASSISTANT | Admitting: PHYSICIAN ASSISTANT
Payer: COMMERCIAL

## 2021-04-21 DIAGNOSIS — I70.209 ACUTE OCCLUSION OF ARTERY OF LOWER EXTREMITY (H): ICD-10-CM

## 2021-04-21 PROCEDURE — 93248 EXT ECG>7D<15D REV&INTERPJ: CPT | Performed by: INTERNAL MEDICINE

## 2021-04-21 PROCEDURE — 93246 EXT ECG>7D<15D RECORDING: CPT

## 2021-04-23 DIAGNOSIS — I70.209 ACUTE OCCLUSION OF ARTERY OF LOWER EXTREMITY (H): ICD-10-CM

## 2021-04-23 RX ORDER — OXYCODONE HYDROCHLORIDE 5 MG/1
5-10 TABLET ORAL EVERY 6 HOURS PRN
Qty: 20 TABLET | Refills: 0 | Status: SHIPPED | OUTPATIENT
Start: 2021-04-23 | End: 2021-05-20

## 2021-04-23 NOTE — TELEPHONE ENCOUNTER
Oxycodone      Last Written Prescription Date:  4/19/21  Last Fill Quantity: 20,   # refills: 0  Last Office Visit: 4/20/21  Future Office visit: 4/29/21      Routing refill request to provider for review/approval because:  Drug not on the [facility], [facility] or [facility] refill protocol or controlled substance    Med and pharm loaded.  Patient staying in [place] with parents.    KELVIN TheodoreN, RN-[facility] [place]

## 2021-04-28 NOTE — PROGRESS NOTES
[place] [facility]    Sruthi Mac and her  return for follow-up.  She suffered multiple bilateral lower extremity emboli of uncertain etiology.  Right leg was relatively asymptomatic despite emboli to the profundus femoral and tibial peroneal arteries with a reconstituted mid posterior tibial artery with adequate collaterals.    The occlusion of the left popliteal artery and tibial vessels that did not respond to lytic therapy and required extensive surgical embolectomy with patch angioplasty to the below-knee popliteal artery and ankle AT/PT embolectomies.  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  There is adequate perfusion to her left foot though multiple skin changes were noted.  Has been on Eliquis and Plavix.    Etiology of these emboli is unclear despite extensive work-up.  Echocardiogram and recent DEYVI both negative.  However recently LENA 2 mutation was noted    She still has the bruising particularly on the distal medial calf beyond her incision that is indurated but gradually improving.  Her foot is still hypersensitive with pins-and-needles.  Somewhat difficult to bear weight and is still using a walker.    Both he and her  have received their second dose of the Pfizer Covid vaccine.    Exam: Alert and appropriate.  Here with her .             Blood pressure 121/88.  Pulse 112             Bruising and ecchymotic areas on the mid medial calf and on the ankle area is                                  improving.  All the toes are somewhat dusky distally.  Sensation is intact.              Surgical sites are healing well.             Biphasic Doppler signals to the left distal PT and anterior tibial artery.  I do not get a                signal in the dorsum of the foot. (Unchanged)         I  removed all but 2 sutures on the calf and one in the posterior tibial/anterior tibial sites.      Impression: Improved blood supply left foot.  May not have good perfusion to the distal dorsalis pedis but there is very little we can do about this.  Continue on Xarelto and Plavix.  Continue with coagulopathy work-up with the JAK2 being positive and this will be discussed with our vascular medicine colleagues.            Return in 1 week for remainder of suture removal and follow-up.    Ulises Gonzalez MD

## 2021-04-29 ENCOUNTER — OFFICE VISIT (OUTPATIENT)
Dept: OTHER | Facility: CLINIC | Age: 31
End: 2021-04-29
Attending: SURGERY
Payer: COMMERCIAL

## 2021-04-29 VITALS — SYSTOLIC BLOOD PRESSURE: 121 MMHG | HEART RATE: 112 BPM | DIASTOLIC BLOOD PRESSURE: 88 MMHG | TEMPERATURE: 96.8 F

## 2021-04-29 DIAGNOSIS — I74.9 EMBOLISM AND THROMBOSIS OF ARTERY (H): Primary | ICD-10-CM

## 2021-04-29 PROCEDURE — G0463 HOSPITAL OUTPT CLINIC VISIT: HCPCS

## 2021-04-29 PROCEDURE — 99024 POSTOP FOLLOW-UP VISIT: CPT | Performed by: SURGERY

## 2021-04-29 NOTE — PROGRESS NOTES
"Sruthi Mac is a 30 year old female who presents for:  Chief Complaint   Patient presents with     RECHECK        Vitals:    Vitals:    04/29/21 1306   BP: 121/88   BP Location: Right arm   Patient Position: Chair   Cuff Size: Adult Regular   Pulse: 112   Temp: 96.8  F (36  C)   TempSrc: Temporal       BMI:  Estimated body mass index is 39.87 kg/m  as calculated from the following:    Height as of 4/8/21: 5' 9\" (1.753 m).    Weight as of 4/8/21: 270 lb (122.5 kg).    Pain Score:  Data Unavailable        Lakesha Kenney MA    "

## 2021-04-30 ENCOUNTER — TELEPHONE (OUTPATIENT)
Dept: OTHER | Facility: CLINIC | Age: 31
End: 2021-04-30

## 2021-04-30 NOTE — TELEPHONE ENCOUNTER
Per 4/29/21 appointment with Dr. Gonzalez, patient needs 1 week in clinic follow up to remove remaining sutures.  Routing to scheduling to contact patient to coordinate.  Please add onto Dr. Gonzalez's schedule at 12:00pm or 12:45pm.    Appt note: 1 week follow up to 4/29/21 appt; remove remaining left leg sutures.    KELVIN TheodoreN, RN-[facility] Vascular [place]

## 2021-05-02 ENCOUNTER — HEALTH MAINTENANCE LETTER (OUTPATIENT)
Age: 31
End: 2021-05-02

## 2021-05-04 NOTE — PROGRESS NOTES
[place] [facility]    Sruthi Mac and her fiancé return to see us.  Significant left greater than right emboli of uncertain etiology requiring extensive left below-knee popliteal embolectomy with patch angioplasty and anterior tibial/posterior tibial ankle embolectomies.  Placed on Eliquis and Plavix post procedure.    Cardiac and blood work-up negative except for a recently noted Jer 2 mutation.    Overall she is doing better.  She is now walking without the walker.  Does have a mild flexion contraction of her posterior calf and is working on stretching exercises.  Also notes this on several of her toes.  The mottling to her toes we previously noted is improving.    However, she did have full-thickness skin loss in an area approximately 3 x 2 cm in the mid medial calf that was quite ecchymotic on previous visits.  Skin looked like she had Coumadin skin necrosis but was never on Coumadin.  She had several other areas of bruising and ecchymosis that have been fine.  She is applying bacitracin ointment and a gauze to the area.    No problems with her right leg with known tibial embolic changes but reconstituted posterior tibial artery in the mid calf down to the foot.    Exam: Alert and appropriate.  Very comfortable.  Here with her .             Blood pressure 127/86.             Full-thickness skin necrosis in the left medial calf.             Some tingling numbness still to her foot but protective sensation intact.             Weaker biphasic signals to the left anterior tibial/dorsalis pedis artery.   Minimal Doppler to ankle PT.   Biphasic Doppler to popliteal artery.   Strong mono phasic signal to the right ankle PT       I removed the remainder of the skin nylon sutures.    Impression: #1.  Overall improvement left leg.  Distal flow was still poor and cannot be improved further and the patient is aware of this.  He is on Eliquis and Plavix and plan to continue the Plavix for  approximately 2 more months and perhaps the Eliquis indefinitely since there is no obvious etiology for her problem.  I do not feel the debridement with her on anticoagulation the calf ulcer is indicated she will treat this with bacitracin ointment and protective dressings allowed to heal by secondary intent since it is relatively superficial.                     #2.  We will work on stretching exercises to prevent flexion contracture and all of her left calf muscles.  She does have normal dorsiflexion and eversion of the lateral compartments and anterior compartments.                     #3.  Work-up if the etiology of the emboli is still in progress.  She just finished her Zio patch several days ago and waiting for results.  Likely these will be normal.  Unsure of the significance of the Jer 2 and this will be discussed with vascular medicine.    I will see her back in approximately 1 month for evaluation.    Ulises Gonzalez MD

## 2021-05-06 ENCOUNTER — OFFICE VISIT (OUTPATIENT)
Dept: OTHER | Facility: CLINIC | Age: 31
End: 2021-05-06
Attending: SURGERY
Payer: COMMERCIAL

## 2021-05-06 ENCOUNTER — MYC MEDICAL ADVICE (OUTPATIENT)
Dept: OTHER | Facility: CLINIC | Age: 31
End: 2021-05-06

## 2021-05-06 ENCOUNTER — TRANSFERRED RECORDS (OUTPATIENT)
Dept: HEALTH INFORMATION MANAGEMENT | Facility: CLINIC | Age: 31
End: 2021-05-06

## 2021-05-06 VITALS — DIASTOLIC BLOOD PRESSURE: 86 MMHG | RESPIRATION RATE: 22 BRPM | SYSTOLIC BLOOD PRESSURE: 127 MMHG | HEART RATE: 123 BPM

## 2021-05-06 DIAGNOSIS — I70.209 ACUTE OCCLUSION OF ARTERY OF LOWER EXTREMITY (H): ICD-10-CM

## 2021-05-06 DIAGNOSIS — I74.3 ARTERIAL EMBOLISM OF LEFT LEG (H): Primary | ICD-10-CM

## 2021-05-06 PROCEDURE — 99024 POSTOP FOLLOW-UP VISIT: CPT | Performed by: SURGERY

## 2021-05-06 PROCEDURE — G0463 HOSPITAL OUTPT CLINIC VISIT: HCPCS

## 2021-05-06 NOTE — TELEPHONE ENCOUNTER
Routing to Dr. Liu to advise.  Please see [employer] message below.    Mariah Romano, KELVINN, RN-[facility] [place]

## 2021-05-06 NOTE — PROGRESS NOTES
"Sruthi Mac is a 30 year old female who presents for:  Chief Complaint   Patient presents with     RECHECK       1 week follow up to 4/29/21 appt; remove remaining left leg sutures *NH  - OK per JAA        Vitals:    Vitals:    05/06/21 1207   BP: 127/86   BP Location: Left arm   Patient Position: Chair   Cuff Size: Adult Large   Pulse: 123   Resp: 22       BMI:  Estimated body mass index is 39.87 kg/m  as calculated from the following:    Height as of 4/8/21: 5' 9\" (1.753 m).    Weight as of 4/8/21: 270 lb (122.5 kg).    Pain Score:  Data Unavailable        Bijal Welch CMA    "

## 2021-05-13 NOTE — TELEPHONE ENCOUNTER
Patient is scheduled for an in clinic appointment with Dr Liu on 05/20/21 @ 3:30pm.    Patient will need a refill on her Eliquis - she only has 2-3 tablets left. Patient can be reached at [phone].

## 2021-05-14 NOTE — TELEPHONE ENCOUNTER
I called Sruthi and stated she has 1 refill available at  pharmacy in [place]. She is requesting the refill of Eliquis be sent to [facility] pharmacy on [address]. Pharmacy changed per patient request. I explained that continuing on this medication will be discussed at her upcoming appointment with Dr. Liu.     Camila WARRENN, RN    [facility]  Vascular [facility]  Office: [phone]  Fax: [phone]

## 2021-05-17 ENCOUNTER — TELEPHONE (OUTPATIENT)
Dept: OTHER | Facility: CLINIC | Age: 31
End: 2021-05-17

## 2021-05-17 NOTE — TELEPHONE ENCOUNTER
[facility]    Who is the name of the provider?:  Noe      What is the location you see this provider at?: [place]    Reason for call:  Needs refill for Plavix, was prescribed when in hospital for surgery.    Can we leave a detailed message on this number?  YES

## 2021-05-17 NOTE — TELEPHONE ENCOUNTER
I called Sruthi and discussed that she has multiple refills at the pharmacy for Plavix and she needs to call them for a refill. She verbalized understanding.    Camila LANDRY, RN    [facility]  Vascular [facility]  Office: [phone]  Fax: [phone]

## 2021-05-20 ENCOUNTER — HOSPITAL ENCOUNTER (OUTPATIENT)
Dept: LAB | Facility: CLINIC | Age: 31
End: 2021-05-20
Attending: SURGERY
Payer: COMMERCIAL

## 2021-05-20 ENCOUNTER — OFFICE VISIT (OUTPATIENT)
Dept: OTHER | Facility: CLINIC | Age: 31
End: 2021-05-20
Attending: SURGERY
Payer: COMMERCIAL

## 2021-05-20 VITALS
WEIGHT: 258 LBS | BODY MASS INDEX: 38.21 KG/M2 | OXYGEN SATURATION: 96 % | HEIGHT: 69 IN | RESPIRATION RATE: 18 BRPM | DIASTOLIC BLOOD PRESSURE: 86 MMHG | HEART RATE: 101 BPM | SYSTOLIC BLOOD PRESSURE: 127 MMHG

## 2021-05-20 DIAGNOSIS — Z15.89 JAK-2 GENE MUTATION: ICD-10-CM

## 2021-05-20 DIAGNOSIS — D75.839 THROMBOCYTOSIS: Primary | ICD-10-CM

## 2021-05-20 DIAGNOSIS — E78.1 HYPERTRIGLYCERIDEMIA: ICD-10-CM

## 2021-05-20 DIAGNOSIS — E78.41 ELEVATED LIPOPROTEIN(A): ICD-10-CM

## 2021-05-20 DIAGNOSIS — I74.3 ARTERIAL EMBOLISM OF LEFT LEG (H): ICD-10-CM

## 2021-05-20 DIAGNOSIS — E03.9 HYPOTHYROIDISM, UNSPECIFIED TYPE: ICD-10-CM

## 2021-05-20 LAB
ALBUMIN SERPL-MCNC: 4.1 G/DL (ref 3.4–5)
ALP SERPL-CCNC: 91 U/L (ref 40–150)
ALT SERPL W P-5'-P-CCNC: 31 U/L (ref 0–50)
ANION GAP SERPL CALCULATED.3IONS-SCNC: 5 MMOL/L (ref 3–14)
AST SERPL W P-5'-P-CCNC: 21 U/L (ref 0–45)
BILIRUB SERPL-MCNC: 0.2 MG/DL (ref 0.2–1.3)
BUN SERPL-MCNC: 8 MG/DL (ref 7–30)
CALCIUM SERPL-MCNC: 9 MG/DL (ref 8.5–10.1)
CHLORIDE SERPL-SCNC: 106 MMOL/L (ref 94–109)
CO2 SERPL-SCNC: 26 MMOL/L (ref 20–32)
CREAT SERPL-MCNC: 0.63 MG/DL (ref 0.52–1.04)
GFR SERPL CREATININE-BSD FRML MDRD: >90 ML/MIN/{1.73_M2}
GLUCOSE SERPL-MCNC: 84 MG/DL (ref 70–99)
POTASSIUM SERPL-SCNC: 3.7 MMOL/L (ref 3.4–5.3)
PROT SERPL-MCNC: 7.7 G/DL (ref 6.8–8.8)
SODIUM SERPL-SCNC: 137 MMOL/L (ref 133–144)
TSH SERPL DL<=0.005 MIU/L-ACNC: 1.48 MU/L (ref 0.4–4)

## 2021-05-20 PROCEDURE — 84443 ASSAY THYROID STIM HORMONE: CPT | Performed by: INTERNAL MEDICINE

## 2021-05-20 PROCEDURE — 36415 COLL VENOUS BLD VENIPUNCTURE: CPT | Performed by: INTERNAL MEDICINE

## 2021-05-20 PROCEDURE — 80053 COMPREHEN METABOLIC PANEL: CPT | Performed by: INTERNAL MEDICINE

## 2021-05-20 PROCEDURE — G0463 HOSPITAL OUTPT CLINIC VISIT: HCPCS

## 2021-05-20 PROCEDURE — 99215 OFFICE O/P EST HI 40 MIN: CPT | Performed by: INTERNAL MEDICINE

## 2021-05-20 ASSESSMENT — MIFFLIN-ST. JEOR: SCORE: 1944.66

## 2021-05-20 NOTE — PROGRESS NOTES
"[facility]      Patient is in clinic today to see Dr Liu.      Patient is here for a new consult to discuss an alternative birth control - Pt can no longer take her estrogen birth control. Pt's OB-GYN suggested a progesterone-only birth control but wanted Pt to check in with vascular.      Patient's condition is stable.    /86 (BP Location: Left arm, Patient Position: Chair, Cuff Size: Adult Large)   Pulse 101   Resp 18   Ht 5' 9\" (1.753 m)   Wt 258 lb (117 kg)   LMP 05/14/2021   SpO2 96%   Breastfeeding No   BMI 38.10 kg/m      The provider has been notified that the patient has no concerns.     Questions patient would like addressed today are: N/A.    Refills are needed: No    At the end of the visit the patient was provided with education both verbally and on their AVS regarding follow up appointment(s).        Bijal Welch, [facility]      "

## 2021-05-20 NOTE — PATIENT INSTRUCTIONS
1. Go for labs today order placed    2. Continue eliquis and plavix for nw    3. 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 Continue rest of the medications same.    5. Virtual or office visit in 2 months.

## 2021-05-21 ENCOUNTER — TELEPHONE (OUTPATIENT)
Dept: OTHER | Facility: CLINIC | Age: 31
End: 2021-05-21

## 2021-05-21 NOTE — PROGRESS NOTES
SUBJECTIVE:  CC:  Follow-up visit  Recent prolonged hospitalization for bilateral lower extremity arterial thrombus left worse than right side with acute on chronic left leg ischemia underwent emergent abdominal aortic and left lower extremity diagnostic arteriography and mechanical thrombectomy of left popliteal artery and redo surgery etc. in first week of April 2021  Positive JAK2 mutation and thrombocytosis    History of present illness:  For full details please see my initial consult note on April 9, 2021 and  evaluation  She underwent extensive evaluation all of her hypercoagulable studies were negative, echo unremarkable and DEYVI negative  She returns Zio patch still this was not read.  Elevated lipoprotein a  JAK2 mutation was present  She has a high platelet count at the time of admission and at the time of discharge raises the question of essential thrombocytosis in the setting of arterial thrombosis and JAK2 mutation.  Her lower extremity skin after surgery looked like a Coumadin necrosis even though she was not on any Coumadin.  Currently taking Plavix and Eliquis.  She was on birth control pill at the time of admission and also she received first dose Pfizer Covid vaccine 1 week prior to admission  She is improving ambulating without any cane or walker  Slowly healing surgical area  Tolerating medications  HISTORIES:  PROBLEM LIST:   Patient Active Problem List   Diagnosis     Acute occlusion of artery of lower extremity (H)     Limb ischemia     PAST MEDICAL HISTORY:  Past Medical History:   Diagnosis Date     Anxiety      Cystic acne      Depression      Panic attack      PAST SURGICAL HISTORY:  Past Surgical History:   Procedure Laterality Date     EMBOLECTOMY LOWER EXTREMITY Left 4/11/2021    Procedure: LEFT LEG ANTERIOR TIBIAL AND POSTERIOR TIBIAL EMBOLECTOMIES, FEMORAL POPITEAL VEIN PATCH, ANGIOGRAM, FASCIOTOMIES;  Surgeon: Ulises Gonzalez;  Location:  OR     IR ANGIOGRAM THROUGH  CATHETER FOLLOW UP  4/9/2021     IR ANGIOGRAM THROUGH CATHETER FOLLOW UP  4/10/2021     IR LOWER EXTREMITY ANGIOGRAM LEFT  4/8/2021     THROMBECTOMY LOWER EXTREMITY Left 4/12/2021    Procedure: Re-Exploration Left Lower Extremity With Popliteal and Tibial Embolectomy.;  Surgeon: Ulises Gonzalez;  Location: [facility] OR     wisdom teeth remova       CURRENT MEDICATIONS:  Current Outpatient Medications   Medication Sig Dispense Refill     apixaban ANTICOAGULANT (ELIQUIS) 5 MG tablet Take 1 tablet (5 mg) by mouth 2 times daily 60 tablet 0     atorvastatin (LIPITOR) 20 MG tablet Take 1 tablet (20 mg) by mouth daily 90 tablet 3     busPIRone (BUSPAR) 10 MG tablet Take 10 mg by mouth 2 times daily       clopidogrel (PLAVIX) 75 MG tablet Take 1 tablet (75 mg) by mouth daily 30 tablet 3     Ferrous Sulfate (IRON) 325 (65 Fe) MG tablet Take 1 tablet by mouth daily       Lactobacillus (PROBIOTIC ACIDOPHILUS PO) Take by mouth daily       levothyroxine (SYNTHROID/LEVOTHROID) 25 MCG tablet Take 1 tablet (25 mcg) by mouth daily 90 tablet 1     magnesium oxide 400 MG CAPS Take by mouth daily       multivitamin, therapeutic (THERA-VIT) TABS tablet Take 1 tablet by mouth daily       sertraline (ZOLOFT) 50 MG tablet Take 100 mg by mouth daily        spironolactone (ALDACTONE) 50 MG tablet Take 50 mg by mouth daily       Vitamin D3 (CHOLECALCIFEROL) 25 mcg (1000 units) tablet Take 1 tablet by mouth daily       ALLERGIES:  Allergies   Allergen Reactions     Erythromycin Unknown     Penicillins Unknown     SOCIAL HISTORY:  Social History     Socioeconomic History     Marital status: Single     Spouse name: Not on file     Number of children: Not on file     Years of education: Not on file     Highest education level: Not on file   Occupational History     Not on file   Social Needs     Financial resource strain: Not on file     Food insecurity     Worry: Not on file     Inability: Not on file     Transportation needs     Medical: Not on  file     Non-medical: Not on file   Tobacco Use     Smoking status: Never Smoker     Smokeless tobacco: Never Used   Substance and Sexual Activity     Alcohol use: Yes     Frequency: 2-4 times a month     Drug use: Never     Sexual activity: Not on file   Lifestyle     Physical activity     Days per week: Not on file     Minutes per session: Not on file     Stress: Not on file   Relationships     Social connections     Talks on phone: Not on file     Gets together: Not on file     Attends Yarsanism service: Not on file     Active member of club or organization: Not on file     Attends meetings of clubs or organizations: Not on file     Relationship status: Not on file     Intimate partner violence     Fear of current or ex partner: Not on file     Emotionally abused: Not on file     Physically abused: Not on file     Forced sexual activity: Not on file   Other Topics Concern     Not on file   Social History Narrative     Not on file     FAMILY HISTORY:  Family History   Problem Relation Age of Onset     Hypertension Father      Diabetes Father      Myocardial Infarction Father      REVIEW OF SYSTEMS:  CONSTITUTIONAL:no malaise, fatigue, or other general symptoms  EYES: no subjective changes in visual acuity, no photophobia  ENT/MOUTH: no complaints of rhinorrhea, nasal congestion, sore throat, hearing changes  RESP:no SOB  CV: no c/o exertional chest pressure or CASTILLO  GI: No abdominal pain, constipation, change in bowel movements, nausea, pyrosis, BRBPR  :no polyuria or polydipsia, no dysuria, no gross hematuria  MUSCULOSKELATAL:no arthalgias or myalgias  INTEGUMENTARY/SKIN: no pruritis, rashes, or moles with recent change in size, shape, or pigmentation  NEURO: no gross sensory or motor symptoms, no dizziness, no confusion  ENDOCRINE: no polyuria or polydipsia, no heat or cold intolerance  HEME/ALLERGY/IMMUNE: no fevers, chills, night sweats, or unwanted weight loss  PSYCHIATRIC: no depression, anxiety, or  "internal stimuli  EXAM:  /86 (BP Location: Left arm, Patient Position: Chair, Cuff Size: Adult Large)   Pulse 101   Resp 18   Ht 1.753 m (5' 9\")   Wt 117 kg (258 lb)   LMP 05/14/2021   SpO2 96%   Breastfeeding No   BMI 38.10 kg/m    BMI: Body mass index is 38.1 kg/m .  GENERAL APPEARANCE:  Pleasant  Healthy appearing male , alert, active, no distress cooperative.  EXAM:  EYES: clear conjunctiva, no cataracts, no obvious fundoscopic abnormalities  HENT: oropharynx, nares, and TMs are WNL  NECK: no JVD, thyromegaly or lymphadenopathy, no cervical bruits  RESP: clear to auscultation without rales, wheezes, or rhonchi  CV: RRR, no murmurs, gallops, or rubs  LYMPH: no cervical , axillary, or inguinal lymphadenopathy appreciated  GI: NABS, ND/NT, no masses or organomegally appreciated  : normal external genitalia and anus, no lumps, masses  MS: no obvoius clinicallly relevant arthropathy, no evidence vasculitis  SKIN: no nevi clinically suspicious for malignancy are noted  NEURO: CN II-XII intact, no localizing sensory or motor abnoramlities noted, DTRs symmetrical bilaterally  PSYCH: Mental status exam reveals the pt to have normal mood and affect. There is no disorder of thought form or content. There is no response to internal stimuli. There is no suicidal or homicidal ideation.    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  Right leg was relatively asymptomatic despite emboli to the profundus femoral and tibial peroneal arteries with a reconstituted mid posterior tibial " artery with adequate collaterals.     The occlusion of the left popliteal artery and tibial vessels that did not respond to lytic therapy and required extensive surgical embolectomy with patch angioplasty to the below-knee popliteal artery and ankle AT/PT embolectomies.  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  There is adequate perfusion to her left foot though multiple skin changes were noted.  Has been on Eliquis and Plavix since then  She stopped the birth control pill since then  Planning to start progesterone therapy alone by her primary OB/GYN physician and she wants to get pregnant in the near future.  She was seen and evaluated by  recently  Etiology of these emboli is unclear despite extensive work-up.  Echocardiogram and recent DEYVI both negative.    However recently LENA 2 mutation was noted and she has a underlying thrombocytosis this raises the question of essential thrombocytosis and possible contribution to her arterial thrombus.  She has a elevated lipoprotein a which is atherogenic and thrombogenic  Rest of the extensive hypercoagulable studies negative and all of these were drawn before initiation of anticoagulation  She returns Zio patch results are still pending  She also has a left adnexal cyst CA-125 was negative.    I will obtain blood morphology then arrange referral to see hematology oncologist for further evaluation of thrombocytosis and JAK2 mutation in the setting of arterial thrombosis and abnormal skin at the time of presentation and after surgeries to the hospital (as if her skin was looking like a Coumadin necrosis even though she was not on any Coumadin)    Return to clinic in 2 months  Plan: Oncology/Hematology Adult Referral, CBC with         platelets differential, Blood Morphology         Pathologist  Review, Reticulocyte count,         (E78.41) Elevated lipoprotein(a)  (E78.1) Hypertriglyceridemia  Comment: She is taking atorvastatin and tolerating without any problems goal of LDL is less than 70  She lost weight  Stopped birth control pills  TLC suggested  Repeat lipid panel in 3 months through primary care physician's office    Plan: Comprehensive metabolic panel            (E03.9) Hypothyroidism, unspecified type  Comment: Currently on replacement with low-dose levothyroxine clinically euthyroid continue the same will check thyroid function tests and adjust dose if needed  Plan: TSH with free T4 reflex            I have discussed with patient the risks, benefits, medications, treatment options and modalities.   I have instructed the patient to call or schedule a follow-up appointment if any problems or failure to improve.    45 minutes spent on the date of the encounter during the chart review, history and exam, documentation and further activities as noted above. Reviewed recent hospitalization records, imaging studies .    Hollis Liu MD, FAKAMAR, FS, FNLA  Vascular medicine  Clinical lipidologist

## 2021-05-21 NOTE — TELEPHONE ENCOUNTER
----- Message from Hollis Liu MD sent at 5/21/2021  9:55 AM CDT -----  Regarding: FW: Issue with lab orders on 05/20/21  Please ask patient to come back for labs    Thanks    LG  ----- Message -----  From: Reena Gee  Sent: 5/21/2021   6:46 AM CDT  To: Hollis Liu MD  Subject: Issue with lab orders on 05/20/21                Dr Liu -    Your patient walked in yesterday late to our lab.  You ordered some lab tests as Future and some as Routine.  This issue is that the Routine lab tests did not get drawn because they were not seen by our lab staff at the time of arrival of the patient.  We see the Future orders and release them.  When tests are not ordered as Future for an outpatient, they show up in a different application as the patient was already drawn in the clinic and the lab is awaiting the specimen to be dropped off to the lab to be received by the lab.    Unfortunately the patient will need to come back for these tests and please enter the needed tests back into Epic as Future.  Please contact the patient about coming back for a lab appointment.    Thank you -    ENRIKE London  [facility]  [phone]

## 2021-05-21 NOTE — TELEPHONE ENCOUNTER
Please call patient.  Some of her lab orders were not entered correctly.  We will need her to go to [facility] at her convenience to have a few more non-fasting labs drawn.    Edilia Gallardo RN BSN  [facility]  [phone]

## 2021-05-27 NOTE — TELEPHONE ENCOUNTER
RECORDS STATUS - ALL OTHER DIAGNOSIS      RECORDS RECEIVED FROM: [facility]   DATE RECEIVED: 5/27   NOTES STATUS DETAILS   OFFICE NOTE from referring provider Hollis Peguero MD in  VASCULAR CENTER: 5/20/21   OFFICE NOTE from medical oncologist     DISCHARGE SUMMARY from hospital [facility] 4/8/21   DISCHARGE REPORT from the ER     OPERATIVE REPORT Epic 4/12/21: Re-Exploration Left Lower Extremity With Popliteal and Tibial Embolectomy    4/11/21: LEFT LEG ANTERIOR TIBIAL AND POSTERIOR TIBIAL EMBOLECTOMIES, FEMORAL POPITEAL VEIN PATCH, ANGIOGRAM, FASCIOTOMIES   MEDICATION LIST [facility] 5/14/21   CLINICAL TRIAL TREATMENTS TO DATE     LABS     PATHOLOGY REPORTS  4/11/21: Surg Path   ANYTHING RELATED TO DIAGNOSIS Epic 5/20/21   GENONOMIC TESTING     TYPE: Epic 4/14/21: NGS   IMAGING (NEED IMAGES & REPORT)     XR PACS [facility]   CT SCANS PACS [facility]   MRI     MAMMO     ULTRASOUND PACS [facility]   PET

## 2021-06-01 ENCOUNTER — TELEPHONE (OUTPATIENT)
Dept: OTHER | Facility: CLINIC | Age: 31
End: 2021-06-01

## 2021-06-01 NOTE — TELEPHONE ENCOUNTER
Follow up to 5/20/21    Please arrange for:      Follow up visit around 7/20/21.  May be virtual.  No labs/images prior.    Edilia Gallardo RN BSN  [facility]  [phone]

## 2021-06-02 DIAGNOSIS — D75.839 THROMBOCYTOSIS: ICD-10-CM

## 2021-06-02 DIAGNOSIS — Z15.89 JAK-2 GENE MUTATION: ICD-10-CM

## 2021-06-02 LAB
BASOPHILS # BLD AUTO: 0.1 10E9/L (ref 0–0.2)
BASOPHILS NFR BLD AUTO: 0.4 %
DIFFERENTIAL METHOD BLD: ABNORMAL
EOSINOPHIL # BLD AUTO: 0.1 10E9/L (ref 0–0.7)
EOSINOPHIL NFR BLD AUTO: 0.8 %
ERYTHROCYTE [DISTWIDTH] IN BLOOD BY AUTOMATED COUNT: 13.6 % (ref 10–15)
HCT VFR BLD AUTO: 37.1 % (ref 35–47)
HGB BLD-MCNC: 12.2 G/DL (ref 11.7–15.7)
LYMPHOCYTES # BLD AUTO: 2.2 10E9/L (ref 0.8–5.3)
LYMPHOCYTES NFR BLD AUTO: 19.6 %
MCH RBC QN AUTO: 28.2 PG (ref 26.5–33)
MCHC RBC AUTO-ENTMCNC: 32.9 G/DL (ref 31.5–36.5)
MCV RBC AUTO: 86 FL (ref 78–100)
MONOCYTES # BLD AUTO: 0.6 10E9/L (ref 0–1.3)
MONOCYTES NFR BLD AUTO: 4.9 %
NEUTROPHILS # BLD AUTO: 8.5 10E9/L (ref 1.6–8.3)
NEUTROPHILS NFR BLD AUTO: 74.3 %
PLATELET # BLD AUTO: 587 10E9/L (ref 150–450)
RBC # BLD AUTO: 4.33 10E12/L (ref 3.8–5.2)
RETICS # AUTO: 47.2 10E9/L (ref 25–95)
RETICS/RBC NFR AUTO: 1.1 % (ref 0.5–2)
WBC # BLD AUTO: 11.4 10E9/L (ref 4–11)

## 2021-06-02 PROCEDURE — 85045 AUTOMATED RETICULOCYTE COUNT: CPT | Performed by: INTERNAL MEDICINE

## 2021-06-02 PROCEDURE — 36415 COLL VENOUS BLD VENIPUNCTURE: CPT | Performed by: INTERNAL MEDICINE

## 2021-06-02 PROCEDURE — 85025 COMPLETE CBC W/AUTO DIFF WBC: CPT | Performed by: INTERNAL MEDICINE

## 2021-06-03 LAB — COPATH REPORT: NORMAL

## 2021-06-04 ENCOUNTER — OFFICE VISIT (OUTPATIENT)
Dept: TRANSPLANT | Facility: CLINIC | Age: 31
End: 2021-06-04
Attending: INTERNAL MEDICINE
Payer: COMMERCIAL

## 2021-06-04 ENCOUNTER — PRE VISIT (OUTPATIENT)
Dept: TRANSPLANT | Facility: CLINIC | Age: 31
End: 2021-06-04

## 2021-06-04 VITALS
HEART RATE: 115 BPM | OXYGEN SATURATION: 99 % | TEMPERATURE: 98.6 F | DIASTOLIC BLOOD PRESSURE: 84 MMHG | HEIGHT: 69 IN | WEIGHT: 258.9 LBS | SYSTOLIC BLOOD PRESSURE: 139 MMHG | RESPIRATION RATE: 18 BRPM | BODY MASS INDEX: 38.34 KG/M2

## 2021-06-04 DIAGNOSIS — Z15.89 JAK-2 GENE MUTATION: ICD-10-CM

## 2021-06-04 DIAGNOSIS — D75.839 THROMBOCYTOSIS: ICD-10-CM

## 2021-06-04 PROCEDURE — 99205 OFFICE O/P NEW HI 60 MIN: CPT | Performed by: INTERNAL MEDICINE

## 2021-06-04 PROCEDURE — G0463 HOSPITAL OUTPT CLINIC VISIT: HCPCS

## 2021-06-04 ASSESSMENT — MIFFLIN-ST. JEOR: SCORE: 1944.61

## 2021-06-04 ASSESSMENT — PAIN SCALES - GENERAL: PAINLEVEL: NO PAIN (0)

## 2021-06-04 NOTE — NURSING NOTE
"Oncology Rooming Note    June 4, 2021 3:16 PM   Sruthi Mac is a 30 year old female who presents for:    Chief Complaint   Patient presents with     Oncology Clinic Visit     NEW; Thrombocytosis (H); LENA-2 gene mutation      Initial Vitals: /84   Pulse 115   Temp 98.6  F (37  C) (Oral)   Resp 18   Ht 1.746 m (5' 8.74\")   Wt 117.4 kg (258 lb 14.4 oz)   LMP 05/14/2021   SpO2 99%   BMI 38.52 kg/m   Estimated body mass index is 38.52 kg/m  as calculated from the following:    Height as of this encounter: 1.746 m (5' 8.74\").    Weight as of this encounter: 117.4 kg (258 lb 14.4 oz). Body surface area is 2.39 meters squared.  No Pain (0) Comment: Data Unavailable   Patient's last menstrual period was 05/14/2021.  Allergies reviewed: Yes  Medications reviewed: Yes    Medications: Medication refills not needed today.  Pharmacy name entered into [place] [facility]:    [id number] - [place], [place] - [address]  [id number] - [place], [place] - [address]    Clinical concerns: New patient.       Soni Hurd MA            "

## 2021-06-04 NOTE — LETTER
6/4/2021         RE: Sruthi Mac  [address]  [facility] [address]        Dear Colleague,    Thank you for referring your patient, Sruthi Mac, to the [facility] [place]. Please see a copy of my visit note below.    [facility] Consultation       Sruthi Mac is a 30 year old female referred for acute arterial thrombosis and Jak2+ MPN.       Hematologic history:    Sruthi Mac is a 30-year-old female with history of depression and anxiety, who presented with claudication symptoms in lower extremities over the past few months, and then acute onset of left leg pain in April 2021.  She started having sudden left foot numbness and pain with toe discoloration over 5 days, found to have acute occlusion of the left popliteal artery.  There was no venous thrombosis.  She was on estrogen-containing oral contraceptive pills.  She was taken emergently for mechanical thrombectomy with initiation of lytic therapy on April 8.  Bilateral lower extremity angiogram on 4/9 showed filling defects in the right profundofemoral artery and right posterior tibial and peroneal arteries.  On the left side, there is improved thrombus burden in the popliteal artery and numerous filling defects defects in the runoff arteries in the left lower extremity.  On 4/10, angiogram showed worsening thrombus burden despite thrombolysis with TPA.  Multiple emboli in right lower extremity including peripheral occluded profundofemoral artery.  On 4/11, underwent open embolectomy of the left popliteal, anterior tibial,, tibioperoneal arteries with vein patch angioplasty and fasciotomies.  On 4/12, had reexploration of left leg with left popliteal and tibial embolectomy and left distal anterior posterior tibial artery embolectomy.  HIT antibodies sent and negative.  She had a hypercoagulable work-up showed normal beta-2 glycoprotein IgM and IgG and normal  Cardiolipin IgM and IgG. LAC neg.  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 MTHFR mutation negative.  She is noted to have elevated platelet count up to 500s and leukocytosis of 12.       Some numbness in feet, especially left leg. Two smallest toes lost their nail.   Normal movement of feet and toes. Can't flex 4 smaller toes. A little better everyday.No pain but calf is tight. Limping a little.   Walking more every day. Leg bending easier.   Following up with vascular surgery.     Both legs had claudication starting in January. Could barely walk to mailbox. This is no longer there. Now can walk without pain in legs.     No chest pain or pressure with walking.   No nausea or arm/jaw pain with walking.   Some SOB from deconditioning but nothing new or changing.     No swelling or pain in arms. No pain in abdomen.     Lost 25 lbs since September, intentional.  No night sweats.   Itching after shaving legs.   No fevers or chills.  Did not get Covid. Got vaccine.     Dec 2019- cough, difficulty getting full breath in. Hurt to breathe. Jesus Manuel had similar symptoms. Took a few weeks to recover.     Birth control pill- on for 5-8 years. Now off.     Social:   Non smoker.   for [employer].   Engaged.     Family Hx:   Dad- T2 DM, hyperlipidemia, testicular cancer, melanoma  Mother- hyperlipidemia       Date Treatment Response Toxicities/Complications                                  HPI:  Please see my entry above for hematologic history.        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  Prior records show an elevated platelet count of 423 in 2005, which normalized to 333 in 2012.  In April during her hospitalization for thrombosis, platelet count increased to 556.  She also had a leukocytosis of 12 with a normal differential.  She was  acutely anemic at 8, which has now improved to 12.2.  She was tested for hypercoagulable disorders and had normal beta-2 glycoprotein IgM and IgG.  Her baseline APTT was normal at 31.  She had a MPN panel sent from blood which showed JAK2 V617F mutation, diagnostic of myeloproliferative neoplasm.  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    She is high risk ET based on recent episode of severe acute arterial thrombosis with critical limb ischemia and Jer 2 mutation.  Treatment includes anticoagulation, which she has been initiated on with Eliquis and Plavix.  Hydroxyurea is often used to reduce platelet count, with the goal of platelets less than 600.  As her current platelet count is around 500, there is no indication to start this at this time.  Bone marrow biopsy should be done to evaluate for bone marrow fibrosis, but at this time is more important important to keep her on therapeutic anticoagulation as it would not acutely .  We discussed bone marrow biopsy and she is agreeable to get this in about 6 months.    We discussed complications of essential thrombocytosis including both arterial and venous clotting, and transformation to myelofibrosis including risk of acute myeloid leukemia.  Her blood counts do not show any signs of secondary myelofibrosis at this time, but we would obtain baseline bone marrow biopsy as above.  Due to her severe acute critical limb ischemia, she will likely need she will likely need lifelong anticoagulation.  She should also modify cardiovascular risk factors as able.  Cholesterol panel was done and showed elevated triglycerides of 471, LDL 80, HDL 42. She does not have any symptoms of CAD or CVA.     We also discussed family planning as she is looking to have children in the future.  She will be getting  in a few months.  ET and thrombosis is not an absolute contraindication to pregnancy, however she would be considered high risk and would need to  be on anticoagulation throughout pregnancy (Lovenox) as well as following with high risk obstetrics.  I did advise her to not try to not become pregnant within the next 6 months.      - continue Eliquis and Plavix   - CBC every 2 months  - BM bx and RTC with me in 6 months   - check Hgb A1C     Eliana David MD   of Medicine  Hematology, Oncology and Transplantation   Pager: [phone]          ROS:    10 point ROS neg other than the symptoms noted above in the HPI.        Past Medical History:   Diagnosis Date     Anxiety      Cystic acne      Depression      Panic attack        Past Surgical History:   Procedure Laterality Date     EMBOLECTOMY LOWER EXTREMITY Left 4/11/2021    Procedure: LEFT LEG ANTERIOR TIBIAL AND POSTERIOR TIBIAL EMBOLECTOMIES, FEMORAL POPITEAL VEIN PATCH, ANGIOGRAM, FASCIOTOMIES;  Surgeon: Ulises Gonzalez;  Location: [facility] OR     IR ANGIOGRAM THROUGH CATHETER FOLLOW UP  4/9/2021     IR ANGIOGRAM THROUGH CATHETER FOLLOW UP  4/10/2021     IR LOWER EXTREMITY ANGIOGRAM LEFT  4/8/2021     THROMBECTOMY LOWER EXTREMITY Left 4/12/2021    Procedure: Re-Exploration Left Lower Extremity With Popliteal and Tibial Embolectomy.;  Surgeon: Ulises Gonzalez;  Location: [facility] OR     wisdom teeth remova         Family History   Problem Relation Age of Onset     Hypertension Father      Diabetes Father      Myocardial Infarction Father        Social History     Tobacco Use     Smoking status: Never Smoker     Smokeless tobacco: Never Used   Substance Use Topics     Alcohol use: Yes     Frequency: 2-4 times a month     Drug use: Never          Allergies   Allergen Reactions     Erythromycin Unknown     Pcn [Penicillins] Unknown        Current Outpatient Medications   Medication Sig Dispense Refill     apixaban ANTICOAGULANT (ELIQUIS) 5 MG tablet Take 1 tablet (5 mg) by mouth 2 times daily 60 tablet 0     atorvastatin (LIPITOR) 20 MG tablet Take 1 tablet (20 mg) by mouth daily 90 tablet 3  "    busPIRone (BUSPAR) 10 MG tablet Take 10 mg by mouth 2 times daily       clopidogrel (PLAVIX) 75 MG tablet Take 1 tablet (75 mg) by mouth daily 30 tablet 3     Ferrous Sulfate (IRON) 325 (65 Fe) MG tablet Take 1 tablet by mouth daily       Lactobacillus (PROBIOTIC ACIDOPHILUS PO) Take by mouth daily       levothyroxine (SYNTHROID/LEVOTHROID) 25 MCG tablet Take 1 tablet (25 mcg) by mouth daily 90 tablet 1     magnesium oxide 400 MG CAPS Take by mouth daily       multivitamin, therapeutic (THERA-VIT) TABS tablet Take 1 tablet by mouth daily       sertraline (ZOLOFT) 50 MG tablet Take 100 mg by mouth daily        spironolactone (ALDACTONE) 50 MG tablet Take 50 mg by mouth daily       Vitamin D3 (CHOLECALCIFEROL) 25 mcg (1000 units) tablet Take 1 tablet by mouth daily           Physical Exam:     Vital Signs: /84   Pulse 115   Temp 98.6  F (37  C) (Oral)   Resp 18   Ht 1.746 m (5' 8.74\")   Wt 117.4 kg (258 lb 14.4 oz)   LMP 05/14/2021   SpO2 99%   BMI 38.52 kg/m          KPS: 90    General Appearance: alert and no distress  Eyes: PERRL, conjunctiva and lids normal, sclera nonicteric  Ears/Nose/M/Throat: Oral mucosa and posterior oropharynx normal, moist mucous membranes  Neck supple, non-tender, free range of motion, no adenopathy  Cardio/Vascular:regular rate and rhythm, normal S1 and S2, no murmur  Resp Effort And Auscultation: Normal - Clear to auscultation without rales, rhonchi, or wheezing.  GI: soft, nontender, bowel sounds present in all four quadrants, no hepatosplenomegaly  Lymphatics:no significant enlargement of lymph nodes globally   Musculoskeletal: Musculoskeletal normal  Edema: trace  Skin: Full thickness skin Ulceration on left medial calf about size of a quarter, clean based. Legs appear well perfused.   Neurologic: Gait normal. Some reduced sensation left foot.   Psych/Affect: Mood and affect are appropriate.        Sruthi understood the above assessment and recommendations.  " Multiple questions answered.  No barriers to learning identified.       Total time: 60 minutes  Counseling time: 50 minutes  Prolonged service:  No     Eliana David MD    ------------------------------------------------------------------------------------------------------------------------------------------------    Patient Care Team       Relationship Specialty Notifications Start End    Rahel Kirkpatrick DO PCP - General OB/Gyn  4/8/21     Phone: [phone] Fax: [phone]         [facility] [address]  [facility] [address]    Ulises Gonzalez Assigned Heart and Vascular Provider   4/25/21     Phone: [phone] Fax: [phone] [address] [facility] [address]              Again, thank you for allowing me to participate in the care of your patient.        Sincerely,        Eliana David MD

## 2021-06-04 NOTE — PROGRESS NOTES
[facility] Consultation       Sruthi Mac is a 30 year old female referred for acute arterial thrombosis and Jak2+ MPN.       Hematologic history:    Sruthi Mac is a 30-year-old female with history of depression and anxiety, who presented with claudication symptoms in lower extremities over the past few months, and then acute onset of left leg pain in April 2021.  She started having sudden left foot numbness and pain with toe discoloration over 5 days, found to have acute occlusion of the left popliteal artery.  There was no venous thrombosis.  She was on estrogen-containing oral contraceptive pills.  She was taken emergently for mechanical thrombectomy with initiation of lytic therapy on April 8.  Bilateral lower extremity angiogram on 4/9 showed filling defects in the right profundofemoral artery and right posterior tibial and peroneal arteries.  On the left side, there is improved thrombus burden in the popliteal artery and numerous filling defects defects in the runoff arteries in the left lower extremity.  On 4/10, angiogram showed worsening thrombus burden despite thrombolysis with TPA.  Multiple emboli in right lower extremity including peripheral occluded profundofemoral artery.  On 4/11, underwent open embolectomy of the left popliteal, anterior tibial,, tibioperoneal arteries with vein patch angioplasty and fasciotomies.  On 4/12, had reexploration of left leg with left popliteal and tibial embolectomy and left distal anterior posterior tibial artery embolectomy.  HIT antibodies sent and negative.  She had a hypercoagulable work-up showed normal beta-2 glycoprotein IgM and IgG and normal Cardiolipin IgM and IgG. LAC neg.  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 MTHFR mutation negative.  She is noted to have elevated platelet count up to 500s and leukocytosis of 12.       Some numbness in feet, especially left leg. Two smallest toes lost their nail.   Normal movement of feet and toes.  Can't flex 4 smaller toes. A little better everyday.No pain but calf is tight. Limping a little.   Walking more every day. Leg bending easier.   Following up with vascular surgery.     Both legs had claudication starting in January. Could barely walk to mailbox. This is no longer there. Now can walk without pain in legs.     No chest pain or pressure with walking.   No nausea or arm/jaw pain with walking.   Some SOB from deconditioning but nothing new or changing.     No swelling or pain in arms. No pain in abdomen.     Lost 25 lbs since September, intentional.  No night sweats.   Itching after shaving legs.   No fevers or chills.  Did not get Covid. Got vaccine.     Dec 2019- cough, difficulty getting full breath in. Hurt to breathe. Jesus Manuel had similar symptoms. Took a few weeks to recover.     Birth control pill- on for 5-8 years. Now off.     Social:   Non smoker.   for [employer].   Engaged.     Family Hx:   Dad- T2 DM, hyperlipidemia, testicular cancer, melanoma  Mother- hyperlipidemia       Date Treatment Response Toxicities/Complications                                  HPI:  Please see my entry above for hematologic history.        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  Prior records show an elevated platelet count of 423 in 2005, which normalized to 333 in 2012.  In April during her hospitalization for thrombosis, platelet count increased to 556.  She also had a leukocytosis of 12 with a normal differential.  She was acutely anemic at 8, which has now improved to 12.2.  She was tested for hypercoagulable disorders and had normal beta-2 glycoprotein IgM and IgG.  Her baseline APTT was normal at 31.  She had a MPN panel sent from blood which showed JAK2 V617F mutation, diagnostic of myeloproliferative neoplasm.  Most  likely diagnosis is essential thrombocytosis.    She is high risk ET based on recent episode of severe acute arterial thrombosis with critical limb ischemia and Jer 2 mutation.  Treatment includes anticoagulation, which she has been initiated on with Eliquis and Plavix.  Hydroxyurea is often used to reduce platelet count, with the goal of platelets less than 600.  As her current platelet count is around 500, there is no indication to start this at this time.  Bone marrow biopsy should be done to evaluate for bone marrow fibrosis, but at this time is more important important to keep her on therapeutic anticoagulation as it would not acutely .  We discussed bone marrow biopsy and she is agreeable to get this in about 6 months.    We discussed complications of essential thrombocytosis including both arterial and venous clotting, and transformation to myelofibrosis including risk of acute myeloid leukemia.  Her blood counts do not show any signs of secondary myelofibrosis at this time, but we would obtain baseline bone marrow biopsy as above.  Due to her severe acute critical limb ischemia, she will likely need she will likely need lifelong anticoagulation.  She should also modify cardiovascular risk factors as able.  Cholesterol panel was done and showed elevated triglycerides of 471, LDL 80, HDL 42. She does not have any symptoms of CAD or CVA.     We also discussed family planning as she is looking to have children in the future.  She will be getting  in a few months.  ET and thrombosis is not an absolute contraindication to pregnancy, however she would be considered high risk and would need to be on anticoagulation throughout pregnancy (Lovenox) as well as following with high risk obstetrics.  I did advise her to not try to not become pregnant within the next 6 months.      - continue Eliquis and Plavix   - CBC every 2 months  - BM bx and RTC with me in 6 months   - check Hgb A1C     Eliana  MD Frankie   of Medicine  Hematology, Oncology and Transplantation   Pager: [phone]          ROS:    10 point ROS neg other than the symptoms noted above in the HPI.        Past Medical History:   Diagnosis Date     Anxiety      Cystic acne      Depression      Panic attack        Past Surgical History:   Procedure Laterality Date     EMBOLECTOMY LOWER EXTREMITY Left 4/11/2021    Procedure: LEFT LEG ANTERIOR TIBIAL AND POSTERIOR TIBIAL EMBOLECTOMIES, FEMORAL POPITEAL VEIN PATCH, ANGIOGRAM, FASCIOTOMIES;  Surgeon: Ulises Gonzalez;  Location: [facility] OR     IR ANGIOGRAM THROUGH CATHETER FOLLOW UP  4/9/2021     IR ANGIOGRAM THROUGH CATHETER FOLLOW UP  4/10/2021     IR LOWER EXTREMITY ANGIOGRAM LEFT  4/8/2021     THROMBECTOMY LOWER EXTREMITY Left 4/12/2021    Procedure: Re-Exploration Left Lower Extremity With Popliteal and Tibial Embolectomy.;  Surgeon: Ulises Gonzalez;  Location: [facility] OR     wisdom teeth remova         Family History   Problem Relation Age of Onset     Hypertension Father      Diabetes Father      Myocardial Infarction Father        Social History     Tobacco Use     Smoking status: Never Smoker     Smokeless tobacco: Never Used   Substance Use Topics     Alcohol use: Yes     Frequency: 2-4 times a month     Drug use: Never          Allergies   Allergen Reactions     Erythromycin Unknown     Pcn [Penicillins] Unknown        Current Outpatient Medications   Medication Sig Dispense Refill     apixaban ANTICOAGULANT (ELIQUIS) 5 MG tablet Take 1 tablet (5 mg) by mouth 2 times daily 60 tablet 0     atorvastatin (LIPITOR) 20 MG tablet Take 1 tablet (20 mg) by mouth daily 90 tablet 3     busPIRone (BUSPAR) 10 MG tablet Take 10 mg by mouth 2 times daily       clopidogrel (PLAVIX) 75 MG tablet Take 1 tablet (75 mg) by mouth daily 30 tablet 3     Ferrous Sulfate (IRON) 325 (65 Fe) MG tablet Take 1 tablet by mouth daily       Lactobacillus (PROBIOTIC ACIDOPHILUS PO) Take by mouth daily  "      levothyroxine (SYNTHROID/LEVOTHROID) 25 MCG tablet Take 1 tablet (25 mcg) by mouth daily 90 tablet 1     magnesium oxide 400 MG CAPS Take by mouth daily       multivitamin, therapeutic (THERA-VIT) TABS tablet Take 1 tablet by mouth daily       sertraline (ZOLOFT) 50 MG tablet Take 100 mg by mouth daily        spironolactone (ALDACTONE) 50 MG tablet Take 50 mg by mouth daily       Vitamin D3 (CHOLECALCIFEROL) 25 mcg (1000 units) tablet Take 1 tablet by mouth daily           Physical Exam:     Vital Signs: /84   Pulse 115   Temp 98.6  F (37  C) (Oral)   Resp 18   Ht 1.746 m (5' 8.74\")   Wt 117.4 kg (258 lb 14.4 oz)   LMP 05/14/2021   SpO2 99%   BMI 38.52 kg/m          KPS: 90    General Appearance: alert and no distress  Eyes: PERRL, conjunctiva and lids normal, sclera nonicteric  Ears/Nose/M/Throat: Oral mucosa and posterior oropharynx normal, moist mucous membranes  Neck supple, non-tender, free range of motion, no adenopathy  Cardio/Vascular:regular rate and rhythm, normal S1 and S2, no murmur  Resp Effort And Auscultation: Normal - Clear to auscultation without rales, rhonchi, or wheezing.  GI: soft, nontender, bowel sounds present in all four quadrants, no hepatosplenomegaly  Lymphatics:no significant enlargement of lymph nodes globally   Musculoskeletal: Musculoskeletal normal  Edema: trace  Skin: Full thickness skin Ulceration on left medial calf about size of a quarter, clean based. Legs appear well perfused.   Neurologic: Gait normal. Some reduced sensation left foot.   Psych/Affect: Mood and affect are appropriate.        Sruthi understood the above assessment and recommendations.  Multiple questions answered.  No barriers to learning identified.       Total time: 60 minutes  Counseling time: 50 minutes  Prolonged service:  No     Eliana David, " MD    ------------------------------------------------------------------------------------------------------------------------------------------------    Patient Care Team       Relationship Specialty Notifications Start End    Rahel Kirkpatrick DO PCP - General OB/Gyn  4/8/21     Phone: [phone] Fax: [phone]         [facility]  [address] [place] [address]    Ulises Gonzalez Assigned Heart and Vascular Provider   4/25/21     Phone: [phone] Fax: [phone] [address] [facility] [address]

## 2021-06-06 DIAGNOSIS — I70.209 ACUTE OCCLUSION OF ARTERY OF LOWER EXTREMITY (H): ICD-10-CM

## 2021-06-07 RX ORDER — APIXABAN 5 MG/1
TABLET, FILM COATED ORAL
Qty: 60 TABLET | Refills: 1 | Status: SHIPPED | OUTPATIENT
Start: 2021-06-07 | End: 2021-07-27

## 2021-06-07 NOTE — TELEPHONE ENCOUNTER
apixaban ANTICOAGULANT (ELIQUIS) 5 MG tablet  Last Written Prescription Date:  5/14/21  Last Fill Quantity: 60,  # refills: 0     Last office visit: 5/20/2021   Follow up scheduled :  July 27, 2021 with Dr. Liu  (Patient to remain on Eliquis and Plavix until that time).      Unable to fill per [facility] protocol.  Medication and pharmacy loaded.      Direct Oral Anticoagulant Agents [id number]/06/2021 09:25 AM   Normal Platelets on file in past 12 months Protocol Details    Medication is active on med list          Edilia Gallardo RN BSN  [facility]  [phone]

## 2021-07-27 ENCOUNTER — VIRTUAL VISIT (OUTPATIENT)
Dept: OTHER | Facility: CLINIC | Age: 31
End: 2021-07-27
Attending: INTERNAL MEDICINE
Payer: COMMERCIAL

## 2021-07-27 DIAGNOSIS — I74.3 ARTERIAL EMBOLISM OF LEFT LEG (H): ICD-10-CM

## 2021-07-27 DIAGNOSIS — E03.9 HYPOTHYROIDISM, UNSPECIFIED TYPE: ICD-10-CM

## 2021-07-27 DIAGNOSIS — E78.41 ELEVATED LIPOPROTEIN(A): ICD-10-CM

## 2021-07-27 DIAGNOSIS — Z15.89 JAK-2 GENE MUTATION: ICD-10-CM

## 2021-07-27 DIAGNOSIS — D75.839 THROMBOCYTOSIS: Primary | ICD-10-CM

## 2021-07-27 DIAGNOSIS — E78.1 HYPERTRIGLYCERIDEMIA: ICD-10-CM

## 2021-07-27 PROCEDURE — 99214 OFFICE O/P EST MOD 30 MIN: CPT | Mod: 95 | Performed by: INTERNAL MEDICINE

## 2021-07-27 NOTE — PATIENT INSTRUCTIONS
1.  Please stop Plavix (clopidogrel) and start coated baby aspirin 81 mg daily with food    2.  Continue Eliquis, new refill sent     3.  Go for fasting labs orders placed at your convenient time    4.  When planning for pregnancy you need to stop atorvastatin a month before.    5.When planning for pregnancy you need to stop Eliquis and initiate Lovenox 1 mg/kg dose twice a day injections    6.  Follow-up with hematology and oncology clinic as scheduled    7.  Visit with me in January or February 2022

## 2021-07-27 NOTE — PROGRESS NOTES
Sruthi is a 30 year old who is being evaluated via a billable video visit.      How would you like to obtain your AVS? Mail a copy  If the video visit is dropped, the invitation should be resent by: Text to cell phone: [phone]  Will anyone else be joining your video visit? No      Lakesha Kenney MA  Provider visit note:    Chief complaint:  Follow-up visit  Leg symptoms improved  Seen and evaluated by hematologist  Getting every other month CBC  Excessive menstrual cycle  Taking Eliquis and also Plavix  Medication refills etc.      History of present illness:  This is a very pleasant 30-year-old female with history of prolonged hospitalization for bilateral lower extremity arterial thrombus left worse than right side with acute on chronic left leg ischemia underwent emergent abdominal aortic and left lower extremity diagnostic arteriography and mechanical thrombectomy of left popliteal artery and redo surgery etc. in first week of April 2021  Positive JAK2 mutation and essential thrombocytosis here today for the follow-up visit currently taking Plavix and Eliquis tolerating without any problems.  She was seen and evaluated by  few weeks ago  She is having excessive menstrual bleeding due to combination of Eliquis and also Plavix.  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  Right leg was relatively asymptomatic despite emboli to the profundus femoral and tibial peroneal arteries with a reconstituted mid posterior tibial artery with adequate collaterals.     The occlusion of the left popliteal artery and tibial vessels that did not respond to lytic therapy and required extensive surgical embolectomy with patch angioplasty to the below-knee popliteal artery and ankle AT/PT embolectomies.  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  There is adequate perfusion to her left foot though multiple skin changes were noted.  Has been on Eliquis and Plavix since then having excessive menstrual bleed.  She stopped the birth control pill since then  She started progesterone therapy , initiated by OB/GYN physician and she wants to get pregnant in the near future.  She is planning to get  in few weeks  She was seen and evaluated by  recently, suggested stopping Plavix after 2 months  Etiology of these emboli is unclear despite extensive work-up.  Echocardiogram and recent DEYVI both negative.    However recently LENA 2 mutation was noted and she has a underlying thrombocytosis this raises the question of essential thrombocytosis and possible contribution to her arterial thrombus.  She has a elevated lipoprotein a which is atherogenic and thrombogenic  Rest of the extensive hypercoagulable studies negative and all of these were drawn before initiation of anticoagulation  Zio patch was unremarkable  She also has a left adnexal cyst CA-125 was negative.     Plan:  Stop Plavix and start baby aspirin daily  Continue Eliquis 5 mg twice a day new prescription sent (she is a long-term/lifetime candidate  for anticoagulation if tolerates)  She is planning to get  in few weeks and also planning for children discussed with the patient contraindication of Eliquis during pregnancy and she was advised to take Lovenox injections instead  I also discussed contraindication of statin during pregnancy and this need to be stopped 1 month before active planning of pregnancy.  Written instructions given  She verbalized understanding the plan  Follow-up with the hematology oncologist as scheduled  Walk as much as she can    Return to clinic or virtual visit in January or February 2022        (E78.41) Elevated lipoprotein(a)  (E78.1) Hypertriglyceridemia  Comment: She is taking atorvastatin and tolerating without any problems goal of LDL is less than 70  She lost weight  Stopped birth control pills  TLC suggested  Repeat lipid panel , lipoprotein a, thyroid function tests and CMP  Order placed             (E03.9) Hypothyroidism, unspecified type  Comment: Currently on replacement with low-dose levothyroxine clinically euthyroid continue the same will check thyroid function tests and adjust dose if needed      Video Visit Details    Type of Service: Video Visit    Video Start Time: 1:00 PM     More than 30 minutes spent on the date of the encounter during chart review, recent imaging studies, laboratory tests and further activities as noted above    Originating Location (patient location): Home    Distant Location (provider location): [facility]    Mode of Communication:  Video Conference via [employer] Here    This visit is being conducted as a virtual visit due to the emphasis on mitigation of the COVID-19 virus pandemic. The clinician has decided that the risk of an in-office visit outweighs the benefit for this patient.     Hollis Liu MD, KIKA, [facility]  Vascular medicine

## 2021-08-11 DIAGNOSIS — I74.3 ARTERIAL EMBOLISM OF LEFT LEG (H): ICD-10-CM

## 2021-08-11 RX ORDER — APIXABAN 5 MG/1
TABLET, FILM COATED ORAL
Qty: 75 TABLET | Refills: 0 | OUTPATIENT
Start: 2021-08-11

## 2021-08-11 NOTE — TELEPHONE ENCOUNTER
apixaban ANTICOAGULANT (ELIQUIS ANTICOAGULANT) 5 MG tablet  Last Written Prescription Date:  7/27/21  Last Fill Quantity: 180,  # refills: 3       One year supply to same pharmacy 7/27/21.  Edilia Gallardo RN BSN  [facility]  [phone]

## 2021-08-16 ENCOUNTER — TRANSFERRED RECORDS (OUTPATIENT)
Dept: HEALTH INFORMATION MANAGEMENT | Facility: CLINIC | Age: 31
End: 2021-08-16

## 2021-08-17 ENCOUNTER — MEDICAL CORRESPONDENCE (OUTPATIENT)
Dept: HEALTH INFORMATION MANAGEMENT | Facility: CLINIC | Age: 31
End: 2021-08-17

## 2021-08-17 ENCOUNTER — TRANSCRIBE ORDERS (OUTPATIENT)
Dept: MATERNAL FETAL MEDICINE | Facility: CLINIC | Age: 31
End: 2021-08-17

## 2021-08-17 DIAGNOSIS — Z31.69 ENCOUNTER FOR PRECONCEPTION CONSULTATION: Primary | ICD-10-CM

## 2021-08-20 ENCOUNTER — TRANSCRIBE ORDERS (OUTPATIENT)
Dept: MATERNAL FETAL MEDICINE | Facility: CLINIC | Age: 31
End: 2021-08-20

## 2021-08-20 DIAGNOSIS — Z31.69 ENCOUNTER FOR PRECONCEPTION CONSULTATION: Primary | ICD-10-CM

## 2021-08-23 ENCOUNTER — PATIENT OUTREACH (OUTPATIENT)
Dept: ONCOLOGY | Facility: CLINIC | Age: 31
End: 2021-08-23

## 2021-08-23 NOTE — PROGRESS NOTES
Spoke with Sruthi in regards to the VM she left this morning. She was concerned she's met with high risk OB and now that she's  she would like to start a family. The OB had recommended approval from hematology prior to conception. She would like to speak with Dr. Eliana David or another hematologist.     Had already sent request to schedule an appointment for Sruthi on Friday 8/27 so the appointment is on hold. Asked and confirmed with her that this will work for her. She is in agreement and states she is having labs drawn on Wed 8/25 at the [facility] labs. Agreed we will have orders placed before her appointment to have results completed by her appointment with MD. No other needs. Sent individual MyChart with contact information in case she needs to contact the clinic and/ or myself again.    Message sent to provider and future provider (Dr. Keily Irving who will be assuming care in October) regarding the request and the current need for plan pre-conception.

## 2021-08-24 DIAGNOSIS — Z15.89 JAK2 V617F MUTATION: Primary | ICD-10-CM

## 2021-08-25 ENCOUNTER — LAB (OUTPATIENT)
Dept: LAB | Facility: CLINIC | Age: 31
End: 2021-08-25
Payer: COMMERCIAL

## 2021-08-25 DIAGNOSIS — E78.1 HYPERTRIGLYCERIDEMIA: ICD-10-CM

## 2021-08-25 DIAGNOSIS — Z15.89 JAK-2 GENE MUTATION: ICD-10-CM

## 2021-08-25 DIAGNOSIS — E03.9 HYPOTHYROIDISM, UNSPECIFIED TYPE: ICD-10-CM

## 2021-08-25 DIAGNOSIS — E78.41 ELEVATED LIPOPROTEIN(A): ICD-10-CM

## 2021-08-25 DIAGNOSIS — Z15.89 JAK2 V617F MUTATION: ICD-10-CM

## 2021-08-25 DIAGNOSIS — D75.839 THROMBOCYTOSIS: ICD-10-CM

## 2021-08-25 LAB
BASOPHILS # BLD AUTO: 0 10E3/UL (ref 0–0.2)
BASOPHILS NFR BLD AUTO: 0 %
EOSINOPHIL # BLD AUTO: 0.1 10E3/UL (ref 0–0.7)
EOSINOPHIL NFR BLD AUTO: 1 %
ERYTHROCYTE [DISTWIDTH] IN BLOOD BY AUTOMATED COUNT: 15.5 % (ref 10–15)
HBA1C MFR BLD: 5.4 % (ref 0–5.6)
HCT VFR BLD AUTO: 38.9 % (ref 35–47)
HGB BLD-MCNC: 12.5 G/DL (ref 11.7–15.7)
LDH SERPL L TO P-CCNC: 128 U/L (ref 81–234)
LYMPHOCYTES # BLD AUTO: 2.4 10E3/UL (ref 0.8–5.3)
LYMPHOCYTES NFR BLD AUTO: 21 %
MCH RBC QN AUTO: 27.1 PG (ref 26.5–33)
MCHC RBC AUTO-ENTMCNC: 32.1 G/DL (ref 31.5–36.5)
MCV RBC AUTO: 84 FL (ref 78–100)
MONOCYTES # BLD AUTO: 0.5 10E3/UL (ref 0–1.3)
MONOCYTES NFR BLD AUTO: 4 %
NEUTROPHILS # BLD AUTO: 8.4 10E3/UL (ref 1.6–8.3)
NEUTROPHILS NFR BLD AUTO: 74 %
PLATELET # BLD AUTO: 564 10E3/UL (ref 150–450)
RBC # BLD AUTO: 4.62 10E6/UL (ref 3.8–5.2)
WBC # BLD AUTO: 11.4 10E3/UL (ref 4–11)

## 2021-08-25 PROCEDURE — 80061 LIPID PANEL: CPT

## 2021-08-25 PROCEDURE — 85045 AUTOMATED RETICULOCYTE COUNT: CPT

## 2021-08-25 PROCEDURE — 82728 ASSAY OF FERRITIN: CPT

## 2021-08-25 PROCEDURE — 80050 GENERAL HEALTH PANEL: CPT

## 2021-08-25 PROCEDURE — 83695 ASSAY OF LIPOPROTEIN(A): CPT | Mod: 90

## 2021-08-25 PROCEDURE — 83615 LACTATE (LD) (LDH) ENZYME: CPT

## 2021-08-25 PROCEDURE — 36415 COLL VENOUS BLD VENIPUNCTURE: CPT

## 2021-08-25 PROCEDURE — 83550 IRON BINDING TEST: CPT

## 2021-08-25 PROCEDURE — 83036 HEMOGLOBIN GLYCOSYLATED A1C: CPT

## 2021-08-25 PROCEDURE — 99000 SPECIMEN HANDLING OFFICE-LAB: CPT

## 2021-08-26 LAB
ALBUMIN SERPL-MCNC: 4 G/DL (ref 3.4–5)
ALP SERPL-CCNC: 104 U/L (ref 40–150)
ALT SERPL W P-5'-P-CCNC: 20 U/L (ref 0–50)
ANION GAP SERPL CALCULATED.3IONS-SCNC: 5 MMOL/L (ref 3–14)
AST SERPL W P-5'-P-CCNC: 13 U/L (ref 0–45)
BILIRUB SERPL-MCNC: 0.5 MG/DL (ref 0.2–1.3)
BUN SERPL-MCNC: 12 MG/DL (ref 7–30)
CALCIUM SERPL-MCNC: 9.6 MG/DL (ref 8.5–10.1)
CHLORIDE BLD-SCNC: 105 MMOL/L (ref 94–109)
CHOLEST SERPL-MCNC: 176 MG/DL
CO2 SERPL-SCNC: 27 MMOL/L (ref 20–32)
CREAT SERPL-MCNC: 0.66 MG/DL (ref 0.52–1.04)
FASTING STATUS PATIENT QL REPORTED: YES
FERRITIN SERPL-MCNC: 28 NG/ML (ref 12–150)
GFR SERPL CREATININE-BSD FRML MDRD: >90 ML/MIN/1.73M2
GLUCOSE BLD-MCNC: 86 MG/DL (ref 70–99)
HDLC SERPL-MCNC: 61 MG/DL
IRON SATN MFR SERPL: 13 % (ref 15–46)
IRON SERPL-MCNC: 57 UG/DL (ref 35–180)
LDLC SERPL CALC-MCNC: 100 MG/DL
NONHDLC SERPL-MCNC: 115 MG/DL
POTASSIUM BLD-SCNC: 4.2 MMOL/L (ref 3.4–5.3)
PROT SERPL-MCNC: 7.8 G/DL (ref 6.8–8.8)
RETICS # AUTO: 0.05 10E6/UL (ref 0.03–0.1)
RETICS/RBC NFR AUTO: 1.1 % (ref 0.5–2)
SODIUM SERPL-SCNC: 137 MMOL/L (ref 133–144)
TIBC SERPL-MCNC: 427 UG/DL (ref 240–430)
TRIGL SERPL-MCNC: 76 MG/DL
TSH SERPL DL<=0.005 MIU/L-ACNC: 2.48 MU/L (ref 0.4–4)

## 2021-08-27 ENCOUNTER — TELEPHONE (OUTPATIENT)
Facility: CLINIC | Age: 31
End: 2021-08-27

## 2021-08-27 ENCOUNTER — VIRTUAL VISIT (OUTPATIENT)
Dept: TRANSPLANT | Facility: CLINIC | Age: 31
End: 2021-08-27
Attending: INTERNAL MEDICINE
Payer: COMMERCIAL

## 2021-08-27 DIAGNOSIS — D47.3 ESSENTIAL THROMBOCYTHEMIA (H): Primary | ICD-10-CM

## 2021-08-27 PROCEDURE — 99213 OFFICE O/P EST LOW 20 MIN: CPT | Mod: 95 | Performed by: INTERNAL MEDICINE

## 2021-08-27 NOTE — LETTER
8/27/2021         RE: Sruthi Mac  [address]  [facility] [address]        Dear Colleague,    Thank you for referring your patient, Sruthi Mac, to the [facility] BLOOD AND MARROW TRANSPLANT PROGRAM [place]. Please see a copy of my visit note below.    [facility]     Sruthi Mac is a 30 year old female referred for acute arterial thrombosis and Jak2+ MPN.     Hematologic history:    Sruthi Mac is a 30-year-old female with history of depression and anxiety, who presented with claudication symptoms in lower extremities over the past few months, and then acute onset of left leg pain in April 2021.  She started having sudden left foot numbness and pain with toe discoloration over 5 days, found to have acute occlusion of the left popliteal artery.  There was no venous thrombosis.  She was on estrogen-containing oral contraceptive pills.  She was taken emergently for mechanical thrombectomy with initiation of lytic therapy on April 8.  Bilateral lower extremity angiogram on 4/9 showed filling defects in the right profundofemoral artery and right posterior tibial and peroneal arteries.  On the left side, there is improved thrombus burden in the popliteal artery and numerous filling defects defects in the runoff arteries in the left lower extremity.  On 4/10, angiogram showed worsening thrombus burden despite thrombolysis with TPA.  Multiple emboli in right lower extremity including peripheral occluded profundofemoral artery.  On 4/11, underwent open embolectomy of the left popliteal, anterior tibial,, tibioperoneal arteries with vein patch angioplasty and fasciotomies.  On 4/12, had reexploration of left leg with left popliteal and tibial embolectomy and left distal anterior posterior tibial artery embolectomy.  HIT antibodies sent and negative.  She had a hypercoagulable work-up showed normal beta-2 glycoprotein IgM and IgG and normal  Cardiolipin IgM and IgG. LAC neg.  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 MTHFR mutation negative.  She is noted to have elevated platelet count up to 500s and leukocytosis of 12.       Some numbness in feet, especially left leg. Two smallest toes lost their nail.   Normal movement of feet and toes. Can't flex 4 smaller toes. A little better everyday.No pain but calf is tight. Limping a little.   Walking more every day. Leg bending easier.   Following up with vascular surgery.     Both legs had claudication starting in January. Could barely walk to mailbox. This is no longer there. Now can walk without pain in legs.     No chest pain or pressure with walking.   No nausea or arm/jaw pain with walking.   Some SOB from deconditioning but nothing new or changing.     No swelling or pain in arms. No pain in abdomen.     Lost 25 lbs since September, intentional.  No night sweats.   Itching after shaving legs.   No fevers or chills.  Did not get Covid. Got vaccine.     Dec 2019- cough, difficulty getting full breath in. Hurt to breathe. Jesus Manuel had similar symptoms. Took a few weeks to recover.     Birth control pill- on for 5-8 years. Now off.     Social:   Non smoker.   for [employer].   Engaged.     Family Hx:   Dad- T2 DM, hyperlipidemia, testicular cancer, melanoma  Mother- hyperlipidemia       Date Treatment Response Toxicities/Complications                                  HPI:  Please see my entry above for hematologic history.      Legs are stiff but otherwise wounds are improving and much less painful.   Stopped Plavix, started aspirin daily.   Remains on Eliquis.   Wants to try to get pregnant this year.     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  Prior records show an elevated platelet  count of 423 in 2005, which normalized to 333 in 2012.  In April during her hospitalization for thrombosis, platelet count increased to 556.  She was tested for hypercoagulable disorders and had normal beta-2 glycoprotein IgM and IgG.  She had a MPN panel sent from blood which showed JAK2 V617F mutation, diagnostic of myeloproliferative neoplasm.  Platelets have remained stable in 500s since then.     She has been initiated on anticoagulation with Eliquis and Plavix, now transitioned to Aspirin and Plavix.  We previously discussed that her platelet count was not high enough to warrant cytoreductive therapy with Hydroxyurea.  We deferred bone marrow biopsy a few months ago to avoid interrupting anticoagulation, but this would be a good time to complete this to evaluate baseline fibrosis.     Due to her severe acute critical limb ischemia, she will likely need she will likely need lifelong anticoagulation.  She should also modify cardiovascular risk factors as able including weight loss.     She is now  and looking to get pregnant.  ET and thrombosis is not an absolute contraindication to pregnancy, however she would be considered high risk and would need to be on anticoagulation throughout pregnancy (Lovenox) as well as following with high risk obstetrics, which she is scheduled for later in the month. From my perspective, it is reasonable to switch to Lovenox 1 mg/kg and start trying to get pregnant in 3-4 weeks.     - continue Eliquis and Aspirin, will need to hold Eliquis/Lovenox prior to bone marrow biopsy    - Lovenox teaching, then initiate 120 mg bid. Obtain Xa level 3-4 days after starting, 4 hours after dose  - BM bx (with IR due to high BMI)   - next follow up in 4-6 weeks with Dr. Keily Irving, who will assume her hematology care       Eliana David MD   of Medicine  Hematology, Oncology and Transplantation   Pager: [phone]    ROS:    10 point ROS neg other than the symptoms  noted above in the HPI.      Past Medical History:   Diagnosis Date     Anxiety      Cystic acne      Depression      Panic attack        Past Surgical History:   Procedure Laterality Date     EMBOLECTOMY LOWER EXTREMITY Left 4/11/2021    Procedure: LEFT LEG ANTERIOR TIBIAL AND POSTERIOR TIBIAL EMBOLECTOMIES, FEMORAL POPITEAL VEIN PATCH, ANGIOGRAM, FASCIOTOMIES;  Surgeon: Ulises Gonzalez;  Location: [facility] OR     IR ANGIOGRAM THROUGH CATHETER FOLLOW UP  4/9/2021     IR ANGIOGRAM THROUGH CATHETER FOLLOW UP  4/10/2021     IR LOWER EXTREMITY ANGIOGRAM LEFT  4/8/2021     THROMBECTOMY LOWER EXTREMITY Left 4/12/2021    Procedure: Re-Exploration Left Lower Extremity With Popliteal and Tibial Embolectomy.;  Surgeon: Ulises Gonzalez;  Location: [facility] OR     wisdom teeth remova         Family History   Problem Relation Age of Onset     Hypertension Father      Diabetes Father      Myocardial Infarction Father        Social History     Tobacco Use     Smoking status: Never Smoker     Smokeless tobacco: Never Used   Substance Use Topics     Alcohol use: Yes     Drug use: Never          Allergies   Allergen Reactions     Erythromycin Unknown     Pcn [Penicillins] Unknown        Current Outpatient Medications   Medication Sig Dispense Refill     apixaban ANTICOAGULANT (ELIQUIS ANTICOAGULANT) 5 MG tablet Take 1 tablet (5 mg) by mouth 2 times daily 180 tablet 3     atorvastatin (LIPITOR) 20 MG tablet Take 1 tablet (20 mg) by mouth daily 90 tablet 3     busPIRone (BUSPAR) 10 MG tablet Take 10 mg by mouth 2 times daily       Ferrous Sulfate (IRON) 325 (65 Fe) MG tablet Take 1 tablet by mouth daily       Lactobacillus (PROBIOTIC ACIDOPHILUS PO) Take by mouth daily       levothyroxine (SYNTHROID/LEVOTHROID) 25 MCG tablet Take 1 tablet (25 mcg) by mouth daily 90 tablet 1     magnesium oxide 400 MG CAPS Take by mouth daily       multivitamin, therapeutic (THERA-VIT) TABS tablet Take 1 tablet by mouth daily       sertraline  "(ZOLOFT) 50 MG tablet Take 100 mg by mouth daily        spironolactone (ALDACTONE) 50 MG tablet Take 50 mg by mouth daily       Vitamin D3 (CHOLECALCIFEROL) 25 mcg (1000 units) tablet Take 1 tablet by mouth daily       Physical Exam:     Vital Signs: There were no vitals taken for this visit.    KPS: 90  GENERAL: Healthy, alert and no distress  EYES: Eyes grossly normal to inspection.  No discharge or erythema, or obvious scleral/conjunctival abnormalities.  RESP: No audible wheeze, cough, or visible cyanosis.  No visible retractions or increased work of breathing.    SKIN: Visible skin clear. No significant rash, abnormal pigmentation or lesions.  NEURO: Cranial nerves grossly intact.  Mentation and speech appropriate for age.  PSYCH: Mentation appears normal, affect normal/bright, judgement and insight intact, normal speech and appearance well-groomed.    Sruthi understood the above assessment and recommendations.  Multiple questions answered.  No barriers to learning identified.     Total time: 30 minutes  Counseling time: 30 minutes  Prolonged service:  No     Eliana David MD    ------------------------------------------------------------------------------------------------------------------------------------------------    Patient Care Team       Relationship Specialty Notifications Start End    Rahel Kirkpatrick DO PCP - General OB/Gyn  4/8/21     Phone: [phone] Fax: [phone]         CODY GUNN [facility] [address] [facility] [address] [facility] [address]    Ulises Gonzalez Assigned Heart and Vascular Provider   4/25/21     Phone: [phone] Fax: [phone] [address] [place] [address]        Sruthi is a 30 year old who is being evaluated via a billable video visit.      Vitals - Patient Reported  Weight (Patient Reported): 115.7 kg (255 lb)  Height (Patient Reported): 174.6 cm (5' 8.74\")  BMI (Based on Pt Reported Ht/Wt): 37.94  Pain Score: No Pain " (0)    Khris Linder LPN    Video Start Time: 830        Eliana David MD

## 2021-08-27 NOTE — PROGRESS NOTES
[facility] Consultation       Sruthi Mac is a 30 year old female referred for acute arterial thrombosis and Jak2+ MPN.       Hematologic history:    Sruthi Mac is a 30-year-old female with history of depression and anxiety, who presented with claudication symptoms in lower extremities over the past few months, and then acute onset of left leg pain in April 2021.  She started having sudden left foot numbness and pain with toe discoloration over 5 days, found to have acute occlusion of the left popliteal artery.  There was no venous thrombosis.  She was on estrogen-containing oral contraceptive pills.  She was taken emergently for mechanical thrombectomy with initiation of lytic therapy on April 8.  Bilateral lower extremity angiogram on 4/9 showed filling defects in the right profundofemoral artery and right posterior tibial and peroneal arteries.  On the left side, there is improved thrombus burden in the popliteal artery and numerous filling defects defects in the runoff arteries in the left lower extremity.  On 4/10, angiogram showed worsening thrombus burden despite thrombolysis with TPA.  Multiple emboli in right lower extremity including peripheral occluded profundofemoral artery.  On 4/11, underwent open embolectomy of the left popliteal, anterior tibial,, tibioperoneal arteries with vein patch angioplasty and fasciotomies.  On 4/12, had reexploration of left leg with left popliteal and tibial embolectomy and left distal anterior posterior tibial artery embolectomy.  HIT antibodies sent and negative.  She had a hypercoagulable work-up showed normal beta-2 glycoprotein IgM and IgG and normal Cardiolipin IgM and IgG. LAC neg.  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 MTHFR mutation negative.  She is noted to have elevated platelet count up to 500s and leukocytosis of 12.       Some numbness in feet, especially left leg. Two smallest toes lost their nail.   Normal movement of feet and toes.  Can't flex 4 smaller toes. A little better everyday.No pain but calf is tight. Limping a little.   Walking more every day. Leg bending easier.   Following up with vascular surgery.     Both legs had claudication starting in January. Could barely walk to mailbox. This is no longer there. Now can walk without pain in legs.     No chest pain or pressure with walking.   No nausea or arm/jaw pain with walking.   Some SOB from deconditioning but nothing new or changing.     No swelling or pain in arms. No pain in abdomen.     Lost 25 lbs since September, intentional.  No night sweats.   Itching after shaving legs.   No fevers or chills.  Did not get Covid. Got vaccine.     Dec 2019- cough, difficulty getting full breath in. Hurt to breathe. Jesus Manuel had similar symptoms. Took a few weeks to recover.     Birth control pill- on for 5-8 years. Now off.     Social:   Non smoker.   for [employer].   Engaged.     Family Hx:   Dad- T2 DM, hyperlipidemia, testicular cancer, melanoma  Mother- hyperlipidemia       Date Treatment Response Toxicities/Complications                                  HPI:  Please see my entry above for hematologic history.      Legs are stiff but otherwise wounds are improving and much less painful.   Stopped Plavix, started aspirin daily.   Remains on Eliquis.   Wants to try to get pregnant this year.       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  Prior records show an elevated platelet count of 423 in 2005, which normalized to 333 in 2012.  In April during her hospitalization for thrombosis, platelet count increased to 556.  She was tested for hypercoagulable disorders and had normal beta-2 glycoprotein IgM and IgG.  She had a MPN panel sent from blood which showed JAK2 V617F  mutation, diagnostic of myeloproliferative neoplasm.  Platelets have remained stable in 500s since then.     She has been initiated on anticoagulation with Eliquis and Plavix, now transitioned to Aspirin and Plavix.  We previously discussed that her platelet count was not high enough to warrant cytoreductive therapy with Hydroxyurea.  We deferred bone marrow biopsy a few months ago to avoid interrupting anticoagulation, but this would be a good time to complete this to evaluate baseline fibrosis.     Due to her severe acute critical limb ischemia, she will likely need she will likely need lifelong anticoagulation.  She should also modify cardiovascular risk factors as able including weight loss.     She is now  and looking to get pregnant.  ET and thrombosis is not an absolute contraindication to pregnancy, however she would be considered high risk and would need to be on anticoagulation throughout pregnancy (Lovenox) as well as following with high risk obstetrics, which she is scheduled for later in the month. From my perspective, it is reasonable to switch to Lovenox 1 mg/kg and start trying to get pregnant in 3-4 weeks.     - continue Eliquis and Aspirin, will need to hold Eliquis/Lovenox prior to bone marrow biopsy    - Lovenox teaching, then initiate 120 mg bid. Obtain Xa level 3-4 days after starting, 4 hours after dose  - BM bx (with IR due to high BMI)   - next follow up in 4-6 weeks with Dr. Keily Irving, who will assume her hematology care       Eliana David MD   of Medicine  Hematology, Oncology and Transplantation   Pager: [phone]          ROS:    10 point ROS neg other than the symptoms noted above in the HPI.        Past Medical History:   Diagnosis Date     Anxiety      Cystic acne      Depression      Panic attack        Past Surgical History:   Procedure Laterality Date     EMBOLECTOMY LOWER EXTREMITY Left 4/11/2021    Procedure: LEFT LEG ANTERIOR TIBIAL AND POSTERIOR  TIBIAL EMBOLECTOMIES, FEMORAL POPITEAL VEIN PATCH, ANGIOGRAM, FASCIOTOMIES;  Surgeon: Ulises Gonzalez;  Location: [facility] OR     IR ANGIOGRAM THROUGH CATHETER FOLLOW UP  4/9/2021     IR ANGIOGRAM THROUGH CATHETER FOLLOW UP  4/10/2021     IR LOWER EXTREMITY ANGIOGRAM LEFT  4/8/2021     THROMBECTOMY LOWER EXTREMITY Left 4/12/2021    Procedure: Re-Exploration Left Lower Extremity With Popliteal and Tibial Embolectomy.;  Surgeon: Ulises Gonzalez;  Location: [facility] OR     wisdom teeth remova         Family History   Problem Relation Age of Onset     Hypertension Father      Diabetes Father      Myocardial Infarction Father        Social History     Tobacco Use     Smoking status: Never Smoker     Smokeless tobacco: Never Used   Substance Use Topics     Alcohol use: Yes     Drug use: Never          Allergies   Allergen Reactions     Erythromycin Unknown     Pcn [Penicillins] Unknown        Current Outpatient Medications   Medication Sig Dispense Refill     apixaban ANTICOAGULANT (ELIQUIS ANTICOAGULANT) 5 MG tablet Take 1 tablet (5 mg) by mouth 2 times daily 180 tablet 3     atorvastatin (LIPITOR) 20 MG tablet Take 1 tablet (20 mg) by mouth daily 90 tablet 3     busPIRone (BUSPAR) 10 MG tablet Take 10 mg by mouth 2 times daily       Ferrous Sulfate (IRON) 325 (65 Fe) MG tablet Take 1 tablet by mouth daily       Lactobacillus (PROBIOTIC ACIDOPHILUS PO) Take by mouth daily       levothyroxine (SYNTHROID/LEVOTHROID) 25 MCG tablet Take 1 tablet (25 mcg) by mouth daily 90 tablet 1     magnesium oxide 400 MG CAPS Take by mouth daily       multivitamin, therapeutic (THERA-VIT) TABS tablet Take 1 tablet by mouth daily       sertraline (ZOLOFT) 50 MG tablet Take 100 mg by mouth daily        spironolactone (ALDACTONE) 50 MG tablet Take 50 mg by mouth daily       Vitamin D3 (CHOLECALCIFEROL) 25 mcg (1000 units) tablet Take 1 tablet by mouth daily           Physical Exam:     Vital Signs: There were no vitals taken for this  visit.        KPS: 90  GENERAL: Healthy, alert and no distress  EYES: Eyes grossly normal to inspection.  No discharge or erythema, or obvious scleral/conjunctival abnormalities.  RESP: No audible wheeze, cough, or visible cyanosis.  No visible retractions or increased work of breathing.    SKIN: Visible skin clear. No significant rash, abnormal pigmentation or lesions.  NEURO: Cranial nerves grossly intact.  Mentation and speech appropriate for age.  PSYCH: Mentation appears normal, affect normal/bright, judgement and insight intact, normal speech and appearance well-groomed.          Sruthi understood the above assessment and recommendations.  Multiple questions answered.  No barriers to learning identified.       Total time: 30 minutes  Counseling time: 30 minutes  Prolonged service:  No     Eliana David MD    ------------------------------------------------------------------------------------------------------------------------------------------------    Patient Care Team       Relationship Specialty Notifications Start End    Rahel Kirkpatrick DO PCP - General OB/Gyn  4/8/21     Phone: [phone] Fax: [phone]         [facility] [address] [facility] [address]    Ulises Gonzalez Assigned Heart and Vascular Provider   4/25/21     Phone: [phone] Fax: [phone] [address] [facility] [address]

## 2021-08-27 NOTE — TELEPHONE ENCOUNTER
S: [facility] pharmacist is calling to clarify Dr. David's order for Lovenox.    B: Questions:   Quantity--how much should be dispensed? 360 ml is written for now.  Start Date: Should she profile this.    A: Paged Dr. David.  Per Dr. David, 360 ml is 90 day supply, ok to dispense a one month supply.  Start date will be after pt has had Lovenox teaching which they would like to arrange for next week.    R: Called pharmacy, spoke with Avinash, and updated with Dr. David's recommendations as above.

## 2021-08-27 NOTE — TELEPHONE ENCOUNTER
Felicia,    A 120mg/0.8ml syringe is available and will be easier for patient to administer (they won't need to waste part of a syringe with each dose).    Can we dispense that in place of the 100mg/ml syringes?    Avinash Cain, PharmD  [facility]  [phone]

## 2021-08-27 NOTE — PROGRESS NOTES
"Sruthi is a 30 year old who is being evaluated via a billable video visit.      How would you like to obtain your AVS? MyChart     If the video visit is dropped, the invitation should be resent by: Text to cell phone: [phone]     Will anyone else be joining your video visit? No          Vitals - Patient Reported  Weight (Patient Reported): 115.7 kg (255 lb)  Height (Patient Reported): 174.6 cm (5' 8.74\")  BMI (Based on Pt Reported Ht/Wt): 37.94  Pain Score: No Pain (0)    Khris Linder LPN    Video Start Time: 830  Video-Visit Details    Type of service:  Video Visit    Video End Time: 9    Originating Location (pt. Location): Home    Distant Location (provider location):  [facility] BLOOD AND MARROW TRANSPLANT PROGRAM [place]     Platform used for Video Visit: Gage  "

## 2021-08-27 NOTE — RESULT ENCOUNTER NOTE
Thyroid, cholesterol, liver , kidney and hemoglobin looks good , continue same plan and  follow up with OBGYN as planned.

## 2021-08-28 LAB — LPA SERPL-MCNC: 118 MG/DL

## 2021-09-07 DIAGNOSIS — D47.3 ESSENTIAL THROMBOCYTHEMIA (H): Primary | ICD-10-CM

## 2021-09-07 NOTE — PROGRESS NOTES
Outpatient imaging procedure order entered:    IR Bone Biopsy Deep [[id number]]    Electronically signed by: Eliana David MD on 08/27/21 0903     Exam reason MPN, baseline eval bone marrow. obese patient     Associated Diagnoses    Essential thrombocythemia (H) [D47.3]  - Primary         ===    Review of Epic entered chart data shows the patient is on Lovenox and Eliquis anticoagulation      Complete Blood Count:  Lab Results   Component Value Date     08/25/2021     06/02/2021       Coagulation:  Lab Results   Component Value Date    INR 1.05 04/08/2021         COVID-19 PCR results = unknown    ===    Referring provider: Eliana David MD. Requesting procedure bone marrow biopsy/aspirate. Dx: Essential thrombocythemia.    Approved for CT guided bone marrow biopsy/aspirate.    I will forward to procedure schedulers. Pre-procedure IR clinic visit is not necessary.    Patient will need to hold anticoagulation for invasive procedure: Lovenox 2 dose hold and Eliquis 4 dose hold.

## 2021-09-08 DIAGNOSIS — Z11.59 ENCOUNTER FOR SCREENING FOR OTHER VIRAL DISEASES: ICD-10-CM

## 2021-09-09 ENCOUNTER — PRE VISIT (OUTPATIENT)
Dept: MATERNAL FETAL MEDICINE | Facility: CLINIC | Age: 31
End: 2021-09-09

## 2021-09-13 ENCOUNTER — LAB (OUTPATIENT)
Dept: LAB | Facility: CLINIC | Age: 31
End: 2021-09-13
Payer: COMMERCIAL

## 2021-09-13 ENCOUNTER — PATIENT OUTREACH (OUTPATIENT)
Dept: ONCOLOGY | Facility: CLINIC | Age: 31
End: 2021-09-13

## 2021-09-13 DIAGNOSIS — Z11.59 ENCOUNTER FOR SCREENING FOR OTHER VIRAL DISEASES: ICD-10-CM

## 2021-09-13 PROCEDURE — U0003 INFECTIOUS AGENT DETECTION BY NUCLEIC ACID (DNA OR RNA); SEVERE ACUTE RESPIRATORY SYNDROME CORONAVIRUS 2 (SARS-COV-2) (CORONAVIRUS DISEASE [COVID-19]), AMPLIFIED PROBE TECHNIQUE, MAKING USE OF HIGH THROUGHPUT TECHNOLOGIES AS DESCRIBED BY CMS-2020-01-R: HCPCS

## 2021-09-13 PROCEDURE — U0005 INFEC AGEN DETEC AMPLI PROBE: HCPCS

## 2021-09-13 NOTE — PROGRESS NOTES
Sruthi reached out to schedule teaching for Lovenox injections-- see Dimplehart. We reviewed the need to hold anticoagulants for 24-48 hrs prior to her BMBx on Thurs 9/16. She is in agreement to hold Eliquis after dose tonight, 9/13 (holding for 48 hours) and will not start Lovenox until the day after her procedure. We will meet on Friday, 9/17 to review the medication and teach her the technique for injecting the medication. She will start her first dose of Lovenox on Friday 9/17.

## 2021-09-14 LAB — SARS-COV-2 RNA RESP QL NAA+PROBE: NEGATIVE

## 2021-09-15 NOTE — PROGRESS NOTES
Maternal-Fetal Medicine Consultation    Sruthi Mac  : 1990  MRN: [id number]  DOS:  2021     REFERRAL:  Sruthi Mac is a 30 year old sent by Dr. Kirkpatrick from [facility] for preconception consultation     HPI:  Sruthi Mac is a 30 year old here for [facility] preconception consultation for concerns regarding history of essential thrombocythemia with JAK2+ mutation, multiple arterial thromboses, depression, anxiety, hyperlipidemia, subclinical hypothyroidism, and obesity.    She is here alone.    Ms. Negro has essential thrombocythemia with JAK2+ mutation and history of multiple arterial thromboses.  She follows with Dr. David of [facility]ematology, last visit 21.  Also follows with [facility] vascular, last visit with Dr. Liu 21.  She initially presented with acute arterial thrombosis of bilateral lower extremities (left worse than right) requiring hospitalization in 2021.  Symptoms included 5 days of left foot numbness and pain, toe discoloration.  She was diagnosed with acute limb ischemia.  Ultrasound of LLE with occlusion of L popliteal artery; no DVT.  Echocardiogram with normal LV systolic function, ejection fraction of 55 to 60%, normal RV structure function and size, no pericardial effusion normal wall ventricular wall motion.  CTA without thoracic or aortic disease.  She was taken emergently for abdominal aortic and LLE diagnostic arteriography with mechanical thrombectomy of the left popliteal artery.  An infusion catheter was also placed with initiation of arterial thrombolytic therapy to the left lower extremity.  She subsequently underwent a bilateral lower extremity angiogram with filling defects in the R profundus femoral artery and R tibial peroneal arteries.  Ultimately she was noted to have a worsening thrombus burden on serial angiography and underwent extensive open embolectomy of the L below-knee popliteal artery and ankle AT/PT  embolectomies with vein patch angioplasty and fasciotomies.  She did require reoperation for repeat embolectomy.    Prior records revealed an elevated PLT count of 423 in 2005 which normalized to 333 in 2012.  In April 2021, PLT increased to 556.  She was tested for hypercoagulable disorders and had an elevated lipoprotein A.  Normal beta-2 glycoprotein IgM and IgG, cardiolipin, lupus anticoagulant, MTHFR, homocysteine.  She had a myeloproliferative neoplasms panel sent which reveaeld JAK2 V617F mutation.  PLT have remained stable in 500s since that time.  She was initially on anticoagulation with Eliquis and Plavix, now transitioned to Lovenox and Aspirin.  Per hematology, PLT count was not high enough to warrant cytoreductive therapy with Hydroxyurea.  Bone marrow biopsy was deferred previously but is scheduled today.  Per hematology, she will need lifelong anticoagulation.  She will be starting Lovenox 1 mg/kg bid and has transitioned to low dose Aspirin in anticipation of pregnancy.  She has follow up with Dr. Keily Irving 10/12/21.  Dr. Irving will assume her hematology care.    Regarding history of depression and anxiety, is on Sertraline 50 mg and Buspar 10 mg.  She reports these control her symptoms well.  They are currently prescribed by Dr. Kirkpatrick.    She recently started Levothyroxine 25 mcg for subclinical hypothyroidism.  TSH at time of diagnosis 5.2, free t4 1.02.  Last TSH 8/25/21 = 2.48.  Is on Lipitor 20 mg daily for hypertriglyceridemia.  Triglycerides 471 at time of diagnosis; have decreased to 76.    She denies any additional changes to her past medical history or concerns today.    Ob/Gyn History:  - On POPs   - Last Pap: 05/2019 NILM  - Denies any history of abnormal pap smears  - Denies prior cervical surgery or procedures  - Denies any history of frequent UTIs, vaginal infections, or STIs    Past Medical History:  Past Medical History:   Diagnosis Date     Anxiety      Depression       Essential thrombocythemia (H)      History of arterial thrombosis      Hyperlipidemia      JAK2 gene mutation      Obesity      Panic attack      Subclinical hypothyroidism        Past Surgical History:  Past Surgical History:   Procedure Laterality Date     EMBOLECTOMY LOWER EXTREMITY Left 4/11/2021    Procedure: LEFT LEG ANTERIOR TIBIAL AND POSTERIOR TIBIAL EMBOLECTOMIES, FEMORAL POPITEAL VEIN PATCH, ANGIOGRAM, FASCIOTOMIES;  Surgeon: Ulises Gonzalez;  Location: [facility] OR     IR ANGIOGRAM THROUGH CATHETER FOLLOW UP  4/9/2021     IR ANGIOGRAM THROUGH CATHETER FOLLOW UP  4/10/2021     IR LOWER EXTREMITY ANGIOGRAM LEFT  4/8/2021     THROMBECTOMY LOWER EXTREMITY Left 4/12/2021    Procedure: Re-Exploration Left Lower Extremity With Popliteal and Tibial Embolectomy.;  Surgeon: Ulises Gonzalez;  Location: [facility] OR     wisdom teeth remova         Current Medications:    Prior to Admission medications    Medication Sig Last Dose Taking? Auth Provider   apixaban ANTICOAGULANT (ELIQUIS ANTICOAGULANT) 5 MG tablet Take 1 tablet (5 mg) by mouth 2 times daily   Hollis Liu MD   atorvastatin (LIPITOR) 20 MG tablet Take 1 tablet (20 mg) by mouth daily   Vashti Dacosta PA-C   busPIRone (BUSPAR) 10 MG tablet Take 10 mg by mouth 2 times daily   Unknown, Entered By History   enoxaparin ANTICOAGULANT (LOVENOX ANTICOAGULANT) 100 MG/ML syringe Inject 1.2 mLs (120 mg) Subcutaneous 2 times daily   Eliana David MD   Ferrous Sulfate (IRON) 325 (65 Fe) MG tablet Take 1 tablet by mouth daily   Unknown, Entered By History   Lactobacillus (PROBIOTIC ACIDOPHILUS PO) Take by mouth daily   Unknown, Entered By History   levothyroxine (SYNTHROID/LEVOTHROID) 25 MCG tablet Take 1 tablet (25 mcg) by mouth daily   Vashti Dacosta PA-C   magnesium oxide 400 MG CAPS Take by mouth daily   Unknown, Entered By History   multivitamin, therapeutic (THERA-VIT) TABS tablet Take 1 tablet by mouth daily   Unknown, Entered By History    sertraline (ZOLOFT) 50 MG tablet Take 100 mg by mouth daily    Unknown, Entered By History   spironolactone (ALDACTONE) 50 MG tablet Take 50 mg by mouth daily   Unknown, Entered By History   Vitamin D3 (CHOLECALCIFEROL) 25 mcg (1000 units) tablet Take 1 tablet by mouth daily   Unknown, Entered By History       Allergies:  Erythromycin and Pcn [penicillins]    Social History:     Social History     Tobacco Use     Smoking status: Never Smoker     Smokeless tobacco: Never Used   Substance Use Topics     Alcohol use: Yes     Drug use: Never      Family History:  Family History   Problem Relation Age of Onset     Hypertension Father      Diabetes Father      Myocardial Infarction Father       Denies history of genetic disorders, preeclampsia, thromboembolic disease, bleeding disorders, development delay.    ROS:  10-point ROS negative except as in HPI     PHYSICAL EXAM:  Deferred    ASSESSMENT/PLAN:  Sruthi Mac is a 30 year old here for [facility] preconception consultation for concerns regarding history of high risk essential thrombocythemia with JAK2+ mutation, multiple arterial thromboses, depression, anxiety, hyperlipidemia, subclinical hypothyroidism, and obesity.    Essential thrombocythemia   JAK2+ mutation  Multiple arterial thromboses  Per review of the literature, women with essential thrombocythemia have increased risk of first trimester pregnancy loss. In one study, the spontaneous  rate in 106 pregnancies in 57 women was 43 percent, 36 percent of which occurred in the first trimester. Similar rates were noted in an [place] study of 103 pregnancies in 62 women. Other complications included stillbirth (5 percent), premature delivery (8 percent), preeclampsia (4 percent), and fetal growth restriction (4 percent). In several studies, the use of aspirin was associated with a decreased incidence of spontaneous abortions. Another study evaluated 34 pregnancies occurring in 18 patients with essential  thrombocythemia. In that study, 45 percent of the pregnancies ended in spontaneous abortions, primarily occurring in the first trimester. The occurrence of abortions could not be predicted from the disease course, platelet count, or specific therapy.      Discussed with patient the limitations of these studies in small sample size and underreporting of uneventful outcomes as well as early spontaneous abortions.    In review of the available literature, of pregnancies carried to term, complications during delivery were reported as infrequent. A decrease in PLTs in patients with essential thrombocythemia during pregnancy is well known.  JAK2 mutation was an independent risk factor for the development of pregnancy complications (OR 2.0; 95% CI 1.1-3.8).    Patient has had negative evaluation for APS.    Recommendations:  - continuation of low dose Aspirin  - continue Lovenox 1 mg/kg bid throughout pregnancy  - close monitoring for signs and symptoms of thrombosis   - early dating ultrasound  - consideration of genetic screening and GC evaluation  - once pregnant consider serial growth ultrasounds, weekly BPP at 36w    Depression  Anxiety  Approximately 10-15% of women of reproductive age are affected by depression.  Untreated psychiatric disease in pregnancy is serious; approximately 40-50% of untreated patients will have an exacerbation and 15% are at risk for suicidal ideation.  Moreover, untreated depression may increase the risk of stillbirth, IUGR,  delivery, low Apgar scores, sexually transmitted infections, and substance abuse.  A multicenter prospective study monitored 201 women with a history of major depression who were not clinically depressed at the time of conception but were taking antidepressant medication. Approximately two thirds of women who discontinued antidepressants relapsed during their pregnancies compared with 26% of women who relapsed among the women who maintained their therapy.  Adjusted analysis reports that the risk of relapse for those that discontinue their medications is 5-6 times higher than those who maintain their dosages.  What is interesting about this study is that over 60% of patients who had previously discontinued their medications restarted them at some point in the pregnancy, indicating a great need to control depressive symptoms in pregnancy. Increasing or reintroduction of medications may attenuate the relapse risk.    In general the risk of uncontrolled psychiatric disease outweighs the risk of medications during pregnancy, and therefore medications should be continued if medically indicated.  There is an extensive literature regarding the risks of medications, particularly SSRIs, in pregnancy.  The literature is quite heterogeneous, with conservative estimates demonstrating a 1.5-2.0-fold overall increased risk of fetal malformations. A case-control study in 9622 infants with major birth defects and 4092 controls found no significant association between use of SSRIs in early pregnancy and birth defects, except for a slightly increased incidence of anencephaly, craniosynostosis and omphalocele.(Zohreh NAVA et al, NE 2007)  A similar study comparing 9849 infants who had birth defects with 5860 controls found no significant association between SSRI use in the first trimester and craniosynostosis, omphalocele or heart defects, but analyses of individual SSRIs found that use of sertraline was associated with septal defects and omphaloceles, and use of paroxetine was associated with right ventricular outflow tract obstruction.(Bob GARCIA, EMIGDIO 2007) Positive findings can occur by chance in malformation studies when multiple comparisons are made, and in both of these studies the absolute risks of all of these defects were small. Other studies also found an increased risk of cardiac defects associated with paroxetine use in early pregnancy, leading the FDA and Health Di to warn  against such use, and the FDA to classify the drug as category D (positive evidence of risk) for use during pregnancy.    SSRI s have also been linked with an increased risk for primary pulmonary hypertension of the , though the absolute risk of this disease is still low. Persistent pulmonary hypertension, which occurs in 2 per 1000 live births, occurs in about one per 100 newborns exposed to an SSRI in the second half of pregnancy, possibly related to serotonin-related effects on cardiovascular development.  The reports of  adaptation (or withdrawal) syndrome have not been clarified to this point.  The risks seem highest with paroxetine (Paxil), which is an SSRI with a short half-life.    Sertraline is a selective serotonin re-uptake inhibitor.  Today we discussed the risks associated with sertraline use in pregnancy.  Adverse events have been observed in animal reproduction studies. Sertraline crosses the human placenta. An increased risk of teratogenic effects may be associated with maternal use of sertraline or other SSRIs; however, available information is conflicting. Nonteratogenic effects in the  following SSRI/SNRI exposure late in the third trimester include respiratory distress, cyanosis, apnea, seizures, temperature instability, feeding difficulty, vomiting, hypoglycemia, hypo- or hypertonia, hyper-reflexia, jitteriness, irritability, constant crying, and tremor. Symptoms may be due to the toxicity of the SSRIs/SNRIs or a discontinuation syndrome and may be consistent with serotonin syndrome associated with SSRI treatment. Persistent pulmonary hypertension of the  (PPHN) has also been reported with SSRI exposure. The long-term effects of in utero SSRI exposure on infant development and behavior are not known.     Sertraline is excreted in breast milk. The American Academy of Breastfeeding Medicine suggests that sertraline may be considered for the treatment of postpartum  depression in appropriately selected women who are nursing. Infants exposed to sertraline while breast-feeding generally receive a low relative dose and serum concentrations are not detectable in most infants.  Maternal use of an SSRI during pregnancy may cause delayed milk secretion.    Buspar (Buspirone, class B) There are no known teratogenic and non-teratogenic effects on the fetus from in-utero exposure.    Recommendations:  - Close monitoring of depressive symptoms throughout pregnancy.  - Targeted ultrasound at 18 weeks to assess for congenital fetal abnormalities.  - Fetal echocardiogram if the patient was taking paroxetine during organogenesis.  - Serial growth ultrasound if the patient continues her medications through pregnancy.  - Notification of pediatrics at the time of delivery and close  follow-up.  - Breastfeeding may be at the patient s discretion.    Hypertriglyceridemia   Discussed with patient limited data on use of Lipitor in pregnancy, however benefits of continuation may outweigh risk of limited exposure data.  Recommend discussion with hematology regarding discontinuation and monitoring or TG levels versus continuation.    Subclinical Hypothyroidism (on Levothyroxine)  Thyroid requirements increase in pregnancy due to increases in metabolism and thyroid binding globulin.  Thyroid function tests, in particular TSH, should be followed closely to ensure adequate treatment.  Hypothyroidism has been associated with higher pregnancy complication rates including miscarriage, preeclampsia, abruption, low birth weight and stillbirth.  Untreated hypothyroidism has also been associated with adverse fetal neurological development, but well treated hypothyroidism (ie euthyroid state) carries an excellent prognosis for mother and baby.  Discussed with patient we do not typically initiate treatment for subclinical hypothyroidism in pregnancy however okay to continue current treatment  plan.    Recommendations:   - Maintain euthyroidism via treatment with levothyroxine, aimed at keeping the TSH at the desired range by trimester; dose requirements may increase by as much as 30-50%.     - TSH should be checked q4-6 weeks in the first half of pregnancy and adjusted to trimester specific norms; it can be checked less often in the second half of pregnancy as long as dose adjustments are not necessary.      First trimester: 0.1 to 2.5 mIU/L    Second trimester: 0.2 to 3 mIU/L    Third trimester: 0.3 to 3 mIU/L  - After delivery levothyroxine can be reduced to pre-pregnancy levels, but serum TSH should be monitored 4-6 weeks later to confirm appropriate dosing.    Obesity, BMI 38  Obesity during pregnancy is associated with hypertension, preeclampsia, diabetes, venous thromboembolism, fetal macrosomia,  delivery, and complications of  delivery (excessive blood loss, infection, wound separation).  Obesity is associated with an increased risk of fetal anomalies, specifically neural tube defects.  This is further complicated by the decreased ability to detect malformations in these fetuses due to imaging limitations of maternal body habitus.  For women with class III obesity, detection of anomalies by targeted (level II) ultrasound is about 75% (as compared to 97% in women with a normal BMI).  By comparison, routine (level I) ultrasound would be expected to detect only 22% of anomalies in the class as compared to 66% of those with a normal weight.  Fundal heights may not be helpful in determining if there is appropriate fetal growth.  Studies suggest that pre-pregnancy obesity increases the risk for stillbirth, though the etiology is not known.  Pregnant women have an 11-20% incidence of obstructive sleep apnea, which is 13 times more common in women with obesity.  JAMES in pregnancy is associated with increased  morbidity and mortality, both fetal/ as well as  maternal    There are a number of intrapartum considerations for obese women.  Studies have found that obese women have, on average a longer first stage of labor.   Obese women attempting a trial of labor after  are less likely to be successful.  Pre-pregnancy obesity increases the rate of both emergent and scheduled  delivery.  Furthermore, obese women are more likely to have complications of  delivery.  Specifically, in obese women there are prolonged operative times which can delay an emergent delivery; they are more likely to have blood loss in excess of 1000 mL, wound infection/separation and endometritis.  There may be anesthesia concerns related to the obese  as well, such as a higher spinal failure rate and more difficult intubation.  Since obese women are more likely to have a macrosomic  they are more likely to have associated complications such as shoulder dystocia and obstetric sphincter injury.  There is also evidence that obese women are more likely to have prolonged postpartum hospitalizations and there is an increased risk for thromboembolism.      It is not recommended that obese women pursue weight loss during pregnancy but rather that they limit their weight gain.  Counseling by a nutritionist may be considered.  Screening for diabetes should be undertaken in the first trimester and then again at 26-28 weeks if the first testing is normal.  It is currently recommended that all pregnant women engage in 30 minutes of exercise most days of the week.  Additionally, postpartum weight loss and exercise should be emphasized as part of the lifestyle modifications that can alter morbidity and mortality.    Recommendations:  - Screen for obstructive sleep apnea, using a questionnaire such as the STOP  - Weight gain should be limited to <11-20 lbs  - Glucose testing with GCT should be performed as soon as possible when pregnancy is diagnosed; If the result is normal, the  test should be repeated again at 26-28 weeks.  - Targeted anatomical survey at 18 weeks.  - Ultrasound for growth every 4-6 weeks for women  - Weekly  testing with BPP (or NST and MVP) starting at 36 weeks gestation  - Depending on BMI, consult anesthesiology on admission, or antepartum, depending on comorbidities, even if the patient is not planning to use anesthesia for labor, so they can assess her prior to an obstetric emergency  - Induction of labor should be offered at 39 weeks gestation to women with a BMI ? 40, and can be considered at this GA for those with a BMI 30-39.    - Encourage breastfeeding  - Postpartum lifestyle modification    Preconception  Although pregnancy in the low risk woman is generally well tolerated, there are unique health issues that non-pregnant women do not face. As a general rule, pregnancy does not enhance the physical health of the woman.  We discussed this in our consultation today.      Medical and obstetrical problems may occur during any low- and high-risk pregnancy and may impact a woman's health status or quality of life.  These include hypertensive disorders, such as preeclampsia, gestational diabetes, vaginal bleeding (both placental abruption and placenta previa occur uniquely in pregnancy), prolonged hospitalization and surgical complications of pregnancy, such as .      Although most women proceed through pregnancy without complications, in rare cases, pregnancy results in significant long-term and life-threatening medical disease, such as peripartum cardiomyopathy (PPCM),  acute fatty liver of pregnancy (AFLP), placenta accreta, venous thromboembolism (VTE) and death.     Complications occur with increased frequency in multiple gestation.      Recommendations:  - Optimization of the patient's comorbid conditions as outlined above.  - Avoidance of alcohol during conception.  - Daily prenatal vitamin with folic acid.   - Consultation with genetics if  desired to screen for hereditable conditions.   - Laboratory evaluation including:   o Hemoglobin A1C (if applicable)  o Comprehensive metabolic panel (if applicable)  o Rubella titer  o Varicella titer  o Hepatitis B surface antigen  o HIV testing  o Complete blood count  o Blood type  o If the patient is Rh-negative, blood type determination of her partner is recommended.      Patient seen and staffed with Dr. Isabel Centeno MD PGY5  Maternal Fetal Medicine Fellow  9/16/2021 9:12 AM

## 2021-09-16 ENCOUNTER — HOSPITAL ENCOUNTER (OUTPATIENT)
Facility: CLINIC | Age: 31
Discharge: HOME | End: 2021-09-16
Attending: INTERNAL MEDICINE | Admitting: INTERNAL MEDICINE
Payer: COMMERCIAL

## 2021-09-16 ENCOUNTER — APPOINTMENT (OUTPATIENT)
Dept: MEDSURG UNIT | Facility: CLINIC | Age: 31
End: 2021-09-16
Attending: SPECIALIST
Payer: COMMERCIAL

## 2021-09-16 ENCOUNTER — OFFICE VISIT (OUTPATIENT)
Dept: MATERNAL FETAL MEDICINE | Facility: CLINIC | Age: 31
End: 2021-09-16
Attending: SPECIALIST
Payer: COMMERCIAL

## 2021-09-16 ENCOUNTER — APPOINTMENT (OUTPATIENT)
Dept: INTERVENTIONAL RADIOLOGY/VASCULAR | Facility: CLINIC | Age: 31
End: 2021-09-16
Attending: INTERNAL MEDICINE
Payer: COMMERCIAL

## 2021-09-16 VITALS
DIASTOLIC BLOOD PRESSURE: 75 MMHG | BODY MASS INDEX: 38.51 KG/M2 | HEART RATE: 73 BPM | TEMPERATURE: 98.2 F | SYSTOLIC BLOOD PRESSURE: 123 MMHG | WEIGHT: 260 LBS | OXYGEN SATURATION: 98 % | HEIGHT: 69 IN | RESPIRATION RATE: 16 BRPM

## 2021-09-16 DIAGNOSIS — D47.3 ESSENTIAL THROMBOCYTHEMIA (H): ICD-10-CM

## 2021-09-16 DIAGNOSIS — Z31.69 ENCOUNTER FOR PRECONCEPTION CONSULTATION: ICD-10-CM

## 2021-09-16 LAB
B-HCG SERPL-ACNC: <1 IU/L (ref 0–5)
INR PPP: 1.01 (ref 0.85–1.15)
PLATELET # BLD AUTO: 539 10E3/UL (ref 150–450)

## 2021-09-16 PROCEDURE — 88313 SPECIAL STAINS GROUP 2: CPT | Mod: 26 | Performed by: PATHOLOGY

## 2021-09-16 PROCEDURE — 77012 CT SCAN FOR NEEDLE BIOPSY: CPT

## 2021-09-16 PROCEDURE — 88305 TISSUE EXAM BY PATHOLOGIST: CPT | Mod: 26 | Performed by: PATHOLOGY

## 2021-09-16 PROCEDURE — 99204 OFFICE O/P NEW MOD 45 MIN: CPT | Mod: GC | Performed by: OBSTETRICS & GYNECOLOGY

## 2021-09-16 PROCEDURE — 88184 FLOWCYTOMETRY/ TC 1 MARKER: CPT | Performed by: PATHOLOGY

## 2021-09-16 PROCEDURE — 85610 PROTHROMBIN TIME: CPT | Performed by: NURSE PRACTITIONER

## 2021-09-16 PROCEDURE — 88342 IMHCHEM/IMCYTCHM 1ST ANTB: CPT | Mod: 26 | Performed by: PATHOLOGY

## 2021-09-16 PROCEDURE — 36415 COLL VENOUS BLD VENIPUNCTURE: CPT | Performed by: NURSE PRACTITIONER

## 2021-09-16 PROCEDURE — 88271 CYTOGENETICS DNA PROBE: CPT | Performed by: PHYSICIAN ASSISTANT

## 2021-09-16 PROCEDURE — 88311 DECALCIFY TISSUE: CPT | Mod: TC | Performed by: PHYSICIAN ASSISTANT

## 2021-09-16 PROCEDURE — 250N000011 HC RX IP 250 OP 636: Performed by: STUDENT IN AN ORGANIZED HEALTH CARE EDUCATION/TRAINING PROGRAM

## 2021-09-16 PROCEDURE — 250N000009 HC RX 250: Performed by: STUDENT IN AN ORGANIZED HEALTH CARE EDUCATION/TRAINING PROGRAM

## 2021-09-16 PROCEDURE — 99152 MOD SED SAME PHYS/QHP 5/>YRS: CPT | Performed by: RADIOLOGY

## 2021-09-16 PROCEDURE — 258N000003 HC RX IP 258 OP 636: Performed by: NURSE PRACTITIONER

## 2021-09-16 PROCEDURE — 38222 DX BONE MARROW BX & ASPIR: CPT | Mod: LT | Performed by: RADIOLOGY

## 2021-09-16 PROCEDURE — 85097 BONE MARROW INTERPRETATION: CPT | Performed by: PATHOLOGY

## 2021-09-16 PROCEDURE — 85049 AUTOMATED PLATELET COUNT: CPT | Performed by: NURSE PRACTITIONER

## 2021-09-16 PROCEDURE — 88311 DECALCIFY TISSUE: CPT | Mod: 26 | Performed by: PATHOLOGY

## 2021-09-16 PROCEDURE — 88341 IMHCHEM/IMCYTCHM EA ADD ANTB: CPT | Mod: 26 | Performed by: PATHOLOGY

## 2021-09-16 PROCEDURE — 88189 FLOWCYTOMETRY/READ 16 & >: CPT | Performed by: PATHOLOGY

## 2021-09-16 PROCEDURE — 85060 BLOOD SMEAR INTERPRETATION: CPT | Performed by: PATHOLOGY

## 2021-09-16 PROCEDURE — 88237 TISSUE CULTURE BONE MARROW: CPT | Performed by: PHYSICIAN ASSISTANT

## 2021-09-16 PROCEDURE — 999N000132 HC STATISTIC PP CARE STAGE 1

## 2021-09-16 PROCEDURE — 88313 SPECIAL STAINS GROUP 2: CPT | Mod: TC,59 | Performed by: PHYSICIAN ASSISTANT

## 2021-09-16 PROCEDURE — 88185 FLOWCYTOMETRY/TC ADD-ON: CPT | Performed by: PHYSICIAN ASSISTANT

## 2021-09-16 PROCEDURE — 99152 MOD SED SAME PHYS/QHP 5/>YRS: CPT

## 2021-09-16 PROCEDURE — 38222 DX BONE MARROW BX & ASPIR: CPT

## 2021-09-16 PROCEDURE — 88291 CYTO/MOLECULAR REPORT: CPT | Performed by: MEDICAL GENETICS

## 2021-09-16 PROCEDURE — 272N000155 HC KIT CR15

## 2021-09-16 PROCEDURE — 77012 CT SCAN FOR NEEDLE BIOPSY: CPT | Mod: 26 | Performed by: RADIOLOGY

## 2021-09-16 PROCEDURE — 84702 CHORIONIC GONADOTROPIN TEST: CPT | Performed by: NURSE PRACTITIONER

## 2021-09-16 RX ORDER — NALOXONE HYDROCHLORIDE 0.4 MG/ML
0.4 INJECTION, SOLUTION INTRAMUSCULAR; INTRAVENOUS; SUBCUTANEOUS
Status: DISCONTINUED | OUTPATIENT
Start: 2021-09-16 | End: 2021-09-16 | Stop reason: HOSPADM

## 2021-09-16 RX ORDER — FENTANYL CITRATE 50 UG/ML
25-50 INJECTION, SOLUTION INTRAMUSCULAR; INTRAVENOUS EVERY 5 MIN PRN
Status: DISCONTINUED | OUTPATIENT
Start: 2021-09-16 | End: 2021-09-16 | Stop reason: HOSPADM

## 2021-09-16 RX ORDER — LIDOCAINE 40 MG/G
CREAM TOPICAL
Status: DISCONTINUED | OUTPATIENT
Start: 2021-09-16 | End: 2021-09-16 | Stop reason: HOSPADM

## 2021-09-16 RX ORDER — NALOXONE HYDROCHLORIDE 0.4 MG/ML
0.2 INJECTION, SOLUTION INTRAMUSCULAR; INTRAVENOUS; SUBCUTANEOUS
Status: DISCONTINUED | OUTPATIENT
Start: 2021-09-16 | End: 2021-09-16 | Stop reason: HOSPADM

## 2021-09-16 RX ORDER — FLUMAZENIL 0.1 MG/ML
0.2 INJECTION, SOLUTION INTRAVENOUS
Status: DISCONTINUED | OUTPATIENT
Start: 2021-09-16 | End: 2021-09-16 | Stop reason: HOSPADM

## 2021-09-16 RX ORDER — SODIUM CHLORIDE 9 MG/ML
INJECTION, SOLUTION INTRAVENOUS CONTINUOUS
Status: DISCONTINUED | OUTPATIENT
Start: 2021-09-16 | End: 2021-09-16 | Stop reason: HOSPADM

## 2021-09-16 RX ADMIN — FENTANYL CITRATE 50 MCG: 50 INJECTION, SOLUTION INTRAMUSCULAR; INTRAVENOUS at 13:05

## 2021-09-16 RX ADMIN — MIDAZOLAM 1 MG: 1 INJECTION INTRAMUSCULAR; INTRAVENOUS at 12:58

## 2021-09-16 RX ADMIN — FENTANYL CITRATE 50 MCG: 50 INJECTION, SOLUTION INTRAMUSCULAR; INTRAVENOUS at 12:59

## 2021-09-16 RX ADMIN — LIDOCAINE HYDROCHLORIDE 6 ML: 10 INJECTION, SOLUTION EPIDURAL; INFILTRATION; INTRACAUDAL; PERINEURAL at 13:00

## 2021-09-16 RX ADMIN — MIDAZOLAM 1 MG: 1 INJECTION INTRAMUSCULAR; INTRAVENOUS at 13:15

## 2021-09-16 RX ADMIN — SODIUM CHLORIDE: 9 INJECTION, SOLUTION INTRAVENOUS at 11:37

## 2021-09-16 RX ADMIN — MIDAZOLAM 1 MG: 1 INJECTION INTRAMUSCULAR; INTRAVENOUS at 13:04

## 2021-09-16 RX ADMIN — FENTANYL CITRATE 50 MCG: 50 INJECTION, SOLUTION INTRAMUSCULAR; INTRAVENOUS at 13:16

## 2021-09-16 ASSESSMENT — MIFFLIN-ST. JEOR: SCORE: 1953.73

## 2021-09-16 NOTE — PROGRESS NOTES
Patient Name: Sruthi Mac  Medical Record Number: [id number]  Today's Date: 9/16/2021    Procedure: CT image guided bone marrow biopsy  Proceduralist: Dr. Aguilar, Dr. TYRESE Nair    Patient in room: 1241  Procedure Start: 1300  Procedure end: 1324  Sedation medications administered: 3 mg versed, 150 mcg fentanyl.  Patient out of room: 1334    Report given to: 2SIL Rodgers    Other Notes: Pt arrived to IR room CT 2 from 2.A.. Consent reviewed. Pt denies any questions or concerns regarding procedure. Pt positioned prone and monitored per protocol. Pt tolerated procedure without any noted complications. Pt transferred back to 2.A.    Lab staff [facility] here to process bone marrow samples with Dr. Nair.

## 2021-09-16 NOTE — PROGRESS NOTES
Prepped for bone biopsy. No pt complaints. Awaiting procedure consent. Ivanna, friend, present in [facility] waiting room [phone].

## 2021-09-16 NOTE — PROCEDURES
[facility]    Procedure: Bone marrow biopsy    Date/Time: 9/16/2021 1:32 PM  Performed by: Sergey Aguilar  Authorized by: Sergey Aguilar Fellow Physician: Jeff  Other(s) attending procedure: Nair - staff    UNIVERSAL PROTOCOL   Site Marked: Yes  Prior Images Obtained and Reviewed:  Yes  Required items: Required blood products, implants, devices and special equipment available    Patient identity confirmed:  Verbally with patient  Patient was reevaluated immediately before administering moderate or deep sedation or anesthesia  Confirmation Checklist:  Patient's identity using two indicators  Time out: Immediately prior to the procedure a time out was called    Universal Protocol: the Joint Commission Universal Protocol was followed    Preparation: Patient was prepped and draped in usual sterile fashion    ESBL (mL):  10         ANESTHESIA    Anesthesia: Local infiltration  Local Anesthetic:  Lidocaine 1% without epinephrine  Anesthetic Total (mL):  17      SEDATION    Patient Sedated: Yes    Sedation Type:  Moderate (conscious) sedation  Sedation:  Fentanyl and midazolam  Vital signs: Vital signs monitored during sedation    Findings: Successful bone marrow biopsy ct guided    Specimens: fluid and/or tissue for laboratory analysis, fine needle aspirate for cytological analysis and core needle biopsy specimens sent for pathological analysis    Complications: None    Plan: Bedrest 1 hour    PROCEDURE   Patient Tolerance:  Patient tolerated the procedure well with no immediate complications  Describe Procedure: See dictation  Length of time physician/provider present for 1:1 monitoring during sedation: 25

## 2021-09-16 NOTE — PROGRESS NOTES
Patient tolerated recovery stage well. VSS, lower back puncture site clean/dry/intact, no hematoma, and denies pain. Patient tolerated PO food and fluids. Teaching was done and discharge instructions were given. Patient ambulated, voided, and PIV was removed. Patient discharged from the hospital to home with friend.

## 2021-09-16 NOTE — DISCHARGE INSTRUCTIONS
[facility]    Interventional Radiology  Patient Instructions Following Biopsy    AFTER YOU GO HOME  ? If you were given sedation DO NOT drive or operate machinery at home or at work for at least 24 hours  ? DO relax and take it easy for 48 hours, no strenuous activity for 24 hours  ? DO drink plenty of fluids  ? DO resume your regular diet, unless otherwise instructed by your Primary Physician  ? Keep the dressing dry and in place for 24 hours.  ? DO NOT SMOKE FOR AT LEAST 24 HOURS, if you have been given any medications that were to help you relax or sedate you during your procedure  ? DO NOT drink alcoholic beverages the day of your procedure  ? DO NOT do any strenuous exercise or lifting (> 10 lbs) for at least 2-3 days following your procedure  ? DO NOT take a bath or shower for at least 12 hours following your procedure  ? Remove dressing after shower the next day. Replace with Band aid for 2 days.  Never leave a wet dressing in place.  ? DO NOT make any important or legal decisions for 24 hours following your procedure  ? There should be minimum drainage from the biopsy site    CALL THE PHYSICIAN IF:  ? You start bleeding from the procedure site.  If you do start to bleed from that site, lie down flat and hold pressure on the site for a minimum of 10 minutes.  Your physician will tell you if you need to return to the hospital  ? You develop nausea or vomiting  ? You have excessive swelling, redness, or tenderness at the site  ? You have drainage that looks like it is infected.  ? You experience severe pain  ? You develop hives or a rash or unexplained itching  ? You develop shortness of breath  ? You develop a temperature of 101 degrees F or greater    [facility] INTERVENTIONAL RADIOLOGY DEPARTMENT  Procedure Physician: Dr Nair and Dr Nesbitt                                     Date of procedure: September 16, 2021  Telephone Numbers: [phone] Monday-Friday 8:00 am to 4:30  pm  [phone] After 4:30 pm Monday-Friday, Weekends & Holidays.   Ask for the Interventional Radiologist on call.  Someone is on call 24 hrs/day  [facility] toll free number: [phone] Monday-Friday 8:00 am to 4:30 pm  [facility] Emergency Dept: [phone]

## 2021-09-16 NOTE — PRE-PROCEDURE
GENERAL PRE-PROCEDURE:   Procedure:  Bone marrow biopsy  Date/Time:  9/16/2021 12:23 PM    Verbal consent obtained?: Yes    Written consent obtained?: Yes    Risks and benefits: Risks, benefits and alternatives were discussed    Consent given by:  Patient  Patient states understanding of procedure being performed: Yes    Patient's understanding of procedure matches consent: Yes    Procedure consent matches procedure scheduled: Yes    Expected level of sedation:  Moderate  Appropriately NPO:  Yes  ASA Class:  2  Mallampati  :  Grade 1- soft palate, uvula, tonsillar pillars, and posterior pharyngeal wall visible  Lungs:  Lungs clear with good breath sounds bilaterally  Heart:  Normal heart sounds and rate  History & Physical reviewed:  History and physical reviewed and no updates needed  Statement of review:  I have reviewed the lab findings, diagnostic data, medications, and the plan for sedation

## 2021-09-16 NOTE — PROGRESS NOTES
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 Pt had consult with Dr. Centeno and Dr. Hall. See note in epic for today's consult discussion and plan of care. Questions answered at this time. Pt discharged stable without further follow up at [facility] at this time. Chanelle Patterson RN

## 2021-09-17 ENCOUNTER — APPOINTMENT (OUTPATIENT)
Dept: ONCOLOGY | Facility: CLINIC | Age: 31
End: 2021-09-17
Attending: INTERNAL MEDICINE
Payer: COMMERCIAL

## 2021-09-17 ENCOUNTER — PATIENT OUTREACH (OUTPATIENT)
Dept: ONCOLOGY | Facility: CLINIC | Age: 31
End: 2021-09-17

## 2021-09-17 DIAGNOSIS — Z79.01 VISIT FOR MONITORING LOVENOX THERAPY: ICD-10-CM

## 2021-09-17 DIAGNOSIS — I70.209 ACUTE OCCLUSION OF ARTERY OF LOWER EXTREMITY (H): Primary | ICD-10-CM

## 2021-09-17 DIAGNOSIS — D47.3 ESSENTIAL THROMBOCYTHEMIA (H): ICD-10-CM

## 2021-09-17 DIAGNOSIS — Z51.81 VISIT FOR MONITORING LOVENOX THERAPY: ICD-10-CM

## 2021-09-17 LAB
PATH REPORT.COMMENTS IMP SPEC: NORMAL
PATH REPORT.FINAL DX SPEC: NORMAL
PATH REPORT.MICROSCOPIC SPEC OTHER STN: NORMAL
PATH REPORT.RELEVANT HX SPEC: NORMAL

## 2021-09-17 PROCEDURE — 999N000102 HC STATISTIC NO CHARGE CLINIC VISIT

## 2021-09-17 NOTE — PROGRESS NOTES
"Sruthi will be starting Lovenox to prevent further clotting as she now has intentions to conceive. She will remain on Lovenox through the pregnancy and likely a period post-partum, depending on her course. She came to clinic for a teaching on how to administer the medication and brought her med supply with to self-administer the first dose.     We reviewed the medication itself and why she is currently on it, as well as the length of use. She met with high risk OB yesterday and understands the risk of pregnancy. We discussed the risk of bleeding while on an anticoagulant and should she fall or have a trauma she should contact the clinic or go to her nearest ED/ hospital. She verbalized understanding. The patient education form, \"Guide to Enoxaparin (Lovenox) Therapy\" was reviewed and provided to Sruthi to take home. She has been prescribed pre-drawn syringes, therefore we reviewed how to expel the air in the syringe prior to administering. She then practiced on the artificial skin pad and felt comfortable performing the injection on herself. She was very anxious to actually inject the medication due to the fear of needles and pain but after injecting the medication she felt it was very easy and something she can get used to doing twice daily.     Of note, the Lovenox she was prescribed was not the same concentration as the medication she was dispensed. The shadowing nurse was able to review the current prescription and find this error prior to patient administering the medication. Lovenox Rx updated in Sruthi's chart to reflect the medication she currently has on hand.   "

## 2021-09-20 ENCOUNTER — LAB (OUTPATIENT)
Dept: LAB | Facility: CLINIC | Age: 31
End: 2021-09-20
Payer: COMMERCIAL

## 2021-09-20 DIAGNOSIS — D47.3 ESSENTIAL THROMBOCYTHEMIA (H): Primary | ICD-10-CM

## 2021-09-20 DIAGNOSIS — Z15.89 JAK2 V617F MUTATION: ICD-10-CM

## 2021-09-20 DIAGNOSIS — D47.3 ESSENTIAL THROMBOCYTHEMIA (H): ICD-10-CM

## 2021-09-20 LAB
LDH SERPL L TO P-CCNC: 131 U/L (ref 81–234)
PATH REPORT.COMMENTS IMP SPEC: ABNORMAL
PATH REPORT.COMMENTS IMP SPEC: ABNORMAL
PATH REPORT.COMMENTS IMP SPEC: YES
PATH REPORT.FINAL DX SPEC: ABNORMAL
PATH REPORT.GROSS SPEC: ABNORMAL
PATH REPORT.MICROSCOPIC SPEC OTHER STN: ABNORMAL
PATH REPORT.MICROSCOPIC SPEC OTHER STN: ABNORMAL
PATH REPORT.RELEVANT HX SPEC: ABNORMAL
RETICS # AUTO: 0.05 10E6/UL (ref 0.03–0.1)
RETICS/RBC NFR AUTO: 1.2 % (ref 0.5–2)

## 2021-09-20 PROCEDURE — 36415 COLL VENOUS BLD VENIPUNCTURE: CPT

## 2021-09-20 PROCEDURE — 83615 LACTATE (LD) (LDH) ENZYME: CPT

## 2021-09-20 PROCEDURE — 85045 AUTOMATED RETICULOCYTE COUNT: CPT

## 2021-09-21 LAB — INTERPRETATION: NORMAL

## 2021-10-12 ENCOUNTER — VIRTUAL VISIT (OUTPATIENT)
Dept: ONCOLOGY | Facility: CLINIC | Age: 31
End: 2021-10-12
Attending: INTERNAL MEDICINE
Payer: COMMERCIAL

## 2021-10-12 DIAGNOSIS — E03.8 SUBCLINICAL HYPOTHYROIDISM: ICD-10-CM

## 2021-10-12 DIAGNOSIS — E78.1 HYPERTRIGLYCERIDEMIA: ICD-10-CM

## 2021-10-12 DIAGNOSIS — Z79.01 LONG TERM CURRENT USE OF ANTICOAGULANT THERAPY: Chronic | ICD-10-CM

## 2021-10-12 DIAGNOSIS — Z15.89 JAK2 V617F MUTATION: Chronic | ICD-10-CM

## 2021-10-12 DIAGNOSIS — D47.3 ESSENTIAL THROMBOCYTHEMIA (H): Primary | ICD-10-CM

## 2021-10-12 DIAGNOSIS — I70.209 ACUTE OCCLUSION OF ARTERY OF LOWER EXTREMITY (H): ICD-10-CM

## 2021-10-12 DIAGNOSIS — Z31.69 ENCOUNTER FOR PRECONCEPTION CONSULTATION: ICD-10-CM

## 2021-10-12 PROCEDURE — 999N001193 HC VIDEO/TELEPHONE VISIT; NO CHARGE

## 2021-10-12 PROCEDURE — 99214 OFFICE O/P EST MOD 30 MIN: CPT | Mod: 95 | Performed by: INTERNAL MEDICINE

## 2021-10-12 RX ORDER — LEVOTHYROXINE SODIUM 25 UG/1
25 TABLET ORAL DAILY
Qty: 90 TABLET | Refills: 1 | Status: SHIPPED | OUTPATIENT
Start: 2021-10-12 | End: 2022-04-15

## 2021-10-12 NOTE — PROGRESS NOTES
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     1. [id number]+ MPN, likely ET v prefibrotic MF  2. 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 Preconception counseling    High risk (IPSET>2) ET    We discussed results of two larger trials evaluating patient with [id number] MPN (polycythemia vera). In these trials, patients were randomly assigned 1:1 to ropeginterferon fouzia-2b (subcutaneously every 2 weeks, starting at 100 ?g) or hydroxyurea (orally starting at 500 mg/day). Compared to hydroxyurea, response to ropeginterferon fouzia-2b continued to increase over time with improved responses compared with hydroxyurea at 36 months. Both the ropeginterferon and hydroxyurea groups had ~40-50% of subjects undergo dose reductions due to drug-related toxicity. The most common toxic side effects for interferon were thrombocytopenia, leukopenia, increased liver function test, fatgiue, anemia, headache, arthralgia and dizziness- similar to hydroxyurea.      As Sruthi is desiring pregnancy, we discussed the interferon is the only currently therapy with use in pregnancy. I would recommend attempting to lower platelet count to help reduce her thrombotic risk. Especially as she will not be able to be on clopedrigril while pregnant.     For her arterial thrombosis, I would favor her being on LMWH 120 mg BID during preconception, pregnancy and post-partum. We briefly discussed that during delivery she will be off anticoagulation, but that shortly after delivery we would resume anticoagulation.       Plan:  1. Majority of today's visit was spent counseling the patient regarding bone marrow biopsy results,  preconception counseling.  2. Plan to start interferon therapy, 45 mcg weekly x 2 weeks, followed by graduate uptitration.   3. Continue oral iron supplement  4. Continue with LMWH 120 mg subcutaneous twice daily--- will check anti-Xa level to see if we can reduce dose    The patient is given our center's contact information and is instructed to call if she should have any further questions or concerns.  Otherwise, we will plan on seeing her back Follow up with Provider - JONAS 1 month and 2 months after starting interferon and Dr. Irving in 3 months .      Keily Irving MD/PhD   of Medicine  [facility] [employer]   ----------------------------------------------------------------------------------------------------------------------    History of Present Illness:  Sruthi Mac is a 30 year old woman with history of depression and anxiety who presetned to  [facility] in April 2021 with acute onset of left leg pain. She was found to have left popliteal arterial occlusion that required extensive interventions. She was evaluated in June 2021 by my former collegue Dr. David due to thrombocytosis. Evaluation included MPN panel that revealed a [id number] mutation. Bone marrow biopsy was completed. Patient presents to my clinic to establish care due to Dr. David leaving practice.     Sruthi reports she is doing well. She is taking LMWH shots and is tolerating them. Her menstrual cycle is slightly heavier. She is not having any leg pain. No night sweats. No unintentional weight loss     Past Medical History:  Past Medical History:   Diagnosis Date     Anxiety      Depression      Essential thrombocythemia (H)      History of arterial thrombosis      Hyperlipidemia      JAK2 gene mutation      Obesity      Panic attack      Subclinical hypothyroidism        Past Surgical History:  Past Surgical History:   Procedure Laterality Date     EMBOLECTOMY LOWER EXTREMITY Left 4/11/2021     Procedure: LEFT LEG ANTERIOR TIBIAL AND POSTERIOR TIBIAL EMBOLECTOMIES, FEMORAL POPITEAL VEIN PATCH, ANGIOGRAM, FASCIOTOMIES;  Surgeon: Ulises Gonzalez;  Location: [facility] OR     IR ANGIOGRAM THROUGH CATHETER FOLLOW UP  4/9/2021     IR ANGIOGRAM THROUGH CATHETER FOLLOW UP  4/10/2021     IR LOWER EXTREMITY ANGIOGRAM LEFT  4/8/2021     THROMBECTOMY LOWER EXTREMITY Left 4/12/2021    Procedure: Re-Exploration Left Lower Extremity With Popliteal and Tibial Embolectomy.;  Surgeon: Ulises Gonzalez;  Location: [facility] OR     wisdom teeth remova         Medications:  Current Outpatient Medications   Medication Sig Dispense Refill     apixaban ANTICOAGULANT (ELIQUIS ANTICOAGULANT) 5 MG tablet Take 1 tablet (5 mg) by mouth 2 times daily 180 tablet 3     atorvastatin (LIPITOR) 20 MG tablet Take 1 tablet (20 mg) by mouth daily 90 tablet 3     busPIRone (BUSPAR) 10 MG tablet Take 10 mg by mouth 2 times daily       enoxaparin ANTICOAGULANT (LOVENOX) 120 MG/0.8ML syringe Inject 0.8 mLs (120 mg) Subcutaneous 2 times daily 48 mL 3     Ferrous Sulfate (IRON) 325 (65 Fe) MG tablet Take 1 tablet by mouth daily       Lactobacillus (PROBIOTIC ACIDOPHILUS PO) Take by mouth daily       levothyroxine (SYNTHROID/LEVOTHROID) 25 MCG tablet TAKE 1 TABLET (25 MCG) BY MOUTH DAILY 90 tablet 1     magnesium oxide 400 MG CAPS Take by mouth daily       multivitamin, therapeutic (THERA-VIT) TABS tablet Take 1 tablet by mouth daily       sertraline (ZOLOFT) 50 MG tablet Take 100 mg by mouth daily        spironolactone (ALDACTONE) 50 MG tablet Take 50 mg by mouth daily       Vitamin D3 (CHOLECALCIFEROL) 25 mcg (1000 units) tablet Take 1 tablet by mouth daily          Allergies:  Allergies   Allergen Reactions     Erythromycin Unknown     Pcn [Penicillins] Unknown       ROS:  A 14 point ROS is negative except as stated in the HPI    Social History:  Denies any tobacco use. No significant alcohol use. Denies any illicit drug use. Patient works as a  " for [employer] .    Family History:  Dad- T2 DM, hyperlipidemia, testicular cancer, melanoma  Mother- hyperlipidemia     Objective:  GENERAL: Healthy, alert and no distress  EYES: Eyes grossly normal to inspection.  No discharge or erythema, or obvious scleral/conjunctival abnormalities.  RESP: No audible wheeze, cough, or visible cyanosis.  No visible retractions or increased work of breathing.    SKIN: Visible skin clear. No significant rash, abnormal pigmentation or lesions.  NEURO: Cranial nerves grossly intact.  Mentation and speech appropriate for age.  PSYCH: Mentation appears normal, affect normal/bright, judgement and insight intact, normal speech and appearance well-groomed.      Labs: personally reviewed with relevant trends annotated below  CBC RESULTS: Recent Labs   Lab Test 09/16/21  1124 08/25/21  1519 08/25/21  1519   WBC  --   --  11.4*   RBC  --   --  4.62   HGB  --   --  12.5   HCT  --   --  38.9   MCV  --   --  84   MCH  --   --  27.1   MCHC  --   --  32.1   RDW  --   --  15.5*   *   < > 564*    < > = values in this interval not displayed.       Bone marrow:  The overall morphology of megakaryocytes is unremarkable with only rare forms showing complex or hyperchromatic nuclear features.  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  The molecular findings are consistent with clonal myeloid neoplasm with a differential diagnosis including essential thrombocytosis and primary myelofibrosis.  There is no morphological evidence for primary myelofibrosis however, the morphologic findings are not \"classic\" for ET in this suboptimal core biopsy.     Variant allele frequency: VAF: 0.0506 Variant Read Depth: 158 Total Read Depth: 3126     Imaging:  No recent abdominal " imaging

## 2021-10-12 NOTE — PROGRESS NOTES
Sruthi is a 30 year old who is being evaluated via a billable video visit.      How would you like to obtain your AVS? MyChart  If the video visit is dropped, the invitation should be resent by: Text to cell phone: [phone]  Will anyone else be joining your video visit? No      Video Start Time: 4:30  Video-Visit Details    Type of service:  Video Visit    Video End Time:5:00 PM    Originating Location (pt. Location): Home    Distant Location (provider location):  [facility]     Platform used for Video Visit: MaryWell

## 2021-10-12 NOTE — TELEPHONE ENCOUNTER
"Last Written Prescription Date:  4/15/21  Last Fill Quantity: 90,  # refills: 1   Last office visit: 5/20/2021 with prescribing provider:     Future Office Visit:      Requested Prescriptions   Pending Prescriptions Disp Refills     levothyroxine (SYNTHROID/LEVOTHROID) 25 MCG tablet [Pharmacy Med Name: LEVOTHYROXINE SODIUM 25MCG TABS] 90 tablet 0     Sig: TAKE 1 TABLET (25 MCG) BY MOUTH DAILY       Thyroid Protocol Passed - 10/12/2021  2:12 PM        Passed - Patient is 12 years or older        Passed - Recent (12 mo) or future (30 days) visit within the authorizing provider's specialty     Patient has had an office visit with the authorizing provider or a provider within the authorizing providers department within the previous 12 mos or has a future within next 30 days. See \"Patient Info\" tab in inbasket, or \"Choose Columns\" in Meds & Orders section of the refill encounter.              Passed - Medication is active on med list        Passed - Normal TSH on file in past 12 months     Recent Labs   Lab Test 08/25/21  1519   TSH 2.48              Passed - No active pregnancy on record     If patient is pregnant or has had a positive pregnancy test, please check TSH.          Passed - No positive pregnancy test in past 12 months     If patient is pregnant or has had a positive pregnancy test, please check TSH.             Prescription approved per [facility] Refill Protocol.    Camila LANDRY, RN    [facility]  Office: [phone]  Fax: [phone]        "

## 2021-10-12 NOTE — LETTER
10/12/2021         RE: Sruthi Mac  [address] [facility] [address]        Dear Colleague,    Thank you for referring your patient, Sruthi Mac, to the [facility]. Please see a copy of my visit note below.    Sruthi is a 30 year old who is being evaluated via a billable video visit.      How would you like to obtain your AVS? MyChart  If the video visit is dropped, the invitation should be resent by: Text to cell phone: [phone]  Will anyone else be joining your video visit? No      Video Start Time: 4:30  Video-Visit Details    Type of service:  Video Visit    Video End Time:5:00 PM    Originating Location (pt. 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     1. [id number] MPN, likely ET v prefibrotic MF  2. 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 Preconception counseling    High risk (IPSET>2) ET    We discussed results of two larger trials evaluating patient with [id number] MPN (polycythemia vera). In these trials, patients were randomly assigned 1:1 to ropeginterferon fouzia-2b (subcutaneously every 2 weeks, starting at 100 ?g) or hydroxyurea (orally starting at 500 mg/day). Compared to hydroxyurea, response to ropeginterferon fouzia-2b continued to increase over time with improved responses compared with hydroxyurea at 36 months. Both the ropeginterferon and hydroxyurea groups had ~40-50% of subjects undergo dose reductions due to drug-related toxicity. The most common toxic side  effects for interferon were thrombocytopenia, leukopenia, increased liver function test, fatgiue, anemia, headache, arthralgia and dizziness- similar to hydroxyurea.      As Sruthi is desiring pregnancy, we discussed the interferon is the only currently therapy with use in pregnancy. I would recommend attempting to lower platelet count to help reduce her thrombotic risk. Especially as she will not be able to be on clopedrigril while pregnant.     For her arterial thrombosis, I would favor her being on LMWH 120 mg BID during preconception, pregnancy and post-partum. We briefly discussed that during delivery she will be off anticoagulation, but that shortly after delivery we would resume anticoagulation.       Plan:  1. Majority of today's visit was spent counseling the patient regarding bone marrow biopsy results, preconception counseling.  2. Plan to start interferon therapy, 45 mcg weekly x 2 weeks, followed by graduate uptitration.   3. Continue oral iron supplement  4. Continue with LMWH 120 mg subcutaneous twice daily--- will check anti-Xa level to see if we can reduce dose    The patient is given our center's contact information and is instructed to call if she should have any further questions or concerns.  Otherwise, we will plan on seeing her back Follow up with Provider - JONAS 1 month and 2 months after starting interferon and Dr. Irving in 3 months .      Keily Irving MD/PhD   of Medicine  [facility]   ----------------------------------------------------------------------------------------------------------------------    History of Present Illness:  Sruthi Mac is a 30 year old woman with history of depression and anxiety who presetned to  [employer] in April 2021 with acute onset of left leg pain. She was found to have left popliteal arterial occlusion that required extensive interventions. She was evaluated in June 2021 by my former  [place] David due to thrombocytosis. Evaluation included MPN panel that revealed a [id number] mutation. Bone marrow biopsy was completed. Patient presents to my clinic to establish care due to Dr. David leaving practice.     Sruthi reports she is doing well. She is taking LMWH shots and is tolerating them. Her menstrual cycle is slightly heavier. She is not having any leg pain. No night sweats. No unintentional weight loss     Past Medical History:  Past Medical History:   Diagnosis Date     Anxiety      Depression      Essential thrombocythemia (H)      History of arterial thrombosis      Hyperlipidemia      JAK2 gene mutation      Obesity      Panic attack      Subclinical hypothyroidism        Past Surgical History:  Past Surgical History:   Procedure Laterality Date     EMBOLECTOMY LOWER EXTREMITY Left 4/11/2021    Procedure: LEFT LEG ANTERIOR TIBIAL AND POSTERIOR TIBIAL EMBOLECTOMIES, FEMORAL POPITEAL VEIN PATCH, ANGIOGRAM, FASCIOTOMIES;  Surgeon: Ulises Gonzalez;  Location: [facility] OR     IR ANGIOGRAM THROUGH CATHETER FOLLOW UP  4/9/2021     IR ANGIOGRAM THROUGH CATHETER FOLLOW UP  4/10/2021     IR LOWER EXTREMITY ANGIOGRAM LEFT  4/8/2021     THROMBECTOMY LOWER EXTREMITY Left 4/12/2021    Procedure: Re-Exploration Left Lower Extremity With Popliteal and Tibial Embolectomy.;  Surgeon: Ulises Gonzalez;  Location: [facility] OR     wisdom teeth remova         Medications:  Current Outpatient Medications   Medication Sig Dispense Refill     apixaban ANTICOAGULANT (ELIQUIS ANTICOAGULANT) 5 MG tablet Take 1 tablet (5 mg) by mouth 2 times daily 180 tablet 3     atorvastatin (LIPITOR) 20 MG tablet Take 1 tablet (20 mg) by mouth daily 90 tablet 3     busPIRone (BUSPAR) 10 MG tablet Take 10 mg by mouth 2 times daily       enoxaparin ANTICOAGULANT (LOVENOX) 120 MG/0.8ML syringe Inject 0.8 mLs (120 mg) Subcutaneous 2 times daily 48 mL 3     Ferrous Sulfate (IRON) 325 (65 Fe) MG tablet Take 1 tablet by mouth daily        Lactobacillus (PROBIOTIC ACIDOPHILUS PO) Take by mouth daily       levothyroxine (SYNTHROID/LEVOTHROID) 25 MCG tablet TAKE 1 TABLET (25 MCG) BY MOUTH DAILY 90 tablet 1     magnesium oxide 400 MG CAPS Take by mouth daily       multivitamin, therapeutic (THERA-VIT) TABS tablet Take 1 tablet by mouth daily       sertraline (ZOLOFT) 50 MG tablet Take 100 mg by mouth daily        spironolactone (ALDACTONE) 50 MG tablet Take 50 mg by mouth daily       Vitamin D3 (CHOLECALCIFEROL) 25 mcg (1000 units) tablet Take 1 tablet by mouth daily          Allergies:  Allergies   Allergen Reactions     Erythromycin Unknown     Pcn [Penicillins] Unknown       ROS:  A 14 point ROS is negative except as stated in the HPI    Social History:  Denies any tobacco use. No significant alcohol use. Denies any illicit drug use. Patient works as a  for [employer]. .    Family History:  Dad- T2 DM, hyperlipidemia, testicular cancer, melanoma  Mother- hyperlipidemia     Objective:  GENERAL: Healthy, alert and no distress  EYES: Eyes grossly normal to inspection.  No discharge or erythema, or obvious scleral/conjunctival abnormalities.  RESP: No audible wheeze, cough, or visible cyanosis.  No visible retractions or increased work of breathing.    SKIN: Visible skin clear. No significant rash, abnormal pigmentation or lesions.  NEURO: Cranial nerves grossly intact.  Mentation and speech appropriate for age.  PSYCH: Mentation appears normal, affect normal/bright, judgement and insight intact, normal speech and appearance well-groomed.      Labs: personally reviewed with relevant trends annotated below  CBC RESULTS: Recent Labs   Lab Test 09/16/21  1124 08/25/21  1519 08/25/21  1519   WBC  --   --  11.4*   RBC  --   --  4.62   HGB  --   --  12.5   HCT  --   --  38.9   MCV  --   --  84   MCH  --   --  27.1   MCHC  --   --  32.1   RDW  --   --  15.5*   *   < > 564*    < > = values in this interval not displayed.  "      Bone marrow:  The overall morphology of megakaryocytes is unremarkable with only rare forms showing complex or hyperchromatic nuclear features.  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  The molecular findings are consistent with clonal myeloid neoplasm with a differential diagnosis including essential thrombocytosis and primary myelofibrosis.  There is no morphological evidence for primary myelofibrosis however, the morphologic findings are not \"classic\" for ET in this suboptimal core biopsy.     Variant allele frequency: VAF: 0.0506 Variant Read Depth: 158 Total Read Depth: 3126     Imaging:  No recent abdominal imaging        Again, thank you for allowing me to participate in the care of your patient.        Sincerely,        Keily Irving MD    "

## 2021-10-19 PROBLEM — Z79.01 LONG TERM CURRENT USE OF ANTICOAGULANT THERAPY: Chronic | Status: ACTIVE | Noted: 2021-10-19

## 2021-10-19 PROBLEM — D47.3 ESSENTIAL THROMBOCYTHEMIA (H): Chronic | Status: ACTIVE | Noted: 2021-10-19

## 2021-10-19 PROBLEM — Z31.69 ENCOUNTER FOR PRECONCEPTION CONSULTATION: Status: ACTIVE | Noted: 2021-10-19

## 2021-10-19 PROBLEM — Z15.89 JAK2 V617F MUTATION: Chronic | Status: ACTIVE | Noted: 2021-10-19

## 2021-10-19 LAB
CULTURE HARVEST COMPLETE DATE: NORMAL
CULTURE HARVEST COMPLETE DATE: NORMAL

## 2021-10-19 RX ORDER — HEPARIN SODIUM,PORCINE 10 UNIT/ML
5 VIAL (ML) INTRAVENOUS
Status: CANCELLED | OUTPATIENT
Start: 2021-10-25

## 2021-10-19 RX ORDER — HEPARIN SODIUM (PORCINE) LOCK FLUSH IV SOLN 100 UNIT/ML 100 UNIT/ML
5 SOLUTION INTRAVENOUS
Status: CANCELLED | OUTPATIENT
Start: 2021-10-25

## 2021-10-20 LAB
INTERPRETATION: NORMAL
ISCN: NORMAL
METHODS: NORMAL

## 2021-11-05 DIAGNOSIS — D47.3 ESSENTIAL THROMBOCYTHEMIA (H): Primary | ICD-10-CM

## 2021-11-05 DIAGNOSIS — Z15.89 JAK2 V617F MUTATION: ICD-10-CM

## 2021-11-05 RX ORDER — ALBUTEROL SULFATE 0.83 MG/ML
2.5 SOLUTION RESPIRATORY (INHALATION)
Status: CANCELLED | OUTPATIENT
Start: 2021-11-05

## 2021-11-05 RX ORDER — NALOXONE HYDROCHLORIDE 0.4 MG/ML
0.2 INJECTION, SOLUTION INTRAMUSCULAR; INTRAVENOUS; SUBCUTANEOUS
Status: CANCELLED | OUTPATIENT
Start: 2021-11-09

## 2021-11-05 RX ORDER — ALBUTEROL SULFATE 90 UG/1
1-2 AEROSOL, METERED RESPIRATORY (INHALATION)
Status: CANCELLED
Start: 2021-11-05

## 2021-11-05 RX ORDER — EPINEPHRINE 1 MG/ML
0.3 INJECTION, SOLUTION INTRAMUSCULAR; SUBCUTANEOUS EVERY 5 MIN PRN
Status: CANCELLED | OUTPATIENT
Start: 2021-11-09

## 2021-11-05 RX ORDER — EPINEPHRINE 1 MG/ML
0.3 INJECTION, SOLUTION INTRAMUSCULAR; SUBCUTANEOUS EVERY 5 MIN PRN
Status: CANCELLED | OUTPATIENT
Start: 2021-11-05

## 2021-11-05 RX ORDER — DIPHENHYDRAMINE HYDROCHLORIDE 50 MG/ML
50 INJECTION INTRAMUSCULAR; INTRAVENOUS
Status: CANCELLED
Start: 2021-11-09

## 2021-11-05 RX ORDER — MEPERIDINE HYDROCHLORIDE 25 MG/ML
25 INJECTION INTRAMUSCULAR; INTRAVENOUS; SUBCUTANEOUS EVERY 30 MIN PRN
Status: CANCELLED | OUTPATIENT
Start: 2021-11-09

## 2021-11-05 RX ORDER — METHYLPREDNISOLONE SODIUM SUCCINATE 125 MG/2ML
125 INJECTION, POWDER, LYOPHILIZED, FOR SOLUTION INTRAMUSCULAR; INTRAVENOUS
Status: CANCELLED
Start: 2021-11-09

## 2021-11-05 RX ORDER — ALBUTEROL SULFATE 90 UG/1
1-2 AEROSOL, METERED RESPIRATORY (INHALATION)
Status: CANCELLED
Start: 2021-11-09

## 2021-11-05 RX ORDER — NALOXONE HYDROCHLORIDE 0.4 MG/ML
0.2 INJECTION, SOLUTION INTRAMUSCULAR; INTRAVENOUS; SUBCUTANEOUS
Status: CANCELLED | OUTPATIENT
Start: 2021-11-05

## 2021-11-05 RX ORDER — ALBUTEROL SULFATE 0.83 MG/ML
2.5 SOLUTION RESPIRATORY (INHALATION)
Status: CANCELLED | OUTPATIENT
Start: 2021-11-09

## 2021-11-05 RX ORDER — MEPERIDINE HYDROCHLORIDE 25 MG/ML
25 INJECTION INTRAMUSCULAR; INTRAVENOUS; SUBCUTANEOUS EVERY 30 MIN PRN
Status: CANCELLED | OUTPATIENT
Start: 2021-11-05

## 2021-11-05 RX ORDER — METHYLPREDNISOLONE SODIUM SUCCINATE 125 MG/2ML
125 INJECTION, POWDER, LYOPHILIZED, FOR SOLUTION INTRAMUSCULAR; INTRAVENOUS
Status: CANCELLED
Start: 2021-11-05

## 2021-11-05 RX ORDER — DIPHENHYDRAMINE HYDROCHLORIDE 50 MG/ML
50 INJECTION INTRAMUSCULAR; INTRAVENOUS
Status: CANCELLED
Start: 2021-11-05

## 2021-11-08 ENCOUNTER — LAB (OUTPATIENT)
Dept: LAB | Facility: CLINIC | Age: 31
End: 2021-11-08
Payer: COMMERCIAL

## 2021-11-08 DIAGNOSIS — Z15.89 JAK2 V617F MUTATION: ICD-10-CM

## 2021-11-08 DIAGNOSIS — D47.3 ESSENTIAL THROMBOCYTHEMIA (H): ICD-10-CM

## 2021-11-08 LAB
ALBUMIN SERPL-MCNC: 3.8 G/DL (ref 3.4–5)
ALP SERPL-CCNC: 99 U/L (ref 40–150)
ALT SERPL W P-5'-P-CCNC: 23 U/L (ref 0–50)
ANION GAP SERPL CALCULATED.3IONS-SCNC: 8 MMOL/L (ref 3–14)
AST SERPL W P-5'-P-CCNC: 11 U/L (ref 0–45)
BASOPHILS # BLD AUTO: 0 10E3/UL (ref 0–0.2)
BASOPHILS NFR BLD AUTO: 0 %
BILIRUB SERPL-MCNC: 0.3 MG/DL (ref 0.2–1.3)
BUN SERPL-MCNC: 13 MG/DL (ref 7–30)
CALCIUM SERPL-MCNC: 9.3 MG/DL (ref 8.5–10.1)
CHLORIDE BLD-SCNC: 102 MMOL/L (ref 94–109)
CO2 SERPL-SCNC: 24 MMOL/L (ref 20–32)
CREAT SERPL-MCNC: 0.71 MG/DL (ref 0.52–1.04)
EOSINOPHIL # BLD AUTO: 0.1 10E3/UL (ref 0–0.7)
EOSINOPHIL NFR BLD AUTO: 2 %
ERYTHROCYTE [DISTWIDTH] IN BLOOD BY AUTOMATED COUNT: 13.8 % (ref 10–15)
FERRITIN SERPL-MCNC: 29 NG/ML (ref 12–150)
GFR SERPL CREATININE-BSD FRML MDRD: >90 ML/MIN/1.73M2
GLUCOSE BLD-MCNC: 84 MG/DL (ref 70–99)
HCT VFR BLD AUTO: 40.3 % (ref 35–47)
HGB BLD-MCNC: 12.9 G/DL (ref 11.7–15.7)
IRON SATN MFR SERPL: 28 % (ref 15–46)
IRON SERPL-MCNC: 106 UG/DL (ref 35–180)
LDH SERPL L TO P-CCNC: 168 U/L (ref 81–234)
LYMPHOCYTES # BLD AUTO: 2.9 10E3/UL (ref 0.8–5.3)
LYMPHOCYTES NFR BLD AUTO: 35 %
MCH RBC QN AUTO: 27.9 PG (ref 26.5–33)
MCHC RBC AUTO-ENTMCNC: 32 G/DL (ref 31.5–36.5)
MCV RBC AUTO: 87 FL (ref 78–100)
MONOCYTES # BLD AUTO: 0.5 10E3/UL (ref 0–1.3)
MONOCYTES NFR BLD AUTO: 6 %
NEUTROPHILS # BLD AUTO: 4.8 10E3/UL (ref 1.6–8.3)
NEUTROPHILS NFR BLD AUTO: 58 %
PLATELET # BLD AUTO: 491 10E3/UL (ref 150–450)
POTASSIUM BLD-SCNC: 4.1 MMOL/L (ref 3.4–5.3)
PROT SERPL-MCNC: 8.1 G/DL (ref 6.8–8.8)
RBC # BLD AUTO: 4.63 10E6/UL (ref 3.8–5.2)
SODIUM SERPL-SCNC: 134 MMOL/L (ref 133–144)
TIBC SERPL-MCNC: 381 UG/DL (ref 240–430)
WBC # BLD AUTO: 8.3 10E3/UL (ref 4–11)

## 2021-11-08 PROCEDURE — 80053 COMPREHEN METABOLIC PANEL: CPT

## 2021-11-08 PROCEDURE — 82728 ASSAY OF FERRITIN: CPT

## 2021-11-08 PROCEDURE — 36415 COLL VENOUS BLD VENIPUNCTURE: CPT

## 2021-11-08 PROCEDURE — 85025 COMPLETE CBC W/AUTO DIFF WBC: CPT

## 2021-11-08 PROCEDURE — 83615 LACTATE (LD) (LDH) ENZYME: CPT

## 2021-11-08 PROCEDURE — 83550 IRON BINDING TEST: CPT

## 2021-11-08 RX ORDER — ACETAMINOPHEN 325 MG/1
975 TABLET ORAL ONCE
Status: CANCELLED
Start: 2021-11-09 | End: 2021-11-09

## 2021-11-09 ENCOUNTER — PATIENT OUTREACH (OUTPATIENT)
Dept: ONCOLOGY | Facility: CLINIC | Age: 31
End: 2021-11-09
Payer: COMMERCIAL

## 2021-11-09 ENCOUNTER — INFUSION THERAPY VISIT (OUTPATIENT)
Dept: ONCOLOGY | Facility: CLINIC | Age: 31
End: 2021-11-09
Attending: INTERNAL MEDICINE
Payer: COMMERCIAL

## 2021-11-09 VITALS
HEART RATE: 87 BPM | TEMPERATURE: 98.2 F | OXYGEN SATURATION: 97 % | SYSTOLIC BLOOD PRESSURE: 109 MMHG | RESPIRATION RATE: 16 BRPM | DIASTOLIC BLOOD PRESSURE: 75 MMHG

## 2021-11-09 DIAGNOSIS — Z15.89 JAK2 V617F MUTATION: ICD-10-CM

## 2021-11-09 DIAGNOSIS — D47.3 ESSENTIAL THROMBOCYTHEMIA (H): Primary | ICD-10-CM

## 2021-11-09 PROCEDURE — 250N000013 HC RX MED GY IP 250 OP 250 PS 637: Performed by: INTERNAL MEDICINE

## 2021-11-09 PROCEDURE — 250N000009 HC RX 250: Performed by: INTERNAL MEDICINE

## 2021-11-09 PROCEDURE — 96372 THER/PROPH/DIAG INJ SC/IM: CPT

## 2021-11-09 RX ORDER — ACETAMINOPHEN 325 MG/1
975 TABLET ORAL ONCE
Status: COMPLETED | OUTPATIENT
Start: 2021-11-09 | End: 2021-11-09

## 2021-11-09 RX ADMIN — PEGINTERFERON ALFA-2A 45 MCG: 180 INJECTION, SOLUTION SUBCUTANEOUS at 16:02

## 2021-11-09 RX ADMIN — ACETAMINOPHEN 975 MG: 325 TABLET ORAL at 14:30

## 2021-11-09 NOTE — PATIENT INSTRUCTIONS
[facility] Triage and after hours / weekends / holidays:  [phone]    Please call the triage or after hours line if you experience a temperature greater than or equal to 100.5, shaking chills, have uncontrolled nausea, vomiting and/or diarrhea, dizziness, shortness of breath, chest pain, bleeding, unexplained bruising, or if you have any other new/concerning symptoms, questions or concerns.      If you are having any concerning symptoms or wish to speak to a provider before your next infusion visit, please call your care coordinator or triage to notify them so we can adequately serve you.     If you need a refill on a narcotic prescription or other medication, please call before your infusion appointment.                 November 2021 Sunday Monday Tuesday Wednesday Thursday Friday Saturday        1     2     3     4     5     6       7     8    LAB  12:00 PM   (15 min.)   [facility]   [facility] 9    ONC INFUSION 1 HR (60 MIN)   2:00 PM   (60 min.)    ONC INFUSION NURSE   [facility] 10     11     12     13       14     15     16    ONC INFUSION 1 HR (60 MIN)   2:30 PM   (60 min.)    ONC INFUSION NURSE   [facility] 17     18     19     20       21     22     23     24     25     26     27       28     29     30    RETURN   1:30 PM   (60 min.)   Ashish Payne PA   [facility]                                 December 2021 Sunday Monday Tuesday Wednesday Thursday Friday Saturday                  1  Happy Birthday!     2     3     4       5     6     7     8     9     10     11       12     13     14     15     16     17    LAB PERIPHERAL   7:30 AM   (15 min.)   [facility] DRAW   [facility] 18       19     20     21     22     23     24     25       26     27     28     29     30     31                          Lab Results:  No results  found for this or any previous visit (from the past 12 hour(s)).

## 2021-11-09 NOTE — PROGRESS NOTES
Received call from infusion RNFredy, regarding Sruthi's hesitancy to receive the peginterferon fouzia-2a injection (Pegasys). She received education from Fredy and they were about to do the injection when Sruthi began to cry, expressing difficulty starting treatment. She requested Dr. Keily Irving or myself come to the infusion room to discuss her treatment plan and the safety of using Pegasys while conceiving and/or pregnant. Printed studies which show the safety profile and beneficial use of Pegasys while pregnant. Discussed with Sruthi the benefits of controlling her ET/ platelet production while conceiving and/ or pregnant to ensure she maintains her pregnancy. Sruthi is currently ovulating and concerned with starting the medication on the same day. Reviewed the safety profile of starting and continuing the medication for cytoreduction during the first trimester to prevent miscarriage or poor fetal development. Sruthi felt more comfortable with the idea of receiving the medication and Dr. Keily Irving came to the infusion room to also review her care plan. After further discussion with MD and myself, Sruthi will start her treatment plan today. Discussed with infusion RNFredy, who will administer per plan.

## 2021-11-16 DIAGNOSIS — D47.3 ESSENTIAL THROMBOCYTHEMIA (H): Primary | ICD-10-CM

## 2021-11-16 DIAGNOSIS — Z15.89 JAK2 V617F MUTATION: ICD-10-CM

## 2021-11-16 RX ORDER — ALBUTEROL SULFATE 0.83 MG/ML
2.5 SOLUTION RESPIRATORY (INHALATION)
Status: DISCONTINUED | OUTPATIENT
Start: 2021-11-16 | End: 2021-11-16 | Stop reason: CLARIF

## 2021-11-16 RX ORDER — ACETAMINOPHEN 325 MG/1
975 TABLET ORAL ONCE
Status: DISCONTINUED | OUTPATIENT
Start: 2021-11-16 | End: 2021-11-16 | Stop reason: CLARIF

## 2021-11-16 RX ORDER — ALBUTEROL SULFATE 90 UG/1
1-2 AEROSOL, METERED RESPIRATORY (INHALATION)
Status: DISCONTINUED | OUTPATIENT
Start: 2021-11-16 | End: 2021-11-16 | Stop reason: CLARIF

## 2021-11-16 RX ORDER — NALOXONE HYDROCHLORIDE 0.4 MG/ML
0.2 INJECTION, SOLUTION INTRAMUSCULAR; INTRAVENOUS; SUBCUTANEOUS
Status: DISCONTINUED | OUTPATIENT
Start: 2021-11-16 | End: 2021-11-16 | Stop reason: CLARIF

## 2021-11-16 RX ORDER — MEPERIDINE HYDROCHLORIDE 25 MG/ML
25 INJECTION INTRAMUSCULAR; INTRAVENOUS; SUBCUTANEOUS EVERY 30 MIN PRN
Status: DISCONTINUED | OUTPATIENT
Start: 2021-11-16 | End: 2021-11-16 | Stop reason: CLARIF

## 2021-11-16 RX ORDER — EPINEPHRINE 1 MG/ML
0.3 INJECTION, SOLUTION INTRAMUSCULAR; SUBCUTANEOUS EVERY 5 MIN PRN
Status: DISCONTINUED | OUTPATIENT
Start: 2021-11-16 | End: 2021-11-16 | Stop reason: CLARIF

## 2021-11-16 RX ORDER — DIPHENHYDRAMINE HYDROCHLORIDE 50 MG/ML
50 INJECTION INTRAMUSCULAR; INTRAVENOUS
Status: DISCONTINUED | OUTPATIENT
Start: 2021-11-16 | End: 2021-11-16 | Stop reason: CLARIF

## 2021-11-16 RX ORDER — METHYLPREDNISOLONE SODIUM SUCCINATE 125 MG/2ML
125 INJECTION, POWDER, LYOPHILIZED, FOR SOLUTION INTRAMUSCULAR; INTRAVENOUS
Status: DISCONTINUED | OUTPATIENT
Start: 2021-11-16 | End: 2021-11-16 | Stop reason: CLARIF

## 2021-11-17 DIAGNOSIS — Z15.89 JAK2 V617F MUTATION: ICD-10-CM

## 2021-11-17 DIAGNOSIS — D47.3 ESSENTIAL THROMBOCYTHEMIA (H): Primary | ICD-10-CM

## 2021-11-17 RX ORDER — NALOXONE HYDROCHLORIDE 0.4 MG/ML
0.2 INJECTION, SOLUTION INTRAMUSCULAR; INTRAVENOUS; SUBCUTANEOUS
Status: CANCELLED | OUTPATIENT
Start: 2021-12-07

## 2021-11-17 RX ORDER — ACETAMINOPHEN 325 MG/1
975 TABLET ORAL ONCE
Status: CANCELLED
Start: 2021-12-14 | End: 2021-12-14

## 2021-11-17 RX ORDER — MEPERIDINE HYDROCHLORIDE 25 MG/ML
25 INJECTION INTRAMUSCULAR; INTRAVENOUS; SUBCUTANEOUS EVERY 30 MIN PRN
Status: CANCELLED | OUTPATIENT
Start: 2021-12-14

## 2021-11-17 RX ORDER — DIPHENHYDRAMINE HYDROCHLORIDE 50 MG/ML
50 INJECTION INTRAMUSCULAR; INTRAVENOUS
Status: CANCELLED
Start: 2021-12-14

## 2021-11-17 RX ORDER — ACETAMINOPHEN 325 MG/1
975 TABLET ORAL ONCE
Status: CANCELLED
Start: 2021-12-07 | End: 2021-12-07

## 2021-11-17 RX ORDER — EPINEPHRINE 1 MG/ML
0.3 INJECTION, SOLUTION, CONCENTRATE INTRAVENOUS EVERY 5 MIN PRN
Status: CANCELLED | OUTPATIENT
Start: 2021-12-07

## 2021-11-17 RX ORDER — ALBUTEROL SULFATE 90 UG/1
1-2 AEROSOL, METERED RESPIRATORY (INHALATION)
Status: CANCELLED
Start: 2021-12-14

## 2021-11-17 RX ORDER — NALOXONE HYDROCHLORIDE 0.4 MG/ML
0.2 INJECTION, SOLUTION INTRAMUSCULAR; INTRAVENOUS; SUBCUTANEOUS
Status: CANCELLED | OUTPATIENT
Start: 2021-12-14

## 2021-11-17 RX ORDER — ALBUTEROL SULFATE 0.83 MG/ML
2.5 SOLUTION RESPIRATORY (INHALATION)
Status: CANCELLED | OUTPATIENT
Start: 2021-12-14

## 2021-11-17 RX ORDER — DIPHENHYDRAMINE HYDROCHLORIDE 50 MG/ML
50 INJECTION INTRAMUSCULAR; INTRAVENOUS
Status: CANCELLED
Start: 2021-12-07

## 2021-11-17 RX ORDER — EPINEPHRINE 1 MG/ML
0.3 INJECTION, SOLUTION, CONCENTRATE INTRAVENOUS EVERY 5 MIN PRN
Status: CANCELLED | OUTPATIENT
Start: 2021-12-14

## 2021-11-17 RX ORDER — ALBUTEROL SULFATE 90 UG/1
1-2 AEROSOL, METERED RESPIRATORY (INHALATION)
Status: CANCELLED
Start: 2021-12-07

## 2021-11-17 RX ORDER — ALBUTEROL SULFATE 0.83 MG/ML
2.5 SOLUTION RESPIRATORY (INHALATION)
Status: CANCELLED | OUTPATIENT
Start: 2021-12-07

## 2021-11-17 RX ORDER — MEPERIDINE HYDROCHLORIDE 25 MG/ML
25 INJECTION INTRAMUSCULAR; INTRAVENOUS; SUBCUTANEOUS EVERY 30 MIN PRN
Status: CANCELLED | OUTPATIENT
Start: 2021-12-07

## 2021-11-23 ENCOUNTER — PATIENT OUTREACH (OUTPATIENT)
Dept: ONCOLOGY | Facility: CLINIC | Age: 31
End: 2021-11-23
Payer: COMMERCIAL

## 2021-11-23 NOTE — PROGRESS NOTES
Called Sruthi to discuss her MyChart stating she was diagnosed with COVID-19. She was exposed via her in-laws on Friday 11/19 and her step-daughter tested positive on Sat 11/20 when she became symptomatic and took a test. She then became symptomatic on Sunday with cold-like symptoms of a headache and fatigue; her COVID-19 test on 11/21 came back positive. Since then every day has gotten worse and today she noticed a cough has started.     We reviewed Dr. Keily Irving's recommendation to try the monoclonal antibody treatment. Sruthi is at high risk for COVID-19 complications as she has a history of clots with diagnosed ET (JAK2 V167F+), and currently on interferon. She is in agreement to try the treatment but we did briefly discuss her risk of treatment while attempting to conceive. She is due to check for pregnancy but not for several more days. Due to the exposure being 5 days prior and her diagnosis 3 days ago, recommended she move forward with the evaluation for the MAb, as it will take time, and if she finds out she's pregnant she can call the clinic. I did encourage her to receive the MAb regardless, as she is at high risk for complications and will not be able to carry a baby to term with possible clots, lung damage, etc. She agreed the risk is worth the benefits and will move forward with the referral. Agreed to place on the [facility] website.

## 2021-11-24 LAB — CULTURE HARVEST COMPLETE DATE: NORMAL

## 2021-11-30 ENCOUNTER — VIRTUAL VISIT (OUTPATIENT)
Dept: ONCOLOGY | Facility: CLINIC | Age: 31
End: 2021-11-30
Attending: PHYSICIAN ASSISTANT
Payer: COMMERCIAL

## 2021-11-30 DIAGNOSIS — Z15.89 JAK2 V617F MUTATION: Chronic | ICD-10-CM

## 2021-11-30 DIAGNOSIS — D47.3 ESSENTIAL THROMBOCYTHEMIA (H): Primary | Chronic | ICD-10-CM

## 2021-11-30 PROCEDURE — 99214 OFFICE O/P EST MOD 30 MIN: CPT | Mod: 95 | Performed by: PHYSICIAN ASSISTANT

## 2021-11-30 NOTE — PROGRESS NOTES
Sruthi is a 30 year old who is being evaluated via a billable video visit.      How would you like to obtain your AVS? MyChart  If the video visit is dropped, the invitation should be resent by: Text to cell phone: [phone]  Will anyone else be joining your video visit? No      Oncology/Hematology Visit Note  Nov 30, 2021    Reason for Visit: Follow up of JAK2+ MPN    History of Present Illness: Sruthi Mac is a 30 year old woman with history of depression and anxiety who presetned to  [employer] in April 2021 with acute onset of left leg pain. She was found to have left popliteal arterial occlusion that required extensive interventions. She was evaluated in June 2021 due to thrombocytosis. Evaluation included MPN panel that revealed a [id number] mutation. Bone marrow biopsy was completed with no morphological evidence for primary myelofibrosis, however was sub optimal core biopsy.      As she is desiring pregnancy she was started on Interferon (up titration every 2 weeks) along with oral iron and LMWH 120mg BID.     She tested positive for COVID 11/21/21.     Interval History:  Sruthi was called today for follow-up on video. She tested positive for COVID last week. She has had fatigue, congestion, cough, mild GI upset including diarrhea, loss of taste and smell. Overall she is improving though still fatigued and looser stools. She did get COVID antibody treatment on Sunday. She denies fevers or SOB. No chest pain, leg swelling. She is tolerating Interferon well with no side effects. Denies any issues with Lovenox other than slightly heavier menses and discomfort from injections. Still on oral iron. She is not currently pregnant.    Current Outpatient Medications   Medication Sig Dispense Refill     atorvastatin (LIPITOR) 20 MG tablet Take 1 tablet (20 mg) by mouth daily (Patient not taking: Reported on 11/9/2021) 90 tablet 3     busPIRone (BUSPAR) 10 MG tablet Take 10 mg by mouth 2 times daily        enoxaparin ANTICOAGULANT (LOVENOX) 120 MG/0.8ML syringe Inject 0.8 mLs (120 mg) Subcutaneous 2 times daily 48 mL 3     Ferrous Sulfate (IRON) 325 (65 Fe) MG tablet Take 1 tablet by mouth daily       Lactobacillus (PROBIOTIC ACIDOPHILUS PO) Take by mouth daily       levothyroxine (SYNTHROID/LEVOTHROID) 25 MCG tablet TAKE 1 TABLET (25 MCG) BY MOUTH DAILY 90 tablet 1     magnesium oxide 400 MG CAPS Take by mouth daily (Patient not taking: Reported on 11/9/2021)       peginterferon fouzia-2a (PEGASYS) 180 MCG/ML injection Inject 0.25 mLs (45 mcg) Subcutaneous every 7 days for 3 doses 3 mL 0     Prenatal MV-Min-Fe Fum-FA-DHA (PRENATAL 1 PO) Take 1 tablet by mouth       sertraline (ZOLOFT) 50 MG tablet Take 100 mg by mouth daily        Vitamin D3 (CHOLECALCIFEROL) 25 mcg (1000 units) tablet Take 1 tablet by mouth daily         Past Medical History  Past Medical History:   Diagnosis Date     Anxiety      Depression      Essential thrombocythemia (H)      History of arterial thrombosis      Hyperlipidemia      JAK2 gene mutation      Obesity      Panic attack      Subclinical hypothyroidism      Past Surgical History:   Procedure Laterality Date     EMBOLECTOMY LOWER EXTREMITY Left 4/11/2021    Procedure: LEFT LEG ANTERIOR TIBIAL AND POSTERIOR TIBIAL EMBOLECTOMIES, FEMORAL POPITEAL VEIN PATCH, ANGIOGRAM, FASCIOTOMIES;  Surgeon: Ulises Gonzalez;  Location:  OR     IR ANGIOGRAM THROUGH CATHETER FOLLOW UP  4/9/2021     IR ANGIOGRAM THROUGH CATHETER FOLLOW UP  4/10/2021     IR LOWER EXTREMITY ANGIOGRAM LEFT  4/8/2021     THROMBECTOMY LOWER EXTREMITY Left 4/12/2021    Procedure: Re-Exploration Left Lower Extremity With Popliteal and Tibial Embolectomy.;  Surgeon: Ulises Gonzalez;  Location:  OR     wisdom teeth remova       Allergies   Allergen Reactions     Erythromycin Unknown     Pcn [Penicillins] Unknown     Social History   Social History     Tobacco Use     Smoking status: Never Smoker     Smokeless  tobacco: Never Used   Substance Use Topics     Alcohol use: Yes     Drug use: Never      Past medical history and social history were reviewed.    Physical Examination:  There were no vitals taken for this visit.  Wt Readings from Last 10 Encounters:   09/16/21 117.9 kg (260 lb)   06/04/21 117.4 kg (258 lb 14.4 oz)   05/20/21 117 kg (258 lb)   04/08/21 122.5 kg (270 lb)     Video physical exam  General: Patient appears well in no acute distress.   Skin: No visualized rash or lesions on visualized skin  Eyes: EOMI, no erythema, sclera icterus or discharge noted  Resp: Appears to be breathing comfortably without accessory muscle usage, speaking in full sentences, no cough  MSK: Appears to have normal range of motion based on visualized movements  Neurologic: No apparent tremors, facial movements symmetric  Psych: affect normal, alert and oriented    The rest of a comprehensive physical examination is deferred due to PHE (public health emergency) video restrictions.    Laboratory Data:  Labs to be done with treatment next week       Assessment and Plan:  1.  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 Desire for pregnancy though not currently pregnant. Started on interferon therapy 45mcg weekly on 11/9/21. Tolerating well with no side effects.    Current plan is to get day 1 of each cycle in clinic, then do subsequent 3 weeks at home. Plan is to increase dose by 45mcg each cycle (4 weeks) as long as counts stable.     Will plan for her to come in to clinic 12/9/21 for C2D1 at 90mcg and then 1/6/22 for C3D1 at 135mcg.    Continue Lovenox 120mg BID. Will check hepxa level next week (requested labs between 12:00-2:00pm as she does injection at 8:00am).    Continue oral iron supplement.     JONAS visit scheduled early Jan 2022. MD visit in February. Plan discussed with Dr. Irving.    2. COVID+ 11/21/21  Mild symptoms, received antibody treatment. She is vaccinated. Will get booster in 90 days  since she had monoclonal antibody treatment.     30 minutes spent on the date of the encounter doing chart review, review of test results, interpretation of tests, patient visit, documentation and discussion with other provider(s)     Ashish Payne PA-C  Department of Hematology and Oncology  [facility]

## 2021-12-09 ENCOUNTER — LAB (OUTPATIENT)
Dept: INFUSION THERAPY | Facility: CLINIC | Age: 31
End: 2021-12-09
Attending: INTERNAL MEDICINE
Payer: COMMERCIAL

## 2021-12-09 VITALS
SYSTOLIC BLOOD PRESSURE: 116 MMHG | TEMPERATURE: 98.9 F | HEART RATE: 81 BPM | RESPIRATION RATE: 16 BRPM | DIASTOLIC BLOOD PRESSURE: 81 MMHG | OXYGEN SATURATION: 97 %

## 2021-12-09 DIAGNOSIS — D47.3 ESSENTIAL THROMBOCYTHEMIA (H): Primary | ICD-10-CM

## 2021-12-09 DIAGNOSIS — Z15.89 JAK2 V617F MUTATION: ICD-10-CM

## 2021-12-09 LAB
ALBUMIN SERPL-MCNC: 3.7 G/DL (ref 3.4–5)
ALP SERPL-CCNC: 101 U/L (ref 40–150)
ALT SERPL W P-5'-P-CCNC: 29 U/L (ref 0–50)
ANION GAP SERPL CALCULATED.3IONS-SCNC: 2 MMOL/L (ref 3–14)
AST SERPL W P-5'-P-CCNC: 14 U/L (ref 0–45)
BASOPHILS # BLD AUTO: 0 10E3/UL (ref 0–0.2)
BASOPHILS NFR BLD AUTO: 0 %
BILIRUB SERPL-MCNC: 0.2 MG/DL (ref 0.2–1.3)
BUN SERPL-MCNC: 10 MG/DL (ref 7–30)
CALCIUM SERPL-MCNC: 9.8 MG/DL (ref 8.5–10.1)
CHLORIDE BLD-SCNC: 106 MMOL/L (ref 94–109)
CO2 SERPL-SCNC: 29 MMOL/L (ref 20–32)
CREAT SERPL-MCNC: 0.56 MG/DL (ref 0.52–1.04)
EOSINOPHIL # BLD AUTO: 0.1 10E3/UL (ref 0–0.7)
EOSINOPHIL NFR BLD AUTO: 1 %
ERYTHROCYTE [DISTWIDTH] IN BLOOD BY AUTOMATED COUNT: 13.3 % (ref 10–15)
FERRITIN SERPL-MCNC: 55 NG/ML (ref 12–150)
GFR SERPL CREATININE-BSD FRML MDRD: >90 ML/MIN/1.73M2
GLUCOSE BLD-MCNC: 93 MG/DL (ref 70–99)
HCT VFR BLD AUTO: 37.6 % (ref 35–47)
HGB BLD-MCNC: 12.7 G/DL (ref 11.7–15.7)
IMM GRANULOCYTES # BLD: 0.1 10E3/UL
IMM GRANULOCYTES NFR BLD: 1 %
IRON SATN MFR SERPL: 27 % (ref 15–46)
IRON SERPL-MCNC: 94 UG/DL (ref 35–180)
LDH SERPL L TO P-CCNC: 176 U/L (ref 81–234)
LMWH PPP CHRO-ACNC: 1.53 IU/ML
LYMPHOCYTES # BLD AUTO: 2.6 10E3/UL (ref 0.8–5.3)
LYMPHOCYTES NFR BLD AUTO: 38 %
MCH RBC QN AUTO: 28.2 PG (ref 26.5–33)
MCHC RBC AUTO-ENTMCNC: 33.8 G/DL (ref 31.5–36.5)
MCV RBC AUTO: 84 FL (ref 78–100)
MONOCYTES # BLD AUTO: 0.5 10E3/UL (ref 0–1.3)
MONOCYTES NFR BLD AUTO: 7 %
NEUTROPHILS # BLD AUTO: 3.6 10E3/UL (ref 1.6–8.3)
NEUTROPHILS NFR BLD AUTO: 53 %
NRBC # BLD AUTO: 0 10E3/UL
NRBC BLD AUTO-RTO: 0 /100
PLATELET # BLD AUTO: 371 10E3/UL (ref 150–450)
POTASSIUM BLD-SCNC: 4.1 MMOL/L (ref 3.4–5.3)
PROT SERPL-MCNC: 7.9 G/DL (ref 6.8–8.8)
RBC # BLD AUTO: 4.5 10E6/UL (ref 3.8–5.2)
SODIUM SERPL-SCNC: 137 MMOL/L (ref 133–144)
TIBC SERPL-MCNC: 344 UG/DL (ref 240–430)
WBC # BLD AUTO: 6.9 10E3/UL (ref 4–11)

## 2021-12-09 PROCEDURE — 83550 IRON BINDING TEST: CPT | Performed by: INTERNAL MEDICINE

## 2021-12-09 PROCEDURE — 250N000013 HC RX MED GY IP 250 OP 250 PS 637: Performed by: INTERNAL MEDICINE

## 2021-12-09 PROCEDURE — 36415 COLL VENOUS BLD VENIPUNCTURE: CPT

## 2021-12-09 PROCEDURE — 83615 LACTATE (LD) (LDH) ENZYME: CPT | Performed by: INTERNAL MEDICINE

## 2021-12-09 PROCEDURE — 85520 HEPARIN ASSAY: CPT | Performed by: PHYSICIAN ASSISTANT

## 2021-12-09 PROCEDURE — 82728 ASSAY OF FERRITIN: CPT | Performed by: INTERNAL MEDICINE

## 2021-12-09 PROCEDURE — 96372 THER/PROPH/DIAG INJ SC/IM: CPT

## 2021-12-09 PROCEDURE — 85025 COMPLETE CBC W/AUTO DIFF WBC: CPT | Performed by: INTERNAL MEDICINE

## 2021-12-09 PROCEDURE — 82040 ASSAY OF SERUM ALBUMIN: CPT | Performed by: INTERNAL MEDICINE

## 2021-12-09 RX ORDER — ACETAMINOPHEN 325 MG/1
975 TABLET ORAL ONCE
Status: COMPLETED | OUTPATIENT
Start: 2021-12-09 | End: 2021-12-09

## 2021-12-09 RX ADMIN — ACETAMINOPHEN 975 MG: 325 TABLET, FILM COATED ORAL at 13:46

## 2021-12-09 RX ADMIN — PEGINTERFERON ALFA-2A 90 MCG: 180 INJECTION, SOLUTION SUBCUTANEOUS at 14:37

## 2021-12-09 ASSESSMENT — PAIN SCALES - GENERAL: PAINLEVEL: NO PAIN (0)

## 2021-12-09 NOTE — PROGRESS NOTES
Infusion Nursing Note:  Sruthi Mac presents today for C2D1 Pegasys.    Patient seen by provider today: No   present during visit today: Not Applicable.    Note: no new concerns.      Intravenous Access:  No Intravenous access/labs at this visit.    Treatment Conditions:  Lab Results   Component Value Date    HGB 12.7 12/09/2021    WBC 6.9 12/09/2021    ANEU 8.5 (H) 06/02/2021    ANEUTAUTO 3.6 12/09/2021     12/09/2021      Results reviewed, labs MET treatment parameters, ok to proceed with treatment.      Post Infusion Assessment:  Patient tolerated injection without incident.       Discharge Plan:   Discharge instructions reviewed with: Patient.  Patient and/or family verbalized understanding of discharge instructions and all questions answered.  AVS to patient via [employer].  Patient will return 1/6 for next appointment.   Patient discharged in stable condition accompanied by: self.  Departure Mode: Ambulatory.      Skylar Flannery RN

## 2021-12-09 NOTE — PROGRESS NOTES
Medical Assistant Note:  Belindaalsysatyrone Mac presents today for blood drawn.    Patient seen by provider today: No.   present during visit today: Not Applicable.    Concerns: No Concerns.    Procedure:  Lab draw site: Left Arm, Needle type: BF, Gauge: 23g.    Post Assessment:  Labs drawn without difficulty: Yes.    Discharge Plan:  Departure Mode: Ambulatory.    Face to Face Time: 5.    Adela Jimenes, CMA

## 2021-12-10 ENCOUNTER — MYC MEDICAL ADVICE (OUTPATIENT)
Dept: ONCOLOGY | Facility: CLINIC | Age: 31
End: 2021-12-10
Payer: COMMERCIAL

## 2021-12-10 DIAGNOSIS — I70.209 ACUTE OCCLUSION OF ARTERY OF LOWER EXTREMITY (H): ICD-10-CM

## 2021-12-10 DIAGNOSIS — D47.3 ESSENTIAL THROMBOCYTHEMIA (H): ICD-10-CM

## 2021-12-14 DIAGNOSIS — D47.3 ESSENTIAL THROMBOCYTHEMIA (H): Primary | ICD-10-CM

## 2021-12-15 NOTE — PROGRESS NOTES
Discussed with Dr. Irving and would like to repeat HepXa level 3-4 days after starting new Lovenox dose.     Will work with [facility].    Ashish Payne PA-C

## 2021-12-16 DIAGNOSIS — Z15.89 JAK2 V617F MUTATION: ICD-10-CM

## 2021-12-16 DIAGNOSIS — D47.3 ESSENTIAL THROMBOCYTHEMIA (H): Primary | ICD-10-CM

## 2021-12-27 ENCOUNTER — LAB (OUTPATIENT)
Dept: LAB | Facility: CLINIC | Age: 31
End: 2021-12-27
Payer: COMMERCIAL

## 2021-12-27 DIAGNOSIS — D47.3 ESSENTIAL THROMBOCYTHEMIA (H): ICD-10-CM

## 2021-12-27 LAB — LMWH PPP CHRO-ACNC: 1.1 IU/ML

## 2021-12-27 PROCEDURE — 36415 COLL VENOUS BLD VENIPUNCTURE: CPT

## 2021-12-27 PROCEDURE — 85520 HEPARIN ASSAY: CPT

## 2022-01-04 ENCOUNTER — VIRTUAL VISIT (OUTPATIENT)
Dept: ONCOLOGY | Facility: CLINIC | Age: 32
End: 2022-01-04
Attending: PHYSICIAN ASSISTANT
Payer: COMMERCIAL

## 2022-01-04 DIAGNOSIS — D47.3 ESSENTIAL THROMBOCYTHEMIA (H): Primary | Chronic | ICD-10-CM

## 2022-01-04 PROCEDURE — 99213 OFFICE O/P EST LOW 20 MIN: CPT | Mod: 95 | Performed by: PHYSICIAN ASSISTANT

## 2022-01-04 NOTE — PROGRESS NOTES
Sruthi is a 31 year old who is being evaluated via a billable video visit.      How would you like to obtain your AVS? MyChart  If the video visit is dropped, the invitation should be resent by: Text to cell phone: [phone]  Will anyone else be joining your video visit? No      Oncology/Hematology Visit Note  Jan 4, 2022    Reason for Visit: Follow up of JAK2+ MPN     History of Present Illness: Sruthi Mac is a 30 year old woman with history of depression and anxiety who presetned to  [employer] in April 2021 with acute onset of left leg pain. She was found to have left popliteal arterial occlusion that required extensive interventions. She was evaluated in June 2021 due to thrombocytosis. Evaluation included MPN panel that revealed a [id number] mutation. Bone marrow biopsy was completed with no morphological evidence for primary myelofibrosis, however was sub optimal core biopsy.       As she is desiring pregnancy she was started on Interferon (up titration every 2 weeks) along with oral iron and LMWH 120mg BID. Reduced to 100mg BID due to elevated Xa level.     She tested positive for COVID 11/21/21.     She has done well on interferon and was called today for follow-up.    Interval History:  Sruthi was seen today for follow-up. She is feeling well with no complaints. She is a bit fatigued and has heavier periods. No other bleeding issues. She denies any fevers, headaches, dizziness, respiratory concerns, GI issues, edema, rashes. Left leg does hurt occasionally but this is stable since her surgery.     Current Outpatient Medications   Medication Sig Dispense Refill     atorvastatin (LIPITOR) 20 MG tablet Take 1 tablet (20 mg) by mouth daily (Patient not taking: Reported on 11/30/2021) 90 tablet 3     busPIRone (BUSPAR) 10 MG tablet Take 10 mg by mouth 2 times daily       enoxaparin ANTICOAGULANT (LOVENOX) 100 MG/ML syringe Inject 1 mL (100 mg) Subcutaneous 2 times daily 60 mL 3     Ferrous  Sulfate (IRON) 325 (65 Fe) MG tablet Take 1 tablet by mouth daily       Lactobacillus (PROBIOTIC ACIDOPHILUS PO) Take by mouth daily       levothyroxine (SYNTHROID/LEVOTHROID) 25 MCG tablet TAKE 1 TABLET (25 MCG) BY MOUTH DAILY 90 tablet 1     magnesium oxide 400 MG CAPS Take by mouth daily (Patient not taking: Reported on 11/9/2021)       peginterferon fouzia-2a (PEGASYS) 180 MCG/ML injection Inject 0.5 mLs (90 mcg) Subcutaneous every 7 days for 3 doses 3 mL 0     peginterferon fouzia-2a (PEGASYS) 180 MCG/ML injection Inject 0.25 mLs (45 mcg) Subcutaneous every 7 days for 3 doses 3 mL 0     Prenatal MV-Min-Fe Fum-FA-DHA (PRENATAL 1 PO) Take 1 tablet by mouth       sertraline (ZOLOFT) 50 MG tablet Take 100 mg by mouth daily        Vitamin D3 (CHOLECALCIFEROL) 25 mcg (1000 units) tablet Take 1 tablet by mouth daily         Past Medical History  Past Medical History:   Diagnosis Date     Anxiety      Depression      Essential thrombocythemia (H)      History of arterial thrombosis      Hyperlipidemia      JAK2 gene mutation      Obesity      Panic attack      Subclinical hypothyroidism      Past Surgical History:   Procedure Laterality Date     EMBOLECTOMY LOWER EXTREMITY Left 4/11/2021    Procedure: LEFT LEG ANTERIOR TIBIAL AND POSTERIOR TIBIAL EMBOLECTOMIES, FEMORAL POPITEAL VEIN PATCH, ANGIOGRAM, FASCIOTOMIES;  Surgeon: Ulises Gonzalez;  Location:  OR     IR ANGIOGRAM THROUGH CATHETER FOLLOW UP  4/9/2021     IR ANGIOGRAM THROUGH CATHETER FOLLOW UP  4/10/2021     IR LOWER EXTREMITY ANGIOGRAM LEFT  4/8/2021     THROMBECTOMY LOWER EXTREMITY Left 4/12/2021    Procedure: Re-Exploration Left Lower Extremity With Popliteal and Tibial Embolectomy.;  Surgeon: Ulises Gonzalez;  Location:  OR     wisdom teeth remova       Allergies   Allergen Reactions     Erythromycin Unknown     Pcn [Penicillins] Unknown     Social History   Social History     Tobacco Use     Smoking status: Never Smoker     Smokeless tobacco:  Never Used   Substance Use Topics     Alcohol use: Yes     Drug use: Never      Past medical history and social history were reviewed.    Physical Examination:  There were no vitals taken for this visit.  Wt Readings from Last 10 Encounters:   09/16/21 117.9 kg (260 lb)   06/04/21 117.4 kg (258 lb 14.4 oz)   05/20/21 117 kg (258 lb)   04/08/21 122.5 kg (270 lb)     Video physical exam  General: Patient appears well in no acute distress.   Skin: No visualized rash or lesions on visualized skin  Eyes: EOMI, no erythema, sclera icterus or discharge noted  Resp: Appears to be breathing comfortably without accessory muscle usage, speaking in full sentences, no cough  MSK: Appears to have normal range of motion based on visualized movements  Neurologic: No apparent tremors, facial movements symmetric  Psych: affect normal, alert and oriented    The rest of a comprehensive physical examination is deferred due to PHE (public health emergency) video restrictions    Laboratory Data:  To be done next week       Assessment and Plan:  1.  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 Desire for pregnancy though not currently pregnant. Started on interferon therapy 45mcg weekly on 11/9/21. Tolerating well with no side effects.     Current plan is to get day 1 of each cycle in clinic, then do subsequent 3 weeks at home. Plan is to increase dose by 45mcg each cycle (4 weeks) as long as counts stable. Wait to sign until we get labs.     Will plan for her to come in to clinic 1/12/22 for C3D1 at 135mcg (slight delay due to issues getting home injections and delays in starting this). C4D1 will be due 2/9/22 at 180mcg.      Continue Lovenox 100mg BID (dose reduced for elevated hepxa levels). Per Dr. Irving even though last check was still slightly high will not reduce further due to clot risk.    Continue oral iron supplement. Check iron levels each visit.    Dr. Irving follow-up in February.      2.  COVID+ 11/21/21  Mild symptoms, received antibody treatment. She is vaccinated. Will get booster 90 days after since she had monoclonal antibody treatment.     25 minutes spent on the date of the encounter doing chart review, patient visit, documentation and discussion with other provider(s)     Ashish Payne PA-C  Department of Hematology and Oncology  [facility]

## 2022-01-10 DIAGNOSIS — Z15.89 JAK2 V617F MUTATION: ICD-10-CM

## 2022-01-10 DIAGNOSIS — D47.3 ESSENTIAL THROMBOCYTHEMIA (H): Primary | ICD-10-CM

## 2022-01-10 RX ORDER — MEPERIDINE HYDROCHLORIDE 25 MG/ML
25 INJECTION INTRAMUSCULAR; INTRAVENOUS; SUBCUTANEOUS EVERY 30 MIN PRN
Status: CANCELLED | OUTPATIENT
Start: 2022-01-12

## 2022-01-10 RX ORDER — NALOXONE HYDROCHLORIDE 0.4 MG/ML
0.2 INJECTION, SOLUTION INTRAMUSCULAR; INTRAVENOUS; SUBCUTANEOUS
Status: CANCELLED | OUTPATIENT
Start: 2022-01-12

## 2022-01-10 RX ORDER — ALBUTEROL SULFATE 90 UG/1
1-2 AEROSOL, METERED RESPIRATORY (INHALATION)
Status: CANCELLED
Start: 2022-01-12

## 2022-01-10 RX ORDER — ALBUTEROL SULFATE 0.83 MG/ML
2.5 SOLUTION RESPIRATORY (INHALATION)
Status: CANCELLED | OUTPATIENT
Start: 2022-01-12

## 2022-01-10 RX ORDER — ACETAMINOPHEN 325 MG/1
975 TABLET ORAL ONCE
Status: CANCELLED
Start: 2022-01-12 | End: 2022-01-12

## 2022-01-10 RX ORDER — EPINEPHRINE 1 MG/ML
0.3 INJECTION, SOLUTION INTRAMUSCULAR; SUBCUTANEOUS EVERY 5 MIN PRN
Status: CANCELLED | OUTPATIENT
Start: 2022-01-12

## 2022-01-10 RX ORDER — DIPHENHYDRAMINE HYDROCHLORIDE 50 MG/ML
50 INJECTION INTRAMUSCULAR; INTRAVENOUS
Status: CANCELLED
Start: 2022-01-12

## 2022-01-10 RX ORDER — METHYLPREDNISOLONE SODIUM SUCCINATE 125 MG/2ML
125 INJECTION, POWDER, LYOPHILIZED, FOR SOLUTION INTRAMUSCULAR; INTRAVENOUS
Status: CANCELLED
Start: 2022-01-12

## 2022-01-12 ENCOUNTER — INFUSION THERAPY VISIT (OUTPATIENT)
Dept: INFUSION THERAPY | Facility: CLINIC | Age: 32
End: 2022-01-12
Attending: INTERNAL MEDICINE
Payer: COMMERCIAL

## 2022-01-12 VITALS
SYSTOLIC BLOOD PRESSURE: 114 MMHG | OXYGEN SATURATION: 98 % | HEART RATE: 77 BPM | DIASTOLIC BLOOD PRESSURE: 77 MMHG | TEMPERATURE: 98.9 F | RESPIRATION RATE: 16 BRPM

## 2022-01-12 DIAGNOSIS — D47.3 ESSENTIAL THROMBOCYTHEMIA (H): ICD-10-CM

## 2022-01-12 DIAGNOSIS — D47.3 ESSENTIAL THROMBOCYTHEMIA (H): Primary | ICD-10-CM

## 2022-01-12 DIAGNOSIS — Z15.89 JAK2 V617F MUTATION: ICD-10-CM

## 2022-01-12 LAB
ALBUMIN SERPL-MCNC: 3.8 G/DL (ref 3.4–5)
ALP SERPL-CCNC: 90 U/L (ref 40–150)
ALT SERPL W P-5'-P-CCNC: 41 U/L (ref 0–50)
ANION GAP SERPL CALCULATED.3IONS-SCNC: 6 MMOL/L (ref 3–14)
AST SERPL W P-5'-P-CCNC: 21 U/L (ref 0–45)
BASOPHILS # BLD AUTO: 0 10E3/UL (ref 0–0.2)
BASOPHILS NFR BLD AUTO: 0 %
BILIRUB SERPL-MCNC: 0.2 MG/DL (ref 0.2–1.3)
BUN SERPL-MCNC: 11 MG/DL (ref 7–30)
CALCIUM SERPL-MCNC: 9.3 MG/DL (ref 8.5–10.1)
CHLORIDE BLD-SCNC: 106 MMOL/L (ref 94–109)
CO2 SERPL-SCNC: 26 MMOL/L (ref 20–32)
CREAT SERPL-MCNC: 0.88 MG/DL (ref 0.52–1.04)
EOSINOPHIL # BLD AUTO: 0 10E3/UL (ref 0–0.7)
EOSINOPHIL NFR BLD AUTO: 1 %
ERYTHROCYTE [DISTWIDTH] IN BLOOD BY AUTOMATED COUNT: 13.6 % (ref 10–15)
FERRITIN SERPL-MCNC: 62 NG/ML (ref 12–150)
GFR SERPL CREATININE-BSD FRML MDRD: 88 ML/MIN/1.73M2
GLUCOSE BLD-MCNC: 97 MG/DL (ref 70–99)
HCT VFR BLD AUTO: 36 % (ref 35–47)
HGB BLD-MCNC: 12 G/DL (ref 11.7–15.7)
IMM GRANULOCYTES # BLD: 0.1 10E3/UL
IMM GRANULOCYTES NFR BLD: 1 %
IRON SATN MFR SERPL: 26 % (ref 15–46)
IRON SERPL-MCNC: 82 UG/DL (ref 35–180)
LDH SERPL L TO P-CCNC: 178 U/L (ref 81–234)
LYMPHOCYTES # BLD AUTO: 1.8 10E3/UL (ref 0.8–5.3)
LYMPHOCYTES NFR BLD AUTO: 28 %
MCH RBC QN AUTO: 28.2 PG (ref 26.5–33)
MCHC RBC AUTO-ENTMCNC: 33.3 G/DL (ref 31.5–36.5)
MCV RBC AUTO: 85 FL (ref 78–100)
MONOCYTES # BLD AUTO: 0.5 10E3/UL (ref 0–1.3)
MONOCYTES NFR BLD AUTO: 7 %
NEUTROPHILS # BLD AUTO: 4.1 10E3/UL (ref 1.6–8.3)
NEUTROPHILS NFR BLD AUTO: 63 %
NRBC # BLD AUTO: 0 10E3/UL
NRBC BLD AUTO-RTO: 0 /100
PLATELET # BLD AUTO: 291 10E3/UL (ref 150–450)
POTASSIUM BLD-SCNC: 4.4 MMOL/L (ref 3.4–5.3)
PROT SERPL-MCNC: 7.8 G/DL (ref 6.8–8.8)
RBC # BLD AUTO: 4.26 10E6/UL (ref 3.8–5.2)
SODIUM SERPL-SCNC: 138 MMOL/L (ref 133–144)
TIBC SERPL-MCNC: 317 UG/DL (ref 240–430)
WBC # BLD AUTO: 6.5 10E3/UL (ref 4–11)

## 2022-01-12 PROCEDURE — 83615 LACTATE (LD) (LDH) ENZYME: CPT | Performed by: INTERNAL MEDICINE

## 2022-01-12 PROCEDURE — 82728 ASSAY OF FERRITIN: CPT | Performed by: INTERNAL MEDICINE

## 2022-01-12 PROCEDURE — 80053 COMPREHEN METABOLIC PANEL: CPT | Performed by: INTERNAL MEDICINE

## 2022-01-12 PROCEDURE — 85025 COMPLETE CBC W/AUTO DIFF WBC: CPT | Performed by: INTERNAL MEDICINE

## 2022-01-12 PROCEDURE — 96372 THER/PROPH/DIAG INJ SC/IM: CPT

## 2022-01-12 PROCEDURE — 36415 COLL VENOUS BLD VENIPUNCTURE: CPT

## 2022-01-12 PROCEDURE — 250N000013 HC RX MED GY IP 250 OP 250 PS 637: Performed by: INTERNAL MEDICINE

## 2022-01-12 PROCEDURE — 83550 IRON BINDING TEST: CPT | Performed by: INTERNAL MEDICINE

## 2022-01-12 RX ORDER — ACETAMINOPHEN 325 MG/1
975 TABLET ORAL ONCE
Status: COMPLETED | OUTPATIENT
Start: 2022-01-12 | End: 2022-01-12

## 2022-01-12 RX ADMIN — ACETAMINOPHEN 975 MG: 325 TABLET, FILM COATED ORAL at 15:19

## 2022-01-12 RX ADMIN — PEGINTERFERON ALFA-2A 135 MCG: 180 INJECTION, SOLUTION SUBCUTANEOUS at 15:34

## 2022-01-12 ASSESSMENT — PAIN SCALES - GENERAL: PAINLEVEL: NO PAIN (0)

## 2022-01-12 NOTE — PROGRESS NOTES
Medical Assistant Note:  Sruthi SYDNEE Mac presents today for lab draw.    Patient seen by provider today: No.   present during visit today: Not Applicable.    Concerns: No Concerns.    Procedure:  Lab draw site: LAC, Needle type: Butterfly, Gauge: 23.    Post Assessment:  Labs drawn without difficulty: Yes.    Discharge Plan:  Departure Mode: Ambulatory.    Face to Face Time: 4 min.    Shari Schoenberger, CMA

## 2022-01-12 NOTE — PROGRESS NOTES
Infusion Nursing Note:  Sruthi Mac presents today for C3 D1 Pegasys.    Patient seen by provider today: No   present during visit today: Not Applicable.    Note: no new concerns.      Intravenous Access:  No Intravenous access/labs at this visit.    Treatment Conditions:  Lab Results   Component Value Date    HGB 12.0 01/12/2022    WBC 6.5 01/12/2022    ANEU 8.5 (H) 06/02/2021    ANEUTAUTO 4.1 01/12/2022     01/12/2022      Results reviewed, labs MET treatment parameters, ok to proceed with treatment.      Post Infusion Assessment:  Patient tolerated injection without incident.       Discharge Plan:   Patient and/or family verbalized understanding of discharge instructions and all questions answered.  AVS to patient via digitalboxT.  Patient will return 2/9 for next appointment.   Patient discharged in stable condition accompanied by: self.  Departure Mode: Ambulatory.      Skylar Flannery RN

## 2022-01-13 DIAGNOSIS — Z15.89 JAK2 V617F MUTATION: ICD-10-CM

## 2022-01-13 DIAGNOSIS — D47.3 ESSENTIAL THROMBOCYTHEMIA (H): Primary | ICD-10-CM

## 2022-01-13 RX ORDER — ACETAMINOPHEN 325 MG/1
975 TABLET ORAL ONCE
Status: CANCELLED
Start: 2022-01-19 | End: 2022-01-19

## 2022-01-13 RX ORDER — DIPHENHYDRAMINE HYDROCHLORIDE 50 MG/ML
50 INJECTION INTRAMUSCULAR; INTRAVENOUS
Status: CANCELLED
Start: 2022-01-19

## 2022-01-13 RX ORDER — ALBUTEROL SULFATE 0.83 MG/ML
2.5 SOLUTION RESPIRATORY (INHALATION)
Status: CANCELLED | OUTPATIENT
Start: 2022-01-19

## 2022-01-13 RX ORDER — MEPERIDINE HYDROCHLORIDE 25 MG/ML
25 INJECTION INTRAMUSCULAR; INTRAVENOUS; SUBCUTANEOUS EVERY 30 MIN PRN
Status: CANCELLED | OUTPATIENT
Start: 2022-01-19

## 2022-01-13 RX ORDER — ALBUTEROL SULFATE 90 UG/1
1-2 AEROSOL, METERED RESPIRATORY (INHALATION)
Status: CANCELLED
Start: 2022-01-19

## 2022-01-13 RX ORDER — METHYLPREDNISOLONE SODIUM SUCCINATE 125 MG/2ML
125 INJECTION, POWDER, LYOPHILIZED, FOR SOLUTION INTRAMUSCULAR; INTRAVENOUS
Status: CANCELLED
Start: 2022-01-19

## 2022-01-13 RX ORDER — EPINEPHRINE 1 MG/ML
0.3 INJECTION, SOLUTION INTRAMUSCULAR; SUBCUTANEOUS EVERY 5 MIN PRN
Status: CANCELLED | OUTPATIENT
Start: 2022-01-19

## 2022-01-13 RX ORDER — NALOXONE HYDROCHLORIDE 0.4 MG/ML
0.2 INJECTION, SOLUTION INTRAMUSCULAR; INTRAVENOUS; SUBCUTANEOUS
Status: CANCELLED | OUTPATIENT
Start: 2022-01-19

## 2022-01-13 NOTE — PROGRESS NOTES
1/13/21    Confirmed plan with Dr. Irving. Sruthi's labs are in a good range. Would plan to keep her on the 135mcg dose for next cycle.    Ashish Payne PA-C

## 2022-02-01 ENCOUNTER — VIRTUAL VISIT (OUTPATIENT)
Dept: ONCOLOGY | Facility: CLINIC | Age: 32
End: 2022-02-01
Attending: INTERNAL MEDICINE
Payer: COMMERCIAL

## 2022-02-01 DIAGNOSIS — Z79.01 VISIT FOR MONITORING LOVENOX THERAPY: ICD-10-CM

## 2022-02-01 DIAGNOSIS — Z15.89 JAK2 V617F MUTATION: Primary | ICD-10-CM

## 2022-02-01 DIAGNOSIS — Z79.01 LONG TERM CURRENT USE OF ANTICOAGULANT THERAPY: ICD-10-CM

## 2022-02-01 DIAGNOSIS — D47.3 ESSENTIAL THROMBOCYTHEMIA (H): ICD-10-CM

## 2022-02-01 DIAGNOSIS — Z51.81 VISIT FOR MONITORING LOVENOX THERAPY: ICD-10-CM

## 2022-02-01 PROCEDURE — 99214 OFFICE O/P EST MOD 30 MIN: CPT | Mod: 95 | Performed by: INTERNAL MEDICINE

## 2022-02-01 NOTE — LETTER
2022         RE: Sruthi Mac  [address] [facility] [address]        Dear Colleague,    Thank you for referring your patient, Sruthi Mac, to the [facility]. Please see a copy of my visit note below.    Sruthi is a 31 year old who is being evaluated via a billable video visit.      How would you like to obtain your AVS? MyChart  If the video visit is dropped, the invitation should be resent by: Send to e-mail at: [email or web address]  Will anyone else be joining your video visit? Cielo GuillenCleveland Clinic Fairview Hospitalfaisal        [facility] Hematology Consultation    Outpatient Visit Note:    Patient: Sruthi Mac  MRN: [id number]  : 1990  STEVE: 20    Reason for Consultation:  JAK2+ MPN    Assessment:  In summary, Sruthi Mac is a 31 year old  woman with [id number] positive myeloproliferative neoplasm and arterial thrombosis, diagnosed in spring 2021.    1. [id number]+ MPN, likely ET v prefibrotic MF  2. 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 Preconception counseling    High risk (IPSET>2) ET    Sruthi is currently attempting to get pregnant. Due to this, as well as the recent publication of the CONTINUATION trials demonstrating compared to hydroxyurea, response to ropeginterferon fouzia-2b continued to increase over time with improved responses compared with hydroxyurea at 36 months, She has been on Pegasys since 21. She has tolerated medication well and is currently at goal of 135 mcg subcutaneous weekly.  Cycle 4 starts around 22. We will inquire with pharmacy if Thad has been evaluated in pregnancy and if cost of medication would be less than Pegasys.    For her arterial thrombosis, she is currently on LMWH 100 mg BID during preconception, pregnancy and post-partum. (Xa level too high on 120mg BID, Xa level acceptable on 100mg BID) We briefly discussed that during delivery she will be off  anticoagulation, but that shortly after delivery we would resume anticoagulation.       Plan:  1. Continue interferon therapy, 135 mcg weekly, dose adjustments as needed for hematological parameters as per treatment plan. Can do home injection for the rest of the cycles. No more infusion appts. The every 2 week formulation has been approved as of December, so we will look into whether this would be an option for her. We will also look into the financial help for the interferon therapy. She unfortunately cannot take the hydroxyurea as she is trying to get pregnant.  3. Continue oral iron supplement  4. Continue with LMWH 100 mg subcutaneous twice daily. Offered DOAC when not pregnant but she is ok with the LMWH.   5. She will let us know if she does get pregnant. We will need to follow her Xa levels more frequently. She will need a MFM consultation and a visit with Dr. Irving in the 1st trimester and again at the end of the 2nd trimester for birth planning.    The patient is given our center's contact information and is instructed to call if she should have any further questions or concerns.  Otherwise, we will plan on seeing her back Follow up with Provider - JONAS 3 months and Dr. Irving in 6 months.     Stanley Mcknight MD  Hematology Fellow    Physician Attestation   I, Keily Irving MD/PhD, saw this patient and agree with the findings and plan of care as documented in the note.      Items personally reviewed/procedural attestation: vitals, labs and imaging and agree with the interpretation documented in the note.    Keily Irving MD/PhD   of Medicine  [facility] [employer]   ----------------------------------------------------------------------------------------------------------------------    History of Present Illness/Interval History:  Sruthi Mac is a 31 year old woman with history of depression and anxiety who presetned to  [facility] in April 2021 with acute  onset of left leg pain. She was found to have left popliteal arterial occlusion that required extensive interventions. She was evaluated in June 2021 by my former [facility]. Frankie due to thrombocytosis. Evaluation included MPN panel that revealed a [id number] mutation. Bone marrow biopsy was completed. She established care in my clinic in late 2021. Please refer to my initial note for further details.     Sruthi reports she is doing well. She is now up to 135 mcg. She hasn't had any symptoms or side effects. She gets a mild nausea. She is concerned that she will have enough coverage for this financially. She is taking LMWH 100mg shots and is tolerating them. No bleeding with it. Her menstrual cycle is shorter but slightly heavier. She is filling up a super tampon in a few hours but she is not leaking. She is not having any leg pain. No night sweats. No unintentional weight loss. Still has some tiredness when walking. No night sweats, no hot flashes, no new concentration issues. Does have some minor injection reaction, with thickening in the skin, with the interferon.     Past Medical History:  Past Medical History:   Diagnosis Date     Anxiety      Depression      Essential thrombocythemia (H)      History of arterial thrombosis      Hyperlipidemia      JAK2 gene mutation      Obesity      Panic attack      Subclinical hypothyroidism        Past Surgical History:  Past Surgical History:   Procedure Laterality Date     EMBOLECTOMY LOWER EXTREMITY Left 4/11/2021    Procedure: LEFT LEG ANTERIOR TIBIAL AND POSTERIOR TIBIAL EMBOLECTOMIES, FEMORAL POPITEAL VEIN PATCH, ANGIOGRAM, FASCIOTOMIES;  Surgeon: Ulises Gonzalez;  Location: [facility] OR     IR ANGIOGRAM THROUGH CATHETER FOLLOW UP  4/9/2021     IR ANGIOGRAM THROUGH CATHETER FOLLOW UP  4/10/2021     IR LOWER EXTREMITY ANGIOGRAM LEFT  4/8/2021     THROMBECTOMY LOWER EXTREMITY Left 4/12/2021    Procedure: Re-Exploration Left Lower Extremity With Popliteal and Tibial  Embolectomy.;  Surgeon: Ulises Gonzalez;  Location: [facility] OR     wisdom teeth remova         Medications:  Current Outpatient Medications   Medication Sig Dispense Refill     atorvastatin (LIPITOR) 20 MG tablet Take 1 tablet (20 mg) by mouth daily (Patient not taking: Reported on 11/30/2021) 90 tablet 3     busPIRone (BUSPAR) 10 MG tablet Take 10 mg by mouth 2 times daily       enoxaparin ANTICOAGULANT (LOVENOX) 100 MG/ML syringe Inject 1 mL (100 mg) Subcutaneous 2 times daily 60 mL 3     Ferrous Sulfate (IRON) 325 (65 Fe) MG tablet Take 1 tablet by mouth daily       Lactobacillus (PROBIOTIC ACIDOPHILUS PO) Take by mouth daily       levothyroxine (SYNTHROID/LEVOTHROID) 25 MCG tablet TAKE 1 TABLET (25 MCG) BY MOUTH DAILY 90 tablet 1     magnesium oxide 400 MG CAPS Take by mouth daily (Patient not taking: Reported on 11/9/2021)       peginterferon fouzia-2a (PEGASYS) 180 MCG/ML injection Inject 0.75 mLs (135 mcg) Subcutaneous every 7 days for 3 doses 3 mL 0     peginterferon fouzia-2a (PEGASYS) 180 MCG/ML injection Inject 0.5 mLs (90 mcg) Subcutaneous every 7 days for 3 doses 3 mL 0     peginterferon fouzia-2a (PEGASYS) 180 MCG/ML injection Inject 0.25 mLs (45 mcg) Subcutaneous every 7 days for 3 doses 3 mL 0     Prenatal MV-Min-Fe Fum-FA-DHA (PRENATAL 1 PO) Take 1 tablet by mouth       sertraline (ZOLOFT) 50 MG tablet Take 100 mg by mouth daily        Vitamin D3 (CHOLECALCIFEROL) 25 mcg (1000 units) tablet Take 1 tablet by mouth daily          Allergies:  Allergies   Allergen Reactions     Erythromycin Unknown     Pcn [Penicillins] Unknown       ROS:  A 10 point ROS is negative except as stated in the HPI    Social History:  Denies any tobacco use. No significant alcohol use. Denies any illicit drug use. Patient works as a  for [employer] .    Family History:  Dad- T2 DM, hyperlipidemia, testicular cancer, melanoma  Mother- hyperlipidemia     Objective:  General: Well appearing.  HEENT:  "Sclerae anicteric.  Lungs: Breathing comfortably. No cough.  MSK: Grossly normal movement.  Neuro: Grossly non-focal.  Skin/access: Normal skin tone.  Psych: Alert and oriented. No distress.    The rest of a comprehensive physical examination is deferred due to PHE (public health emergency) video visit restrictions      Labs: personally reviewed with relevant trends annotated below  Recent Labs   Lab Test 01/12/22  1439 08/25/21  1519 06/02/21  1336 04/14/21  0643 04/13/21  1024 04/08/21  2122 04/08/21  1513   WBC 6.5   < > 11.4*   < > 10.5   < > 14.2*   HGB 12.0   < > 12.2   < > 8.3*   < > 12.0      < > 587*   < > 498*   < > 576*   ANEU  --   --  8.5*  --  7.1  --  11.6*   ALYM  --   --  2.2  --  2.3  --  2.0    < > = values in this interval not displayed.     Recent Labs   Lab Test 01/12/22  1438      POTASSIUM 4.4   CHLORIDE 106   CO2 26   BUN 11   CR 0.88   CASSI 9.3        Recent Labs   Lab Test 01/12/22  1438 12/09/21  1319 11/08/21  1212 08/25/21  1519   AST 21 14 11 13   ALT 41 29 23 20   ALKPHOS 90 101 99 104   BILITOTAL 0.2 0.2 0.3 0.5         Bone marrow:  The overall morphology of megakaryocytes is unremarkable with only rare forms showing complex or hyperchromatic nuclear features.  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  The molecular findings are consistent with clonal myeloid neoplasm with a differential diagnosis including essential thrombocytosis and primary myelofibrosis.  There is no morphological evidence for primary myelofibrosis however, the morphologic findings are not \"classic\" for ET in this suboptimal core biopsy.     Variant allele frequency: VAF: [id number] Variant Read Depth: 158 Total Read Depth: 3126     Imaging:  No recent abdominal imaging            Again, thank you " for allowing me to participate in the care of your patient.      Sincerely,    Keily Irving MD

## 2022-02-01 NOTE — PROGRESS NOTES
Sruthi is a 31 year old who is being evaluated via a billable video visit.      How would you like to obtain your AVS? MyChart  If the video visit is dropped, the invitation should be resent by: Send to e-mail at: [email or web address]  Will anyone else be joining your video visit? No      Video Start Time: 3:46 pm  Video-Visit Details    Type of service:  Video Visit    Video End Time:4:06 PM    Originating Location (pt. 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    1. [id number] MPN, likely ET v prefibrotic MF  2. 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 Preconception counseling    High risk (IPSET>2) ET    Sruthi is currently attempting to get pregnant. Due to this, as well as the recent publication of the CONTINUATION trials demonstrating compared to hydroxyurea, response to ropeginterferon fouzia-2b continued to increase over time with improved responses compared with hydroxyurea at 36 months, She has been on Pegasys since 21. She has tolerated medication well and is currently at goal of 135 mcg subcutaneous weekly.  Cycle 4 starts around 22. We will inquire with pharmacy if Thad has been evaluated in pregnancy and if cost of medication would be less than Pegasys.    For her arterial thrombosis, she is currently on LMWH 100 mg BID during preconception, pregnancy and post-partum. (Xa level too high on 120mg BID, Xa level acceptable on 100mg BID) We briefly discussed that during delivery she will be  off anticoagulation, but that shortly after delivery we would resume anticoagulation.       Plan:  1. Continue interferon therapy, 135 mcg weekly, dose adjustments as needed for hematological parameters as per treatment plan. Can do home injection for the rest of the cycles. No more infusion appts. The every 2 week formulation has been approved as of December, so we will look into whether this would be an option for her. We will also look into the financial help for the interferon therapy. She unfortunately cannot take the hydroxyurea as she is trying to get pregnant.  3. Continue oral iron supplement  4. Continue with LMWH 100 mg subcutaneous twice daily. Offered DOAC when not pregnant but she is ok with the LMWH.   5. She will let us know if she does get pregnant. We will need to follow her Xa levels more frequently. She will need a M consultation and a visit with Dr. Irving in the 1st trimester and again at the end of the 2nd trimester for birth planning.    The patient is given our center's contact information and is instructed to call if she should have any further questions or concerns.  Otherwise, we will plan on seeing her back Follow up with Provider - JONAS 3 months and Dr. Irving in 6 months.     Stanley Mcknight MD  Hematology Fellow    Physician Attestation   I, Keily Irving MD/PhD, saw this patient and agree with the findings and plan of care as documented in the note.      Items personally reviewed/procedural attestation: vitals, labs and imaging and agree with the interpretation documented in the note.    Keily Irving MD/PhD   of Medicine  [employer] [employer]   ----------------------------------------------------------------------------------------------------------------------    History of Present Illness/Interval History:  Sruthi Mac is a 31 year old woman with history of depression and anxiety who presetned to  [place] in April 2021 with  acute onset of left leg pain. She was found to have left popliteal arterial occlusion that required extensive interventions. She was evaluated in June 2021 by my former [facility] due to thrombocytosis. Evaluation included MPN panel that revealed a [id number] mutation. Bone marrow biopsy was completed. She established care in my clinic in late 2021. Please refer to my initial note for further details.     Sruthi reports she is doing well. She is now up to 135 mcg. She hasn't had any symptoms or side effects. She gets a mild nausea. She is concerned that she will have enough coverage for this financially. She is taking LMWH 100mg shots and is tolerating them. No bleeding with it. Her menstrual cycle is shorter but slightly heavier. She is filling up a super tampon in a few hours but she is not leaking. She is not having any leg pain. No night sweats. No unintentional weight loss. Still has some tiredness when walking. No night sweats, no hot flashes, no new concentration issues. Does have some minor injection reaction, with thickening in the skin, with the interferon.     Past Medical History:  Past Medical History:   Diagnosis Date     Anxiety      Depression      Essential thrombocythemia (H)      History of arterial thrombosis      Hyperlipidemia      JAK2 gene mutation      Obesity      Panic attack      Subclinical hypothyroidism        Past Surgical History:  Past Surgical History:   Procedure Laterality Date     EMBOLECTOMY LOWER EXTREMITY Left 4/11/2021    Procedure: LEFT LEG ANTERIOR TIBIAL AND POSTERIOR TIBIAL EMBOLECTOMIES, FEMORAL POPITEAL VEIN PATCH, ANGIOGRAM, FASCIOTOMIES;  Surgeon: Ulises Gonzalez;  Location: [facility] OR     IR ANGIOGRAM THROUGH CATHETER FOLLOW UP  4/9/2021     IR ANGIOGRAM THROUGH CATHETER FOLLOW UP  4/10/2021     IR LOWER EXTREMITY ANGIOGRAM LEFT  4/8/2021     THROMBECTOMY LOWER EXTREMITY Left 4/12/2021    Procedure: Re-Exploration Left Lower Extremity With Popliteal and  Tibial Embolectomy.;  Surgeon: Ulises Gonzalez;  Location: [facility] OR     wisdom teeth remova         Medications:  Current Outpatient Medications   Medication Sig Dispense Refill     atorvastatin (LIPITOR) 20 MG tablet Take 1 tablet (20 mg) by mouth daily (Patient not taking: Reported on 11/30/2021) 90 tablet 3     busPIRone (BUSPAR) 10 MG tablet Take 10 mg by mouth 2 times daily       enoxaparin ANTICOAGULANT (LOVENOX) 100 MG/ML syringe Inject 1 mL (100 mg) Subcutaneous 2 times daily 60 mL 3     Ferrous Sulfate (IRON) 325 (65 Fe) MG tablet Take 1 tablet by mouth daily       Lactobacillus (PROBIOTIC ACIDOPHILUS PO) Take by mouth daily       levothyroxine (SYNTHROID/LEVOTHROID) 25 MCG tablet TAKE 1 TABLET (25 MCG) BY MOUTH DAILY 90 tablet 1     magnesium oxide 400 MG CAPS Take by mouth daily (Patient not taking: Reported on 11/9/2021)       peginterferon fouzia-2a (PEGASYS) 180 MCG/ML injection Inject 0.75 mLs (135 mcg) Subcutaneous every 7 days for 3 doses 3 mL 0     peginterferon fouzia-2a (PEGASYS) 180 MCG/ML injection Inject 0.5 mLs (90 mcg) Subcutaneous every 7 days for 3 doses 3 mL 0     peginterferon fouzia-2a (PEGASYS) 180 MCG/ML injection Inject 0.25 mLs (45 mcg) Subcutaneous every 7 days for 3 doses 3 mL 0     Prenatal MV-Min-Fe Fum-FA-DHA (PRENATAL 1 PO) Take 1 tablet by mouth       sertraline (ZOLOFT) 50 MG tablet Take 100 mg by mouth daily        Vitamin D3 (CHOLECALCIFEROL) 25 mcg (1000 units) tablet Take 1 tablet by mouth daily          Allergies:  Allergies   Allergen Reactions     Erythromycin Unknown     Pcn [Penicillins] Unknown       ROS:  A 10 point ROS is negative except as stated in the HPI    Social History:  Denies any tobacco use. No significant alcohol use. Denies any illicit drug use. Patient works as a  for [employer] .    Family History:  Dad- T2 DM, hyperlipidemia, testicular cancer, melanoma  Mother- hyperlipidemia     Objective:  General: Well  "appearing.  HEENT: Sclerae anicteric.  Lungs: Breathing comfortably. No cough.  MSK: Grossly normal movement.  Neuro: Grossly non-focal.  Skin/access: Normal skin tone.  Psych: Alert and oriented. No distress.    The rest of a comprehensive physical examination is deferred due to PHE (public health emergency) video visit restrictions      Labs: personally reviewed with relevant trends annotated below  Recent Labs   Lab Test 01/12/22  1439 08/25/21  1519 06/02/21  1336 04/14/21  0643 04/13/21  1024 04/08/21  2122 04/08/21  1513   WBC 6.5   < > 11.4*   < > 10.5   < > 14.2*   HGB 12.0   < > 12.2   < > 8.3*   < > 12.0      < > 587*   < > 498*   < > 576*   ANEU  --   --  8.5*  --  7.1  --  11.6*   ALYM  --   --  2.2  --  2.3  --  2.0    < > = values in this interval not displayed.     Recent Labs   Lab Test 01/12/22  1438      POTASSIUM 4.4   CHLORIDE 106   CO2 26   BUN 11   CR 0.88   CASSI 9.3        Recent Labs   Lab Test 01/12/22  1438 12/09/21  1319 11/08/21  1212 08/25/21  1519   AST 21 14 11 13   ALT 41 29 23 20   ALKPHOS 90 101 99 104   BILITOTAL 0.2 0.2 0.3 0.5         Bone marrow:  The overall morphology of megakaryocytes is unremarkable with only rare forms showing complex or hyperchromatic nuclear features.  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  The molecular findings are consistent with clonal myeloid neoplasm with a differential diagnosis including essential thrombocytosis and primary myelofibrosis.  There is no morphological evidence for primary myelofibrosis however, the morphologic findings are not \"classic\" for ET in this suboptimal core biopsy.     Variant allele frequency: VAF: 0.0506 Variant Read Depth: 158 Total Read Depth: 3126     Imaging:  No recent abdominal imaging    "

## 2022-02-07 ENCOUNTER — PATIENT OUTREACH (OUTPATIENT)
Dept: ONCOLOGY | Facility: CLINIC | Age: 32
End: 2022-02-07
Payer: COMMERCIAL

## 2022-02-07 DIAGNOSIS — D47.3 ESSENTIAL THROMBOCYTHEMIA (H): ICD-10-CM

## 2022-02-07 DIAGNOSIS — Z15.89 JAK2 V617F MUTATION: ICD-10-CM

## 2022-02-09 ENCOUNTER — TELEPHONE (OUTPATIENT)
Dept: ONCOLOGY | Facility: CLINIC | Age: 32
End: 2022-02-09
Payer: COMMERCIAL

## 2022-02-09 DIAGNOSIS — D47.3 ESSENTIAL THROMBOCYTHEMIA (H): Primary | ICD-10-CM

## 2022-02-09 NOTE — TELEPHONE ENCOUNTER
Per [place], pharmacy from  specialty pharm,    The current drug Pegasys is no longer available, viable has been discontinued by the .      does have prefilled syringes available of drug however on back order until late February (maybe, pending ).     What care team/provider like to do in regards to txt plan?    Routing high priority to Dr. Irving and Marlena Butler

## 2022-02-11 ENCOUNTER — PATIENT OUTREACH (OUTPATIENT)
Dept: ONCOLOGY | Facility: CLINIC | Age: 32
End: 2022-02-11
Payer: COMMERCIAL

## 2022-02-11 NOTE — TELEPHONE ENCOUNTER
02.10.2022  from Pharmacy   [facility] discontinued the VIAL dosing  but continues to make the prefilled syringes,  there is a back order on those currently.  We had been ordering her  vials     02.11.2022  Olya confirms back order    02.11.22  Patient is out of pegasus and requests update on the plan.   She has no med available and pharmacy is uncertain of when stock will be in    Patient is actively trying to get pregnant    Per :  Ok to miss a dose.  We will not attempt to change to Besremi at this time.    Will attempt to get injection at [facility] next week and until retial can supply again     We will stay on pegasus and will continue with med when pregnancy occurs

## 2022-02-14 ENCOUNTER — TELEPHONE (OUTPATIENT)
Facility: CLINIC | Age: 32
End: 2022-02-14
Payer: COMMERCIAL

## 2022-02-14 DIAGNOSIS — D47.3 ESSENTIAL THROMBOCYTHEMIA (H): Primary | ICD-10-CM

## 2022-02-14 DIAGNOSIS — Z15.89 JAK2 V617F MUTATION: ICD-10-CM

## 2022-02-14 RX ORDER — ACETAMINOPHEN 325 MG/1
975 TABLET ORAL ONCE
Status: CANCELLED
Start: 2022-03-02 | End: 2022-03-02

## 2022-02-14 RX ORDER — DIPHENHYDRAMINE HYDROCHLORIDE 50 MG/ML
50 INJECTION INTRAMUSCULAR; INTRAVENOUS
Status: CANCELLED
Start: 2022-03-02

## 2022-02-14 RX ORDER — EPINEPHRINE 1 MG/ML
0.3 INJECTION, SOLUTION INTRAMUSCULAR; SUBCUTANEOUS EVERY 5 MIN PRN
Status: CANCELLED | OUTPATIENT
Start: 2022-02-15

## 2022-02-14 RX ORDER — DIPHENHYDRAMINE HYDROCHLORIDE 50 MG/ML
50 INJECTION INTRAMUSCULAR; INTRAVENOUS
Status: CANCELLED
Start: 2022-02-15

## 2022-02-14 RX ORDER — METHYLPREDNISOLONE SODIUM SUCCINATE 125 MG/2ML
125 INJECTION, POWDER, LYOPHILIZED, FOR SOLUTION INTRAMUSCULAR; INTRAVENOUS
Status: CANCELLED
Start: 2022-02-15

## 2022-02-14 RX ORDER — NALOXONE HYDROCHLORIDE 0.4 MG/ML
0.2 INJECTION, SOLUTION INTRAMUSCULAR; INTRAVENOUS; SUBCUTANEOUS
Status: CANCELLED | OUTPATIENT
Start: 2022-03-02

## 2022-02-14 RX ORDER — NALOXONE HYDROCHLORIDE 0.4 MG/ML
0.2 INJECTION, SOLUTION INTRAMUSCULAR; INTRAVENOUS; SUBCUTANEOUS
Status: CANCELLED | OUTPATIENT
Start: 2022-02-15

## 2022-02-14 RX ORDER — ALBUTEROL SULFATE 0.83 MG/ML
2.5 SOLUTION RESPIRATORY (INHALATION)
Status: CANCELLED | OUTPATIENT
Start: 2022-02-15

## 2022-02-14 RX ORDER — ACETAMINOPHEN 325 MG/1
975 TABLET ORAL ONCE
Status: CANCELLED | OUTPATIENT
Start: 2022-02-15 | End: 2022-02-15

## 2022-02-14 RX ORDER — EPINEPHRINE 1 MG/ML
0.3 INJECTION, SOLUTION INTRAMUSCULAR; SUBCUTANEOUS EVERY 5 MIN PRN
Status: CANCELLED | OUTPATIENT
Start: 2022-03-02

## 2022-02-14 RX ORDER — ALBUTEROL SULFATE 90 UG/1
1-2 AEROSOL, METERED RESPIRATORY (INHALATION)
Status: CANCELLED
Start: 2022-02-15

## 2022-02-14 RX ORDER — ALBUTEROL SULFATE 90 UG/1
1-2 AEROSOL, METERED RESPIRATORY (INHALATION)
Status: CANCELLED
Start: 2022-03-02

## 2022-02-14 RX ORDER — METHYLPREDNISOLONE SODIUM SUCCINATE 125 MG/2ML
125 INJECTION, POWDER, LYOPHILIZED, FOR SOLUTION INTRAMUSCULAR; INTRAVENOUS
Status: CANCELLED
Start: 2022-03-02

## 2022-02-14 RX ORDER — MEPERIDINE HYDROCHLORIDE 25 MG/ML
25 INJECTION INTRAMUSCULAR; INTRAVENOUS; SUBCUTANEOUS EVERY 30 MIN PRN
Status: CANCELLED | OUTPATIENT
Start: 2022-03-02

## 2022-02-14 RX ORDER — ALBUTEROL SULFATE 0.83 MG/ML
2.5 SOLUTION RESPIRATORY (INHALATION)
Status: CANCELLED | OUTPATIENT
Start: 2022-03-02

## 2022-02-14 RX ORDER — MEPERIDINE HYDROCHLORIDE 25 MG/ML
25 INJECTION INTRAMUSCULAR; INTRAVENOUS; SUBCUTANEOUS EVERY 30 MIN PRN
Status: CANCELLED | OUTPATIENT
Start: 2022-02-15

## 2022-02-14 NOTE — TELEPHONE ENCOUNTER
Good morning,    Could I have someone send a new rx to [place] [facility] to place on pt's profile for pegasys syringes? Vials have been discontinued and the syringes are on backorder.     Thank you,  Nichelle Bojorquez PharmD  [facility]   [phone]

## 2022-02-15 ENCOUNTER — INFUSION THERAPY VISIT (OUTPATIENT)
Dept: INFUSION THERAPY | Facility: CLINIC | Age: 32
End: 2022-02-15
Attending: INTERNAL MEDICINE
Payer: COMMERCIAL

## 2022-02-15 VITALS
TEMPERATURE: 97.5 F | RESPIRATION RATE: 20 BRPM | SYSTOLIC BLOOD PRESSURE: 124 MMHG | DIASTOLIC BLOOD PRESSURE: 89 MMHG | HEART RATE: 90 BPM | OXYGEN SATURATION: 97 %

## 2022-02-15 DIAGNOSIS — D47.3 ESSENTIAL THROMBOCYTHEMIA (H): Primary | ICD-10-CM

## 2022-02-15 DIAGNOSIS — Z15.89 JAK2 V617F MUTATION: ICD-10-CM

## 2022-02-15 DIAGNOSIS — D47.3 ESSENTIAL THROMBOCYTHEMIA (H): ICD-10-CM

## 2022-02-15 LAB
ALBUMIN SERPL-MCNC: 4.1 G/DL (ref 3.4–5)
ALP SERPL-CCNC: 87 U/L (ref 40–150)
ALT SERPL W P-5'-P-CCNC: 39 U/L (ref 0–50)
ANION GAP SERPL CALCULATED.3IONS-SCNC: 3 MMOL/L (ref 3–14)
AST SERPL W P-5'-P-CCNC: 26 U/L (ref 0–45)
BASOPHILS # BLD AUTO: 0 10E3/UL (ref 0–0.2)
BASOPHILS NFR BLD AUTO: 0 %
BILIRUB SERPL-MCNC: 0.3 MG/DL (ref 0.2–1.3)
BUN SERPL-MCNC: 12 MG/DL (ref 7–30)
CALCIUM SERPL-MCNC: 9.7 MG/DL (ref 8.5–10.1)
CHLORIDE BLD-SCNC: 103 MMOL/L (ref 94–109)
CO2 SERPL-SCNC: 29 MMOL/L (ref 20–32)
CREAT SERPL-MCNC: 0.64 MG/DL (ref 0.52–1.04)
EOSINOPHIL # BLD AUTO: 0 10E3/UL (ref 0–0.7)
EOSINOPHIL NFR BLD AUTO: 1 %
ERYTHROCYTE [DISTWIDTH] IN BLOOD BY AUTOMATED COUNT: 13.8 % (ref 10–15)
FERRITIN SERPL-MCNC: 101 NG/ML (ref 12–150)
GFR SERPL CREATININE-BSD FRML MDRD: >90 ML/MIN/1.73M2
GLUCOSE BLD-MCNC: 114 MG/DL (ref 70–99)
HCT VFR BLD AUTO: 35.7 % (ref 35–47)
HGB BLD-MCNC: 11.8 G/DL (ref 11.7–15.7)
IMM GRANULOCYTES # BLD: 0.1 10E3/UL
IMM GRANULOCYTES NFR BLD: 1 %
IRON SATN MFR SERPL: 21 % (ref 15–46)
IRON SERPL-MCNC: 65 UG/DL (ref 35–180)
LDH SERPL L TO P-CCNC: 193 U/L (ref 81–234)
LYMPHOCYTES # BLD AUTO: 2.3 10E3/UL (ref 0.8–5.3)
LYMPHOCYTES NFR BLD AUTO: 34 %
MCH RBC QN AUTO: 28.2 PG (ref 26.5–33)
MCHC RBC AUTO-ENTMCNC: 33.1 G/DL (ref 31.5–36.5)
MCV RBC AUTO: 85 FL (ref 78–100)
MONOCYTES # BLD AUTO: 0.4 10E3/UL (ref 0–1.3)
MONOCYTES NFR BLD AUTO: 6 %
NEUTROPHILS # BLD AUTO: 4 10E3/UL (ref 1.6–8.3)
NEUTROPHILS NFR BLD AUTO: 58 %
NRBC # BLD AUTO: 0 10E3/UL
NRBC BLD AUTO-RTO: 0 /100
PLATELET # BLD AUTO: 249 10E3/UL (ref 150–450)
POTASSIUM BLD-SCNC: 4.1 MMOL/L (ref 3.4–5.3)
PROT SERPL-MCNC: 8 G/DL (ref 6.8–8.8)
RBC # BLD AUTO: 4.19 10E6/UL (ref 3.8–5.2)
SODIUM SERPL-SCNC: 135 MMOL/L (ref 133–144)
TIBC SERPL-MCNC: 306 UG/DL (ref 240–430)
WBC # BLD AUTO: 6.7 10E3/UL (ref 4–11)

## 2022-02-15 PROCEDURE — 83615 LACTATE (LD) (LDH) ENZYME: CPT | Performed by: INTERNAL MEDICINE

## 2022-02-15 PROCEDURE — 85004 AUTOMATED DIFF WBC COUNT: CPT | Performed by: INTERNAL MEDICINE

## 2022-02-15 PROCEDURE — 96372 THER/PROPH/DIAG INJ SC/IM: CPT

## 2022-02-15 PROCEDURE — 36415 COLL VENOUS BLD VENIPUNCTURE: CPT

## 2022-02-15 PROCEDURE — 83550 IRON BINDING TEST: CPT | Performed by: INTERNAL MEDICINE

## 2022-02-15 PROCEDURE — 250N000013 HC RX MED GY IP 250 OP 250 PS 637: Performed by: INTERNAL MEDICINE

## 2022-02-15 PROCEDURE — 82728 ASSAY OF FERRITIN: CPT | Performed by: INTERNAL MEDICINE

## 2022-02-15 PROCEDURE — 250N000009 HC RX 250: Mod: JW | Performed by: INTERNAL MEDICINE

## 2022-02-15 PROCEDURE — 80053 COMPREHEN METABOLIC PANEL: CPT | Performed by: INTERNAL MEDICINE

## 2022-02-15 RX ORDER — EPINEPHRINE 1 MG/ML
0.3 INJECTION, SOLUTION INTRAMUSCULAR; SUBCUTANEOUS EVERY 5 MIN PRN
Status: CANCELLED | OUTPATIENT
Start: 2022-03-09

## 2022-02-15 RX ORDER — NALOXONE HYDROCHLORIDE 0.4 MG/ML
0.2 INJECTION, SOLUTION INTRAMUSCULAR; INTRAVENOUS; SUBCUTANEOUS
Status: CANCELLED | OUTPATIENT
Start: 2022-03-09

## 2022-02-15 RX ORDER — METHYLPREDNISOLONE SODIUM SUCCINATE 125 MG/2ML
125 INJECTION, POWDER, LYOPHILIZED, FOR SOLUTION INTRAMUSCULAR; INTRAVENOUS
Status: CANCELLED
Start: 2022-03-09

## 2022-02-15 RX ORDER — ALBUTEROL SULFATE 0.83 MG/ML
2.5 SOLUTION RESPIRATORY (INHALATION)
Status: CANCELLED | OUTPATIENT
Start: 2022-03-09

## 2022-02-15 RX ORDER — MEPERIDINE HYDROCHLORIDE 25 MG/ML
25 INJECTION INTRAMUSCULAR; INTRAVENOUS; SUBCUTANEOUS EVERY 30 MIN PRN
Status: CANCELLED | OUTPATIENT
Start: 2022-03-09

## 2022-02-15 RX ORDER — ACETAMINOPHEN 325 MG/1
975 TABLET ORAL ONCE
Status: CANCELLED
Start: 2022-03-09 | End: 2022-03-09

## 2022-02-15 RX ORDER — ACETAMINOPHEN 325 MG/1
975 TABLET ORAL ONCE
Status: COMPLETED | OUTPATIENT
Start: 2022-02-15 | End: 2022-02-15

## 2022-02-15 RX ORDER — ALBUTEROL SULFATE 90 UG/1
1-2 AEROSOL, METERED RESPIRATORY (INHALATION)
Status: CANCELLED
Start: 2022-03-09

## 2022-02-15 RX ORDER — DIPHENHYDRAMINE HYDROCHLORIDE 50 MG/ML
50 INJECTION INTRAMUSCULAR; INTRAVENOUS
Status: CANCELLED
Start: 2022-03-09

## 2022-02-15 RX ADMIN — ACETAMINOPHEN 975 MG: 325 TABLET, FILM COATED ORAL at 14:16

## 2022-02-15 RX ADMIN — PEGINTERFERON ALFA-2A 135 MCG: 180 INJECTION, SOLUTION SUBCUTANEOUS at 14:35

## 2022-02-15 ASSESSMENT — PAIN SCALES - GENERAL: PAINLEVEL: NO PAIN (0)

## 2022-02-15 NOTE — PROGRESS NOTES
Infusion Nursing Note:  Sruthi Beasley presents today for Pegasys Injection.    Patient seen by provider today: No   present during visit today: Not Applicable.    Note: N/A.      Intravenous Access:  No Intravenous access/labs at this visit.    Treatment Conditions:  Lab Results   Component Value Date    HGB 11.8 02/15/2022    WBC 6.7 02/15/2022    ANEU 8.5 (H) 06/02/2021    ANEUTAUTO 4.0 02/15/2022     02/15/2022      Results reviewed, labs MET treatment parameters, ok to proceed with treatment.      Post Infusion Assessment:  Patient tolerated injection in right thigh subcutaneous tissue without incident.       Discharge Plan:   Patient declined prescription refills.  Discharge instructions reviewed with: Patient.  Patient verbalized understanding of discharge instructions and all questions answered.  AVS to patient via [email or web address].  Patient will return 2/22/22 for next appointment.   Patient discharged in stable condition accompanied by: self.  Departure Mode: Ambulatory.      Fay Sampson RN

## 2022-02-15 NOTE — PROGRESS NOTES
Medical Assistant Note:  Belindaalyssatyrone Beasley presents today for blood draw.    Patient seen by provider today: No.   present during visit today: Not Applicable.    Concerns: No Concerns.    Procedure:  Lab draw site: LAC, Needle type: BF, Gauge: 23g.    Post Assessment:  Labs drawn without difficulty: Yes.    Discharge Plan:  Departure Mode: Ambulatory.    Face to Face Time: 5.    Adela Jimenes, CMA

## 2022-03-01 ENCOUNTER — IMMUNIZATION (OUTPATIENT)
Dept: NURSING | Facility: CLINIC | Age: 32
End: 2022-03-01
Payer: COMMERCIAL

## 2022-03-01 PROCEDURE — 0054A COVID-19,PF,PFIZER (12+ YRS): CPT

## 2022-03-01 PROCEDURE — 91305 COVID-19,PF,PFIZER (12+ YRS): CPT

## 2022-03-16 DIAGNOSIS — Z15.89 JAK2 V617F MUTATION: ICD-10-CM

## 2022-03-16 DIAGNOSIS — D47.3 ESSENTIAL THROMBOCYTHEMIA (H): ICD-10-CM

## 2022-03-18 NOTE — TELEPHONE ENCOUNTER
Refill request sent by pharmacy.   Sruthi should continue on peginterferon 135mcg weekly   Monthly labs needed.   Sent [place] message to patient with reminder

## 2022-04-11 ENCOUNTER — LAB (OUTPATIENT)
Dept: LAB | Facility: CLINIC | Age: 32
End: 2022-04-11
Payer: COMMERCIAL

## 2022-04-11 DIAGNOSIS — Z15.89 JAK2 V617F MUTATION: ICD-10-CM

## 2022-04-11 DIAGNOSIS — D47.3 ESSENTIAL THROMBOCYTHEMIA (H): ICD-10-CM

## 2022-04-11 LAB
ALBUMIN SERPL-MCNC: 3.7 G/DL (ref 3.4–5)
ALP SERPL-CCNC: 87 U/L (ref 40–150)
ALT SERPL W P-5'-P-CCNC: 38 U/L (ref 0–50)
ANION GAP SERPL CALCULATED.3IONS-SCNC: 7 MMOL/L (ref 3–14)
AST SERPL W P-5'-P-CCNC: 22 U/L (ref 0–45)
BASOPHILS # BLD AUTO: 0 10E3/UL (ref 0–0.2)
BASOPHILS NFR BLD AUTO: 0 %
BILIRUB SERPL-MCNC: 0.2 MG/DL (ref 0.2–1.3)
BUN SERPL-MCNC: 9 MG/DL (ref 7–30)
CALCIUM SERPL-MCNC: 9.1 MG/DL (ref 8.5–10.1)
CHLORIDE BLD-SCNC: 106 MMOL/L (ref 94–109)
CO2 SERPL-SCNC: 22 MMOL/L (ref 20–32)
CREAT SERPL-MCNC: 0.6 MG/DL (ref 0.52–1.04)
EOSINOPHIL # BLD AUTO: 0 10E3/UL (ref 0–0.7)
EOSINOPHIL NFR BLD AUTO: 1 %
ERYTHROCYTE [DISTWIDTH] IN BLOOD BY AUTOMATED COUNT: 14.4 % (ref 10–15)
FERRITIN SERPL-MCNC: 79 NG/ML (ref 12–150)
GFR SERPL CREATININE-BSD FRML MDRD: >90 ML/MIN/1.73M2
GLUCOSE BLD-MCNC: 94 MG/DL (ref 70–99)
HCT VFR BLD AUTO: 33.7 % (ref 35–47)
HGB BLD-MCNC: 11 G/DL (ref 11.7–15.7)
IRON SATN MFR SERPL: 21 % (ref 15–46)
IRON SERPL-MCNC: 64 UG/DL (ref 35–180)
LDH SERPL L TO P-CCNC: 146 U/L (ref 81–234)
LYMPHOCYTES # BLD AUTO: 1.8 10E3/UL (ref 0.8–5.3)
LYMPHOCYTES NFR BLD AUTO: 32 %
MCH RBC QN AUTO: 28.2 PG (ref 26.5–33)
MCHC RBC AUTO-ENTMCNC: 32.6 G/DL (ref 31.5–36.5)
MCV RBC AUTO: 86 FL (ref 78–100)
MONOCYTES # BLD AUTO: 0.3 10E3/UL (ref 0–1.3)
MONOCYTES NFR BLD AUTO: 6 %
NEUTROPHILS # BLD AUTO: 3.5 10E3/UL (ref 1.6–8.3)
NEUTROPHILS NFR BLD AUTO: 62 %
PLATELET # BLD AUTO: 222 10E3/UL (ref 150–450)
POTASSIUM BLD-SCNC: 3.7 MMOL/L (ref 3.4–5.3)
PROT SERPL-MCNC: 7.3 G/DL (ref 6.8–8.8)
RBC # BLD AUTO: 3.9 10E6/UL (ref 3.8–5.2)
SODIUM SERPL-SCNC: 135 MMOL/L (ref 133–144)
TIBC SERPL-MCNC: 308 UG/DL (ref 240–430)
WBC # BLD AUTO: 5.7 10E3/UL (ref 4–11)

## 2022-04-11 PROCEDURE — 85025 COMPLETE CBC W/AUTO DIFF WBC: CPT

## 2022-04-11 PROCEDURE — 83615 LACTATE (LD) (LDH) ENZYME: CPT

## 2022-04-11 PROCEDURE — 80053 COMPREHEN METABOLIC PANEL: CPT

## 2022-04-11 PROCEDURE — 82728 ASSAY OF FERRITIN: CPT

## 2022-04-11 PROCEDURE — 83550 IRON BINDING TEST: CPT

## 2022-04-11 PROCEDURE — 36415 COLL VENOUS BLD VENIPUNCTURE: CPT

## 2022-04-15 ENCOUNTER — TELEPHONE (OUTPATIENT)
Dept: ONCOLOGY | Facility: CLINIC | Age: 32
End: 2022-04-15
Payer: COMMERCIAL

## 2022-04-15 DIAGNOSIS — D47.3 ESSENTIAL THROMBOCYTHEMIA (H): ICD-10-CM

## 2022-04-15 DIAGNOSIS — E03.8 SUBCLINICAL HYPOTHYROIDISM: ICD-10-CM

## 2022-04-15 DIAGNOSIS — I70.209 ACUTE OCCLUSION OF ARTERY OF LOWER EXTREMITY (H): ICD-10-CM

## 2022-04-15 DIAGNOSIS — Z15.89 JAK2 V617F MUTATION: Primary | ICD-10-CM

## 2022-04-15 DIAGNOSIS — Z79.01 LONG TERM CURRENT USE OF ANTICOAGULANT THERAPY: ICD-10-CM

## 2022-04-15 DIAGNOSIS — E78.1 HYPERTRIGLYCERIDEMIA: ICD-10-CM

## 2022-04-15 RX ORDER — LEVOTHYROXINE SODIUM 25 UG/1
25 TABLET ORAL DAILY
Qty: 90 TABLET | Refills: 0 | Status: SHIPPED | OUTPATIENT
Start: 2022-04-15 | End: 2023-01-24

## 2022-04-15 NOTE — TELEPHONE ENCOUNTER
"Last Written Prescription Date:  10/12/21  Last Fill Quantity: 90,  # refills: 1   Last office visit: 5/20/2021 with prescribing provider:     Future Office Visit:   Next 5 appointments (look out 90 days)    May 03, 2022  1:15 PM  (Arrive by 1:00 PM)  Return Visit with VESTA Allred CNP  [facility] ([facility] ) [address] [place] [id number] [phone]         Requested Prescriptions   Pending Prescriptions Disp Refills     levothyroxine (SYNTHROID/LEVOTHROID) 25 MCG tablet [Pharmacy Med Name: LEVOTHYROXINE SODIUM 25MCG TABS] 90 tablet 1     Sig: TAKE 1 TABLET (25 MCG) BY MOUTH DAILY       Thyroid Protocol Passed - 4/15/2022 10:18 AM        Passed - Patient is 12 years or older        Passed - Recent (12 mo) or future (30 days) visit within the authorizing provider's specialty     Patient has had an office visit with the authorizing provider or a provider within the authorizing providers department within the previous 12 mos or has a future within next 30 days. See \"Patient Info\" tab in inbasket, or \"Choose Columns\" in Meds & Orders section of the refill encounter.              Passed - Medication is active on med list        Passed - Normal TSH on file in past 12 months     Recent Labs   Lab Test 08/25/21  1519   TSH 2.48              Passed - No active pregnancy on record     If patient is pregnant or has had a positive pregnancy test, please check TSH.          Passed - No positive pregnancy test in past 12 months     If patient is pregnant or has had a positive pregnancy test, please check TSH.             Prescription approved per [facility] Refill Protocol.    Camila LANDRY, RN    [facility]  Office: [phone]  Fax: [phone]        "

## 2022-04-15 NOTE — CONFIDENTIAL NOTE
enoxaparin 100mg/ml Refill   Last prescribing provider: Ashish HADLEY    Last clinic visit date: 2/1/22 Dr Irving     Any missed appointments or no-shows since last clinic visit?: No     Recommendations for requested medication (if none, N/A): Copied from chart note 2/1/22 Dr Irving     enoxaparin ANTICOAGULANT (LOVENOX) 100 MG/ML syringe Inject 1 mL (100 mg) Subcutaneous 2 times daily 60 mL 3       Next clinic visit date: 5/3/22 Alexandra Skye     Any other pertinent information (if none, N/A): N/A

## 2022-04-18 RX ORDER — ENOXAPARIN SODIUM 100 MG/ML
100 INJECTION SUBCUTANEOUS 2 TIMES DAILY
Qty: 60 ML | Refills: 3 | Status: SHIPPED | OUTPATIENT
Start: 2022-04-18 | End: 2022-05-18

## 2022-05-01 NOTE — TELEPHONE ENCOUNTER
Patient calling asking about her prescription, as she called the pharmacy and it isn't there.    Reviewed chart and provider has not yet signed medication order.     Paged provider Dr. Ulises Gonzalez at 5:02pm.    He called back at 5:05pm and he just signed it.    Called back to patient and she is having her fiance go and pick it up, as she is having trouble moving around much.    Fay Russ RN on 4/19/2021 at 4:58 PM      
"Re-exploration of LLE w/popliteal and tibial embolectomy 21 w/Dr. Gonzalez.    Patient states that her entire leg is \"so tight.\"  Using her walker just to get up and use the bathroom.  At night, she cannot sleep.    Swelling: has not worsened since discharge, leg is \"pretty bruised\".  Has been elevating leg.        Hurts when she flexes her foot in her calf quite a bit.  Calf is too tight to completely flatten her foot.      Incision:  Looks pretty good       Discoloration: pt sent picture on Saturday to Dr. Gonzalez due to blotchienss but he thoguth it was normal after her shower.      Fever/chills: no      Drainage: tiny bit on one incision.  Clear yellow color.      Pain ratin/10 at night.    Was taking oxycodone 10 mg prior to bed.  And 10 mg in the middle of the day.  They are gone.  Was given 20 tabs.    Is taking Tylenol 1500 mg every 8 hours.    Edilia Gallardo RN BSN  [facility]  [phone]          "
Discussed with Dr. Gonzalez, pt in person OV set for tomorrow 4/20/21.   Pain med rx will be refilled.   Called pt, explained this and verified pharmacy.   Routed rx to Dr. Gonzalez for approval.   Pt notes understanding.     KELVIN HarleyN, RN  [facility]  Office:  [phone] Fax: [phone]    
Patient was recently discharged from [facility] after surgery with Dr. Gonzalez. Patient states that she was given some low dose oxycodone but says that she is still having a lot of pain. She was wondering if she could change to a different pain medication. Patient can be reached at [phone]  
S/p re-exploration of left leg, left Pop tibial embolectomy, left distal AT embolectomy, left distal PT embolectomy on 4/12/21.     Routing to Dr. Gonzalez as an update and for pain medication/change/refill.     KELVIN HarleyN, RN  [facility]  Office:  [phone] Fax: [phone]          
fall

## 2022-05-05 NOTE — PROGRESS NOTES
"Sruthi is a 31 year old who is being evaluated via a billable video visit.      How would you like to obtain your AVS? MyChart  If the video visit is dropped, the invitation should be resent by: Text to cell phone: [phone]   Will anyone else be joining your video visit? No      Video Start Time: 1:33 PM  Video-Visit Details    Type of service:  Video Visit    Video End Time:1:40 PM    Originating Location (pt. Location): Home    Distant Location (provider location):  [facility]     Platform used for Video Visit: Gage Goldstein        [facility] Hematology Progress Note    Outpatient Visit Note:    Patient: Sruthi Mac  MRN: [id number]  : 1990  STEVE: May 6, 2022      Reason for Consultation:  JAK2+ MPN      History of Present Illness/Interval History:  Sruthi Beasley is a 31 year old woman with history of depression and anxiety who presetned to [facility] in 2021 with acute onset of left leg pain. She was found to have left popliteal arterial occlusion that required extensive interventions. She was evaluated in 2021 by Dr. David due to thrombocytosis. Evaluation included MPN panel that revealed a [id number] mutation. Bone marrow biopsy was completed. She established care with Dr. Irving in late . Please refer to the initial note for further details.     Sruthi reports she is doing well. She continues at the goal dose of Pegasys 135 mcg. She hasn't had any symptoms or side effects. She was provided a milton that helps cover the cost of the Pegasys. She is taking LMWH 100mg shots and is tolerating them. No bleeding with it. She does get occasional \"hard bumps\" under her skin that resolve after several weeks. Her menstrual cycle is shorter but slightly heavier. She is not having any leg pain. No night sweats. No unintentional weight loss. No night sweats, no hot flashes, no new concentration issues.     Medications:  Current " Outpatient Medications   Medication Sig Dispense Refill     busPIRone (BUSPAR) 10 MG tablet Take 10 mg by mouth 2 times daily       enoxaparin ANTICOAGULANT (LOVENOX) 100 MG/ML syringe Inject 1 mL (100 mg) Subcutaneous 2 times daily 60 mL 3     enoxaparin ANTICOAGULANT (LOVENOX) 100 MG/ML syringe Inject 1 mL (100 mg) Subcutaneous 2 times daily 60 mL 3     Ferrous Sulfate (IRON) 325 (65 Fe) MG tablet Take 1 tablet by mouth daily       Lactobacillus (PROBIOTIC ACIDOPHILUS PO) Take by mouth daily       levothyroxine (SYNTHROID/LEVOTHROID) 25 MCG tablet TAKE 1 TABLET (25 MCG) BY MOUTH DAILY 90 tablet 0     peginterferon fouzia-2a (PEGASYS) 180 MCG/ML injection Inject 0.75 mLs (135 mcg) Subcutaneous every 7 days 4 mL 3     Peginterferon fouzia-2a 135 MCG/0.5ML SOAJ Inject 135 mcg Subcutaneous once a week 2 mL 0     Prenatal MV-Min-Fe Fum-FA-DHA (PRENATAL 1 PO) Take 1 tablet by mouth       sertraline (ZOLOFT) 50 MG tablet Take 100 mg by mouth daily        Vitamin D3 (CHOLECALCIFEROL) 25 mcg (1000 units) tablet Take 1 tablet by mouth daily          ROS:  A 10 point ROS is negative except as stated in the HPI    Social History:  Denies any tobacco use. No significant alcohol use. Denies any illicit drug use. Patient works as a  for [employer] .    Family History:  Dad- T2 DM, hyperlipidemia, testicular cancer, melanoma  Mother- hyperlipidemia     Objective:  There were no vitals taken for this visit.  Video physical exam  General: Patient appears well in no acute distress.   Skin: No visualized rash or lesions on visualized skin  Eyes: EOMI, no erythema, sclera icterus or discharge noted  Resp: Appears to be breathing comfortably without accessory muscle usage, speaking in full sentences, no cough  MSK: Appears to have normal range of motion based on visualized movements  Neurologic: No apparent tremors, facial movements symmetric  Psych: affect bright, alert and oriented    The rest of a  comprehensive physical examination is deferred due to PHE (public health emergency) video restrictions        Labs:   I personally reviewed the following labs:    Most Recent 3 CBC's:  Recent Labs   Lab Test 04/11/22  1324 02/15/22  1338 01/12/22  1439   WBC 5.7 6.7 6.5   HGB 11.0* 11.8 12.0   MCV 86 85 85    249 291     Most Recent 3 BMP's:  Recent Labs   Lab Test 04/11/22  1324 02/15/22  1338 01/12/22  1438    135 138   POTASSIUM 3.7 4.1 4.4   CHLORIDE 106 103 106   CO2 22 29 26   BUN 9 12 11   CR 0.60 0.64 0.88   ANIONGAP 7 3 6   CASSI 9.1 9.7 9.3   GLC 94 114* 97     Most Recent 2 LFT's:  Recent Labs   Lab Test 04/11/22  1324 02/15/22  1338   AST 22 26   ALT 38 39   ALKPHOS 87 87   BILITOTAL 0.2 0.3         Imaging:  No recent abdominal imaging    Assessment:  In summary, Sruthi Beasley is a 31 year old woman with [id number] positive myeloproliferative neoplasm and arterial thrombosis, diagnosed in spring 2021.    1. [id number]+ MPN, likely ET v prefibrotic MF  2. 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 Preconception counseling    High risk (IPSET>2) ET    Sruthi is currently attempting to get pregnant. Due to this, as well as the recent publication of the CONTINUATION trials demonstrating ropeginterferon fouzia-2b compared to hydroxyurea, response to ropeginterferon fouzia-2b continued to increase over time with improved responses compared with hydroxyurea at 36 months, She has been on Pegasys since 11/9/21. She has tolerated medication well and is currently at goal of 135 mcg subcutaneous weekly.      For her arterial thrombosis, she is currently on LMWH 100 mg BID during preconception, pregnancy and post-partum. (Xa level too high on 120mg BID, Xa level acceptable on 100mg BID) During delivery she would be off anticoagulation, but shortly after delivery we would resume anticoagulation.     The areas of hardening under her skin sound like small hematomas or minor skin hardening from the  injections.  They self-resolve.  Advised her to try to avoid re-injecting in an affected area until the hardening resolves.    She has not had her monthly labs drawn yet (last checked 4/11/22).  She will make an appointment to have her labs drawn sometime next week.    Plan:  1. Continue interferon therapy, 135 mcg weekly, dose adjustments as needed for hematological parameters as per treatment plan. Doing these as home injections.  2. Continue oral iron supplement  3. Continue with LMWH 100 mg subcutaneous twice daily. Offered DOAC when not pregnant but she is ok with the LMWH.   4. She will let us know if she does get pregnant. We will need to follow her Xa levels more frequently. She will need a MFM consultation and a visit with Dr. Irving in the 1st trimester and again at the end of the 2nd trimester for birth planning.     Follow up with Dr. Irving in July 30 minutes spent on the date of the encounter doing chart review, review of test results, patient visit and documentation     VESTA Fisher [facility]  [address] [place] [address] [id number] [phone]

## 2022-05-06 ENCOUNTER — VIRTUAL VISIT (OUTPATIENT)
Dept: ONCOLOGY | Facility: CLINIC | Age: 32
End: 2022-05-06
Attending: REGISTERED NURSE
Payer: COMMERCIAL

## 2022-05-06 DIAGNOSIS — I70.209 ACUTE OCCLUSION OF ARTERY OF LOWER EXTREMITY (H): ICD-10-CM

## 2022-05-06 DIAGNOSIS — Z79.01 LONG TERM CURRENT USE OF ANTICOAGULANT THERAPY: ICD-10-CM

## 2022-05-06 DIAGNOSIS — Z15.89 JAK2 V617F MUTATION: Primary | ICD-10-CM

## 2022-05-06 PROCEDURE — 99213 OFFICE O/P EST LOW 20 MIN: CPT | Mod: 95 | Performed by: REGISTERED NURSE

## 2022-05-06 NOTE — NURSING NOTE
Patient confirms medications and allergies are accurate via patients echeck in completion, and or denies any changes since last reviewed/verified.     Norberto Goldstein, Virtual Facilitator

## 2022-05-06 NOTE — LETTER
"    2022         RE: Sruthi Beasley  [address] [address]        Dear Colleague,    Thank you for referring your patient, Sruthi Beasley, to the [facility]. Please see a copy of my visit note below.    Sruthi is a 31 year old who is being evaluated via a billable video visit.      How would you like to obtain your AVS? MyChart  If the video visit is dropped, the invitation should be resent by: Text to cell phone: [phone]   Will anyone else be joining your video visit? No      Video Start Time: 1:33 PM  Video-Visit Details    Type of service:  Video Visit    Video End Time:1:40 PM    Originating Location (pt. Location): Home    Distant Location (provider location):  [facility]     Platform used for Video Visit: Gage Goldstein        [facility] Hematology Progress Note    Outpatient Visit Note:    Patient: Sruthi Mac  MRN: [id number]  : 1990  STEVE: May 6, 2022      Reason for Consultation:  JAK2+ MPN      History of Present Illness/Interval History:  Sruthi Beasley is a 31 year old woman with history of depression and anxiety who presetned to [facility] in 2021 with acute onset of left leg pain. She was found to have left popliteal arterial occlusion that required extensive interventions. She was evaluated in 2021 by Dr. David due to thrombocytosis. Evaluation included MPN panel that revealed a [id number] mutation. Bone marrow biopsy was completed. She established care with Dr. Irving in late . Please refer to the initial note for further details.     Sruthi reports she is doing well. She continues at the goal dose of Pegasys 135 mcg. She hasn't had any symptoms or side effects. She was provided a milton that helps cover the cost of the Pegasys. She is taking LMWH 100mg shots and is tolerating them. No bleeding with it. She does get occasional \"hard bumps\" under her " skin that resolve after several weeks. Her menstrual cycle is shorter but slightly heavier. She is not having any leg pain. No night sweats. No unintentional weight loss. No night sweats, no hot flashes, no new concentration issues.     Medications:  Current Outpatient Medications   Medication Sig Dispense Refill     busPIRone (BUSPAR) 10 MG tablet Take 10 mg by mouth 2 times daily       enoxaparin ANTICOAGULANT (LOVENOX) 100 MG/ML syringe Inject 1 mL (100 mg) Subcutaneous 2 times daily 60 mL 3     enoxaparin ANTICOAGULANT (LOVENOX) 100 MG/ML syringe Inject 1 mL (100 mg) Subcutaneous 2 times daily 60 mL 3     Ferrous Sulfate (IRON) 325 (65 Fe) MG tablet Take 1 tablet by mouth daily       Lactobacillus (PROBIOTIC ACIDOPHILUS PO) Take by mouth daily       levothyroxine (SYNTHROID/LEVOTHROID) 25 MCG tablet TAKE 1 TABLET (25 MCG) BY MOUTH DAILY 90 tablet 0     peginterferon fouzia-2a (PEGASYS) 180 MCG/ML injection Inject 0.75 mLs (135 mcg) Subcutaneous every 7 days 4 mL 3     Peginterferon fouzia-2a 135 MCG/0.5ML SOAJ Inject 135 mcg Subcutaneous once a week 2 mL 0     Prenatal MV-Min-Fe Fum-FA-DHA (PRENATAL 1 PO) Take 1 tablet by mouth       sertraline (ZOLOFT) 50 MG tablet Take 100 mg by mouth daily        Vitamin D3 (CHOLECALCIFEROL) 25 mcg (1000 units) tablet Take 1 tablet by mouth daily          ROS:  A 10 point ROS is negative except as stated in the HPI    Social History:  Denies any tobacco use. No significant alcohol use. Denies any illicit drug use. Patient works as a  for [employer] .    Family History:  Dad- T2 DM, hyperlipidemia, testicular cancer, melanoma  Mother- hyperlipidemia     Objective:  There were no vitals taken for this visit.  Video physical exam  General: Patient appears well in no acute distress.   Skin: No visualized rash or lesions on visualized skin  Eyes: EOMI, no erythema, sclera icterus or discharge noted  Resp: Appears to be breathing comfortably without  accessory muscle usage, speaking in full sentences, no cough  MSK: Appears to have normal range of motion based on visualized movements  Neurologic: No apparent tremors, facial movements symmetric  Psych: affect bright, alert and oriented    The rest of a comprehensive physical examination is deferred due to PHE (public health emergency) video restrictions        Labs:   I personally reviewed the following labs:    Most Recent 3 CBC's:  Recent Labs   Lab Test 04/11/22  1324 02/15/22  1338 01/12/22  1439   WBC 5.7 6.7 6.5   HGB 11.0* 11.8 12.0   MCV 86 85 85    249 291     Most Recent 3 BMP's:  Recent Labs   Lab Test 04/11/22  1324 02/15/22  1338 01/12/22  1438    135 138   POTASSIUM 3.7 4.1 4.4   CHLORIDE 106 103 106   CO2 22 29 26   BUN 9 12 11   CR 0.60 0.64 0.88   ANIONGAP 7 3 6   CASSI 9.1 9.7 9.3   GLC 94 114* 97     Most Recent 2 LFT's:  Recent Labs   Lab Test 04/11/22  1324 02/15/22  1338   AST 22 26   ALT 38 39   ALKPHOS 87 87   BILITOTAL 0.2 0.3         Imaging:  No recent abdominal imaging    Assessment:  In summary, Sruthi Beasley is a 31 year old woman with [id number] positive myeloproliferative neoplasm and arterial thrombosis, diagnosed in spring 2021.    1. [id number]+ MPN, likely ET v prefibrotic MF  2. 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 Preconception counseling    High risk (IPSET>2) ALBERTO Negro is currently attempting to get pregnant. Due to this, as well as the recent publication of the CONTINUATION trials demonstrating ropeginterferon fouzia-2b compared to hydroxyurea, response to ropeginterferon fouzia-2b continued to increase over time with improved responses compared with hydroxyurea at 36 months, She has been on Pegasys since 11/9/21. She has tolerated medication well and is currently at goal of 135 mcg subcutaneous weekly.      For her arterial thrombosis, she is currently on LMWH 100 mg BID during preconception, pregnancy and post-partum. (Xa level too high on 120mg  BID, Xa level acceptable on 100mg BID) During delivery she would be off anticoagulation, but shortly after delivery we would resume anticoagulation.     The areas of hardening under her skin sound like small hematomas or minor skin hardening from the injections.  They self-resolve.  Advised her to try to avoid re-injecting in an affected area until the hardening resolves.    She has not had her monthly labs drawn yet (last checked 4/11/22).  She will make an appointment to have her labs drawn sometime next week.    Plan:  1. Continue interferon therapy, 135 mcg weekly, dose adjustments as needed for hematological parameters as per treatment plan. Doing these as home injections.  2. Continue oral iron supplement  3. Continue with LMWH 100 mg subcutaneous twice daily. Offered DOAC when not pregnant but she is ok with the LMWH.   4. She will let us know if she does get pregnant. We will need to follow her Xa levels more frequently. She will need a MFM consultation and a visit with Dr. Irving in the 1st trimester and again at the end of the 2nd trimester for birth planning.     Follow up with Dr. Irving in July 30 minutes spent on the date of the encounter doing chart review, review of test results, patient visit and documentation     VESTA Fisher [facility]  [address] [place] [address] [phone]

## 2022-05-20 ENCOUNTER — LAB (OUTPATIENT)
Dept: LAB | Facility: CLINIC | Age: 32
End: 2022-05-20
Payer: COMMERCIAL

## 2022-05-20 DIAGNOSIS — D47.3 ESSENTIAL THROMBOCYTHEMIA (H): ICD-10-CM

## 2022-05-20 DIAGNOSIS — Z15.89 JAK2 V617F MUTATION: ICD-10-CM

## 2022-05-20 LAB
BASOPHILS # BLD AUTO: 0 10E3/UL (ref 0–0.2)
BASOPHILS NFR BLD AUTO: 0 %
EOSINOPHIL # BLD AUTO: 0 10E3/UL (ref 0–0.7)
EOSINOPHIL NFR BLD AUTO: 1 %
ERYTHROCYTE [DISTWIDTH] IN BLOOD BY AUTOMATED COUNT: 14.2 % (ref 10–15)
HCT VFR BLD AUTO: 34.4 % (ref 35–47)
HGB BLD-MCNC: 11.4 G/DL (ref 11.7–15.7)
LDH SERPL L TO P-CCNC: 159 U/L (ref 81–234)
LYMPHOCYTES # BLD AUTO: 1.9 10E3/UL (ref 0.8–5.3)
LYMPHOCYTES NFR BLD AUTO: 32 %
MCH RBC QN AUTO: 28.3 PG (ref 26.5–33)
MCHC RBC AUTO-ENTMCNC: 33.1 G/DL (ref 31.5–36.5)
MCV RBC AUTO: 85 FL (ref 78–100)
MONOCYTES # BLD AUTO: 0.4 10E3/UL (ref 0–1.3)
MONOCYTES NFR BLD AUTO: 7 %
NEUTROPHILS # BLD AUTO: 3.6 10E3/UL (ref 1.6–8.3)
NEUTROPHILS NFR BLD AUTO: 61 %
PLATELET # BLD AUTO: 237 10E3/UL (ref 150–450)
RBC # BLD AUTO: 4.03 10E6/UL (ref 3.8–5.2)
WBC # BLD AUTO: 5.9 10E3/UL (ref 4–11)

## 2022-05-20 PROCEDURE — 80053 COMPREHEN METABOLIC PANEL: CPT

## 2022-05-20 PROCEDURE — 85025 COMPLETE CBC W/AUTO DIFF WBC: CPT

## 2022-05-20 PROCEDURE — 36415 COLL VENOUS BLD VENIPUNCTURE: CPT

## 2022-05-20 PROCEDURE — 83615 LACTATE (LD) (LDH) ENZYME: CPT

## 2022-05-21 LAB
ALBUMIN SERPL-MCNC: 4.1 G/DL (ref 3.4–5)
ALP SERPL-CCNC: 86 U/L (ref 40–150)
ALT SERPL W P-5'-P-CCNC: 42 U/L (ref 0–50)
ANION GAP SERPL CALCULATED.3IONS-SCNC: 6 MMOL/L (ref 3–14)
AST SERPL W P-5'-P-CCNC: 23 U/L (ref 0–45)
BILIRUB SERPL-MCNC: 0.3 MG/DL (ref 0.2–1.3)
BUN SERPL-MCNC: 9 MG/DL (ref 7–30)
CALCIUM SERPL-MCNC: 9.3 MG/DL (ref 8.5–10.1)
CHLORIDE BLD-SCNC: 108 MMOL/L (ref 94–109)
CO2 SERPL-SCNC: 25 MMOL/L (ref 20–32)
CREAT SERPL-MCNC: 0.58 MG/DL (ref 0.52–1.04)
GFR SERPL CREATININE-BSD FRML MDRD: >90 ML/MIN/1.73M2
GLUCOSE BLD-MCNC: 93 MG/DL (ref 70–99)
POTASSIUM BLD-SCNC: 4.3 MMOL/L (ref 3.4–5.3)
PROT SERPL-MCNC: 8 G/DL (ref 6.8–8.8)
SODIUM SERPL-SCNC: 139 MMOL/L (ref 133–144)

## 2022-05-29 ENCOUNTER — HEALTH MAINTENANCE LETTER (OUTPATIENT)
Age: 32
End: 2022-05-29

## 2022-06-03 ENCOUNTER — OFFICE VISIT (OUTPATIENT)
Dept: OTHER | Facility: CLINIC | Age: 32
End: 2022-06-03
Attending: INTERNAL MEDICINE
Payer: COMMERCIAL

## 2022-06-03 VITALS
HEART RATE: 96 BPM | DIASTOLIC BLOOD PRESSURE: 86 MMHG | WEIGHT: 282.6 LBS | SYSTOLIC BLOOD PRESSURE: 123 MMHG | BODY MASS INDEX: 41.86 KG/M2 | HEIGHT: 69 IN | OXYGEN SATURATION: 97 %

## 2022-06-03 DIAGNOSIS — E66.01 MORBID OBESITY (H): ICD-10-CM

## 2022-06-03 DIAGNOSIS — E03.9 HYPOTHYROIDISM, UNSPECIFIED TYPE: ICD-10-CM

## 2022-06-03 DIAGNOSIS — D75.839 THROMBOCYTOSIS: ICD-10-CM

## 2022-06-03 DIAGNOSIS — E78.41 ELEVATED LIPOPROTEIN(A): ICD-10-CM

## 2022-06-03 DIAGNOSIS — Z15.89 JAK-2 GENE MUTATION: ICD-10-CM

## 2022-06-03 DIAGNOSIS — I74.9 EMBOLISM AND THROMBOSIS OF ARTERY (H): Primary | ICD-10-CM

## 2022-06-03 PROCEDURE — G0463 HOSPITAL OUTPT CLINIC VISIT: HCPCS

## 2022-06-03 PROCEDURE — 99215 OFFICE O/P EST HI 40 MIN: CPT | Performed by: INTERNAL MEDICINE

## 2022-06-03 NOTE — PROGRESS NOTES
"Patient is here to discuss follow up.    /86 (BP Location: Right arm, Patient Position: Chair, Cuff Size: Adult Large)   Pulse 96   Ht 5' 9\" (1.753 m)   Wt 282 lb 9.6 oz (128.2 kg)   SpO2 97%   BMI 41.73 kg/m      Questions patient would like addressed today are: N/A.    Refills are needed: No    Has homecare services and agency name:  [facility]  "

## 2022-06-03 NOTE — PATIENT INSTRUCTIONS
Go for PRANEETH and arterial duplex next available     Continue lovenox injections    Lose weight consider intermittent fasting     Follow up with hematologist as planned

## 2022-06-17 ENCOUNTER — HOSPITAL ENCOUNTER (OUTPATIENT)
Dept: ULTRASOUND IMAGING | Facility: CLINIC | Age: 32
Discharge: HOME | End: 2022-06-17
Attending: INTERNAL MEDICINE
Payer: COMMERCIAL

## 2022-06-17 DIAGNOSIS — I74.9 EMBOLISM AND THROMBOSIS OF ARTERY (H): ICD-10-CM

## 2022-06-17 PROCEDURE — 93924 LWR XTR VASC STDY BILAT: CPT

## 2022-06-17 PROCEDURE — 93925 LOWER EXTREMITY STUDY: CPT

## 2022-06-22 ENCOUNTER — TELEPHONE (OUTPATIENT)
Dept: OTHER | Facility: CLINIC | Age: 32
End: 2022-06-22

## 2022-06-22 NOTE — TELEPHONE ENCOUNTER
Pt is currently scheduled to see Dr. Liu on 6/29/22.      Please re-schedule patient to see Dr. Gonzalez in lieu of Dr. Liu.  Schedule in person visit at next available.    Appt note:  Hx of left poplitea/anterirtibial/tibioperoneal open embolectomy and fasciotomies on 4/11/21; pt has been following with Dr. Liu; follow up to 6/17/22 imaging in Epic; LOV with Dr. Gonzalez on 5/6/21.    Mariah Romano, BSN, RN-[facility] [place]

## 2022-06-22 NOTE — TELEPHONE ENCOUNTER
Rescheduled for 6/30/22.     Future Appointments   Date Time Provider Department Center   6/30/2022  2:30 PM Ulises Gonzalez [facility]       Gabby Weiss    [facility]   [phone]

## 2022-06-28 NOTE — PROGRESS NOTES
[facility]    Sruthi Beasley returns for vascular follow-up.  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  Etiology was unclear.  Attempts at lytic therapy were also unsuccessful.  4/11/2021 left popliteal and tibial embolectomy with vein patch.  Also distal anterior tibial and posterior tibial embolectomy along with fasciotomies.  Despite anticoagulation there is reoccluded and reexploration and embolectomies performed 4/12/2021 with success.  Initially placed on Eliquis and Plavix due to the extensive emboli.  Zio patch evaluation normal.    At the initial event and found to have occlusion of the profundus femoral artery and occluded distal anterior tibial artery with collaterals reconstituting PT.  No intervention performed on this leg due to the marked problems in the left leg and mild symptoms on the right.    She has done well since that time.  Followed by Dr. Liu with recent visit.  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  On Pegasys.  Discontinued Eliquis and Plavix due to plans for pregnancy and thus on Lovenox injections.  Quit taking her statin.      PMH: Medications: BuSpar, PEG interferon, Synthroid, Zoloft, Lovenox injections twice daily    ROS: No problems with her right leg.  On her left leg if she walks very leisurely at [employer] she does fairly well.  However, walking in a more rapid pace or up an incline will cause calf claudication and numbness to her toes.  When she rests for short period of time this does resolve.  She has had no ulcerations.  Sensation is fairly intact to her feet.    Exam: Alert and appropriate.  Normal affect.   Blood pressure 136/85 left arm.  Pulse 70.   Chest= clear   Cardiovascular= regular rate   Extremities= no ulcerations.  Intact sensation.    Healed left popliteal and ankle incisions from embolectomies    No edema.  No  ulcers.     Bedside Doppler with a strong biphasic signal on the right ankle PT.  Monophasic flow which is quite weak in the left foot.      Ankle-brachial index is 0.74 on the right with triphasic PT and biphasic DP signals.  Decreases with exercise of 0.60  On the left index is only 0.38 with flow in the dorsalis pedis artery only.  Decreases is 0.24 with exercise.        Arterial duplex on the right shows the occlusion of the profundus femoral artery which we knew at the time of her hospitalization.  Rest of the arteries are unremarkable down to the tibials.  On the left everything is patent down to the trifurcation.  Anterior tibial arteries open but 2.7 mm.  Posterior tibial artery is occluded in the peroneal artery is not visualized.        IMPRESSION: #1.  Still with significant vascular disease in the left leg trifurcation vessels.  This certainly explains her symptoms with ambulation and the low PRANEETH.  Unfortunately, there is nothing further we can do to improve her blood supply.  I have recommended regular exercise to help improve collateralization and oxygen utilization and she does understand this.  Also stressed the importance of proper footwear and perhaps even custom inserts in her shoes prevent any pressure points that could lead to an ulcer.  If an ulcer develops healing would be very difficult if impossible.    Though she does have decreased blood flow to her left calf and foot she was in the situation the time of her presentation where we were happy that she did not go on 20 amputation and she does understand this.     #2.  I do not feel that any further specific vascular follow-up will be of any benefit.  We will follow the patient clinically.  She sees Dr Liu on a regular basis.  Will need chronic anticoagulation due to her blood dyscrasia which was the likely cause of her multiple emboli and thrombosis.  Presently on Lovenox try to get pregnant but will likely be on lifelong Eliquis.    25  minutes with patient today.  This includes review of past records and operative reports along with outside records and recent testing.      Ulises Gonzalez MD  This note was created using Dragon voice recognition software which may result in transcription errors.

## 2022-06-30 ENCOUNTER — OFFICE VISIT (OUTPATIENT)
Dept: OTHER | Facility: CLINIC | Age: 32
End: 2022-06-30
Attending: SURGERY
Payer: COMMERCIAL

## 2022-06-30 VITALS — HEART RATE: 70 BPM | SYSTOLIC BLOOD PRESSURE: 136 MMHG | DIASTOLIC BLOOD PRESSURE: 85 MMHG | OXYGEN SATURATION: 97 %

## 2022-06-30 DIAGNOSIS — I73.9 PAD (PERIPHERAL ARTERY DISEASE) (H): Primary | ICD-10-CM

## 2022-06-30 DIAGNOSIS — Z15.89 JAK2 GENE MUTATION: ICD-10-CM

## 2022-06-30 PROCEDURE — G0463 HOSPITAL OUTPT CLINIC VISIT: HCPCS

## 2022-06-30 PROCEDURE — 99213 OFFICE O/P EST LOW 20 MIN: CPT | Performed by: SURGERY

## 2022-06-30 NOTE — PROGRESS NOTES
Patient is here to discuss follow up.    /85 (BP Location: Left arm, Patient Position: Chair, Cuff Size: Adult Large)   Pulse 70   SpO2 97%     Questions patient would like addressed today are: N/A.    Refills are needed: No    Has homecare services and agency name:  [facility]

## 2022-07-07 NOTE — PROGRESS NOTES
SUBJECTIVE:  CC:  Follow-up visit  She got  and planning for pregnancy  Anticoagulation was switched to Lovenox  She stopped taking statin sometime ago  Gained some weight  Following up with hematologist  Still continues to have a left leg heaviness and some tingling  HPI:  For full details please see my previous clinic visit note and also hospital consult and follow-up notes  This is a very pleasant 31-year-old female with history of prolonged hospitalization for bilateral lower extremity arterial thrombus left worse than right side with acute on chronic left leg ischemia underwent emergent abdominal aortic and left lower extremity diagnostic arteriography and mechanical thrombectomy of left popliteal artery and redo surgery etc. in first week of April 2021  Positive JAK2 mutation and essential thrombocytosis here today for the follow-up visit currently taking Lovenox injections and previously she took Eliquis with Plavix which caused excessive menstrual bleeding.  She is following up with hematologist and taking Pegasys with good response.  She got  and planning for pregnancy  Still continues to have some leg heaviness and tingling left side, able to do routine activities  She gained some weight    HISTORIES:  PROBLEM LIST:   Patient Active Problem List   Diagnosis     Acute occlusion of artery of lower extremity (H)     Limb ischemia     Essential thrombocythemia (H)     JAK2 [id number] mutation     Long term current use of anticoagulant therapy     Encounter for preconception consultation     Morbid obesity (H)     PAST MEDICAL HISTORY:  Past Medical History:   Diagnosis Date     Anxiety      Depression      Essential thrombocythemia (H)      History of arterial thrombosis      Hyperlipidemia      JAK2 gene mutation      Obesity      Panic attack      Subclinical hypothyroidism      PAST SURGICAL HISTORY:  Past Surgical History:   Procedure Laterality Date     EMBOLECTOMY LOWER EXTREMITY Left 4/11/2021     Procedure: LEFT LEG ANTERIOR TIBIAL AND POSTERIOR TIBIAL EMBOLECTOMIES, FEMORAL POPITEAL VEIN PATCH, ANGIOGRAM, FASCIOTOMIES;  Surgeon: Ulises Gonzalez;  Location: [facility] OR     IR ANGIOGRAM THROUGH CATHETER FOLLOW UP  4/9/2021     IR ANGIOGRAM THROUGH CATHETER FOLLOW UP  4/10/2021     IR LOWER EXTREMITY ANGIOGRAM LEFT  4/8/2021     THROMBECTOMY LOWER EXTREMITY Left 4/12/2021    Procedure: Re-Exploration Left Lower Extremity With Popliteal and Tibial Embolectomy.;  Surgeon: Ulises Gonzalez;  Location: [facility] OR     wisdom teeth remova       CURRENT MEDICATIONS:  Current Outpatient Medications   Medication Sig Dispense Refill     busPIRone (BUSPAR) 10 MG tablet Take 10 mg by mouth 2 times daily       enoxaparin ANTICOAGULANT (LOVENOX) 100 MG/ML syringe Inject 1 mL (100 mg) Subcutaneous 2 times daily 60 mL 3     Ferrous Sulfate (IRON) 325 (65 Fe) MG tablet Take 1 tablet by mouth daily       Lactobacillus (PROBIOTIC ACIDOPHILUS PO) Take by mouth daily       levothyroxine (SYNTHROID/LEVOTHROID) 25 MCG tablet TAKE 1 TABLET (25 MCG) BY MOUTH DAILY 90 tablet 0     peginterferon fouzia-2a (PEGASYS) 180 MCG/ML injection Inject 0.75 mLs (135 mcg) Subcutaneous every 7 days 4 mL 3     Peginterferon fouzia-2a 135 MCG/0.5ML SOAJ Inject 135 mcg Subcutaneous once a week 2 mL 0     Prenatal MV-Min-Fe Fum-FA-DHA (PRENATAL 1 PO) Take 1 tablet by mouth       sertraline (ZOLOFT) 50 MG tablet Take 100 mg by mouth daily        STATIN NOT PRESCRIBED (INTENTIONAL) Please choose reason not prescribed from choices below.       Vitamin D3 (CHOLECALCIFEROL) 25 mcg (1000 units) tablet Take 1 tablet by mouth daily       ALLERGIES:  Allergies   Allergen Reactions     Erythromycin Unknown     Pcn [Penicillins] Unknown     SOCIAL HISTORY:  Social History     Socioeconomic History     Marital status:      Spouse name: Not on file     Number of children: Not on file     Years of education: Not on file     Highest education level: Not on  "file   Occupational History     Not on file   Tobacco Use     Smoking status: Never Smoker     Smokeless tobacco: Never Used   Substance and Sexual Activity     Alcohol use: Yes     Comment: rare     Drug use: Never     Sexual activity: Not on file   Other Topics Concern     Not on file   Social History Narrative     Not on file     Social Determinants of Health     Financial Resource Strain: Not on file   Food Insecurity: Not on file   Transportation Needs: Not on file   Physical Activity: Not on file   Stress: Not on file   Social Connections: Not on file   Intimate Partner Violence: Not on file   Housing Stability: Not on file     FAMILY HISTORY:  Family History   Problem Relation Age of Onset     Hypertension Father      Diabetes Father      Myocardial Infarction Father      REVIEW OF SYSTEMS:  CONSTITUTIONAL:no malaise, fatigue, or other general symptoms  EYES: no subjective changes in visual acuity, no photophobia  ENT/MOUTH: no complaints of rhinorrhea, nasal congestion, sore throat, hearing changes  RESP:no SOB  CV: no c/o exertional chest pressure or CASTILLO  GI: No abdominal pain, constipation, change in bowel movements, nausea, pyrosis, BRBPR  :no polyuria or polydipsia, no dysuria, no gross hematuria  MUSCULOSKELATAL:no arthalgias or myalgias, some tingling numbness since the surgery on the left side and sometimes feels heaviness of the leg  INTEGUMENTARY/SKIN: Left foot is colder than the right side  NEURO: no gross sensory or motor symptoms, no dizziness, no confusion  ENDOCRINE: no polyuria or polydipsia, no heat or cold intolerance  HEME/ALLERGY/IMMUNE: no fevers, chills, night sweats, or unwanted weight loss  PSYCHIATRIC: no depression, anxiety, or internal stimuli  EXAM:  /86 (BP Location: Right arm, Patient Position: Chair, Cuff Size: Adult Large)   Pulse 96   Ht 1.753 m (5' 9\")   Wt 128.2 kg (282 lb 9.6 oz)   SpO2 97%   BMI 41.73 kg/m    BMI: Body mass index is 41.73 kg/m .  GENERAL " APPEARANCE:  Pleasant  Healthy appearing male , alert, active, no distress cooperative.  EXAM:  EYES: clear conjunctiva, no cataracts, no obvious fundoscopic abnormalities  HENT: oropharynx, nares, and TMs are WNL  NECK: no JVD, thyromegaly or lymphadenopathy, no cervical bruits  RESP: clear to auscultation without rales, wheezes, or rhonchi  CV: RRR, no murmurs, gallops, or rubs  LYMPH: no cervical , axillary, or inguinal lymphadenopathy appreciated  GI: NABS, ND/NT, no masses or organomegally appreciated    MS:Vascular:  Decreased pedal pulses bilateral but feet are well-perfused  Left distal foot is slightly colder than the right side  Motor and sensory function is normal  No foot ulcers or leg ulcers  SKIN: Abnormal capillary response left foot area 4 to 5 seconds, well-healed scars from previous surgery  NEURO: CN II-XII intact, no localizing sensory or motor abnoramlities noted, DTRs symmetrical bilaterally  PSYCH: Mental status exam reveals the pt to have normal mood and affect. There is no disorder of thought form or content. There is no response to internal stimuli. There is no suicidal or homicidal ideation.    A/P:  (I74.9)  Hx of embolism and thrombosis of artery (H) (Embolic occlusion of the left popliteal artery and tibial arteries with extensive distal embolization.) s/p surgery 4/2021  (primary encounter diagnosis)  Comment:   For full details please see our hospital consult and subsequent follow-up visit note and also  evaluation  Its been more than a year since hospital discharge.  She still continues to have a leg heaviness and some tingling  Left lower extremity slightly cooler than right side, and abnormal capillary response  She is able to walk few blocks without any problem  She gained some weight  Taking Lovenox injections and planning to get pregnant  She is also getting treatment for thrombocytosis with positive JAK2 mutation  Continue Lovenox injections twice daily  Arrange  bilateral lower extremity arterial duplex and also PRANEETH with exercise  Walk as much as she can  Virtual visit 1 week after completion of the studies    Plan: US PRANEETH Doppler with Exercise Bilateral, US         Lower Extremity Arterial Duplex Bilateral      (D75.839) Thrombocytosis  (Z15.89) LENA-2 gene mutation  She is currently following up with her hematologist platelet count is normal getting PEG interferon weekly injections  Continue to follow the recommendations  (E66.01) Morbid obesity (H)  She is morbidly obese BMI is 41.7.   Suggested patient to lose weight and consider intermittent fasting 2 meals a day 8 hours apart during the daytime and 16 hours apart from evening meal to morning meal  (E78.41) Elevated lipoprotein(a), goal of LDL less than 70  She was initiated statin in the past and she is planning to get pregnant this was stopped  TLC suggested  (E03.9) Hypothyroidism, unspecified type  Comment: On replacement clinically euthyroid continue the same  Plan:     40 minutes spent on the date of the encounter doing chart review, history and exam, documentation and further activities as noted above.  Reviewed previous hospitalization records and operative report etc.    Hollis Liu MD, FAKAMAR, FSVM, FNLA  Vascular Medicine  Clinical lipidologist  Clinical Hypertension specialist     no

## 2022-07-18 ENCOUNTER — PATIENT OUTREACH (OUTPATIENT)
Dept: ONCOLOGY | Facility: CLINIC | Age: 32
End: 2022-07-18

## 2022-07-18 ENCOUNTER — LAB (OUTPATIENT)
Dept: LAB | Facility: CLINIC | Age: 32
End: 2022-07-18
Payer: COMMERCIAL

## 2022-07-18 DIAGNOSIS — D47.3 ESSENTIAL THROMBOCYTHEMIA (H): ICD-10-CM

## 2022-07-18 DIAGNOSIS — Z15.89 JAK2 V617F MUTATION: ICD-10-CM

## 2022-07-18 LAB
ALBUMIN SERPL-MCNC: 4 G/DL (ref 3.4–5)
ALP SERPL-CCNC: 81 U/L (ref 40–150)
ALT SERPL W P-5'-P-CCNC: 40 U/L (ref 0–50)
ANION GAP SERPL CALCULATED.3IONS-SCNC: 7 MMOL/L (ref 3–14)
AST SERPL W P-5'-P-CCNC: 28 U/L (ref 0–45)
BASOPHILS # BLD AUTO: 0 10E3/UL (ref 0–0.2)
BASOPHILS NFR BLD AUTO: 0 %
BILIRUB SERPL-MCNC: 0.3 MG/DL (ref 0.2–1.3)
BUN SERPL-MCNC: 8 MG/DL (ref 7–30)
CALCIUM SERPL-MCNC: 9 MG/DL (ref 8.5–10.1)
CHLORIDE BLD-SCNC: 107 MMOL/L (ref 94–109)
CO2 SERPL-SCNC: 23 MMOL/L (ref 20–32)
CREAT SERPL-MCNC: 0.58 MG/DL (ref 0.52–1.04)
EOSINOPHIL # BLD AUTO: 0.1 10E3/UL (ref 0–0.7)
EOSINOPHIL NFR BLD AUTO: 1 %
ERYTHROCYTE [DISTWIDTH] IN BLOOD BY AUTOMATED COUNT: 14.9 % (ref 10–15)
FERRITIN SERPL-MCNC: 88 NG/ML (ref 12–150)
GFR SERPL CREATININE-BSD FRML MDRD: >90 ML/MIN/1.73M2
GLUCOSE BLD-MCNC: 102 MG/DL (ref 70–99)
HCT VFR BLD AUTO: 35.1 % (ref 35–47)
HGB BLD-MCNC: 11.6 G/DL (ref 11.7–15.7)
IRON BINDING CAPACITY (ROCHE): 315 UG/DL (ref 240–430)
IRON SATN MFR SERPL: 26 % (ref 15–46)
IRON SERPL-MCNC: 81 UG/DL (ref 37–145)
LDH SERPL L TO P-CCNC: 175 U/L (ref 81–234)
LYMPHOCYTES # BLD AUTO: 2.2 10E3/UL (ref 0.8–5.3)
LYMPHOCYTES NFR BLD AUTO: 35 %
MCH RBC QN AUTO: 28 PG (ref 26.5–33)
MCHC RBC AUTO-ENTMCNC: 33 G/DL (ref 31.5–36.5)
MCV RBC AUTO: 85 FL (ref 78–100)
MONOCYTES # BLD AUTO: 0.4 10E3/UL (ref 0–1.3)
MONOCYTES NFR BLD AUTO: 6 %
NEUTROPHILS # BLD AUTO: 3.6 10E3/UL (ref 1.6–8.3)
NEUTROPHILS NFR BLD AUTO: 58 %
PLATELET # BLD AUTO: 226 10E3/UL (ref 150–450)
POTASSIUM BLD-SCNC: 4.1 MMOL/L (ref 3.4–5.3)
PROT SERPL-MCNC: 7.9 G/DL (ref 6.8–8.8)
RBC # BLD AUTO: 4.15 10E6/UL (ref 3.8–5.2)
SODIUM SERPL-SCNC: 137 MMOL/L (ref 133–144)
WBC # BLD AUTO: 6.3 10E3/UL (ref 4–11)

## 2022-07-18 PROCEDURE — 36415 COLL VENOUS BLD VENIPUNCTURE: CPT

## 2022-07-18 PROCEDURE — 83550 IRON BINDING TEST: CPT

## 2022-07-18 PROCEDURE — 82728 ASSAY OF FERRITIN: CPT

## 2022-07-18 PROCEDURE — 83615 LACTATE (LD) (LDH) ENZYME: CPT

## 2022-07-18 PROCEDURE — 83540 ASSAY OF IRON: CPT

## 2022-07-18 PROCEDURE — 85025 COMPLETE CBC W/AUTO DIFF WBC: CPT

## 2022-07-18 PROCEDURE — 80053 COMPREHEN METABOLIC PANEL: CPT

## 2022-07-19 ENCOUNTER — VIRTUAL VISIT (OUTPATIENT)
Dept: ONCOLOGY | Facility: CLINIC | Age: 32
End: 2022-07-19
Attending: INTERNAL MEDICINE
Payer: COMMERCIAL

## 2022-07-19 DIAGNOSIS — D47.3 ESSENTIAL THROMBOCYTHEMIA (H): Primary | Chronic | ICD-10-CM

## 2022-07-19 DIAGNOSIS — Z15.89 JAK2 V617F MUTATION: Chronic | ICD-10-CM

## 2022-07-19 DIAGNOSIS — Z79.01 LONG TERM CURRENT USE OF ANTICOAGULANT THERAPY: Chronic | ICD-10-CM

## 2022-07-19 PROCEDURE — 99214 OFFICE O/P EST MOD 30 MIN: CPT | Mod: 95 | Performed by: INTERNAL MEDICINE

## 2022-07-19 NOTE — PROGRESS NOTES
Sruthi is a 31 year old who is being evaluated via a billable video visit.      How would you like to obtain your AVS? MyChart  If the video visit is dropped, the invitation should be resent by: Text to cell phone: [phone]  Will anyone else be joining your video visit? No      Amy Staley REYNALDO    Video-Visit Details    Video Start Time: 5:25 PM    Type of service:  Video Visit    Video End Time:5:55 PM    Originating Location (pt. Location): Home    Distant Location (provider location):  [facility]     Platform used for Video Visit: Gage Negro is a 31 year old who is being evaluated via a billable video visit.              [facility] Hematology Progress Note    Outpatient Visit Note:    Patient: Sruthi Mac  MRN: [id number]  : 1990  STEVE: 2022      Reason for Consultation:  JAK2+ MPN    History of Present Illness/Interval History:  Sruthi Beasley is a 31 year old woman with history of depression and anxiety who presetned to [facility] in 2021 with acute onset of left leg pain. She was found to have left popliteal arterial occlusion that required extensive interventions. She was evaluated in 2021 by Dr. David due to thrombocytosis. Evaluation included MPN panel that revealed a [id number] mutation. Bone marrow biopsy was completed. She established care with Dr. Irving in late . Please refer to the initial note for further details.     Today, Sruthi reports feeling well overall recently. She denies feeling fatigued, having fever, chills, night sweats. Has been eating and drinking fine, denies early satiety, weight has been stable. She does endorse frequent diarrhea, up to 4-5 episodes of loose or watery, nonbloody stools a day. She feels gas and bloating that is often improved after a BM. Denies difficulty ambulating, mood has been fine, concentration okay. Occasionally has itching legs - typically after  shaving her legs. She reports she is not pregnant, but is continuing to try. She lives at home with her  and her step-daughter is with them 50% of the time. 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 She continues to take Pegasys 135 mcg and has been taking LMWH 100 mg shots, which she is tolerating well.     Medications:  Current Outpatient Medications   Medication Sig Dispense Refill     busPIRone (BUSPAR) 10 MG tablet Take 10 mg by mouth 2 times daily       enoxaparin ANTICOAGULANT (LOVENOX) 100 MG/ML syringe Inject 1 mL (100 mg) Subcutaneous 2 times daily 60 mL 3     Ferrous Sulfate (IRON) 325 (65 Fe) MG tablet Take 1 tablet by mouth daily       Lactobacillus (PROBIOTIC ACIDOPHILUS PO) Take by mouth daily       levothyroxine (SYNTHROID/LEVOTHROID) 25 MCG tablet TAKE 1 TABLET (25 MCG) BY MOUTH DAILY 90 tablet 0     Peginterferon fouzia-2a 135 MCG/0.5ML SOAJ Inject 135 mcg Subcutaneous once a week 2 mL 0     Prenatal MV-Min-Fe Fum-FA-DHA (PRENATAL 1 PO) Take 1 tablet by mouth       sertraline (ZOLOFT) 50 MG tablet Take 100 mg by mouth daily        STATIN NOT PRESCRIBED (INTENTIONAL) Please choose reason not prescribed from choices below.       Vitamin D3 (CHOLECALCIFEROL) 25 mcg (1000 units) tablet Take 1 tablet by mouth daily       peginterferon fouzia-2a (PEGASYS) 180 MCG/ML injection Inject 0.75 mLs (135 mcg) Subcutaneous every 7 days (Patient not taking: Reported on 7/19/2022) 4 mL 3        ROS:  A 10 point ROS is negative except as stated in the HPI    Social History:  Denies any tobacco use. No significant alcohol use. Denies any illicit drug use. Patient works as a  for [employer]. .    Family History:  Dad- T2 DM, hyperlipidemia, testicular cancer, melanoma  Mother- hyperlipidemia     Objective:  There were no vitals taken for this visit.  Video physical exam  General: Patient appears well in no acute distress.   Skin: No visualized rash or lesions on  visualized skin  Eyes: no erythema or sclera icterus  Resp: Appears to be breathing comfortably without accessory muscle usage, speaking in full sentences, no cough  MSK: Appears to have normal range of motion based on visualized movements  Neurologic: No apparent tremors, facial movements symmetric  Psych: affect bright, alert and oriented    Labs:   I personally reviewed the following labs:    Most Recent 3 CBC's:  Recent Labs   Lab Test 07/18/22  1306 05/20/22  1415 04/11/22  1324   WBC 6.3 5.9 5.7   HGB 11.6* 11.4* 11.0*   MCV 85 85 86    237 222     Most Recent 3 BMP's:  Recent Labs   Lab Test 07/18/22  1306 05/20/22  1415 04/11/22  1324    139 135   POTASSIUM 4.1 4.3 3.7   CHLORIDE 107 108 106   CO2 23 25 22   BUN 8 9 9   CR 0.58 0.58 0.60   ANIONGAP 7 6 7   CASSI 9.0 9.3 9.1   * 93 94     Most Recent 2 LFT's:  Recent Labs   Lab Test 07/18/22  1306 05/20/22  1415   AST 28 23   ALT 40 42   ALKPHOS 81 86   BILITOTAL 0.3 0.3       Imaging:  No recent abdominal imaging    Assessment:  In summary, Sruthi Beasley is a 31 year old woman with [id number] positive myeloproliferative neoplasm and arterial thrombosis, diagnosed in spring 2021.    1. [id number]+ MPN, likely ET v prefibrotic MF  2. 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 Preconception counseling    High risk (IPSET>2) ALBERTO Negro is currently attempting to get pregnant. Due to this, as well as the recent publication of the CONTINUATION trials demonstrating ropeginterferon fouzia-2b compared to hydroxyurea, response to ropeginterferon fouzia-2b continued to increase over time with improved responses compared with hydroxyurea at 36 months, She has been on Pegasys since 11/9/21. She has tolerated medication well and is currently at goal of 135 mcg subcutaneous weekly.      For her arterial thrombosis, she is currently on LMWH 100 mg BID during preconception, pregnancy and post-partum. (Xa level too high on 120mg BID, Xa level acceptable  on 100mg BID) During delivery she would be off anticoagulation, but shortly after delivery we would resume anticoagulation.  Advised her to try to rotate which sites to inject for lovenox.    Plan:  1. Continue interferon therapy, 135 mcg weekly, dose adjustments as needed for hematological parameters as per treatment plan. Doing these as home injections.  2. Continue oral iron supplement  3. Continue with LMWH 100 mg subcutaneous twice daily. Offered DOAC when not pregnant but she is ok with the LMWH.   4. She will let us know if she does get pregnant. We will need to follow her Xa levels more frequently. She will need a MFM consultation and a visit with Dr. Irving in the 1st trimester and again at the end of the 2nd trimester for birth planning.  5. Diarrhea not consistent with MPN-related complication and suspect not 2/2 interferon treatment. She will follow-up with her PCP     Pete Payne MD on 7/19/2022 at 5:26 PM  Hematology/Oncology Fellow PGY4

## 2022-07-19 NOTE — LETTER
2022         RE: Sruthi Beasley  [address] [facility] [address]        Dear Colleague,    Thank you for referring your patient, Sruthi Beasley, to the [facility]. Please see a copy of my visit note below.            [facility] Hematology Progress Note    Outpatient Visit Note:    Patient: Sruthi Mac  MRN: [id number]  : 1990  STEVE: 2022      Reason for Consultation:  JAK2+ MPN    History of Present Illness/Interval History:  Sruthi Beasley is a 31 year old woman with history of depression and anxiety who presetned to [facility] in 2021 with acute onset of left leg pain. She was found to have left popliteal arterial occlusion that required extensive interventions. She was evaluated in 2021 by Dr. David due to thrombocytosis. Evaluation included MPN panel that revealed a [id number] mutation. Bone marrow biopsy was completed. She established care with Dr. Irving in late . Please refer to the initial note for further details.     Today, Sruthi reports feeling well overall recently. She denies feeling fatigued, having fever, chills, night sweats. Has been eating and drinking fine, denies early satiety, weight has been stable. She does endorse frequent diarrhea, up to 4-5 episodes of loose or watery, nonbloody stools a day. She feels gas and bloating that is often improved after a BM. Denies difficulty ambulating, mood has been fine, concentration okay. Occasionally has itching legs - typically after shaving her legs. She reports she is not pregnant, but is continuing to try. She lives at home with her  and her step-daughter is with them 50% of the time. 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 She continues to take Pegasys 135 mcg and has been taking LMWH 100 mg shots, which she is tolerating well.     Medications:  Current Outpatient Medications   Medication Sig Dispense Refill      busPIRone (BUSPAR) 10 MG tablet Take 10 mg by mouth 2 times daily       enoxaparin ANTICOAGULANT (LOVENOX) 100 MG/ML syringe Inject 1 mL (100 mg) Subcutaneous 2 times daily 60 mL 3     Ferrous Sulfate (IRON) 325 (65 Fe) MG tablet Take 1 tablet by mouth daily       Lactobacillus (PROBIOTIC ACIDOPHILUS PO) Take by mouth daily       levothyroxine (SYNTHROID/LEVOTHROID) 25 MCG tablet TAKE 1 TABLET (25 MCG) BY MOUTH DAILY 90 tablet 0     Peginterferon fouzia-2a 135 MCG/0.5ML SOAJ Inject 135 mcg Subcutaneous once a week 2 mL 0     Prenatal MV-Min-Fe Fum-FA-DHA (PRENATAL 1 PO) Take 1 tablet by mouth       sertraline (ZOLOFT) 50 MG tablet Take 100 mg by mouth daily        STATIN NOT PRESCRIBED (INTENTIONAL) Please choose reason not prescribed from choices below.       Vitamin D3 (CHOLECALCIFEROL) 25 mcg (1000 units) tablet Take 1 tablet by mouth daily       peginterferon fouzia-2a (PEGASYS) 180 MCG/ML injection Inject 0.75 mLs (135 mcg) Subcutaneous every 7 days (Patient not taking: Reported on 7/19/2022) 4 mL 3        ROS:  A 10 point ROS is negative except as stated in the HPI    Social History:  Denies any tobacco use. No significant alcohol use. Denies any illicit drug use. Patient works as a  for [employer]. .    Family History:  Dad- T2 DM, hyperlipidemia, testicular cancer, melanoma  Mother- hyperlipidemia     Objective:  There were no vitals taken for this visit.  Video physical exam  General: Patient appears well in no acute distress.   Skin: No visualized rash or lesions on visualized skin  Eyes: no erythema or sclera icterus  Resp: Appears to be breathing comfortably without accessory muscle usage, speaking in full sentences, no cough  MSK: Appears to have normal range of motion based on visualized movements  Neurologic: No apparent tremors, facial movements symmetric  Psych: affect bright, alert and oriented    Labs:   I personally reviewed the following labs:    Most Recent 3  CBC's:  Recent Labs   Lab Test 07/18/22  1306 05/20/22  1415 04/11/22  1324   WBC 6.3 5.9 5.7   HGB 11.6* 11.4* 11.0*   MCV 85 85 86    237 222     Most Recent 3 BMP's:  Recent Labs   Lab Test 07/18/22  1306 05/20/22  1415 04/11/22  1324    139 135   POTASSIUM 4.1 4.3 3.7   CHLORIDE 107 108 106   CO2 23 25 22   BUN 8 9 9   CR 0.58 0.58 0.60   ANIONGAP 7 6 7   CASSI 9.0 9.3 9.1   * 93 94     Most Recent 2 LFT's:  Recent Labs   Lab Test 07/18/22  1306 05/20/22  1415   AST 28 23   ALT 40 42   ALKPHOS 81 86   BILITOTAL 0.3 0.3       Imaging:  No recent abdominal imaging    Assessment:  In summary, Sruthi Beasley is a 31 year old woman with [id number] positive myeloproliferative neoplasm and arterial thrombosis, diagnosed in spring 2021.    1. [id number]+ MPN, likely ET v prefibrotic MF  2. 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 Preconception counseling    High risk (IPSET>2) ET    Sruthi is currently attempting to get pregnant. Due to this, as well as the recent publication of the CONTINUATION trials demonstrating ropeginterferon fouzia-2b compared to hydroxyurea, response to ropeginterferon fouzia-2b continued to increase over time with improved responses compared with hydroxyurea at 36 months, She has been on Pegasys since 11/9/21. She has tolerated medication well and is currently at goal of 135 mcg subcutaneous weekly.      For her arterial thrombosis, she is currently on LMWH 100 mg BID during preconception, pregnancy and post-partum. (Xa level too high on 120mg BID, Xa level acceptable on 100mg BID) During delivery she would be off anticoagulation, but shortly after delivery we would resume anticoagulation.  Advised her to try to rotate which sites to inject for lovenox.    Plan:  1. Continue interferon therapy, 135 mcg weekly, dose adjustments as needed for hematological parameters as per treatment plan. Doing these as home injections.  2. Continue oral iron supplement  3. Continue with  LMWH 100 mg subcutaneous twice daily. Offered DOAC when not pregnant but she is ok with the LMWH.   4. She will let us know if she does get pregnant. We will need to follow her Xa levels more frequently. She will need a MFM consultation and a visit with Dr. Irving in the 1st trimester and again at the end of the 2nd trimester for birth planning.  5. Diarrhea not consistent with MPN-related complication and suspect not 2/2 interferon treatment. She will follow-up with her PCP     Pete Payne MD on 7/19/2022 at 5:26 PM  Hematology/Oncology Fellow PGY4             Attestation signed by Keily Irving MD at 7/21/2022  4:54 PM:  Physician Attestation   I, Keily Irving MD/PhD, saw this patient and agree with the findings and plan of care as documented in the note.      Items personally reviewed/procedural attestation: vitals, labs, and discussed symptoms with patient and agree with the interpretation documented in the note.      Will work with nursing and pharmacy/supply team about obtaining insuflon catheter for injection sites.       Keily Irving MD/PhD

## 2022-08-15 ENCOUNTER — LAB (OUTPATIENT)
Dept: LAB | Facility: CLINIC | Age: 32
End: 2022-08-15
Payer: COMMERCIAL

## 2022-08-15 DIAGNOSIS — N97.9 FEMALE INFERTILITY: ICD-10-CM

## 2022-08-15 DIAGNOSIS — Z15.89 JAK2 V617F MUTATION: ICD-10-CM

## 2022-08-15 DIAGNOSIS — R94.6 THYROID FUNCTION TEST ABNORMAL: Primary | ICD-10-CM

## 2022-08-15 DIAGNOSIS — D47.3 ESSENTIAL THROMBOCYTHEMIA (H): ICD-10-CM

## 2022-08-15 LAB
ALBUMIN SERPL-MCNC: 4.1 G/DL (ref 3.4–5)
ALP SERPL-CCNC: 80 U/L (ref 40–150)
ALT SERPL W P-5'-P-CCNC: 58 U/L (ref 0–50)
ANION GAP SERPL CALCULATED.3IONS-SCNC: 8 MMOL/L (ref 3–14)
AST SERPL W P-5'-P-CCNC: 42 U/L (ref 0–45)
BASOPHILS # BLD AUTO: 0 10E3/UL (ref 0–0.2)
BASOPHILS NFR BLD AUTO: 0 %
BILIRUB SERPL-MCNC: 0.4 MG/DL (ref 0.2–1.3)
BUN SERPL-MCNC: 9 MG/DL (ref 7–30)
CALCIUM SERPL-MCNC: 9.7 MG/DL (ref 8.5–10.1)
CHLORIDE BLD-SCNC: 104 MMOL/L (ref 94–109)
CO2 SERPL-SCNC: 23 MMOL/L (ref 20–32)
CREAT SERPL-MCNC: 0.59 MG/DL (ref 0.52–1.04)
EOSINOPHIL # BLD AUTO: 0.1 10E3/UL (ref 0–0.7)
EOSINOPHIL NFR BLD AUTO: 1 %
ERYTHROCYTE [DISTWIDTH] IN BLOOD BY AUTOMATED COUNT: 14.8 % (ref 10–15)
GFR SERPL CREATININE-BSD FRML MDRD: >90 ML/MIN/1.73M2
GLUCOSE BLD-MCNC: 88 MG/DL (ref 70–99)
HCT VFR BLD AUTO: 33.8 % (ref 35–47)
HGB BLD-MCNC: 11.1 G/DL (ref 11.7–15.7)
LDH SERPL L TO P-CCNC: 148 U/L (ref 81–234)
LYMPHOCYTES # BLD AUTO: 1.5 10E3/UL (ref 0.8–5.3)
LYMPHOCYTES NFR BLD AUTO: 32 %
MCH RBC QN AUTO: 28 PG (ref 26.5–33)
MCHC RBC AUTO-ENTMCNC: 32.8 G/DL (ref 31.5–36.5)
MCV RBC AUTO: 85 FL (ref 78–100)
MONOCYTES # BLD AUTO: 0.3 10E3/UL (ref 0–1.3)
MONOCYTES NFR BLD AUTO: 7 %
NEUTROPHILS # BLD AUTO: 2.8 10E3/UL (ref 1.6–8.3)
NEUTROPHILS NFR BLD AUTO: 60 %
PLATELET # BLD AUTO: 208 10E3/UL (ref 150–450)
POTASSIUM BLD-SCNC: 4.1 MMOL/L (ref 3.4–5.3)
PROT SERPL-MCNC: 7.9 G/DL (ref 6.8–8.8)
RBC # BLD AUTO: 3.96 10E6/UL (ref 3.8–5.2)
SODIUM SERPL-SCNC: 135 MMOL/L (ref 133–144)
TSH SERPL DL<=0.005 MIU/L-ACNC: 3.21 MU/L (ref 0.4–4)
WBC # BLD AUTO: 4.7 10E3/UL (ref 4–11)

## 2022-08-15 PROCEDURE — 83615 LACTATE (LD) (LDH) ENZYME: CPT

## 2022-08-15 PROCEDURE — 80050 GENERAL HEALTH PANEL: CPT

## 2022-08-15 PROCEDURE — 36415 COLL VENOUS BLD VENIPUNCTURE: CPT

## 2022-08-26 DIAGNOSIS — Z79.01 LONG TERM CURRENT USE OF ANTICOAGULANT THERAPY: Primary | ICD-10-CM

## 2022-08-26 DIAGNOSIS — I70.209 ACUTE OCCLUSION OF ARTERY OF LOWER EXTREMITY (H): ICD-10-CM

## 2022-08-26 RX ORDER — ENOXAPARIN SODIUM 100 MG/ML
100 INJECTION SUBCUTANEOUS 2 TIMES DAILY
Qty: 60 ML | Refills: 0 | Status: SHIPPED | OUTPATIENT
Start: 2022-08-26 | End: 2022-09-29

## 2022-09-12 ENCOUNTER — LAB (OUTPATIENT)
Dept: LAB | Facility: CLINIC | Age: 32
End: 2022-09-12
Payer: COMMERCIAL

## 2022-09-12 DIAGNOSIS — Z15.89 JAK2 V617F MUTATION: ICD-10-CM

## 2022-09-12 DIAGNOSIS — D47.3 ESSENTIAL THROMBOCYTHEMIA (H): ICD-10-CM

## 2022-09-12 LAB
ALBUMIN SERPL-MCNC: 3.7 G/DL (ref 3.4–5)
ALP SERPL-CCNC: 75 U/L (ref 40–150)
ALT SERPL W P-5'-P-CCNC: 41 U/L (ref 0–50)
ANION GAP SERPL CALCULATED.3IONS-SCNC: 7 MMOL/L (ref 3–14)
AST SERPL W P-5'-P-CCNC: 26 U/L (ref 0–45)
BASOPHILS # BLD AUTO: 0 10E3/UL (ref 0–0.2)
BASOPHILS NFR BLD AUTO: 0 %
BILIRUB SERPL-MCNC: 0.2 MG/DL (ref 0.2–1.3)
BUN SERPL-MCNC: 9 MG/DL (ref 7–30)
CALCIUM SERPL-MCNC: 8.7 MG/DL (ref 8.5–10.1)
CHLORIDE BLD-SCNC: 106 MMOL/L (ref 94–109)
CO2 SERPL-SCNC: 24 MMOL/L (ref 20–32)
CREAT SERPL-MCNC: 0.58 MG/DL (ref 0.52–1.04)
EOSINOPHIL # BLD AUTO: 0.1 10E3/UL (ref 0–0.7)
EOSINOPHIL NFR BLD AUTO: 1 %
ERYTHROCYTE [DISTWIDTH] IN BLOOD BY AUTOMATED COUNT: 14.6 % (ref 10–15)
GFR SERPL CREATININE-BSD FRML MDRD: >90 ML/MIN/1.73M2
GLUCOSE BLD-MCNC: 102 MG/DL (ref 70–99)
HCT VFR BLD AUTO: 32.5 % (ref 35–47)
HGB BLD-MCNC: 10.5 G/DL (ref 11.7–15.7)
IMM GRANULOCYTES # BLD: 0 10E3/UL
IMM GRANULOCYTES NFR BLD: 1 %
LDH SERPL L TO P-CCNC: 161 U/L (ref 81–234)
LYMPHOCYTES # BLD AUTO: 1.6 10E3/UL (ref 0.8–5.3)
LYMPHOCYTES NFR BLD AUTO: 35 %
MCH RBC QN AUTO: 28.5 PG (ref 26.5–33)
MCHC RBC AUTO-ENTMCNC: 32.3 G/DL (ref 31.5–36.5)
MCV RBC AUTO: 88 FL (ref 78–100)
MONOCYTES # BLD AUTO: 0.4 10E3/UL (ref 0–1.3)
MONOCYTES NFR BLD AUTO: 8 %
NEUTROPHILS # BLD AUTO: 2.6 10E3/UL (ref 1.6–8.3)
NEUTROPHILS NFR BLD AUTO: 55 %
NRBC # BLD AUTO: 0 10E3/UL
NRBC BLD AUTO-RTO: 0 /100
PLATELET # BLD AUTO: 210 10E3/UL (ref 150–450)
POTASSIUM BLD-SCNC: 4 MMOL/L (ref 3.4–5.3)
PROT SERPL-MCNC: 7.3 G/DL (ref 6.8–8.8)
RBC # BLD AUTO: 3.69 10E6/UL (ref 3.8–5.2)
SODIUM SERPL-SCNC: 137 MMOL/L (ref 133–144)
WBC # BLD AUTO: 4.7 10E3/UL (ref 4–11)

## 2022-09-12 PROCEDURE — 36415 COLL VENOUS BLD VENIPUNCTURE: CPT

## 2022-09-12 PROCEDURE — 80053 COMPREHEN METABOLIC PANEL: CPT

## 2022-09-12 PROCEDURE — 85025 COMPLETE CBC W/AUTO DIFF WBC: CPT

## 2022-09-12 PROCEDURE — 83615 LACTATE (LD) (LDH) ENZYME: CPT

## 2022-09-29 ENCOUNTER — PATIENT OUTREACH (OUTPATIENT)
Dept: ONCOLOGY | Facility: CLINIC | Age: 32
End: 2022-09-29

## 2022-09-29 DIAGNOSIS — I70.209 ACUTE OCCLUSION OF ARTERY OF LOWER EXTREMITY (H): ICD-10-CM

## 2022-09-29 DIAGNOSIS — Z79.01 LONG TERM CURRENT USE OF ANTICOAGULANT THERAPY: ICD-10-CM

## 2022-09-29 RX ORDER — SERTRALINE HYDROCHLORIDE 100 MG/1
100 TABLET, FILM COATED ORAL
COMMUNITY
Start: 2022-07-25

## 2022-09-29 RX ORDER — ENOXAPARIN SODIUM 100 MG/ML
100 INJECTION SUBCUTANEOUS 2 TIMES DAILY
Qty: 60 ML | Refills: 11 | Status: SHIPPED | OUTPATIENT
Start: 2022-09-29 | End: 2024-02-27

## 2022-09-29 RX ORDER — LETROZOLE 2.5 MG/1
2.5 TABLET, FILM COATED ORAL
COMMUNITY
Start: 2022-09-28 | End: 2023-08-01

## 2022-10-03 ENCOUNTER — HEALTH MAINTENANCE LETTER (OUTPATIENT)
Age: 32
End: 2022-10-03

## 2022-11-15 ENCOUNTER — LAB (OUTPATIENT)
Dept: LAB | Facility: CLINIC | Age: 32
End: 2022-11-15
Payer: COMMERCIAL

## 2022-11-15 DIAGNOSIS — Z15.89 JAK2 V617F MUTATION: ICD-10-CM

## 2022-11-15 DIAGNOSIS — D47.3 ESSENTIAL THROMBOCYTHEMIA (H): ICD-10-CM

## 2022-11-15 LAB
ALBUMIN SERPL BCG-MCNC: 4.7 G/DL (ref 3.5–5.2)
ALP SERPL-CCNC: 79 U/L (ref 35–104)
ALT SERPL W P-5'-P-CCNC: 31 U/L (ref 10–35)
ANION GAP SERPL CALCULATED.3IONS-SCNC: 12 MMOL/L (ref 7–15)
AST SERPL W P-5'-P-CCNC: 32 U/L (ref 10–35)
BASOPHILS # BLD AUTO: 0 10E3/UL (ref 0–0.2)
BASOPHILS NFR BLD AUTO: 0 %
BILIRUB SERPL-MCNC: 0.2 MG/DL
BUN SERPL-MCNC: 10.8 MG/DL (ref 6–20)
CALCIUM SERPL-MCNC: 9.1 MG/DL (ref 8.6–10)
CHLORIDE SERPL-SCNC: 104 MMOL/L (ref 98–107)
CREAT SERPL-MCNC: 0.72 MG/DL (ref 0.51–0.95)
DEPRECATED HCO3 PLAS-SCNC: 22 MMOL/L (ref 22–29)
EOSINOPHIL # BLD AUTO: 0 10E3/UL (ref 0–0.7)
EOSINOPHIL NFR BLD AUTO: 1 %
ERYTHROCYTE [DISTWIDTH] IN BLOOD BY AUTOMATED COUNT: 14.2 % (ref 10–15)
GFR SERPL CREATININE-BSD FRML MDRD: >90 ML/MIN/1.73M2
GLUCOSE SERPL-MCNC: 98 MG/DL (ref 70–99)
HCT VFR BLD AUTO: 34.6 % (ref 35–47)
HGB BLD-MCNC: 11.6 G/DL (ref 11.7–15.7)
LDH SERPL L TO P-CCNC: 161 U/L (ref 0–250)
LYMPHOCYTES # BLD AUTO: 2.1 10E3/UL (ref 0.8–5.3)
LYMPHOCYTES NFR BLD AUTO: 36 %
MCH RBC QN AUTO: 28.4 PG (ref 26.5–33)
MCHC RBC AUTO-ENTMCNC: 33.5 G/DL (ref 31.5–36.5)
MCV RBC AUTO: 85 FL (ref 78–100)
MONOCYTES # BLD AUTO: 0.3 10E3/UL (ref 0–1.3)
MONOCYTES NFR BLD AUTO: 5 %
NEUTROPHILS # BLD AUTO: 3.4 10E3/UL (ref 1.6–8.3)
NEUTROPHILS NFR BLD AUTO: 58 %
PLATELET # BLD AUTO: 231 10E3/UL (ref 150–450)
POTASSIUM SERPL-SCNC: 4.3 MMOL/L (ref 3.4–5.3)
PROT SERPL-MCNC: 7.8 G/DL (ref 6.4–8.3)
RBC # BLD AUTO: 4.09 10E6/UL (ref 3.8–5.2)
SODIUM SERPL-SCNC: 138 MMOL/L (ref 136–145)
WBC # BLD AUTO: 5.8 10E3/UL (ref 4–11)

## 2022-11-15 PROCEDURE — 83615 LACTATE (LD) (LDH) ENZYME: CPT

## 2022-11-15 PROCEDURE — 85025 COMPLETE CBC W/AUTO DIFF WBC: CPT

## 2022-11-15 PROCEDURE — 36415 COLL VENOUS BLD VENIPUNCTURE: CPT

## 2022-11-15 PROCEDURE — 80053 COMPREHEN METABOLIC PANEL: CPT

## 2022-11-16 NOTE — PROGRESS NOTES
"Sruthi is a 31 year old who is being evaluated via a billable video visit.      How would you like to obtain your AVS? MyChart  If the video visit is dropped, the invitation should be resent by: Text to cell phone: [phone]  Will anyone else be joining your video visit? No      Amy JACOBS    Video-Visit Details    Video Start Time: 3:03 PM    Type of service:  Video Visit    Video End Time:3:09 PM    Originating Location (pt. Location): Home        Distant Location (provider location):  On-site    Platform used for Video Visit: [facility] Hematology Progress Note    Outpatient Visit Note:    Patient: Sruthi Mac  MRN: [id number]  : 1990  STEVE: 2022      Reason for Consultation:  JAK2+ MPN      History of Present Illness/Interval History:  Sruthi Beasley is a 31 year old woman with history of depression and anxiety who presetned to [facility] in 2021 with acute onset of left leg pain. She was found to have left popliteal arterial occlusion that required extensive interventions. She was evaluated in 2021 by Dr. David due to thrombocytosis. 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 Bone marrow biopsy was completed. She established care with Dr. Irving in late . Please refer to the initial note for further details.     Sruthi reports she is doing well. She continues at the goal dose of Pegasys 135 mcg, although she didn't do last week's dose because she was waiting to hear if we wanted her to decrease her weekly dosage or just space out her injections to see if it helps improve her hemoglobin.  She hasn't had any symptoms or side effects. She was provided a milton that helps cover the cost of the Pegasys. She is taking LMWH 100mg shots and is tolerating them. No bleeding with it. She does get occasional \"hard bumps\" under her skin that resolve after several weeks. Her menstrual cycle is shorter but " slightly heavier. She is not having any leg pain. No night sweats. No unintentional weight loss. No night sweats, no hot flashes, no new concentration issues.     Medications:  Current Outpatient Medications   Medication Sig Dispense Refill     busPIRone (BUSPAR) 10 MG tablet Take 10 mg by mouth 2 times daily       enoxaparin ANTICOAGULANT (LOVENOX) 100 MG/ML syringe Inject 1 mL (100 mg) Subcutaneous 2 times daily 60 mL 11     Ferrous Sulfate (IRON) 325 (65 Fe) MG tablet Take 1 tablet by mouth daily       Lactobacillus (PROBIOTIC ACIDOPHILUS PO) Take by mouth daily       letrozole (FEMARA) 2.5 MG tablet Take 2.5 mg by mouth       levothyroxine (SYNTHROID/LEVOTHROID) 25 MCG tablet TAKE 1 TABLET (25 MCG) BY MOUTH DAILY 90 tablet 0     peginterferon fouzia-2a (PEGASYS) 180 MCG/ML injection Inject 0.75 mLs (135 mcg) Subcutaneous every 7 days 4 mL 3     Prenatal MV-Min-Fe Fum-FA-DHA (PRENATAL 1 PO) Take 1 tablet by mouth       sertraline (ZOLOFT) 100 MG tablet Take 100 mg by mouth       STATIN NOT PRESCRIBED (INTENTIONAL) Please choose reason not prescribed from choices below.       Vitamin D3 (CHOLECALCIFEROL) 25 mcg (1000 units) tablet Take 1 tablet by mouth daily          ROS:  A 10 point ROS is negative except as stated in the HPI    Social History:  Denies any tobacco use. No significant alcohol use. Denies any illicit drug use. Patient works as a  for [employer]. .    Family History:  Dad- T2 DM, hyperlipidemia, testicular cancer, melanoma  Mother- hyperlipidemia     Objective:  Video physical exam  General: Patient appears well in no acute distress.   Skin: No visualized rash or lesions on visualized skin  Eyes: EOMI, no erythema, sclera icterus or discharge noted  Resp: Appears to be breathing comfortably without accessory muscle usage, speaking in full sentences, no cough  MSK: Appears to have normal range of motion based on visualized movements  Neurologic: No apparent tremors,  facial movements symmetric  Psych: affect bright, alert and oriented      Labs:   I personally reviewed the following labs:    Most Recent 3 CBC's:  Recent Labs   Lab Test 11/15/22  1351 09/12/22  1306 08/15/22  1313   WBC 5.8 4.7 4.7   HGB 11.6* 10.5* 11.1*   MCV 85 88 85    210 208     Most Recent 3 BMP's:  Recent Labs   Lab Test 11/15/22  1351 09/12/22  1306 08/15/22  1313    137 135   POTASSIUM 4.3 4.0 4.1   CHLORIDE 104 106 104   CO2 22 24 23   BUN 10.8 9 9   CR 0.72 0.58 0.59   ANIONGAP 12 7 8   CASSI 9.1 8.7 9.7   GLC 98 102* 88     Most Recent 2 LFT's:  Recent Labs   Lab Test 11/15/22  1351 09/12/22  1306   AST 32 26   ALT 31 41   ALKPHOS 79 75   BILITOTAL 0.2 0.2         Imaging:  No recent abdominal imaging    Assessment:  In summary, Sruthi Beasley is a 31 year old woman with [id number] positive myeloproliferative neoplasm and arterial thrombosis, diagnosed in spring 2021.    1. [id number]+ MPN, likely ET v prefibrotic MF  2. 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 Due to this, as well as the recent publication of the CONTINUATION trials demonstrating ropeginterferon fouzia-2b compared to hydroxyurea, response to ropeginterferon fouzia-2b continued to increase over time with improved responses compared with hydroxyurea at 36 months, she has been on Pegasys since 11/9/21. She has tolerated medication well and is currently at goal of 135 mcg subcutaneous weekly.      There were some recent questions about whether her Pegasys was causing her new anemia. She is still waiting to hear if she should make any dose adjustments.  I will follow-up with Dr. Irving about this.    For her arterial thrombosis, she is currently on LMWH 100 mg BID during preconception, pregnancy and post-partum. (Xa level too high on 120mg BID, Xa level acceptable on 100mg BID) During delivery she would be off anticoagulation, but shortly after  delivery we would resume anticoagulation.       Plan:  1. Decrease Pegasys to 45 mcg weekly (discussed with Dr. Irving), dose adjustments as needed for hematological parameters as per treatment plan. Doing these as home injections.  2. Continue oral iron supplement  3. Continue with LMWH 100 mg subcutaneous twice daily. Offered DOAC when not pregnant but she is ok with the LMWH.   4. She will let us know if she does get pregnant. We will need to follow her Xa levels more frequently. She will need a MFM consultation and a visit with Dr. Irving in the 1st trimester and again at the end of the 2nd trimester for birth planning.     Follow up with Dr. Irving in 3 months    30 minutes spent on the date of the encounter doing chart review, review of test results, patient visit and documentation     VESTA Carrasco [facility]  [address] [place] [id number]  [phone]

## 2022-11-17 ENCOUNTER — VIRTUAL VISIT (OUTPATIENT)
Dept: ONCOLOGY | Facility: CLINIC | Age: 32
End: 2022-11-17
Attending: REGISTERED NURSE
Payer: COMMERCIAL

## 2022-11-17 DIAGNOSIS — D47.3 ESSENTIAL THROMBOCYTHEMIA (H): Primary | ICD-10-CM

## 2022-11-17 DIAGNOSIS — Z79.01 LONG TERM CURRENT USE OF ANTICOAGULANT THERAPY: ICD-10-CM

## 2022-11-17 DIAGNOSIS — Z15.89 JAK2 V617F MUTATION: ICD-10-CM

## 2022-11-17 PROCEDURE — 99214 OFFICE O/P EST MOD 30 MIN: CPT | Mod: 95 | Performed by: REGISTERED NURSE

## 2022-11-17 PROCEDURE — G0463 HOSPITAL OUTPT CLINIC VISIT: HCPCS | Mod: PN,RTG | Performed by: REGISTERED NURSE

## 2022-11-17 NOTE — LETTER
"    2022         RE: Sruthi Beasley  [address] [facility] [address]        Dear Colleague,    Thank you for referring your patient, Sruthi Beasley, to the [facility]. Please see a copy of my visit note below.        [facility] Hematology Progress Note    Outpatient Visit Note:    Patient: Sruthi Mac  MRN: [id number]  : 1990  STEVE: 2022      Reason for Consultation:  JAK2+ MPN      History of Present Illness/Interval History:  Sruthi Beasley is a 31 year old woman with history of depression and anxiety who presetned to [facility] in 2021 with acute onset of left leg pain. She was found to have left popliteal arterial occlusion that required extensive interventions. She was evaluated in 2021 by Dr. David due to thrombocytosis. Evaluation included MPN panel that revealed a [id number] mutation. Bone marrow biopsy was completed. She established care with Dr. Irving in late . Please refer to the initial note for further details.     Sruthi reports she is doing well. She continues at the goal dose of Pegasys 135 mcg, although she didn't do last week's dose because she was waiting to hear if we wanted her to decrease her weekly dosage or just space out her injections to see if it helps improve her hemoglobin.  She hasn't had any symptoms or side effects. She was provided a milton that helps cover the cost of the Pegasys. She is taking LMWH 100mg shots and is tolerating them. No bleeding with it. She does get occasional \"hard bumps\" under her skin that resolve after several weeks. Her menstrual cycle is shorter but slightly heavier. She is not having any leg pain. No night sweats. No unintentional weight loss. No night sweats, no hot flashes, no new concentration issues.     Medications:  Current Outpatient Medications   Medication Sig Dispense Refill     busPIRone (BUSPAR) 10 MG tablet Take 10 mg by mouth 2 times " daily       enoxaparin ANTICOAGULANT (LOVENOX) 100 MG/ML syringe Inject 1 mL (100 mg) Subcutaneous 2 times daily 60 mL 11     Ferrous Sulfate (IRON) 325 (65 Fe) MG tablet Take 1 tablet by mouth daily       Lactobacillus (PROBIOTIC ACIDOPHILUS PO) Take by mouth daily       letrozole (FEMARA) 2.5 MG tablet Take 2.5 mg by mouth       levothyroxine (SYNTHROID/LEVOTHROID) 25 MCG tablet TAKE 1 TABLET (25 MCG) BY MOUTH DAILY 90 tablet 0     peginterferon fouzia-2a (PEGASYS) 180 MCG/ML injection Inject 0.75 mLs (135 mcg) Subcutaneous every 7 days 4 mL 3     Prenatal MV-Min-Fe Fum-FA-DHA (PRENATAL 1 PO) Take 1 tablet by mouth       sertraline (ZOLOFT) 100 MG tablet Take 100 mg by mouth       STATIN NOT PRESCRIBED (INTENTIONAL) Please choose reason not prescribed from choices below.       Vitamin D3 (CHOLECALCIFEROL) 25 mcg (1000 units) tablet Take 1 tablet by mouth daily        ROS:  A 10 point ROS is negative except as stated in the HPI    Social History:  Denies any tobacco use. No significant alcohol use. Denies any illicit drug use. Patient works as a  for [employer]. .    Family History:  Dad- T2 DM, hyperlipidemia, testicular cancer, melanoma  Mother- hyperlipidemia     Objective:  Video physical exam  General: Patient appears well in no acute distress.   Skin: No visualized rash or lesions on visualized skin  Eyes: EOMI, no erythema, sclera icterus or discharge noted  Resp: Appears to be breathing comfortably without accessory muscle usage, speaking in full sentences, no cough  MSK: Appears to have normal range of motion based on visualized movements  Neurologic: No apparent tremors, facial movements symmetric  Psych: affect bright, alert and oriented      Labs:   I personally reviewed the following labs:    Most Recent 3 CBC's:  Recent Labs   Lab Test 11/15/22  1351 09/12/22  1306 08/15/22  1313   WBC 5.8 4.7 4.7   HGB 11.6* 10.5* 11.1*   MCV 85 88 85    210 208     Most Recent 3  BMP's:  Recent Labs   Lab Test 11/15/22  1351 09/12/22  1306 08/15/22  1313    137 135   POTASSIUM 4.3 4.0 4.1   CHLORIDE 104 106 104   CO2 22 24 23   BUN 10.8 9 9   CR 0.72 0.58 0.59   ANIONGAP 12 7 8   CASSI 9.1 8.7 9.7   GLC 98 102* 88     Most Recent 2 LFT's:  Recent Labs   Lab Test 11/15/22  1351 09/12/22  1306   AST 32 26   ALT 31 41   ALKPHOS 79 75   BILITOTAL 0.2 0.2         Imaging:  No recent abdominal imaging    Assessment:  In summary, Sruthi Beasley is a 31 year old woman with [id number] positive myeloproliferative neoplasm and arterial thrombosis, diagnosed in spring 2021.    1. [id number]+ MPN, likely ET v prefibrotic MF  2. 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 Due to this, as well as the recent publication of the CONTINUATION trials demonstrating ropeginterferon fouzia-2b compared to hydroxyurea, response to ropeginterferon fouzia-2b continued to increase over time with improved responses compared with hydroxyurea at 36 months, she has been on Pegasys since 11/9/21. She has tolerated medication well and is currently at goal of 135 mcg subcutaneous weekly.      There were some recent questions about whether her Pegasys was causing her new anemia. She is still waiting to hear if she should make any dose adjustments.  I will follow-up with Dr. Irving about this.    For her arterial thrombosis, she is currently on LMWH 100 mg BID during preconception, pregnancy and post-partum. (Xa level too high on 120mg BID, Xa level acceptable on 100mg BID) During delivery she would be off anticoagulation, but shortly after delivery we would resume anticoagulation.       Plan:  1. Decrease Pegasys to 45 mcg weekly (discussed with Dr. Irving), dose adjustments as needed for hematological parameters as per treatment plan. Doing these as home injections.  2. Continue oral iron supplement  3. Continue with LMWH 100 mg subcutaneous twice  daily. Offered DOAC when not pregnant but she is ok with the LMWH.   4. She will let us know if she does get pregnant. We will need to follow her Xa levels more frequently. She will need a MFM consultation and a visit with Dr. Irving in the 1st trimester and again at the end of the 2nd trimester for birth planning.     Follow up with Dr. Irving in 3 months      30 minutes spent on the date of the encounter doing chart review, review of test results, patient visit and documentation     VESTA Carrasco [facility] [address] [address]  [phone]

## 2022-12-07 ENCOUNTER — PATIENT OUTREACH (OUTPATIENT)
Dept: ONCOLOGY | Facility: CLINIC | Age: 32
End: 2022-12-07

## 2022-12-07 NOTE — PROGRESS NOTES
[facility]: Cancer Care                                                                                        [employer] message sent to pt for reminder to check labs within the next few weeks.  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      KELVIN MartinN, RN  RN Care Coordinator  [facility]

## 2023-01-24 ENCOUNTER — ONCOLOGY VISIT (OUTPATIENT)
Dept: ONCOLOGY | Facility: CLINIC | Age: 33
End: 2023-01-24
Attending: INTERNAL MEDICINE
Payer: COMMERCIAL

## 2023-01-24 ENCOUNTER — APPOINTMENT (OUTPATIENT)
Dept: LAB | Facility: CLINIC | Age: 33
End: 2023-01-24
Payer: COMMERCIAL

## 2023-01-24 ENCOUNTER — PATIENT OUTREACH (OUTPATIENT)
Dept: ONCOLOGY | Facility: CLINIC | Age: 33
End: 2023-01-24

## 2023-01-24 VITALS
BODY MASS INDEX: 40.24 KG/M2 | SYSTOLIC BLOOD PRESSURE: 134 MMHG | WEIGHT: 272.5 LBS | DIASTOLIC BLOOD PRESSURE: 80 MMHG | TEMPERATURE: 97.5 F | RESPIRATION RATE: 16 BRPM | HEART RATE: 73 BPM | OXYGEN SATURATION: 98 %

## 2023-01-24 DIAGNOSIS — Z15.89 JAK2 V617F MUTATION: ICD-10-CM

## 2023-01-24 DIAGNOSIS — D47.3 ESSENTIAL THROMBOCYTHEMIA (H): ICD-10-CM

## 2023-01-24 LAB
ALBUMIN SERPL BCG-MCNC: 4.7 G/DL (ref 3.5–5.2)
ALP SERPL-CCNC: 81 U/L (ref 35–104)
ALT SERPL W P-5'-P-CCNC: 17 U/L (ref 10–35)
ANION GAP SERPL CALCULATED.3IONS-SCNC: 12 MMOL/L (ref 7–15)
AST SERPL W P-5'-P-CCNC: 19 U/L (ref 10–35)
BASOPHILS # BLD AUTO: 0 10E3/UL (ref 0–0.2)
BASOPHILS NFR BLD AUTO: 0 %
BILIRUB SERPL-MCNC: 0.2 MG/DL
BUN SERPL-MCNC: 13.1 MG/DL (ref 6–20)
CALCIUM SERPL-MCNC: 9.7 MG/DL (ref 8.6–10)
CHLORIDE SERPL-SCNC: 103 MMOL/L (ref 98–107)
CREAT SERPL-MCNC: 0.86 MG/DL (ref 0.51–0.95)
DEPRECATED HCO3 PLAS-SCNC: 24 MMOL/L (ref 22–29)
EOSINOPHIL # BLD AUTO: 0.1 10E3/UL (ref 0–0.7)
EOSINOPHIL NFR BLD AUTO: 1 %
ERYTHROCYTE [DISTWIDTH] IN BLOOD BY AUTOMATED COUNT: 13.7 % (ref 10–15)
GFR SERPL CREATININE-BSD FRML MDRD: 90 ML/MIN/1.73M2
GLUCOSE SERPL-MCNC: 76 MG/DL (ref 70–99)
HCT VFR BLD AUTO: 35.5 % (ref 35–47)
HGB BLD-MCNC: 11.8 G/DL (ref 11.7–15.7)
IMM GRANULOCYTES # BLD: 0 10E3/UL
IMM GRANULOCYTES NFR BLD: 0 %
LDH SERPL L TO P-CCNC: 195 U/L (ref 0–250)
LYMPHOCYTES # BLD AUTO: 2.5 10E3/UL (ref 0.8–5.3)
LYMPHOCYTES NFR BLD AUTO: 34 %
MCH RBC QN AUTO: 27.9 PG (ref 26.5–33)
MCHC RBC AUTO-ENTMCNC: 33.2 G/DL (ref 31.5–36.5)
MCV RBC AUTO: 84 FL (ref 78–100)
MONOCYTES # BLD AUTO: 0.4 10E3/UL (ref 0–1.3)
MONOCYTES NFR BLD AUTO: 6 %
NEUTROPHILS # BLD AUTO: 4.3 10E3/UL (ref 1.6–8.3)
NEUTROPHILS NFR BLD AUTO: 59 %
NRBC # BLD AUTO: 0 10E3/UL
NRBC BLD AUTO-RTO: 0 /100
PLATELET # BLD AUTO: 315 10E3/UL (ref 150–450)
POTASSIUM SERPL-SCNC: 4.6 MMOL/L (ref 3.4–5.3)
PROT SERPL-MCNC: 7.8 G/DL (ref 6.4–8.3)
RBC # BLD AUTO: 4.23 10E6/UL (ref 3.8–5.2)
SODIUM SERPL-SCNC: 139 MMOL/L (ref 136–145)
WBC # BLD AUTO: 7.2 10E3/UL (ref 4–11)

## 2023-01-24 PROCEDURE — 85025 COMPLETE CBC W/AUTO DIFF WBC: CPT | Performed by: INTERNAL MEDICINE

## 2023-01-24 PROCEDURE — 83615 LACTATE (LD) (LDH) ENZYME: CPT | Performed by: INTERNAL MEDICINE

## 2023-01-24 PROCEDURE — 80053 COMPREHEN METABOLIC PANEL: CPT | Performed by: INTERNAL MEDICINE

## 2023-01-24 PROCEDURE — 99214 OFFICE O/P EST MOD 30 MIN: CPT | Performed by: INTERNAL MEDICINE

## 2023-01-24 PROCEDURE — G0463 HOSPITAL OUTPT CLINIC VISIT: HCPCS

## 2023-01-24 PROCEDURE — G0463 HOSPITAL OUTPT CLINIC VISIT: HCPCS | Performed by: INTERNAL MEDICINE

## 2023-01-24 PROCEDURE — 36415 COLL VENOUS BLD VENIPUNCTURE: CPT | Performed by: INTERNAL MEDICINE

## 2023-01-24 RX ORDER — LEVOTHYROXINE SODIUM 50 UG/1
TABLET ORAL
COMMUNITY
Start: 2022-11-23 | End: 2023-09-26

## 2023-01-24 RX ORDER — PROGESTERONE 200 MG/1
CAPSULE ORAL
COMMUNITY
Start: 2023-01-12 | End: 2023-08-01

## 2023-01-24 ASSESSMENT — PAIN SCALES - GENERAL: PAINLEVEL: NO PAIN (0)

## 2023-01-24 NOTE — LETTER
2023         RE: Sruthi Beasley  [address] [facility] [address]        Dear Colleague,    Thank you for referring your patient, Sruthi Beasley, to the [facility]. Please see a copy of my visit note below.            [facility] Consultation    Outpatient Visit Note:    Patient: Sruthi Mac  MRN: [id number]  : 1990  STEVE: 23    Reason for Consultation:  JAK2+ MPN    Assessment:  In summary, Sruthi Mac is a 32 year old woman with [id number] positive myeloproliferative neoplasm and arterial thrombosis, diagnosed in spring 2021.    1. [id number] MPN, likely ET v prefibrotic MF  2. 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 Preconception counseling    High risk (IPSET>2) ET    Sruthi is currently attempting to get pregnant. Due to this, as well as the recent publication of the CONTINUATION trials demonstrating compared to hydroxyurea, response to ropeginterferon fouzia-2b continued to increase over time with improved responses compared with hydroxyurea at 36 months, She has been on Pegasys since 19. She has tolerated medication well with recent platelets in the normal range, though it is very expensive and she can no longer afford this. We will check her allele frequency of the JAK2 V617F gene, and if lower than prior value in  this would be evidence that her thrombosis risk is lower. For now, hold Pegasys pending reevaluation after allele frequency comes back.     For her arterial thrombosis, she is currently on LMWH 100 mg BID during preconception, pregnancy and post-partum. (Xa level too high on 120mg BID, Xa level acceptable on 100mg BID) We briefly discussed that during delivery she will be off anticoagulation, but that shortly after delivery we would resume anticoagulation.     She will continue to follow with obstetrics regarding her fertility and preconception planning. On progesterone now. For  future reference, her antiphospholipid antibody testing was negative (missing anti-cardiolipin, but negative beta-2 glycoprotein and lupus anticoagulant) during the clotting episode. MHTFR testing normal (sent in 2021 by [facility])--- of note--- in 2013 both the American College of Medical Genetics as well as the American College of Obstetricians and Gynecologists recommended against screening for hyper homocystinuria as well as MTHFR mutations as there has was found to be a lack of association between these conditions and negative pregnancy outcomes.  Overall, compared to the general population, patients with MTHFR mutations and elevated homocysteine have an increased risk for cardiovascular disease, VTE/PE and adverse pregnancy outcomes, however this risk is still low and it is NOT considered a thrombophilia.     She should decrease her iron supplementation to every other day.       Plan:  1. Hold interferon as above. Will reevaluate after allele frequency returns. She unfortunately cannot take the hydroxyurea as she is trying to get pregnant.  2. Will need to monitor her CBC   3. Continue oral iron supplement, but change to every other day  4. Continue with LMWH 100 mg subcutaneous twice daily. Offered DOAC when not pregnant but she is ok with the LMWH.   5. She will let us know if she does get pregnant. We will need to follow her Xa levels more frequently. She will need a MFM consultation and a visit with Dr. Irving in the 1st trimester and again at the end of the 2nd trimester for birth planning.    The patient is given our center's contact information and is instructed to call if she should have any further questions or concerns.  .     Nathan Rg (MS4)  Medical Student, Hematology  [facility]    Physician Attestation   I, Keily Irving MD/PhD, saw this patient and agree with the findings and plan of care as documented in the note.      Items personally reviewed/procedural  attestation: vitals, labs and imaging and agree with the interpretation documented in the note.    Keily Irving MD/PhD   of Medicine  [employer]   ----------------------------------------------------------------------------------------------------------------------    History of Present Illness/Interval History:  Sruthi Mac is a 31 year old woman with history of depression and anxiety who presetned to [facility]. in April 2021 with acute onset of left leg pain. She was found to have left popliteal arterial occlusion that required extensive interventions. She was evaluated in June 2021 by my former collegue Dr. David due to thrombocytosis. 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 Bone marrow biopsy was completed. She established care in my clinic in late 2021. Please refer to my initial note for further details.     Doing well today. Has some ongoing decreased mobility at the L ankle. Sometimes interferes with gait. Trying to move and swim as much as possible for this, she was told by vascular surgery that she should work her calves until pain to promote vascular growth. No additional concerns. No headache, congestion, sore throat, cough, chest pain, dyspnea, n/v/d/c, abdominal pain, limb pain or joint pain.     Trying to get pregnant, working with Ob/Gyn. On Progesterone. Has plans to move forward with fertility specialists if needed.     Myeloproliferative Symptoms Assessment Score  Reference: Emasharda RM, Petr AC, Victor M, HL, et al Myeloproliferative neoplasm (MPN) symptoms assessment form total symptom score: prospective international assessment of an abbreviated symptom burden scoring systems among patients with MPNs. J. Clin Oncol 2012; 30: 3013-1775    Symptom 1-10 (0 if absent), 1 favorable, 10 unfavorable   Please rate your fatigue (weariness, tiredness) by choosing one number that best describes your WORSE level of fatigue  during the past 24 hours 0   Filling up quickly when you eat 0   Abdominal discomfort 0   Inactivity 2   Numbness tingling in hands and feet    Problems with concentration 3   Night sweats 1   Itching 0   Bone pain 00   Fever 0   Unintentional weight loss over the last 6 months 0   Total score for her MPN symptom assessment form score is 6   (Mean score for ET 18.7 +/- 15.3, PV of 21.8 +/- 16.3, and MF 25.3 +/- 17.2)      Past Medical History:  Past Medical History:   Diagnosis Date     Anxiety      Depression      Essential thrombocythemia (H)      History of arterial thrombosis      Hyperlipidemia      JAK2 gene mutation      Obesity      Panic attack      Subclinical hypothyroidism        Past Surgical History:  Past Surgical History:   Procedure Laterality Date     EMBOLECTOMY LOWER EXTREMITY Left 4/11/2021    Procedure: LEFT LEG ANTERIOR TIBIAL AND POSTERIOR TIBIAL EMBOLECTOMIES, FEMORAL POPITEAL VEIN PATCH, ANGIOGRAM, FASCIOTOMIES;  Surgeon: Ulises Gonzalez;  Location: [facility] OR     IR ANGIOGRAM THROUGH CATHETER FOLLOW UP  4/9/2021     IR ANGIOGRAM THROUGH CATHETER FOLLOW UP  4/10/2021     IR LOWER EXTREMITY ANGIOGRAM LEFT  4/8/2021     THROMBECTOMY LOWER EXTREMITY Left 4/12/2021    Procedure: Re-Exploration Left Lower Extremity With Popliteal and Tibial Embolectomy.;  Surgeon: Ulises Gonzalez;  Location: [facility] OR     wisdom teeth remova         Medications:  Current Outpatient Medications   Medication Sig Dispense Refill     busPIRone (BUSPAR) 10 MG tablet Take 10 mg by mouth 2 times daily       enoxaparin ANTICOAGULANT (LOVENOX) 100 MG/ML syringe Inject 1 mL (100 mg) Subcutaneous 2 times daily 60 mL 11     Ferrous Sulfate (IRON) 325 (65 Fe) MG tablet Take 1 tablet by mouth daily       Lactobacillus (PROBIOTIC ACIDOPHILUS PO) Take by mouth daily       letrozole (FEMARA) 2.5 MG tablet Take 2.5 mg by mouth       levothyroxine (SYNTHROID/LEVOTHROID) 50 MCG tablet        peginterferon fouzia-2a (PEGASYS) 180  MCG/ML injection Inject 0.25 mLs (45 mcg) Subcutaneous every 7 days 4 mL 3     Prenatal MV-Min-Fe Fum-FA-DHA (PRENATAL 1 PO) Take 1 tablet by mouth       progesterone (PROMETRIUM) 200 MG capsule        sertraline (ZOLOFT) 100 MG tablet Take 100 mg by mouth       STATIN NOT PRESCRIBED (INTENTIONAL) Please choose reason not prescribed from choices below.       Vitamin D3 (CHOLECALCIFEROL) 25 mcg (1000 units) tablet Take 1 tablet by mouth daily          Allergies:  Allergies   Allergen Reactions     Erythromycin Unknown     Pcn [Penicillins] Unknown       ROS:  A 10 point ROS is negative except as stated in the HPI    Social History:  Denies any tobacco use. No significant alcohol use. Denies any illicit drug use. Patient works as a  for [employer]. .    Family History:  Dad- T2 DM, hyperlipidemia, testicular cancer, melanoma  Mother- hyperlipidemia     Objective:  General: Well appearing.  HEENT: Sclerae anicteric.  Lungs: Breathing comfortably. No cough.  MSK: Grossly normal movement. No tenderness to palpation in the lower extremities. No homans sign.   Neuro: Grossly non-focal. Decreased range of motion with flexion and extension of the L ankle.   Skin/access: Normal skin tone. No visible lesions, petechiae   Psych: Alert and oriented. No distress.    Lab Results  Results for orders placed or performed in visit on 01/24/23   Lactate Dehydrogenase     Status: Normal   Result Value Ref Range    Lactate Dehydrogenase 195 0 - 250 U/L   Comprehensive metabolic panel     Status: Normal   Result Value Ref Range    Sodium 139 136 - 145 mmol/L    Potassium 4.6 3.4 - 5.3 mmol/L    Chloride 103 98 - 107 mmol/L    Carbon Dioxide (CO2) 24 22 - 29 mmol/L    Anion Gap 12 7 - 15 mmol/L    Urea Nitrogen 13.1 6.0 - 20.0 mg/dL    Creatinine 0.86 0.51 - 0.95 mg/dL    Calcium 9.7 8.6 - 10.0 mg/dL    Glucose 76 70 - 99 mg/dL    Alkaline Phosphatase 81 35 - 104 U/L    AST 19 10 - 35 U/L    ALT 17 10 - 35  U/L    Protein Total 7.8 6.4 - 8.3 g/dL    Albumin 4.7 3.5 - 5.2 g/dL    Bilirubin Total 0.2 <=1.2 mg/dL    GFR Estimate 90 >60 mL/min/1.73m2   CBC with platelets and differential     Status: None   Result Value Ref Range    WBC Count 7.2 4.0 - 11.0 10e3/uL    RBC Count 4.23 3.80 - 5.20 10e6/uL    Hemoglobin 11.8 11.7 - 15.7 g/dL    Hematocrit 35.5 35.0 - 47.0 %    MCV 84 78 - 100 fL    MCH 27.9 26.5 - 33.0 pg    MCHC 33.2 31.5 - 36.5 g/dL    RDW 13.7 10.0 - 15.0 %    Platelet Count 315 150 - 450 10e3/uL    % Neutrophils 59 %    % Lymphocytes 34 %    % Monocytes 6 %    % Eosinophils 1 %    % Basophils 0 %    % Immature Granulocytes 0 %    NRBCs per 100 WBC 0 <1 /100    Absolute Neutrophils 4.3 1.6 - 8.3 10e3/uL    Absolute Lymphocytes 2.5 0.8 - 5.3 10e3/uL    Absolute Monocytes 0.4 0.0 - 1.3 10e3/uL    Absolute Eosinophils 0.1 0.0 - 0.7 10e3/uL    Absolute Basophils 0.0 0.0 - 0.2 10e3/uL    Absolute Immature Granulocytes 0.0 <=0.4 10e3/uL    Absolute NRBCs 0.0 10e3/uL   CBC with Platelets & Differential     Status: None    Narrative    The following orders were created for panel order CBC with Platelets & Differential.  Procedure                               Abnormality         Status                     ---------                               -----------         ------                     CBC with platelets and d...[[id number]]                      Final result                 Please view results for these tests on the individual orders.     Notable other labs  Negative evaluation for lupus anticoagulant, beta-2 glycoprotein    MTHFR- no mutation    Bone marrow: 4/14/21  The overall morphology of megakaryocytes is unremarkable with only rare forms showing complex or hyperchromatic nuclear features.  The review of available electronic medical record shows that the patient has a persistent thrombocytosis since at least April 2021, history multiple arterial thromboses and the molecular study showed presence of JAK2  "V617F pathogenic mutation.  The molecular findings are consistent with clonal myeloid neoplasm with a differential diagnosis including essential thrombocytosis and primary myelofibrosis.  There is no morphological evidence for primary myelofibrosis however, the morphologic findings are not \"classic\" for ET in this suboptimal core biopsy.     Variant allele frequency: VAF: 0.0506 Variant Read Depth: 158 Total Read Depth: 3126     Imaging:  No recent relevant imaging    Sincerely,        Keily Irving MD    "

## 2023-01-24 NOTE — PROGRESS NOTES
[facility]: Cancer Care Initial Note                                    Discussion with Patient:                                                      Met with pt in clinic during clinic visit.  Introduced self and role as RNCC, contact information given to pt for writer.      Pt reports she spoke with the pharmacy liason yesterday and there are no current grants or copay plans to help with the pegasys copays.  Pt will speak with Dr Irving regarding other options.     Goals        General     Functional (pt-stated)      Notes - Note created  1/24/2023  5:28 PM by Vashti Llanos RN     Goal Statement: I will use my clinic and care team resources as directed.  Date Goal set: 1/24/2023  Barriers: financial/insurance  Strengths: support, coping, motivation, health awareness, and involvement with care team  Date to Achieve By: ongoing  Patient expressed understanding of goal: Yes  Action steps to achieve this goal:  I will contact triage with new, worsening or uncontrolled symptoms. , I will call with difficulties of scheduling and/or transportation. , I will request needed refills when there are 7 days of medication remaining. , I will not send urgent or symptomatic messages through [facility]. , and I will contact scheduling to arrange or make changes in my appointments.              Assessment:                                                      Initial  Current living arrangement:: I live in a private home with spouse  Informal Support system:: Family;Friends;Spouse  Bed or wheelchair confined:: No  Mobility Status: Independent  Transportation means:: Regular car  Medication adherence problem (GOAL):: No  Knowledgeable about how to use meds:: Yes    Plan of Care Education Review:   Assessment completed with:: Patient    Current living arrangement  I live in a private home with spouse    Plan of Care Education   Yearly learning assessment completed?: Yes (see Education tab)  Diagnosis:: Essential  thrombocythemia  Does patient understand diagnosis?: Yes  Tx plan/regimen:: Pegasys 45mcg & LMWH 100mg  Does patient understand treatment plan/regimen?: Yes  Transportation means:: Regular car  Safety/self care at home reviewed with patient:: Yes  Informal Support system:: Family;Friends;Spouse  Coping - concerns/fears reviewed with patient:: Yes  Plan of Care:: Lab appointment;MD follow-up appointment    Evaluation of Learning              Intervention/Education provided during outreach:                                                       Patient to follow up as scheduled at next appt  Patient to call/[facility] [employer] message with updates  Confirmed patient has clinic and triage numbers    KELVIN MartinN, RN  RN Care Coordinator  [facility]

## 2023-01-24 NOTE — NURSING NOTE
"Oncology Rooming Note    January 24, 2023 5:39 PM   Sruhti Beasley is a 32 year old female who presents for:    Chief Complaint   Patient presents with     Blood Draw     Labs drawn via  by RN. Vitals taken.     Oncology Clinic Visit     RETURN - THROMBOCYTOSIS     Initial Vitals: /80 (BP Location: Right arm, Patient Position: Sitting, Cuff Size: Adult Regular)   Pulse 73   Temp 97.5  F (36.4  C) (Oral)   Resp 16   Wt 123.6 kg (272 lb 8 oz)   SpO2 98%   BMI 40.24 kg/m   Estimated body mass index is 40.24 kg/m  as calculated from the following:    Height as of 6/3/22: 1.753 m (5' 9\").    Weight as of this encounter: 123.6 kg (272 lb 8 oz). Body surface area is 2.45 meters squared.  No Pain (0) Comment: Data Unavailable   No LMP recorded.  Allergies reviewed: Yes  Medications reviewed: Yes    Medications: Medication refills not needed today.  Pharmacy name entered into [facility]:    [facility] - [place], [place] - [address] [place] [address] [facility] [place], [place] - [facility] [address]    Clinical concerns: Would like to discuss Pegasys.        Saida Bruner CMA            "

## 2023-01-24 NOTE — NURSING NOTE
Chief Complaint   Patient presents with     Blood Draw     Labs drawn via  by RN. Vitals taken.     Labs drawn with  by RN. Vitals taken. Patient checked into next appointment.    Amy Gonzalez RN

## 2023-01-24 NOTE — PROGRESS NOTES
[facility] Hematology Consultation    Outpatient Visit Note:    Patient: Sruthi Mac  MRN: [id number]  : 1990  STEVE: 23    Reason for Consultation:  JAK2+ MPN    Assessment:  In summary, Sruthi Mac is a 32 year old woman with [id number] positive myeloproliferative neoplasm and arterial thrombosis, diagnosed in spring 2021.    1. [id number]+ MPN, likely ET v prefibrotic MF  2. 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 Preconception counseling    High risk (IPSET>2) ET    Sruthi is currently attempting to get pregnant. Due to this, as well as the recent publication of the CONTINUATION trials demonstrating compared to hydroxyurea, response to ropeginterferon fouzia-2b continued to increase over time with improved responses compared with hydroxyurea at 36 months, She has been on Pegasys since 21. She has tolerated medication well with recent platelets in the normal range, though it is very expensive and she can no longer afford this. We will check her allele frequency of the JAK2 V617F gene, and if lower than prior value in  this would be evidence that her thrombosis risk is lower. For now, hold Pegasys pending reevaluation after allele frequency comes back.     For her arterial thrombosis, she is currently on LMWH 100 mg BID during preconception, pregnancy and post-partum. (Xa level too high on 120mg BID, Xa level acceptable on 100mg BID) We briefly discussed that during delivery she will be off anticoagulation, but that shortly after delivery we would resume anticoagulation.     She will continue to follow with obstetrics regarding her fertility and preconception planning. On progesterone now. For future reference, her antiphospholipid antibody testing was negative (missing anti-cardiolipin, but negative beta-2 glycoprotein and lupus anticoagulant) during the clotting episode. MHTFR testing normal (sent in  by [facility])--- of note--- in   both the American College of Medical Genetics as well as the American College of Obstetricians and Gynecologists recommended against screening for hyper homocystinuria as well as MTHFR mutations as there has was found to be a lack of association between these conditions and negative pregnancy outcomes.  Overall, compared to the general population, patients with MTHFR mutations and elevated homocysteine have an increased risk for cardiovascular disease, VTE/PE and adverse pregnancy outcomes, however this risk is still low and it is NOT considered a thrombophilia.     She should decrease her iron supplementation to every other day.       Plan:  1. Hold interferon as above. Will reevaluate after allele frequency returns. She unfortunately cannot take the hydroxyurea as she is trying to get pregnant.  2. Will need to monitor her CBC   3. Continue oral iron supplement, but change to every other day  4. Continue with LMWH 100 mg subcutaneous twice daily. Offered DOAC when not pregnant but she is ok with the LMWH.   5. She will let us know if she does get pregnant. We will need to follow her Xa levels more frequently. She will need a MFM consultation and a visit with Dr. Irving in the 1st trimester and again at the end of the 2nd trimester for birth planning.    The patient is given our center's contact information and is instructed to call if she should have any further questions or concerns.  .     Nathan Rg (MS4)  Medical Student, Hematology  [facility]    Physician Attestation   I, Keily Irving MD/PhD, saw this patient and agree with the findings and plan of care as documented in the note.      Items personally reviewed/procedural attestation: vitals, labs and imaging and agree with the interpretation documented in the note.    Keily Irving MD/PhD   of Medicine  [facility] [employer]    ----------------------------------------------------------------------------------------------------------------------    History of Present Illness/Interval History:  Sruthi Mac is a 31 year old woman with history of depression and anxiety who presetned to [facility] in April 2021 with acute onset of left leg pain. She was found to have left popliteal arterial occlusion that required extensive interventions. She was evaluated in June 2021 by my former collegue Dr. David due to thrombocytosis. Evaluation included MPN panel that revealed a [id number] mutation. Bone marrow biopsy was completed. She established care in my clinic in late 2021. Please refer to my initial note for further details.     Doing well today. Has some ongoing decreased mobility at the L ankle. Sometimes interferes with gait. Trying to move and swim as much as possible for this, she was told by vascular surgery that she should work her calves until pain to promote vascular growth. No additional concerns. No headache, congestion, sore throat, cough, chest pain, dyspnea, n/v/d/c, abdominal pain, limb pain or joint pain.     Trying to get pregnant, working with Ob/Gyn. On Progesterone. Has plans to move forward with fertility specialists if needed.     Myeloproliferative Symptoms Assessment Score  Reference: Emaunjamila RM, Petr AC, Victor M HL, et al Myeloproliferative neoplasm (MPN) symptoms assessment form total symptom score: prospective international assessment of an abbreviated symptom burden scoring systems among patients with MPNs. J. Clin Oncol 2012; 30: 9697-4640    Symptom 1-10 (0 if absent), 1 favorable, 10 unfavorable   Please rate your fatigue (weariness, tiredness) by choosing one number that best describes your WORSE level of fatigue during the past 24 hours 0   Filling up quickly when you eat 0   Abdominal discomfort 0   Inactivity 2   Numbness tingling in hands and feet    Problems with concentration 3   Night sweats 1    Itching 0   Bone pain 00   Fever 0   Unintentional weight loss over the last 6 months 0   Total score for her MPN symptom assessment form score is 6   (Mean score for ET 18.7 +/- 15.3, PV of 21.8 +/- 16.3, and MF 25.3 +/- 17.2)      Past Medical History:  Past Medical History:   Diagnosis Date     Anxiety      Depression      Essential thrombocythemia (H)      History of arterial thrombosis      Hyperlipidemia      JAK2 gene mutation      Obesity      Panic attack      Subclinical hypothyroidism        Past Surgical History:  Past Surgical History:   Procedure Laterality Date     EMBOLECTOMY LOWER EXTREMITY Left 4/11/2021    Procedure: LEFT LEG ANTERIOR TIBIAL AND POSTERIOR TIBIAL EMBOLECTOMIES, FEMORAL POPITEAL VEIN PATCH, ANGIOGRAM, FASCIOTOMIES;  Surgeon: Ulises Gonzalez;  Location: [facility] OR     IR ANGIOGRAM THROUGH CATHETER FOLLOW UP  4/9/2021     IR ANGIOGRAM THROUGH CATHETER FOLLOW UP  4/10/2021     IR LOWER EXTREMITY ANGIOGRAM LEFT  4/8/2021     THROMBECTOMY LOWER EXTREMITY Left 4/12/2021    Procedure: Re-Exploration Left Lower Extremity With Popliteal and Tibial Embolectomy.;  Surgeon: Ulises Gonzalez;  Location: [facility] OR     wisdom teeth remova         Medications:  Current Outpatient Medications   Medication Sig Dispense Refill     busPIRone (BUSPAR) 10 MG tablet Take 10 mg by mouth 2 times daily       enoxaparin ANTICOAGULANT (LOVENOX) 100 MG/ML syringe Inject 1 mL (100 mg) Subcutaneous 2 times daily 60 mL 11     Ferrous Sulfate (IRON) 325 (65 Fe) MG tablet Take 1 tablet by mouth daily       Lactobacillus (PROBIOTIC ACIDOPHILUS PO) Take by mouth daily       letrozole (FEMARA) 2.5 MG tablet Take 2.5 mg by mouth       levothyroxine (SYNTHROID/LEVOTHROID) 50 MCG tablet        peginterferon fouzia-2a (PEGASYS) 180 MCG/ML injection Inject 0.25 mLs (45 mcg) Subcutaneous every 7 days 4 mL 3     Prenatal MV-Min-Fe Fum-FA-DHA (PRENATAL 1 PO) Take 1 tablet by mouth       progesterone (PROMETRIUM) 200 MG  capsule        sertraline (ZOLOFT) 100 MG tablet Take 100 mg by mouth       STATIN NOT PRESCRIBED (INTENTIONAL) Please choose reason not prescribed from choices below.       Vitamin D3 (CHOLECALCIFEROL) 25 mcg (1000 units) tablet Take 1 tablet by mouth daily          Allergies:  Allergies   Allergen Reactions     Erythromycin Unknown     Pcn [Penicillins] Unknown       ROS:  A 10 point ROS is negative except as stated in the HPI    Social History:  Denies any tobacco use. No significant alcohol use. Denies any illicit drug use. Patient works as a  for [employer]. .    Family History:  Dad- T2 DM, hyperlipidemia, testicular cancer, melanoma  Mother- hyperlipidemia     Objective:  General: Well appearing.  HEENT: Sclerae anicteric.  Lungs: Breathing comfortably. No cough.  MSK: Grossly normal movement. No tenderness to palpation in the lower extremities. No homans sign.   Neuro: Grossly non-focal. Decreased range of motion with flexion and extension of the L ankle.   Skin/access: Normal skin tone. No visible lesions, petechiae   Psych: Alert and oriented. No distress.    Lab Results  Results for orders placed or performed in visit on 01/24/23   Lactate Dehydrogenase     Status: Normal   Result Value Ref Range    Lactate Dehydrogenase 195 0 - 250 U/L   Comprehensive metabolic panel     Status: Normal   Result Value Ref Range    Sodium 139 136 - 145 mmol/L    Potassium 4.6 3.4 - 5.3 mmol/L    Chloride 103 98 - 107 mmol/L    Carbon Dioxide (CO2) 24 22 - 29 mmol/L    Anion Gap 12 7 - 15 mmol/L    Urea Nitrogen 13.1 6.0 - 20.0 mg/dL    Creatinine 0.86 0.51 - 0.95 mg/dL    Calcium 9.7 8.6 - 10.0 mg/dL    Glucose 76 70 - 99 mg/dL    Alkaline Phosphatase 81 35 - 104 U/L    AST 19 10 - 35 U/L    ALT 17 10 - 35 U/L    Protein Total 7.8 6.4 - 8.3 g/dL    Albumin 4.7 3.5 - 5.2 g/dL    Bilirubin Total 0.2 <=1.2 mg/dL    GFR Estimate 90 >60 mL/min/1.73m2   CBC with platelets and differential      Status: None   Result Value Ref Range    WBC Count 7.2 4.0 - 11.0 10e3/uL    RBC Count 4.23 3.80 - 5.20 10e6/uL    Hemoglobin 11.8 11.7 - 15.7 g/dL    Hematocrit 35.5 35.0 - 47.0 %    MCV 84 78 - 100 fL    MCH 27.9 26.5 - 33.0 pg    MCHC 33.2 31.5 - 36.5 g/dL    RDW 13.7 10.0 - 15.0 %    Platelet Count 315 150 - 450 10e3/uL    % Neutrophils 59 %    % Lymphocytes 34 %    % Monocytes 6 %    % Eosinophils 1 %    % Basophils 0 %    % Immature Granulocytes 0 %    NRBCs per 100 WBC 0 <1 /100    Absolute Neutrophils 4.3 1.6 - 8.3 10e3/uL    Absolute Lymphocytes 2.5 0.8 - 5.3 10e3/uL    Absolute Monocytes 0.4 0.0 - 1.3 10e3/uL    Absolute Eosinophils 0.1 0.0 - 0.7 10e3/uL    Absolute Basophils 0.0 0.0 - 0.2 10e3/uL    Absolute Immature Granulocytes 0.0 <=0.4 10e3/uL    Absolute NRBCs 0.0 10e3/uL   CBC with Platelets & Differential     Status: None    Narrative    The following orders were created for panel order CBC with Platelets & Differential.  Procedure                               Abnormality         Status                     ---------                               -----------         ------                     CBC with platelets and d...[[id number]]                      Final result                 Please view results for these tests on the individual orders.     Notable other labs  Negative evaluation for lupus anticoagulant, beta-2 glycoprotein    MTHFR- no mutation    Bone marrow: 4/14/21  The overall morphology of megakaryocytes is unremarkable with only rare forms showing complex or hyperchromatic nuclear features.  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  The molecular findings are consistent with clonal myeloid neoplasm with a differential diagnosis including essential thrombocytosis and primary myelofibrosis.  " There is no morphological evidence for primary myelofibrosis however, the morphologic findings are not \"classic\" for ET in this suboptimal core biopsy.     Variant allele frequency: VAF: 0.0506 Variant Read Depth: 158 Total Read Depth: 3126     Imaging:  No recent relevant imaging  "

## 2023-01-25 NOTE — PATIENT INSTRUCTIONS
We will hold interferon due to cost.     We will check your variant allele frequency of the [id number] gene. If this is LOWER that it was 2021 that also provides reassurance that your thrombosis risk is lower.     Continue with Lovenox twice a day.     Decrease iron supplement to every other day.    For your information: Your antiphospholipid testing was all negative during your clotting episode. You also had MHTFR testing done that was normal.     Keily Irving MD/PhD   of Medicine  Division of Hematology, Oncology and Transplantation    [facility] [address], Mail [id number]  [place] [address]    Clinic Scheduling and Nurse Line: [phone], option 5, option 2      RN Care Coordinator:   Vashti Llanos  [facility]  Direct line: (P) [phone]  (F) [phone]

## 2023-04-26 ENCOUNTER — APPOINTMENT (OUTPATIENT)
Dept: LAB | Facility: CLINIC | Age: 33
End: 2023-04-26
Payer: COMMERCIAL

## 2023-04-26 ENCOUNTER — ONCOLOGY VISIT (OUTPATIENT)
Dept: ONCOLOGY | Facility: CLINIC | Age: 33
End: 2023-04-26
Attending: REGISTERED NURSE
Payer: COMMERCIAL

## 2023-04-26 VITALS
WEIGHT: 278.3 LBS | SYSTOLIC BLOOD PRESSURE: 125 MMHG | TEMPERATURE: 98.5 F | RESPIRATION RATE: 16 BRPM | BODY MASS INDEX: 41.1 KG/M2 | DIASTOLIC BLOOD PRESSURE: 74 MMHG | HEART RATE: 80 BPM | OXYGEN SATURATION: 98 %

## 2023-04-26 DIAGNOSIS — Z15.89 JAK2 V617F MUTATION: ICD-10-CM

## 2023-04-26 DIAGNOSIS — D47.3 ESSENTIAL THROMBOCYTHEMIA (H): ICD-10-CM

## 2023-04-26 LAB
ALBUMIN SERPL BCG-MCNC: 4.1 G/DL (ref 3.5–5.2)
ALP SERPL-CCNC: 75 U/L (ref 35–104)
ALT SERPL W P-5'-P-CCNC: <5 U/L (ref 10–35)
ANION GAP SERPL CALCULATED.3IONS-SCNC: 11 MMOL/L (ref 7–15)
AST SERPL W P-5'-P-CCNC: 12 U/L (ref 10–35)
BASOPHILS # BLD AUTO: 0.1 10E3/UL (ref 0–0.2)
BASOPHILS NFR BLD AUTO: 1 %
BILIRUB SERPL-MCNC: <0.2 MG/DL
BUN SERPL-MCNC: 8.5 MG/DL (ref 6–20)
CALCIUM SERPL-MCNC: 10.1 MG/DL (ref 8.6–10)
CHLORIDE SERPL-SCNC: 102 MMOL/L (ref 98–107)
CREAT SERPL-MCNC: 0.53 MG/DL (ref 0.51–0.95)
DEPRECATED HCO3 PLAS-SCNC: 22 MMOL/L (ref 22–29)
EOSINOPHIL # BLD AUTO: 0.1 10E3/UL (ref 0–0.7)
EOSINOPHIL NFR BLD AUTO: 1 %
ERYTHROCYTE [DISTWIDTH] IN BLOOD BY AUTOMATED COUNT: 14.1 % (ref 10–15)
GFR SERPL CREATININE-BSD FRML MDRD: >90 ML/MIN/1.73M2
GLUCOSE SERPL-MCNC: 83 MG/DL (ref 70–99)
HCT VFR BLD AUTO: 33.5 % (ref 35–47)
HGB BLD-MCNC: 11.5 G/DL (ref 11.7–15.7)
IMM GRANULOCYTES # BLD: 0.1 10E3/UL
IMM GRANULOCYTES NFR BLD: 1 %
INTERPRETATION: NORMAL
LDH SERPL L TO P-CCNC: 136 U/L (ref 0–250)
LYMPHOCYTES # BLD AUTO: 1.8 10E3/UL (ref 0.8–5.3)
LYMPHOCYTES NFR BLD AUTO: 19 %
MCH RBC QN AUTO: 28.9 PG (ref 26.5–33)
MCHC RBC AUTO-ENTMCNC: 34.3 G/DL (ref 31.5–36.5)
MCV RBC AUTO: 84 FL (ref 78–100)
MONOCYTES # BLD AUTO: 0.6 10E3/UL (ref 0–1.3)
MONOCYTES NFR BLD AUTO: 6 %
NEUTROPHILS # BLD AUTO: 7.3 10E3/UL (ref 1.6–8.3)
NEUTROPHILS NFR BLD AUTO: 72 %
NRBC # BLD AUTO: 0 10E3/UL
NRBC BLD AUTO-RTO: 0 /100
PLATELET # BLD AUTO: 329 10E3/UL (ref 150–450)
POTASSIUM SERPL-SCNC: 4.4 MMOL/L (ref 3.4–5.3)
PROT SERPL-MCNC: 7.3 G/DL (ref 6.4–8.3)
RBC # BLD AUTO: 3.98 10E6/UL (ref 3.8–5.2)
SIGNIFICANT RESULTS: NORMAL
SODIUM SERPL-SCNC: 135 MMOL/L (ref 136–145)
SPECIMEN DESCRIPTION: NORMAL
TEST DETAILS, MDL: NORMAL
WBC # BLD AUTO: 9.9 10E3/UL (ref 4–11)

## 2023-04-26 PROCEDURE — 99214 OFFICE O/P EST MOD 30 MIN: CPT | Performed by: REGISTERED NURSE

## 2023-04-26 PROCEDURE — 36415 COLL VENOUS BLD VENIPUNCTURE: CPT | Performed by: REGISTERED NURSE

## 2023-04-26 PROCEDURE — 83615 LACTATE (LD) (LDH) ENZYME: CPT | Performed by: REGISTERED NURSE

## 2023-04-26 PROCEDURE — 85025 COMPLETE CBC W/AUTO DIFF WBC: CPT | Performed by: REGISTERED NURSE

## 2023-04-26 PROCEDURE — G0452 MOLECULAR PATHOLOGY INTERPR: HCPCS | Mod: 26 | Performed by: PATHOLOGY

## 2023-04-26 PROCEDURE — 81450 HL NEO GSAP 5-50DNA/DNA&RNA: CPT | Performed by: REGISTERED NURSE

## 2023-04-26 PROCEDURE — 80053 COMPREHEN METABOLIC PANEL: CPT | Performed by: REGISTERED NURSE

## 2023-04-26 PROCEDURE — G0463 HOSPITAL OUTPT CLINIC VISIT: HCPCS | Performed by: REGISTERED NURSE

## 2023-04-26 ASSESSMENT — PAIN SCALES - GENERAL: PAINLEVEL: NO PAIN (0)

## 2023-04-26 NOTE — LETTER
2023         RE: Sruthi Beasley  [address] [facility] [address]        Dear Colleague,    Thank you for referring your patient, Sruthi Beasley, to the [facility]. Please see a copy of my visit note below.            [facility] Hematology Consultation    Outpatient Visit Note:    Patient: Sruthi Mac  MRN: [id number]  : 1990  STEVE: 2023    Reason for Consultation:  JAK2+ MPN    Assessment:  In summary, Sruthi Mac is a 32 year old woman with [id number] positive myeloproliferative neoplasm and arterial thrombosis, diagnosed in spring 2021.    1. [id number]+ MPN, likely ET v prefibrotic MF  2. 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 Preconception counseling    High risk (IPSET>2) ET    Sruthi is currently attempting to get pregnant. Due to this, as well as the recent publication of the CONTINUATION trials demonstrating compared to hydroxyurea, response to ropeginterferon fouzia-2b continued to increase over time with improved responses compared with hydroxyurea at 36 months, She has been on Pegasys since 21. She has tolerated medication well with recent platelets in the normal range, though it is very expensive and she can no longer afford this. We will check her allele frequency of the JAK2 V617F gene, and if lower than prior value in  this would be evidence that her thrombosis risk is lower. Held Pegasys starting 2023 pending reevaluation after allele frequency comes back. NGS panel is being drawn today (23). Of note, she came off Pegasys due to financial concerns, but her insurance has changed and she is not certain if it would be covered now or not (she previously paid for her Lovenox until she hit her deductible, but has not been charged for the Lovenox since the insurance switch).     She will be 13 weeks pregnant tomorrow (23).  She should continue on LMWH and baby aspirin. If counts  are increasing, may need to discuss interferon again.  Will get monthly CBCs and follow up with Dr. Irving around weeks 26-28.  Following with Ayad CEDILLO.  Reports 7 week US looked good, meets with genetic counseling later this week and will get a level 2 US in the near future.    For her arterial thrombosis, she is currently on LMWH 100 mg BID during preconception, pregnancy and post-partum. (Xa level too high on 120mg BID, Xa level acceptable on 100mg BID) We briefly discussed that during delivery she will be off anticoagulation, but that shortly after delivery we would resume anticoagulation.     She will continue to follow with obstetrics.  For future reference, her antiphospholipid antibody testing was negative (missing anti-cardiolipin, but negative beta-2 glycoprotein and lupus anticoagulant) during the clotting episode. MHTFR testing normal (sent in 2021 by [facility])--- of note--- in 2013 both the American College of Medical Genetics as well as the American College of Obstetricians and Gynecologists recommended against screening for hyper homocystinuria as well as MTHFR mutations as there has was found to be a lack of association between these conditions and negative pregnancy outcomes.  Overall, compared to the general population, patients with MTHFR mutations and elevated homocysteine have an increased risk for cardiovascular disease, VTE/PE and adverse pregnancy outcomes, however this risk is still low and it is NOT considered a thrombophilia.     Continue iron supplementation every other day.     Plan:  1. Hold interferon as above. Will reevaluate after allele frequency returns (drawing NGS sample today, 4/26/23) She unfortunately cannot take the hydroxyurea while she is pregnant.  If counts are increasing, may need to reconsider interferon.  2. Will need to monitor her CBC monthly while pregnant.  Prefers lab appointments at [facility].  3. Continue oral iron supplement every other day  4.  Continue with LMWH 100 mg subcutaneous twice daily.   5. Follow-up with Dr. Irving around week 26-28 of pregnancy (around July 27 - Aug 10)    The patient is given our center's contact information and is instructed to call if she should have any further questions or concerns.  .     36 minutes spent on the date of the encounter doing chart review, review of test results, patient visit and documentation     VESTA Carrasco CNP  [facility] [address] [place] [address]  [phone]  ----------------------------------------------------------------------------------------------------------------------    History of Present Illness/Interval History:  Sruthi Mac is a 32 year old woman with history of depression and anxiety who presetned to [facility] in April 2021 with acute onset of left leg pain. She was found to have left popliteal arterial occlusion that required extensive interventions. She was evaluated in June 2021 by my former collegue Dr. David due to thrombocytosis. Evaluation included MPN panel that revealed a [id number] mutation. Bone marrow biopsy was completed. She established care in Dr. Irving's clinic in late 2021. Please refer to her initial note for further details.       Doing well today. She will be at week 13 of her pregnancy tomorrow.  Had some constipation and nausea throughout 1st trimester which is starting to improve some.  Feeling more fatigued.  No additional concerns. No headache, congestion, sore throat, cough, chest pain, dyspnea, n/v/d/c, abdominal pain, limb pain or joint pain.       Past Medical History:  Past Medical History:   Diagnosis Date    Anxiety     Depression     Essential thrombocythemia (H)     History of arterial thrombosis     Hyperlipidemia     JAK2 gene mutation     Obesity     Panic attack     Subclinical hypothyroidism        Past Surgical History:  Past Surgical History:   Procedure Laterality Date    EMBOLECTOMY LOWER  EXTREMITY Left 4/11/2021    Procedure: LEFT LEG ANTERIOR TIBIAL AND POSTERIOR TIBIAL EMBOLECTOMIES, FEMORAL POPITEAL VEIN PATCH, ANGIOGRAM, FASCIOTOMIES;  Surgeon: Ulises Gonzalez;  Location: [facility] OR    IR ANGIOGRAM THROUGH CATHETER FOLLOW UP  4/9/2021    IR ANGIOGRAM THROUGH CATHETER FOLLOW UP  4/10/2021    IR LOWER EXTREMITY ANGIOGRAM LEFT  4/8/2021    THROMBECTOMY LOWER EXTREMITY Left 4/12/2021    Procedure: Re-Exploration Left Lower Extremity With Popliteal and Tibial Embolectomy.;  Surgeon: Ulises Gonzalez;  Location: [facility] OR    wisdom teeth remova         Medications:  Current Outpatient Medications   Medication Sig Dispense Refill    busPIRone (BUSPAR) 10 MG tablet Take 10 mg by mouth 2 times daily      enoxaparin ANTICOAGULANT (LOVENOX) 100 MG/ML syringe Inject 1 mL (100 mg) Subcutaneous 2 times daily 60 mL 11    Ferrous Sulfate (IRON) 325 (65 Fe) MG tablet Take 1 tablet by mouth daily      Lactobacillus (PROBIOTIC ACIDOPHILUS PO) Take by mouth daily      levothyroxine (SYNTHROID/LEVOTHROID) 50 MCG tablet       peginterferon fouzia-2a (PEGASYS) 180 MCG/ML injection Inject 0.25 mLs (45 mcg) Subcutaneous every 7 days 4 mL 3    Prenatal MV-Min-Fe Fum-FA-DHA (PRENATAL 1 PO) Take 1 tablet by mouth      sertraline (ZOLOFT) 100 MG tablet Take 100 mg by mouth      Vitamin D3 (CHOLECALCIFEROL) 25 mcg (1000 units) tablet Take 1 tablet by mouth daily      letrozole (FEMARA) 2.5 MG tablet Take 2.5 mg by mouth (Patient not taking: Reported on 4/26/2023)      progesterone (PROMETRIUM) 200 MG capsule       STATIN NOT PRESCRIBED (INTENTIONAL) Please choose reason not prescribed from choices below.          Allergies:  Allergies   Allergen Reactions    Erythromycin Unknown    Pcn [Penicillins] Unknown       ROS:  A 10 point ROS is negative except as stated in the HPI    Social History:  Denies any tobacco use. No significant alcohol use. Denies any illicit drug use. Patient works as a  for [employer] .    Family History:  Dad- T2 DM, hyperlipidemia, testicular cancer, melanoma  Mother- hyperlipidemia     Objective:  General: Well appearing.  HEENT: Sclerae anicteric.  Lungs: Breathing comfortably. No cough.  MSK: Grossly normal movement. No tenderness to palpation in the lower extremities. No homans sign.   Neuro: Grossly non-focal. Decreased range of motion with flexion and extension of the L ankle.   Skin/access: Normal skin tone. No visible lesions, petechiae   Psych: Alert and oriented. No distress.    Lab Results  Results for orders placed or performed in visit on 04/26/23   CBC with platelets and differential     Status: Abnormal   Result Value Ref Range    WBC Count 9.9 4.0 - 11.0 10e3/uL    RBC Count 3.98 3.80 - 5.20 10e6/uL    Hemoglobin 11.5 (L) 11.7 - 15.7 g/dL    Hematocrit 33.5 (L) 35.0 - 47.0 %    MCV 84 78 - 100 fL    MCH 28.9 26.5 - 33.0 pg    MCHC 34.3 31.5 - 36.5 g/dL    RDW 14.1 10.0 - 15.0 %    Platelet Count 329 150 - 450 10e3/uL    % Neutrophils 72 %    % Lymphocytes 19 %    % Monocytes 6 %    % Eosinophils 1 %    % Basophils 1 %    % Immature Granulocytes 1 %    NRBCs per 100 WBC 0 <1 /100    Absolute Neutrophils 7.3 1.6 - 8.3 10e3/uL    Absolute Lymphocytes 1.8 0.8 - 5.3 10e3/uL    Absolute Monocytes 0.6 0.0 - 1.3 10e3/uL    Absolute Eosinophils 0.1 0.0 - 0.7 10e3/uL    Absolute Basophils 0.1 0.0 - 0.2 10e3/uL    Absolute Immature Granulocytes 0.1 <=0.4 10e3/uL    Absolute NRBCs 0.0 10e3/uL   CBC with Platelets & Differential     Status: Abnormal    Narrative    The following orders were created for panel order CBC with Platelets & Differential.  Procedure                               Abnormality         Status                     ---------                               -----------         ------                     CBC with platelets and d...[[id number]]  Abnormal            Final result                 Please view results for these tests on the individual orders.  "    Notable other labs  Negative evaluation for lupus anticoagulant, beta-2 glycoprotein    MTHFR- no mutation    Bone marrow: 4/14/21  The overall morphology of megakaryocytes is unremarkable with only rare forms showing complex or hyperchromatic nuclear features.  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  The molecular findings are consistent with clonal myeloid neoplasm with a differential diagnosis including essential thrombocytosis and primary myelofibrosis.  There is no morphological evidence for primary myelofibrosis however, the morphologic findings are not \"classic\" for ET in this suboptimal core biopsy.     Variant allele frequency: VAF: 0.0506 Variant Read Depth: 158 Total Read Depth: 3126     Imaging:  No recent relevant imaging      Sincerely,        VESTA Plummer CNP    "

## 2023-04-26 NOTE — NURSING NOTE
Venipuncture blood draw done on patients Right ac. Patient tolerated well without any complications. 21G needle used. Pt last name and  verified on specimen label and chart. Specimen sent to first floor lab. See flowsheets      Soni Hurd CMA on 2023 at 4:03 PM

## 2023-04-26 NOTE — NURSING NOTE
"Oncology Rooming Note    April 26, 2023 3:37 PM   Sruthi Beasley is a 32 year old female who presents for:    Chief Complaint   Patient presents with     Labs Only     Labs drawn via vpt by RN in lab, vitals completed.     Oncology Clinic Visit     Essential Thrombocytopenia     Initial Vitals: /74 (BP Location: Right arm)   Pulse 80   Temp 98.5  F (36.9  C) (Oral)   Resp 16   Wt 126.2 kg (278 lb 4.8 oz)   SpO2 98%   BMI 41.10 kg/m   Estimated body mass index is 41.1 kg/m  as calculated from the following:    Height as of 6/3/22: 1.753 m (5' 9\").    Weight as of this encounter: 126.2 kg (278 lb 4.8 oz). Body surface area is 2.48 meters squared.  No Pain (0) Comment: Data Unavailable   No LMP recorded.  Allergies reviewed: Yes  Medications reviewed: Yes    Medications: Medication refills not needed today.  Pharmacy name entered into [facility]:    [facility] - [place], [place] - [address] [place] [address] [facility] - [place], [place] - [facility] [address]    Clinical concerns: Pt presents today for f/u.       Griselda Greer LPN  4/26/2023              "

## 2023-04-26 NOTE — NURSING NOTE
Chief Complaint   Patient presents with     Labs Only     Labs drawn via vpt by RN in lab, vitals completed.     Helga Fernandez RN

## 2023-05-09 ENCOUNTER — DOCUMENTATION ONLY (OUTPATIENT)
Dept: LAB | Facility: CLINIC | Age: 33
End: 2023-05-09

## 2023-05-09 ENCOUNTER — PATIENT OUTREACH (OUTPATIENT)
Dept: ONCOLOGY | Facility: CLINIC | Age: 33
End: 2023-05-09

## 2023-05-09 DIAGNOSIS — D47.3 ESSENTIAL THROMBOCYTHEMIA (H): Primary | Chronic | ICD-10-CM

## 2023-05-09 NOTE — PROGRESS NOTES
[facility]: Cancer Care                                                                                          Call to pt to coordinate lab with timing of lovenox injection.  Per [facility] clinic, they want to monitor anti-Xa level which needs to be drawn 4-6 hours after lovenox injection.  Pt states she does her injection between 6:30-8am most mornings.  Pt has scheduled lab on 5/16 at 1pm.  Pt states she will do her injection at 8am that morning.    Vashti Llanos, KELVINN, RN  RN Care Coordinator  [facility]

## 2023-05-09 NOTE — PROGRESS NOTES
Patient has upcoming lab appt scheduled 5/15     Appt notes: [facility] labs    Please place orders or have care team contact patient for rescheduling.     Thank you.

## 2023-05-15 ENCOUNTER — LAB (OUTPATIENT)
Dept: LAB | Facility: CLINIC | Age: 33
End: 2023-05-15
Payer: COMMERCIAL

## 2023-05-15 DIAGNOSIS — D47.3 ESSENTIAL THROMBOCYTHEMIA (H): ICD-10-CM

## 2023-05-15 LAB
BASOPHILS # BLD AUTO: 0 10E3/UL (ref 0–0.2)
BASOPHILS NFR BLD AUTO: 0 %
EOSINOPHIL # BLD AUTO: 0 10E3/UL (ref 0–0.7)
EOSINOPHIL NFR BLD AUTO: 0 %
ERYTHROCYTE [DISTWIDTH] IN BLOOD BY AUTOMATED COUNT: 14.4 % (ref 10–15)
HCT VFR BLD AUTO: 35 % (ref 35–47)
HGB BLD-MCNC: 11.7 G/DL (ref 11.7–15.7)
LMWH PPP CHRO-ACNC: 0.92 IU/ML
LYMPHOCYTES # BLD AUTO: 1.6 10E3/UL (ref 0.8–5.3)
LYMPHOCYTES NFR BLD AUTO: 18 %
MCH RBC QN AUTO: 28.8 PG (ref 26.5–33)
MCHC RBC AUTO-ENTMCNC: 33.4 G/DL (ref 31.5–36.5)
MCV RBC AUTO: 86 FL (ref 78–100)
MONOCYTES # BLD AUTO: 0.3 10E3/UL (ref 0–1.3)
MONOCYTES NFR BLD AUTO: 4 %
NEUTROPHILS # BLD AUTO: 7.2 10E3/UL (ref 1.6–8.3)
NEUTROPHILS NFR BLD AUTO: 78 %
PLATELET # BLD AUTO: 324 10E3/UL (ref 150–450)
RBC # BLD AUTO: 4.06 10E6/UL (ref 3.8–5.2)
RETICS # AUTO: 0.06 10E6/UL (ref 0.03–0.1)
RETICS/RBC NFR AUTO: 1.5 % (ref 0.5–2)
WBC # BLD AUTO: 9.2 10E3/UL (ref 4–11)

## 2023-05-15 PROCEDURE — 36415 COLL VENOUS BLD VENIPUNCTURE: CPT

## 2023-05-15 PROCEDURE — 83615 LACTATE (LD) (LDH) ENZYME: CPT

## 2023-05-15 PROCEDURE — 85520 HEPARIN ASSAY: CPT

## 2023-05-15 PROCEDURE — 86140 C-REACTIVE PROTEIN: CPT

## 2023-05-15 PROCEDURE — 80053 COMPREHEN METABOLIC PANEL: CPT

## 2023-05-15 PROCEDURE — 85025 COMPLETE CBC W/AUTO DIFF WBC: CPT

## 2023-05-15 PROCEDURE — 85045 AUTOMATED RETICULOCYTE COUNT: CPT

## 2023-05-15 PROCEDURE — 85060 BLOOD SMEAR INTERPRETATION: CPT | Performed by: PATHOLOGY

## 2023-05-16 LAB
ALBUMIN SERPL BCG-MCNC: 4.1 G/DL (ref 3.5–5.2)
ALP SERPL-CCNC: 76 U/L (ref 35–104)
ALT SERPL W P-5'-P-CCNC: 9 U/L (ref 10–35)
ANION GAP SERPL CALCULATED.3IONS-SCNC: 10 MMOL/L (ref 7–15)
AST SERPL W P-5'-P-CCNC: 16 U/L (ref 10–35)
BILIRUB SERPL-MCNC: <0.2 MG/DL
BUN SERPL-MCNC: 6.4 MG/DL (ref 6–20)
CALCIUM SERPL-MCNC: 9.4 MG/DL (ref 8.6–10)
CHLORIDE SERPL-SCNC: 102 MMOL/L (ref 98–107)
CREAT SERPL-MCNC: 0.54 MG/DL (ref 0.51–0.95)
CRP SERPL-MCNC: 14.32 MG/L
DEPRECATED HCO3 PLAS-SCNC: 23 MMOL/L (ref 22–29)
GFR SERPL CREATININE-BSD FRML MDRD: >90 ML/MIN/1.73M2
GLUCOSE SERPL-MCNC: 118 MG/DL (ref 70–99)
LDH SERPL L TO P-CCNC: 147 U/L (ref 0–250)
PATH REPORT.COMMENTS IMP SPEC: NORMAL
PATH REPORT.FINAL DX SPEC: NORMAL
PATH REPORT.MICROSCOPIC SPEC OTHER STN: NORMAL
PATH REPORT.MICROSCOPIC SPEC OTHER STN: NORMAL
POTASSIUM SERPL-SCNC: 4.1 MMOL/L (ref 3.4–5.3)
PROT SERPL-MCNC: 7.1 G/DL (ref 6.4–8.3)
SODIUM SERPL-SCNC: 135 MMOL/L (ref 136–145)

## 2023-06-04 ENCOUNTER — HEALTH MAINTENANCE LETTER (OUTPATIENT)
Age: 33
End: 2023-06-04

## 2023-07-17 ENCOUNTER — LAB (OUTPATIENT)
Dept: LAB | Facility: CLINIC | Age: 33
End: 2023-07-17
Payer: COMMERCIAL

## 2023-07-17 DIAGNOSIS — D47.3 ESSENTIAL THROMBOCYTHEMIA (H): ICD-10-CM

## 2023-07-17 LAB
ALBUMIN SERPL BCG-MCNC: 4 G/DL (ref 3.5–5.2)
ALP SERPL-CCNC: 79 U/L (ref 35–104)
ALT SERPL W P-5'-P-CCNC: 7 U/L (ref 0–50)
ANION GAP SERPL CALCULATED.3IONS-SCNC: 11 MMOL/L (ref 7–15)
AST SERPL W P-5'-P-CCNC: 15 U/L (ref 0–45)
BILIRUB SERPL-MCNC: 0.2 MG/DL
BUN SERPL-MCNC: 5.4 MG/DL (ref 6–20)
CALCIUM SERPL-MCNC: 9.5 MG/DL (ref 8.6–10)
CHLORIDE SERPL-SCNC: 100 MMOL/L (ref 98–107)
CREAT SERPL-MCNC: 0.48 MG/DL (ref 0.51–0.95)
DEPRECATED HCO3 PLAS-SCNC: 24 MMOL/L (ref 22–29)
GFR SERPL CREATININE-BSD FRML MDRD: >90 ML/MIN/1.73M2
GLUCOSE SERPL-MCNC: 98 MG/DL (ref 70–99)
POTASSIUM SERPL-SCNC: 4.2 MMOL/L (ref 3.4–5.3)
PROT SERPL-MCNC: 7 G/DL (ref 6.4–8.3)
SODIUM SERPL-SCNC: 135 MMOL/L (ref 136–145)

## 2023-07-17 PROCEDURE — 80053 COMPREHEN METABOLIC PANEL: CPT

## 2023-07-17 PROCEDURE — 36415 COLL VENOUS BLD VENIPUNCTURE: CPT

## 2023-08-01 ENCOUNTER — APPOINTMENT (OUTPATIENT)
Dept: LAB | Facility: CLINIC | Age: 33
End: 2023-08-01
Attending: INTERNAL MEDICINE
Payer: COMMERCIAL

## 2023-08-01 ENCOUNTER — ONCOLOGY VISIT (OUTPATIENT)
Dept: ONCOLOGY | Facility: CLINIC | Age: 33
End: 2023-08-01
Attending: INTERNAL MEDICINE
Payer: COMMERCIAL

## 2023-08-01 VITALS
WEIGHT: 280.7 LBS | OXYGEN SATURATION: 94 % | DIASTOLIC BLOOD PRESSURE: 68 MMHG | BODY MASS INDEX: 41.45 KG/M2 | SYSTOLIC BLOOD PRESSURE: 105 MMHG | TEMPERATURE: 98.7 F | HEART RATE: 79 BPM | RESPIRATION RATE: 16 BRPM

## 2023-08-01 DIAGNOSIS — O09.90 HIGH-RISK PREGNANCY, UNSPECIFIED TRIMESTER: ICD-10-CM

## 2023-08-01 DIAGNOSIS — Z79.01 LONG TERM CURRENT USE OF ANTICOAGULANT THERAPY: Primary | Chronic | ICD-10-CM

## 2023-08-01 DIAGNOSIS — Z15.89 JAK2 V617F MUTATION: ICD-10-CM

## 2023-08-01 DIAGNOSIS — D47.3 ESSENTIAL THROMBOCYTHEMIA (H): Primary | ICD-10-CM

## 2023-08-01 DIAGNOSIS — D47.3 ESSENTIAL THROMBOCYTHEMIA (H): ICD-10-CM

## 2023-08-01 LAB
ALBUMIN SERPL BCG-MCNC: 3.9 G/DL (ref 3.5–5.2)
ALP SERPL-CCNC: 97 U/L (ref 35–104)
ALT SERPL W P-5'-P-CCNC: 8 U/L (ref 0–50)
ANION GAP SERPL CALCULATED.3IONS-SCNC: 12 MMOL/L (ref 7–15)
AST SERPL W P-5'-P-CCNC: 11 U/L (ref 0–45)
BASOPHILS # BLD AUTO: 0 10E3/UL (ref 0–0.2)
BASOPHILS NFR BLD AUTO: 0 %
BILIRUB SERPL-MCNC: <0.2 MG/DL
BUN SERPL-MCNC: 5.6 MG/DL (ref 6–20)
CALCIUM SERPL-MCNC: 9.6 MG/DL (ref 8.6–10)
CHLORIDE SERPL-SCNC: 102 MMOL/L (ref 98–107)
CREAT SERPL-MCNC: 0.51 MG/DL (ref 0.51–0.95)
DEPRECATED HCO3 PLAS-SCNC: 20 MMOL/L (ref 22–29)
EOSINOPHIL # BLD AUTO: 0.1 10E3/UL (ref 0–0.7)
EOSINOPHIL NFR BLD AUTO: 1 %
ERYTHROCYTE [DISTWIDTH] IN BLOOD BY AUTOMATED COUNT: 14.1 % (ref 10–15)
FERRITIN SERPL-MCNC: 29 NG/ML (ref 6–175)
GFR SERPL CREATININE-BSD FRML MDRD: >90 ML/MIN/1.73M2
GLUCOSE SERPL-MCNC: 80 MG/DL (ref 70–99)
HCT VFR BLD AUTO: 29.6 % (ref 35–47)
HGB BLD-MCNC: 10 G/DL (ref 11.7–15.7)
IMM GRANULOCYTES # BLD: 0.1 10E3/UL
IMM GRANULOCYTES NFR BLD: 1 %
IRON BINDING CAPACITY (ROCHE): 395 UG/DL (ref 240–430)
IRON SATN MFR SERPL: 12 % (ref 15–46)
IRON SERPL-MCNC: 49 UG/DL (ref 37–145)
LDH SERPL L TO P-CCNC: 111 U/L (ref 0–250)
LYMPHOCYTES # BLD AUTO: 1.7 10E3/UL (ref 0.8–5.3)
LYMPHOCYTES NFR BLD AUTO: 16 %
MCH RBC QN AUTO: 29.9 PG (ref 26.5–33)
MCHC RBC AUTO-ENTMCNC: 33.8 G/DL (ref 31.5–36.5)
MCV RBC AUTO: 89 FL (ref 78–100)
MONOCYTES # BLD AUTO: 0.5 10E3/UL (ref 0–1.3)
MONOCYTES NFR BLD AUTO: 5 %
NEUTROPHILS # BLD AUTO: 8 10E3/UL (ref 1.6–8.3)
NEUTROPHILS NFR BLD AUTO: 77 %
NRBC # BLD AUTO: 0 10E3/UL
NRBC BLD AUTO-RTO: 0 /100
PLATELET # BLD AUTO: 306 10E3/UL (ref 150–450)
POTASSIUM SERPL-SCNC: 4.3 MMOL/L (ref 3.4–5.3)
PROT SERPL-MCNC: 6.8 G/DL (ref 6.4–8.3)
RBC # BLD AUTO: 3.34 10E6/UL (ref 3.8–5.2)
RETICS # AUTO: 0.06 10E6/UL (ref 0.03–0.1)
RETICS/RBC NFR AUTO: 1.9 % (ref 0.5–2)
SODIUM SERPL-SCNC: 134 MMOL/L (ref 136–145)
WBC # BLD AUTO: 10.4 10E3/UL (ref 4–11)

## 2023-08-01 PROCEDURE — G0463 HOSPITAL OUTPT CLINIC VISIT: HCPCS | Performed by: INTERNAL MEDICINE

## 2023-08-01 PROCEDURE — 85025 COMPLETE CBC W/AUTO DIFF WBC: CPT | Performed by: INTERNAL MEDICINE

## 2023-08-01 PROCEDURE — 99214 OFFICE O/P EST MOD 30 MIN: CPT | Performed by: INTERNAL MEDICINE

## 2023-08-01 PROCEDURE — 83615 LACTATE (LD) (LDH) ENZYME: CPT | Performed by: INTERNAL MEDICINE

## 2023-08-01 PROCEDURE — 80053 COMPREHEN METABOLIC PANEL: CPT | Performed by: INTERNAL MEDICINE

## 2023-08-01 PROCEDURE — 36415 COLL VENOUS BLD VENIPUNCTURE: CPT | Performed by: INTERNAL MEDICINE

## 2023-08-01 PROCEDURE — 85045 AUTOMATED RETICULOCYTE COUNT: CPT | Performed by: INTERNAL MEDICINE

## 2023-08-01 PROCEDURE — 82728 ASSAY OF FERRITIN: CPT | Performed by: INTERNAL MEDICINE

## 2023-08-01 PROCEDURE — 83550 IRON BINDING TEST: CPT | Performed by: INTERNAL MEDICINE

## 2023-08-01 ASSESSMENT — PAIN SCALES - GENERAL: PAINLEVEL: NO PAIN (0)

## 2023-08-01 NOTE — PROGRESS NOTES
[facility] Hematology Consultation    Outpatient Visit Note:    Patient: Sruthi Mac  MRN: [id number]  : 1990  STEVE: Aug 1, 2023    Reason for Consultation:  JAK2+ MPN    Assessment:  In summary, Sruthi Mac is a 32 year old woman with [id number] positive myeloproliferative neoplasm and arterial thrombosis, diagnosed in spring 2021.    1. [id number] MPN, likely ET v prefibrotic MF  High risk (IPSET>2) ET  2. 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 Third trimester pregnancy- M Dr. Hannah Dye (Hannah Dye, DO FACOG   [facility] (new practice in [place])   4. Potential PVCs    Sruthi is currently 26 weeks pregnant.     For anticoagulation-- Currently on Lovenox 100mg twice daily keeping an Xa level 0.6-1 and aspirin    Antepartum planning  - induction to be determined by MFM and patient- anticipate around 23  - will hold Lovenox for 18-24 hrs prior   - ok to hold anticoagulation for entire induction and up to 12 h post-partum. If she were to need a  would also only hold up to 12 h post-  - nursing team should be instructed to monitor blood pressure and distal pulses (history of arterial clots)    Post-partum  - LMWH until she is done breastfeeding--- at that time we will change to an oral anticoagulant. She should NOT be off anticoagulation     Currently is NOT on interferon due to the cost of Pegasys. Last variant allele frequency (2023) was JAK2 V617F currently 3.5%, previously at 5% (2021). Unless platelets increase to threshold where we are concerned for bleeding (>600, 1 million) or thrombosis will not resume.   Will revisit after delivery as may qualify for [employer] financial support (could not use during pregnancy).     Ferritin is 29 and iron sat is 12. Has been taking oral iron supplement. At present has mild anemia without symptoms. Can consider IV iron sucrose- will contact [facility].     Has  ziopatch study done 4/21/21--- had rare PVCs. Now endorsing increased frequency. Will discuss with Cardiology collegues if repeat study prior to induction is warranted.     Plan:  1. Continue with LMWH 100 mg subcutaneous BID- monitor level to titrate to 0.6-1  2. Will need to monitor her CBC monthly while pregnant.  Prefers lab appointments at [facility].  3. Continue oral iron supplement every other day-- will contact [facility] regarding IV iron  4. Continue with LMWH 100 mg subcutaneous twice daily.   5. Follow-up with Dr. Irving as scheduled.    The patient is given our center's contact information and is instructed to call if she should have any further questions or concerns.  .     36 minutes spent on the date of the encounter doing chart review, review of test results, patient visit and documentation     Keily Irving MD/PhD   of Medicine  Division of Hematology, Oncology and Transplantation      ----------------------------------------------------------------------------------------------------------------------    History of Present Illness/Interval History:  Sruthi Mac is a 32 year old woman with history of depression and anxiety who presetned to [facility] in April 2021 with acute onset of left leg pain. She was found to have left popliteal arterial occlusion that required extensive interventions. She was evaluated in June 2021 by my former collegue Dr. David due to thrombocytosis. Evaluation included MPN panel that revealed a [id number] mutation. Bone marrow biopsy was completed. She established care in Dr. Irving's clinic in late 2021. Please refer to her initial note for further details.     Doing well today. She will be at week 23 of her pregnancy tomorrow.  Is starting to have some mild LE edema at the end of the day. No issues with upper abdominal pain. No issues with n/v at present. No issues with hand swelling. Is feeling her daughter move. ALso noting increased frequency of  PVCs- can occur at rest or with activitiy.    Past Medical History:  Past Medical History:   Diagnosis Date    Anxiety     Depression     Essential thrombocythemia (H)     History of arterial thrombosis     Hyperlipidemia     JAK2 gene mutation     Obesity     Panic attack     Subclinical hypothyroidism        Past Surgical History:  Past Surgical History:   Procedure Laterality Date    EMBOLECTOMY LOWER EXTREMITY Left 4/11/2021    Procedure: LEFT LEG ANTERIOR TIBIAL AND POSTERIOR TIBIAL EMBOLECTOMIES, FEMORAL POPITEAL VEIN PATCH, ANGIOGRAM, FASCIOTOMIES;  Surgeon: Ulises Gonzalez;  Location: [facility] OR    IR ANGIOGRAM THROUGH CATHETER FOLLOW UP  4/9/2021    IR ANGIOGRAM THROUGH CATHETER FOLLOW UP  4/10/2021    IR LOWER EXTREMITY ANGIOGRAM LEFT  4/8/2021    THROMBECTOMY LOWER EXTREMITY Left 4/12/2021    Procedure: Re-Exploration Left Lower Extremity With Popliteal and Tibial Embolectomy.;  Surgeon: Ulises Gonzalez;  Location: [facility] OR    wisdom teeth remova         Medications:  Current Outpatient Medications   Medication Sig Dispense Refill    busPIRone (BUSPAR) 10 MG tablet Take 10 mg by mouth 2 times daily      enoxaparin ANTICOAGULANT (LOVENOX) 100 MG/ML syringe Inject 1 mL (100 mg) Subcutaneous 2 times daily 60 mL 11    Ferrous Sulfate (IRON) 325 (65 Fe) MG tablet Take 1 tablet by mouth daily      Lactobacillus (PROBIOTIC ACIDOPHILUS PO) Take by mouth daily      letrozole (FEMARA) 2.5 MG tablet Take 2.5 mg by mouth (Patient not taking: Reported on 4/26/2023)      levothyroxine (SYNTHROID/LEVOTHROID) 50 MCG tablet       peginterferon fouzia-2a (PEGASYS) 180 MCG/ML injection Inject 0.25 mLs (45 mcg) Subcutaneous every 7 days 4 mL 3    Prenatal MV-Min-Fe Fum-FA-DHA (PRENATAL 1 PO) Take 1 tablet by mouth      progesterone (PROMETRIUM) 200 MG capsule       sertraline (ZOLOFT) 100 MG tablet Take 100 mg by mouth      STATIN NOT PRESCRIBED (INTENTIONAL) Please choose reason not prescribed from choices below.       Vitamin D3 (CHOLECALCIFEROL) 25 mcg (1000 units) tablet Take 1 tablet by mouth daily          Allergies:  Allergies   Allergen Reactions    Erythromycin Unknown    Pcn [Penicillins] Unknown       ROS:  A 10 point ROS is negative except as stated in the HPI    Social History:  Denies any tobacco use. No significant alcohol use. Denies any illicit drug use. Patient works as a  for [employer] .    Family History:  Dad- T2 DM, hyperlipidemia, testicular cancer, melanoma  Mother- hyperlipidemia     Objective:  Vitals: B/P: 105/68, T: 98.7, P: 79, R: 16, Wt: 280 lbs 11.2 oz  Exam:   Constitutional: Appears well, no distress  HEENT: Pupils equal and reactive to light. No scleral icterus or hemorrhage. Nares without evidence of telangiectasia. Mucous membranes moist with no wet purpura.   CV: regular rate and rhythm, no murmurs  Respiratory: clear  GI: gravid uterus- abdomen soft, nontender, without guarding or rebound.   Mus/Skele: no edema  Skin: no petechiae, no ecchymosis.  Neuro: CN II-XII intact. Normal gait. AOx3    Lab Results  Results for orders placed or performed in visit on 08/01/23   Comprehensive metabolic panel     Status: Abnormal   Result Value Ref Range    Sodium 134 (L) 136 - 145 mmol/L    Potassium 4.3 3.4 - 5.3 mmol/L    Chloride 102 98 - 107 mmol/L    Carbon Dioxide (CO2) 20 (L) 22 - 29 mmol/L    Anion Gap 12 7 - 15 mmol/L    Urea Nitrogen 5.6 (L) 6.0 - 20.0 mg/dL    Creatinine 0.51 0.51 - 0.95 mg/dL    Calcium 9.6 8.6 - 10.0 mg/dL    Glucose 80 70 - 99 mg/dL    Alkaline Phosphatase 97 35 - 104 U/L    AST 11 0 - 45 U/L    ALT 8 0 - 50 U/L    Protein Total 6.8 6.4 - 8.3 g/dL    Albumin 3.9 3.5 - 5.2 g/dL    Bilirubin Total <0.2 <=1.2 mg/dL    GFR Estimate >90 >60 mL/min/1.73m2   Lactate Dehydrogenase     Status: Normal   Result Value Ref Range    Lactate Dehydrogenase 111 0 - 250 U/L   Reticulocyte count     Status: Normal   Result Value Ref Range    % Reticulocyte 1.9  0.5 - 2.0 %    Absolute Reticulocyte 0.064 0.025 - 0.095 10e6/uL   Ferritin     Status: Normal   Result Value Ref Range    Ferritin 29 6 - 175 ng/mL   Iron and iron binding capacity     Status: Abnormal   Result Value Ref Range    Iron 49 37 - 145 ug/dL    Iron Binding Capacity 395 240 - 430 ug/dL    Iron Sat Index 12 (L) 15 - 46 %   CBC with platelets and differential     Status: Abnormal   Result Value Ref Range    WBC Count 10.4 4.0 - 11.0 10e3/uL    RBC Count 3.34 (L) 3.80 - 5.20 10e6/uL    Hemoglobin 10.0 (L) 11.7 - 15.7 g/dL    Hematocrit 29.6 (L) 35.0 - 47.0 %    MCV 89 78 - 100 fL    MCH 29.9 26.5 - 33.0 pg    MCHC 33.8 31.5 - 36.5 g/dL    RDW 14.1 10.0 - 15.0 %    Platelet Count 306 150 - 450 10e3/uL    % Neutrophils 77 %    % Lymphocytes 16 %    % Monocytes 5 %    % Eosinophils 1 %    % Basophils 0 %    % Immature Granulocytes 1 %    NRBCs per 100 WBC 0 <1 /100    Absolute Neutrophils 8.0 1.6 - 8.3 10e3/uL    Absolute Lymphocytes 1.7 0.8 - 5.3 10e3/uL    Absolute Monocytes 0.5 0.0 - 1.3 10e3/uL    Absolute Eosinophils 0.1 0.0 - 0.7 10e3/uL    Absolute Basophils 0.0 0.0 - 0.2 10e3/uL    Absolute Immature Granulocytes 0.1 <=0.4 10e3/uL    Absolute NRBCs 0.0 10e3/uL   CBC with platelets differential     Status: Abnormal    Narrative    The following orders were created for panel order CBC with platelets differential.  Procedure                               Abnormality         Status                     ---------                               -----------         ------                     CBC with platelets and d...[[id number]]  Abnormal            Final result                 Please view results for these tests on the individual orders.       Imaging:  No recent relevant imaging

## 2023-08-01 NOTE — LETTER
2023         RE: Sruthi Beasley  [address] [facility] [address]              [facility] Hematology Consultation    Outpatient Visit Note:    Patient: Sruthi Mac  MRN: [id number]  : 1990  STEVE: Aug 1, 2023    Reason for Consultation:  JAK2+ MPN    Assessment:  In summary, Sruthi Mac is a 32 year old woman with [id number] positive myeloproliferative neoplasm and arterial thrombosis, diagnosed in spring 2021.    1. [id number]+ MPN, likely ET v prefibrotic MF  High risk (IPSET>2) ET  2. 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 Third trimester pregnancy- MFM Dr. Hannah Dye (Hannah Dye, DO FACOG   [facility] (new practice in [place])   4. Potential PVCs    Sruthi is currently 26 weeks pregnant.     For anticoagulation-- Currently on Lovenox 100mg twice daily keeping an Xa level 0.6-1 and aspirin    Antepartum planning  - induction to be determined by MFM and patient- anticipate around 23  - will hold Lovenox for 18-24 hrs prior   - ok to hold anticoagulation for entire induction and up to 12 h post-partum. If she were to need a  would also only hold up to 12 h post-  - nursing team should be instructed to monitor blood pressure and distal pulses (history of arterial clots)    Post-partum  - LMWH until she is done breastfeeding--- at that time we will change to an oral anticoagulant. She should NOT be off anticoagulation     Currently is NOT on interferon due to the cost of Pegasys. Last variant allele frequency (2023) was JAK2 V617F currently 3.5%, previously at 5% (2021). Unless platelets increase to threshold where we are concerned for bleeding (>600, 1 million) or thrombosis will not resume.   Will revisit after delivery as may qualify for [employer] financial support (could not use during pregnancy).     Ferritin is 29 and iron sat is 12. Has been taking oral iron supplement. At present has  mild anemia without symptoms. Can consider IV iron sucrose- will contact [facility].     Has ziopatch study done 4/21/21--- had rare PVCs. Now endorsing increased frequency. Will discuss with Cardiology collegues if repeat study prior to induction is warranted.     Plan:  1. Continue with LMWH 100 mg subcutaneous BID- monitor level to titrate to 0.6-1  2. Will need to monitor her CBC monthly while pregnant.  Prefers lab appointments at [facility].  3. Continue oral iron supplement every other day-- will contact [facility] regarding IV iron  4. Continue with LMWH 100 mg subcutaneous twice daily.   5. Follow-up with Dr. Irving as scheduled.    The patient is given our center's contact information and is instructed to call if she should have any further questions or concerns.  .     36 minutes spent on the date of the encounter doing chart review, review of test results, patient visit and documentation     Keily Irving MD/PhD   of Medicine  Division of Hematology, Oncology and Transplantation      ----------------------------------------------------------------------------------------------------------------------    History of Present Illness/Interval History:  Sruthi Mac is a 32 year old woman with history of depression and anxiety who presetned to  [facility] in April 2021 with acute onset of left leg pain. She was found to have left popliteal arterial occlusion that required extensive interventions. She was evaluated in June 2021 by my former collegue Dr. David due to thrombocytosis. Evaluation included MPN panel that revealed a [id number] mutation. Bone marrow biopsy was completed. She established care in Dr. Irving's clinic in late 2021. Please refer to her initial note for further details.     Doing well today. She will be at week 23 of her pregnancy tomorrow.  Is starting to have some mild LE edema at the end of the day. No issues with upper abdominal pain. No issues with n/v at present. No issues  with hand swelling. Is feeling her daughter move. ALso noting increased frequency of PVCs- can occur at rest or with activitiy.    Past Medical History:  Past Medical History:   Diagnosis Date    Anxiety     Depression     Essential thrombocythemia (H)     History of arterial thrombosis     Hyperlipidemia     JAK2 gene mutation     Obesity     Panic attack     Subclinical hypothyroidism        Past Surgical History:  Past Surgical History:   Procedure Laterality Date    EMBOLECTOMY LOWER EXTREMITY Left 4/11/2021    Procedure: LEFT LEG ANTERIOR TIBIAL AND POSTERIOR TIBIAL EMBOLECTOMIES, FEMORAL POPITEAL VEIN PATCH, ANGIOGRAM, FASCIOTOMIES;  Surgeon: Ulises Gonzalez;  Location: [facility] OR    IR ANGIOGRAM THROUGH CATHETER FOLLOW UP  4/9/2021    IR ANGIOGRAM THROUGH CATHETER FOLLOW UP  4/10/2021    IR LOWER EXTREMITY ANGIOGRAM LEFT  4/8/2021    THROMBECTOMY LOWER EXTREMITY Left 4/12/2021    Procedure: Re-Exploration Left Lower Extremity With Popliteal and Tibial Embolectomy.;  Surgeon: Ulises Gonzalez;  Location: [facility] OR    wisdom teeth remova         Medications:  Current Outpatient Medications   Medication Sig Dispense Refill    busPIRone (BUSPAR) 10 MG tablet Take 10 mg by mouth 2 times daily      enoxaparin ANTICOAGULANT (LOVENOX) 100 MG/ML syringe Inject 1 mL (100 mg) Subcutaneous 2 times daily 60 mL 11    Ferrous Sulfate (IRON) 325 (65 Fe) MG tablet Take 1 tablet by mouth daily      Lactobacillus (PROBIOTIC ACIDOPHILUS PO) Take by mouth daily      letrozole (FEMARA) 2.5 MG tablet Take 2.5 mg by mouth (Patient not taking: Reported on 4/26/2023)      levothyroxine (SYNTHROID/LEVOTHROID) 50 MCG tablet       peginterferon fouzia-2a (PEGASYS) 180 MCG/ML injection Inject 0.25 mLs (45 mcg) Subcutaneous every 7 days 4 mL 3    Prenatal MV-Min-Fe Fum-FA-DHA (PRENATAL 1 PO) Take 1 tablet by mouth      progesterone (PROMETRIUM) 200 MG capsule       sertraline (ZOLOFT) 100 MG tablet Take 100 mg by mouth      STATIN NOT  PRESCRIBED (INTENTIONAL) Please choose reason not prescribed from choices below.      Vitamin D3 (CHOLECALCIFEROL) 25 mcg (1000 units) tablet Take 1 tablet by mouth daily          Allergies:  Allergies   Allergen Reactions    Erythromycin Unknown    Pcn [Penicillins] Unknown       ROS:  A 10 point ROS is negative except as stated in the HPI    Social History:  Denies any tobacco use. No significant alcohol use. Denies any illicit drug use. Patient works as a  for [employer]. .    Family History:  Dad- T2 DM, hyperlipidemia, testicular cancer, melanoma  Mother- hyperlipidemia     Objective:  Vitals: B/P: 105/68, T: 98.7, P: 79, R: 16, Wt: 280 lbs 11.2 oz  Exam:   Constitutional: Appears well, no distress  HEENT: Pupils equal and reactive to light. No scleral icterus or hemorrhage. Nares without evidence of telangiectasia. Mucous membranes moist with no wet purpura.   CV: regular rate and rhythm, no murmurs  Respiratory: clear  GI: gravid uterus- abdomen soft, nontender, without guarding or rebound.   Mus/Skele: no edema  Skin: no petechiae, no ecchymosis.  Neuro: CN II-XII intact. Normal gait. AOx3    Lab Results  Results for orders placed or performed in visit on 08/01/23   Comprehensive metabolic panel     Status: Abnormal   Result Value Ref Range    Sodium 134 (L) 136 - 145 mmol/L    Potassium 4.3 3.4 - 5.3 mmol/L    Chloride 102 98 - 107 mmol/L    Carbon Dioxide (CO2) 20 (L) 22 - 29 mmol/L    Anion Gap 12 7 - 15 mmol/L    Urea Nitrogen 5.6 (L) 6.0 - 20.0 mg/dL    Creatinine 0.51 0.51 - 0.95 mg/dL    Calcium 9.6 8.6 - 10.0 mg/dL    Glucose 80 70 - 99 mg/dL    Alkaline Phosphatase 97 35 - 104 U/L    AST 11 0 - 45 U/L    ALT 8 0 - 50 U/L    Protein Total 6.8 6.4 - 8.3 g/dL    Albumin 3.9 3.5 - 5.2 g/dL    Bilirubin Total <0.2 <=1.2 mg/dL    GFR Estimate >90 >60 mL/min/1.73m2   Lactate Dehydrogenase     Status: Normal   Result Value Ref Range    Lactate Dehydrogenase 111 0 - 250 U/L    Reticulocyte count     Status: Normal   Result Value Ref Range    % Reticulocyte 1.9 0.5 - 2.0 %    Absolute Reticulocyte 0.064 0.025 - 0.095 10e6/uL   Ferritin     Status: Normal   Result Value Ref Range    Ferritin 29 6 - 175 ng/mL   Iron and iron binding capacity     Status: Abnormal   Result Value Ref Range    Iron 49 37 - 145 ug/dL    Iron Binding Capacity 395 240 - 430 ug/dL    Iron Sat Index 12 (L) 15 - 46 %   CBC with platelets and differential     Status: Abnormal   Result Value Ref Range    WBC Count 10.4 4.0 - 11.0 10e3/uL    RBC Count 3.34 (L) 3.80 - 5.20 10e6/uL    Hemoglobin 10.0 (L) 11.7 - 15.7 g/dL    Hematocrit 29.6 (L) 35.0 - 47.0 %    MCV 89 78 - 100 fL    MCH 29.9 26.5 - 33.0 pg    MCHC 33.8 31.5 - 36.5 g/dL    RDW 14.1 10.0 - 15.0 %    Platelet Count 306 150 - 450 10e3/uL    % Neutrophils 77 %    % Lymphocytes 16 %    % Monocytes 5 %    % Eosinophils 1 %    % Basophils 0 %    % Immature Granulocytes 1 %    NRBCs per 100 WBC 0 <1 /100    Absolute Neutrophils 8.0 1.6 - 8.3 10e3/uL    Absolute Lymphocytes 1.7 0.8 - 5.3 10e3/uL    Absolute Monocytes 0.5 0.0 - 1.3 10e3/uL    Absolute Eosinophils 0.1 0.0 - 0.7 10e3/uL    Absolute Basophils 0.0 0.0 - 0.2 10e3/uL    Absolute Immature Granulocytes 0.1 <=0.4 10e3/uL    Absolute NRBCs 0.0 10e3/uL   CBC with platelets differential     Status: Abnormal    Narrative    The following orders were created for panel order CBC with platelets differential.  Procedure                               Abnormality         Status                     ---------                               -----------         ------                     CBC with platelets and d...[[id number]]  Abnormal            Final result                 Please view results for these tests on the individual orders.       Imaging:  No recent relevant imaging        Keily Irving MD

## 2023-08-01 NOTE — PATIENT INSTRUCTIONS
Continue Lovenox     Follow up in September      Dr. Irving will contact Cardiology regarding heart monitoring.       Keily Irving MD/PhD   of Medicine  Division of Hematology, Oncology and Transplantation    [facility]  [address] [place] [address], Mail [id number]  [place] [address]    Clinic Scheduling and Nurse Line: [phone], option 5, option 2      RN Care Coordinator:   Vashti Llanos  [facility]  [facility]  Direct line: (P) [phone]  (F) [phone]      For coagulation/blood thinner questions the team at [facility] can call Dr. Irving [phone] between 8-5 PM on weekdays after 5 PM and on weekends they can call [phone] and ask for Hematologist on call.

## 2023-08-01 NOTE — LETTER
2023         RE: Sruthi Beasley  [address] [facility] [address]        Dear Colleague,    Thank you for referring your patient, Sruthi Beasley, to the [facility]. Please see a copy of my visit note below.            [facility] Hematology Consultation    Outpatient Visit Note:    Patient: Sruthi Mac  MRN: [id number]  : 1990  STEVE: Aug 1, 2023    Reason for Consultation:  JAK2+ MPN    Assessment:  In summary, Sruthi Mac is a 32 year old woman with [id number] positive myeloproliferative neoplasm and arterial thrombosis, diagnosed in spring 2021.    1. [id number]+ MPN, likely ET v prefibrotic MF  High risk (IPSET>2) ET  2. 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 Third trimester pregnancy- MFM Dr. Hannah Dye (Hannah Dye, DO FACOG   [facility] (new practice in [place])   4. Potential PVCs    Sruthi is currently 26 weeks pregnant.     For anticoagulation-- Currently on Lovenox 100mg twice daily keeping an Xa level 0.6-1 and aspirin    Antepartum planning  - induction to be determined by MFM and patient- anticipate around 23  - will hold Lovenox for 18-24 hrs prior   - ok to hold anticoagulation for entire induction and up to 12 h post-partum. If she were to need a  would also only hold up to 12 h post-  - nursing team should be instructed to monitor blood pressure and distal pulses (history of arterial clots)    Post-partum  - LMWH until she is done breastfeeding--- at that time we will change to an oral anticoagulant. She should NOT be off anticoagulation     Currently is NOT on interferon due to the cost of Pegasys. Last variant allele frequency (2023) was JAK2 [id number] currently 3.5%, previously at 5% (2021). Unless platelets increase to threshold where we are concerned for bleeding (>600, 1 million) or thrombosis will not resume.   Will revisit after  delivery as may qualify for [employer] financial support (could not use during pregnancy).     Ferritin is 29 and iron sat is 12. Has been taking oral iron supplement. At present has mild anemia without symptoms. Can consider IV iron sucrose- will contact [facility].     Has ziopatch study done 4/21/21--- had rare PVCs. Now endorsing increased frequency. Will discuss with Cardiology collegues if repeat study prior to induction is warranted.     Plan:  1. Continue with LMWH 100 mg subcutaneous BID- monitor level to titrate to 0.6-1  2. Will need to monitor her CBC monthly while pregnant.  Prefers lab appointments at [facility].  3. Continue oral iron supplement every other day-- will contact [facility] regarding IV iron  4. Continue with LMWH 100 mg subcutaneous twice daily.   5. Follow-up with Dr. Irving as scheduled.    The patient is given our center's contact information and is instructed to call if she should have any further questions or concerns.  .     36 minutes spent on the date of the encounter doing chart review, review of test results, patient visit and documentation     Keily Irving MD/PhD   of Medicine  Division of Hematology, Oncology and Transplantation      ----------------------------------------------------------------------------------------------------------------------    History of Present Illness/Interval History:  Sruthi Mac is a 32 year old woman with history of depression and anxiety who presetned to [employer] in April 2021 with acute onset of left leg pain. She was found to have left popliteal arterial occlusion that required extensive interventions. She was evaluated in June 2021 by my former collegue Dr. David due to thrombocytosis. Evaluation included MPN panel that revealed a [id number] mutation. Bone marrow biopsy was completed. She established care in Dr. Irving's clinic in late 2021. Please refer to her initial note for further details.     Doing well today. She will be  at week 23 of her pregnancy tomorrow.  Is starting to have some mild LE edema at the end of the day. No issues with upper abdominal pain. No issues with n/v at present. No issues with hand swelling. Is feeling her daughter move. ALso noting increased frequency of PVCs- can occur at rest or with activitiy.    Past Medical History:  Past Medical History:   Diagnosis Date    Anxiety     Depression     Essential thrombocythemia (H)     History of arterial thrombosis     Hyperlipidemia     JAK2 gene mutation     Obesity     Panic attack     Subclinical hypothyroidism        Past Surgical History:  Past Surgical History:   Procedure Laterality Date    EMBOLECTOMY LOWER EXTREMITY Left 4/11/2021    Procedure: LEFT LEG ANTERIOR TIBIAL AND POSTERIOR TIBIAL EMBOLECTOMIES, FEMORAL POPITEAL VEIN PATCH, ANGIOGRAM, FASCIOTOMIES;  Surgeon: Ulises Gonzalez;  Location: [facility] OR    IR ANGIOGRAM THROUGH CATHETER FOLLOW UP  4/9/2021    IR ANGIOGRAM THROUGH CATHETER FOLLOW UP  4/10/2021    IR LOWER EXTREMITY ANGIOGRAM LEFT  4/8/2021    THROMBECTOMY LOWER EXTREMITY Left 4/12/2021    Procedure: Re-Exploration Left Lower Extremity With Popliteal and Tibial Embolectomy.;  Surgeon: Ulises Gonzalez;  Location: [facility] OR    wisdom teeth remova         Medications:  Current Outpatient Medications   Medication Sig Dispense Refill    busPIRone (BUSPAR) 10 MG tablet Take 10 mg by mouth 2 times daily      enoxaparin ANTICOAGULANT (LOVENOX) 100 MG/ML syringe Inject 1 mL (100 mg) Subcutaneous 2 times daily 60 mL 11    Ferrous Sulfate (IRON) 325 (65 Fe) MG tablet Take 1 tablet by mouth daily      Lactobacillus (PROBIOTIC ACIDOPHILUS PO) Take by mouth daily      letrozole (FEMARA) 2.5 MG tablet Take 2.5 mg by mouth (Patient not taking: Reported on 4/26/2023)      levothyroxine (SYNTHROID/LEVOTHROID) 50 MCG tablet       peginterferon fouzia-2a (PEGASYS) 180 MCG/ML injection Inject 0.25 mLs (45 mcg) Subcutaneous every 7 days 4 mL 3    Prenatal  MV-Min-Fe Fum-FA-DHA (PRENATAL 1 PO) Take 1 tablet by mouth      progesterone (PROMETRIUM) 200 MG capsule       sertraline (ZOLOFT) 100 MG tablet Take 100 mg by mouth      STATIN NOT PRESCRIBED (INTENTIONAL) Please choose reason not prescribed from choices below.      Vitamin D3 (CHOLECALCIFEROL) 25 mcg (1000 units) tablet Take 1 tablet by mouth daily          Allergies:  Allergies   Allergen Reactions    Erythromycin Unknown    Pcn [Penicillins] Unknown       ROS:  A 10 point ROS is negative except as stated in the HPI    Social History:  Denies any tobacco use. No significant alcohol use. Denies any illicit drug use. Patient works as a  for [employer]. .    Family History:  Dad- T2 DM, hyperlipidemia, testicular cancer, melanoma  Mother- hyperlipidemia     Objective:  Vitals: B/P: 105/68, T: 98.7, P: 79, R: 16, Wt: 280 lbs 11.2 oz  Exam:   Constitutional: Appears well, no distress  HEENT: Pupils equal and reactive to light. No scleral icterus or hemorrhage. Nares without evidence of telangiectasia. Mucous membranes moist with no wet purpura.   CV: regular rate and rhythm, no murmurs  Respiratory: clear  GI: gravid uterus- abdomen soft, nontender, without guarding or rebound.   Mus/Skele: no edema  Skin: no petechiae, no ecchymosis.  Neuro: CN II-XII intact. Normal gait. AOx3    Lab Results  Results for orders placed or performed in visit on 08/01/23   Comprehensive metabolic panel     Status: Abnormal   Result Value Ref Range    Sodium 134 (L) 136 - 145 mmol/L    Potassium 4.3 3.4 - 5.3 mmol/L    Chloride 102 98 - 107 mmol/L    Carbon Dioxide (CO2) 20 (L) 22 - 29 mmol/L    Anion Gap 12 7 - 15 mmol/L    Urea Nitrogen 5.6 (L) 6.0 - 20.0 mg/dL    Creatinine 0.51 0.51 - 0.95 mg/dL    Calcium 9.6 8.6 - 10.0 mg/dL    Glucose 80 70 - 99 mg/dL    Alkaline Phosphatase 97 35 - 104 U/L    AST 11 0 - 45 U/L    ALT 8 0 - 50 U/L    Protein Total 6.8 6.4 - 8.3 g/dL    Albumin 3.9 3.5 - 5.2 g/dL     Bilirubin Total <0.2 <=1.2 mg/dL    GFR Estimate >90 >60 mL/min/1.73m2   Lactate Dehydrogenase     Status: Normal   Result Value Ref Range    Lactate Dehydrogenase 111 0 - 250 U/L   Reticulocyte count     Status: Normal   Result Value Ref Range    % Reticulocyte 1.9 0.5 - 2.0 %    Absolute Reticulocyte 0.064 0.025 - 0.095 10e6/uL   Ferritin     Status: Normal   Result Value Ref Range    Ferritin 29 6 - 175 ng/mL   Iron and iron binding capacity     Status: Abnormal   Result Value Ref Range    Iron 49 37 - 145 ug/dL    Iron Binding Capacity 395 240 - 430 ug/dL    Iron Sat Index 12 (L) 15 - 46 %   CBC with platelets and differential     Status: Abnormal   Result Value Ref Range    WBC Count 10.4 4.0 - 11.0 10e3/uL    RBC Count 3.34 (L) 3.80 - 5.20 10e6/uL    Hemoglobin 10.0 (L) 11.7 - 15.7 g/dL    Hematocrit 29.6 (L) 35.0 - 47.0 %    MCV 89 78 - 100 fL    MCH 29.9 26.5 - 33.0 pg    MCHC 33.8 31.5 - 36.5 g/dL    RDW 14.1 10.0 - 15.0 %    Platelet Count 306 150 - 450 10e3/uL    % Neutrophils 77 %    % Lymphocytes 16 %    % Monocytes 5 %    % Eosinophils 1 %    % Basophils 0 %    % Immature Granulocytes 1 %    NRBCs per 100 WBC 0 <1 /100    Absolute Neutrophils 8.0 1.6 - 8.3 10e3/uL    Absolute Lymphocytes 1.7 0.8 - 5.3 10e3/uL    Absolute Monocytes 0.5 0.0 - 1.3 10e3/uL    Absolute Eosinophils 0.1 0.0 - 0.7 10e3/uL    Absolute Basophils 0.0 0.0 - 0.2 10e3/uL    Absolute Immature Granulocytes 0.1 <=0.4 10e3/uL    Absolute NRBCs 0.0 10e3/uL   CBC with platelets differential     Status: Abnormal    Narrative    The following orders were created for panel order CBC with platelets differential.  Procedure                               Abnormality         Status                     ---------                               -----------         ------                     CBC with platelets and d...[[id number]]  Abnormal            Final result                 Please view results for these tests on the individual orders.        Imaging:  No recent relevant imaging      Sincerely,        Keily Irving MD

## 2023-08-01 NOTE — NURSING NOTE
Chief Complaint   Patient presents with    Blood Draw     Vpt blood draw by lab RN. Vitals taken and appointment arrived     Ryann Romo RN

## 2023-08-01 NOTE — NURSING NOTE
"Oncology Rooming Note    August 1, 2023 3:47 PM   Sruthi Beasley is a 32 year old female who presents for:    Chief Complaint   Patient presents with    Blood Draw     Vpt blood draw by lab RN. Vitals taken and appointment arrived    Oncology Clinic Visit     Thrombocytopenia     Initial Vitals: /68   Pulse 79   Temp 98.7  F (37.1  C) (Oral)   Resp 16   Wt 127.3 kg (280 lb 11.2 oz)   SpO2 94%   BMI 41.45 kg/m   Estimated body mass index is 41.45 kg/m  as calculated from the following:    Height as of 6/3/22: 1.753 m (5' 9\").    Weight as of this encounter: 127.3 kg (280 lb 11.2 oz). Body surface area is 2.49 meters squared.  No Pain (0) Comment: Data Unavailable   No LMP recorded.  Allergies reviewed: Yes  Medications reviewed: Yes    Medications: Medication refills not needed today.  Pharmacy name entered into [facility]:    [facility] [place], [place] - [address] [place] [address] [facility] [place], [place] - [address]    Clinical concerns: Patient presents today for follow up with Dr Irving.       Griselda Greer, LEELA  8/1/2023              "

## 2023-08-02 DIAGNOSIS — I49.3 PVC'S (PREMATURE VENTRICULAR CONTRACTIONS): ICD-10-CM

## 2023-08-02 DIAGNOSIS — Z86.79 HISTORY OF ARTERIAL OCCLUSION: ICD-10-CM

## 2023-08-02 DIAGNOSIS — D47.3 ESSENTIAL THROMBOCYTHEMIA (H): Primary | Chronic | ICD-10-CM

## 2023-08-02 PROBLEM — Z31.69 ENCOUNTER FOR PRECONCEPTION CONSULTATION: Status: RESOLVED | Noted: 2021-10-19 | Resolved: 2023-08-02

## 2023-08-02 PROBLEM — I70.209: Status: RESOLVED | Noted: 2021-04-08 | Resolved: 2023-08-02

## 2023-08-02 PROBLEM — I99.8 LIMB ISCHEMIA: Status: RESOLVED | Noted: 2021-04-08 | Resolved: 2023-08-02

## 2023-08-15 ENCOUNTER — HOSPITAL ENCOUNTER (OUTPATIENT)
Dept: CARDIOLOGY | Facility: CLINIC | Age: 33
Discharge: HOME | End: 2023-08-15
Attending: INTERNAL MEDICINE
Payer: COMMERCIAL

## 2023-08-15 DIAGNOSIS — Z86.79 HISTORY OF ARTERIAL OCCLUSION: ICD-10-CM

## 2023-08-15 DIAGNOSIS — I49.3 PVC'S (PREMATURE VENTRICULAR CONTRACTIONS): ICD-10-CM

## 2023-08-15 DIAGNOSIS — D47.3 ESSENTIAL THROMBOCYTHEMIA (H): Chronic | ICD-10-CM

## 2023-08-15 PROCEDURE — 93244 EXT ECG>48HR<7D REV&INTERPJ: CPT | Performed by: INTERNAL MEDICINE

## 2023-08-15 PROCEDURE — 93010 ELECTROCARDIOGRAM REPORT: CPT | Performed by: INTERNAL MEDICINE

## 2023-08-15 PROCEDURE — 93242 EXT ECG>48HR<7D RECORDING: CPT

## 2023-08-15 PROCEDURE — 93005 ELECTROCARDIOGRAM TRACING: CPT

## 2023-08-18 LAB
ATRIAL RATE - MUSE: 80 BPM
DIASTOLIC BLOOD PRESSURE - MUSE: NORMAL MMHG
INTERPRETATION ECG - MUSE: NORMAL
P AXIS - MUSE: 47 DEGREES
PR INTERVAL - MUSE: 152 MS
QRS DURATION - MUSE: 86 MS
QT - MUSE: 376 MS
QTC - MUSE: 433 MS
R AXIS - MUSE: 54 DEGREES
SYSTOLIC BLOOD PRESSURE - MUSE: NORMAL MMHG
T AXIS - MUSE: 25 DEGREES
VENTRICULAR RATE- MUSE: 80 BPM

## 2023-09-26 ENCOUNTER — APPOINTMENT (OUTPATIENT)
Dept: LAB | Facility: CLINIC | Age: 33
End: 2023-09-26
Payer: COMMERCIAL

## 2023-09-26 ENCOUNTER — ONCOLOGY VISIT (OUTPATIENT)
Dept: ONCOLOGY | Facility: CLINIC | Age: 33
End: 2023-09-26
Attending: INTERNAL MEDICINE
Payer: COMMERCIAL

## 2023-09-26 VITALS
HEART RATE: 80 BPM | SYSTOLIC BLOOD PRESSURE: 130 MMHG | BODY MASS INDEX: 42.72 KG/M2 | OXYGEN SATURATION: 98 % | RESPIRATION RATE: 18 BRPM | WEIGHT: 289.3 LBS | TEMPERATURE: 97.8 F | DIASTOLIC BLOOD PRESSURE: 85 MMHG

## 2023-09-26 DIAGNOSIS — D47.3 ESSENTIAL THROMBOCYTHEMIA (H): ICD-10-CM

## 2023-09-26 DIAGNOSIS — Z15.89 JAK2 V617F MUTATION: ICD-10-CM

## 2023-09-26 LAB
ALBUMIN SERPL BCG-MCNC: 3.5 G/DL (ref 3.5–5.2)
ALP SERPL-CCNC: 108 U/L (ref 35–104)
ALT SERPL W P-5'-P-CCNC: 6 U/L (ref 0–50)
ANION GAP SERPL CALCULATED.3IONS-SCNC: 12 MMOL/L (ref 7–15)
AST SERPL W P-5'-P-CCNC: 8 U/L (ref 0–45)
BASOPHILS # BLD AUTO: 0 10E3/UL (ref 0–0.2)
BASOPHILS NFR BLD AUTO: 0 %
BILIRUB SERPL-MCNC: <0.2 MG/DL
BUN SERPL-MCNC: 6.4 MG/DL (ref 6–20)
CALCIUM SERPL-MCNC: 9.4 MG/DL (ref 8.6–10)
CHLORIDE SERPL-SCNC: 100 MMOL/L (ref 98–107)
CREAT SERPL-MCNC: 0.52 MG/DL (ref 0.51–0.95)
DEPRECATED HCO3 PLAS-SCNC: 21 MMOL/L (ref 22–29)
EGFRCR SERPLBLD CKD-EPI 2021: >90 ML/MIN/1.73M2
EOSINOPHIL # BLD AUTO: 0 10E3/UL (ref 0–0.7)
EOSINOPHIL NFR BLD AUTO: 0 %
ERYTHROCYTE [DISTWIDTH] IN BLOOD BY AUTOMATED COUNT: 13.3 % (ref 10–15)
FERRITIN SERPL-MCNC: 27 NG/ML (ref 6–175)
GLUCOSE SERPL-MCNC: 100 MG/DL (ref 70–99)
HCT VFR BLD AUTO: 29.7 % (ref 35–47)
HGB BLD-MCNC: 10.3 G/DL (ref 11.7–15.7)
IMM GRANULOCYTES # BLD: 0.1 10E3/UL
IMM GRANULOCYTES NFR BLD: 1 %
IRON BINDING CAPACITY (ROCHE): 471 UG/DL (ref 240–430)
IRON SATN MFR SERPL: 14 % (ref 15–46)
IRON SERPL-MCNC: 67 UG/DL (ref 37–145)
LDH SERPL L TO P-CCNC: 123 U/L (ref 0–250)
LYMPHOCYTES # BLD AUTO: 1.9 10E3/UL (ref 0.8–5.3)
LYMPHOCYTES NFR BLD AUTO: 22 %
MCH RBC QN AUTO: 30.4 PG (ref 26.5–33)
MCHC RBC AUTO-ENTMCNC: 34.7 G/DL (ref 31.5–36.5)
MCV RBC AUTO: 88 FL (ref 78–100)
MONOCYTES # BLD AUTO: 0.5 10E3/UL (ref 0–1.3)
MONOCYTES NFR BLD AUTO: 6 %
NEUTROPHILS # BLD AUTO: 6.4 10E3/UL (ref 1.6–8.3)
NEUTROPHILS NFR BLD AUTO: 71 %
NRBC # BLD AUTO: 0 10E3/UL
NRBC BLD AUTO-RTO: 0 /100
PLATELET # BLD AUTO: 235 10E3/UL (ref 150–450)
POTASSIUM SERPL-SCNC: 4.3 MMOL/L (ref 3.4–5.3)
PROT SERPL-MCNC: 6.4 G/DL (ref 6.4–8.3)
RBC # BLD AUTO: 3.39 10E6/UL (ref 3.8–5.2)
RETICS # AUTO: 0.07 10E6/UL (ref 0.03–0.1)
RETICS/RBC NFR AUTO: 2 % (ref 0.5–2)
SODIUM SERPL-SCNC: 133 MMOL/L (ref 135–145)
WBC # BLD AUTO: 9 10E3/UL (ref 4–11)

## 2023-09-26 PROCEDURE — 85014 HEMATOCRIT: CPT | Performed by: INTERNAL MEDICINE

## 2023-09-26 PROCEDURE — 85045 AUTOMATED RETICULOCYTE COUNT: CPT | Performed by: INTERNAL MEDICINE

## 2023-09-26 PROCEDURE — 80053 COMPREHEN METABOLIC PANEL: CPT | Performed by: INTERNAL MEDICINE

## 2023-09-26 PROCEDURE — 83615 LACTATE (LD) (LDH) ENZYME: CPT | Performed by: INTERNAL MEDICINE

## 2023-09-26 PROCEDURE — 83550 IRON BINDING TEST: CPT | Performed by: INTERNAL MEDICINE

## 2023-09-26 PROCEDURE — 36415 COLL VENOUS BLD VENIPUNCTURE: CPT | Performed by: INTERNAL MEDICINE

## 2023-09-26 PROCEDURE — 99214 OFFICE O/P EST MOD 30 MIN: CPT | Performed by: INTERNAL MEDICINE

## 2023-09-26 PROCEDURE — 82728 ASSAY OF FERRITIN: CPT | Performed by: INTERNAL MEDICINE

## 2023-09-26 PROCEDURE — G0463 HOSPITAL OUTPT CLINIC VISIT: HCPCS | Performed by: INTERNAL MEDICINE

## 2023-09-26 RX ORDER — LEVOTHYROXINE SODIUM 75 UG/1
75 TABLET ORAL DAILY
COMMUNITY
Start: 2023-07-17

## 2023-09-26 ASSESSMENT — PAIN SCALES - GENERAL: PAINLEVEL: NO PAIN (0)

## 2023-09-26 NOTE — NURSING NOTE
"Oncology Rooming Note    September 26, 2023 4:42 PM   Sruthi Beasley is a 32 year old female who presents for:    Chief Complaint   Patient presents with    Blood Draw     Labs drawn via  by RN. VS taken.    Oncology Clinic Visit     Essential thrombocythemia      Initial Vitals: /85 (BP Location: Left arm, Patient Position: Sitting, Cuff Size: Adult Large)   Pulse 80   Temp 97.8  F (36.6  C) (Oral)   Resp 18   Wt 131.2 kg (289 lb 4.8 oz)   SpO2 98%   BMI 42.72 kg/m   Estimated body mass index is 42.72 kg/m  as calculated from the following:    Height as of 6/3/22: 1.753 m (5' 9\").    Weight as of this encounter: 131.2 kg (289 lb 4.8 oz). Body surface area is 2.53 meters squared.  No Pain (0) Comment: Data Unavailable   No LMP recorded. Patient is pregnant.  Allergies reviewed: Yes  Medications reviewed: Yes    Medications: Medication refills not needed today.  Pharmacy name entered into [facility]:    [facility] [place], [place] - [address] [place] [address] [facility] - [place], [place] - [address]    Clinical concerns: none.       Robby Saravia"

## 2023-09-26 NOTE — NURSING NOTE
Chief Complaint   Patient presents with    Blood Draw     Labs drawn via  by RN. VS taken.     Labs collected from venipuncture by RN. Vitals taken. Checked in for appointment(s).     Camille Ballesteros RN

## 2023-09-26 NOTE — LETTER
2023         RE: Sruthi Beasley  [address] [facility] [address]        Dear Colleague,    Thank you for referring your patient, Sruthi Beasley, to the [facility]. Please see a copy of my visit note below.            [facility] Hematology Consultation    Outpatient Visit Note:    Patient: Sruthi Mac  MRN: [id number]  : 1990  STEVE: Sep 26, 2023    Reason for Consultation:  JAK2+ MPN    Assessment:  In summary, Sruthi Mac is a 32 year old woman with [id number] positive myeloproliferative neoplasm and arterial thrombosis, diagnosed in spring 2021.    1. PDE3T842H+ MPN, likely ET v prefibrotic MF  High risk (IPSET>2) ET  2. 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 Third trimester pregnancy- MFM Dr. Hannah Dye (Hannah Dye, DO FACOG   [facility] (new practice in [place])     Sruthi is currently 34 weeks pregnant.     For anticoagulation-- Currently on Lovenox 100mg twice daily keeping an Xa level 0.6-1 and aspirin    Antepartum planning  - induction to be determined by MFM and patient- anticipate around 110/  - will hold Lovenox for 18-24 hrs prior   - ok to hold anticoagulation for entire induction and up to 12 h post-partum. If she were to need a  would also only hold up to 12 h post-  - nursing team should be instructed to monitor blood pressure and distal pulses (history of arterial clots)    Post-partum  - LMWH until she is done breastfeeding--- at that time we will change to an oral anticoagulant. She should NOT be off anticoagulation     Currently is NOT on interferon due to the cost of Pegasys. Last variant allele frequency (2023) was JAK2 V617F currently 3.5%, previously at 5% (2021).     Will repeat VAF and revisit after delivery as may qualify for [employer] financial support (could not use during pregnancy).     Ferritin is 27 and iron sat is 14.  Has been taking oral iron supplement. At present has mild anemia without symptoms. Can consider IV iron sucrose post-partum.    Has ziopatch study done 4/21/21--- had rare PVCs. Now endorsing increased frequency. Will discuss with Cardiology collegues if repeat study prior to induction is warranted.     Plan:  1. Continue with LMWH 100 mg subcutaneous BID- monitor level to titrate to 0.6-1  2. Will need to monitor her CBC monthly while pregnant.  Prefers lab appointments at [facility].  3. Continue oral iron supplement every other day-- will contact [facility] regarding IV iron post-partum  4. Follow-up with Dr. Irving as scheduled.    The patient is given our center's contact information and is instructed to call if she should have any further questions or concerns.  .     36 minutes spent on the date of the encounter doing chart review, review of test results, patient visit and documentation     Keily Irving MD/PhD   of Medicine  Division of Hematology, Oncology and Transplantation      ----------------------------------------------------------------------------------------------------------------------    History of Present Illness/Interval History:  Sruthi Mac is a 32 year old woman with history of depression and anxiety who presetned to  [facility] in April 2021 with acute onset of left leg pain. She was found to have left popliteal arterial occlusion that required extensive interventions. She was evaluated in June 2021 by my former collegue Dr. David due to thrombocytosis. Evaluation included MPN panel that revealed a [id number] mutation. Bone marrow biopsy was completed. She established care in Dr. Irving's clinic in late 2021. Please refer to her initial note for further details.     Doing well today. No issues with severe swelling in her legs. No issues with cramping. Feels normal movements.     Past Medical History:  Past Medical History:   Diagnosis Date    Acute occlusion of artery of  lower extremity (H) 04/08/2021    Anxiety     Depression     Encounter for preconception consultation 10/19/2021    Essential thrombocythemia (H)     History of arterial thrombosis     Hyperlipidemia     JAK2 gene mutation     Limb ischemia 04/08/2021    Obesity     Panic attack     Subclinical hypothyroidism        Past Surgical History:  Past Surgical History:   Procedure Laterality Date    EMBOLECTOMY LOWER EXTREMITY Left 4/11/2021    Procedure: LEFT LEG ANTERIOR TIBIAL AND POSTERIOR TIBIAL EMBOLECTOMIES, FEMORAL POPITEAL VEIN PATCH, ANGIOGRAM, FASCIOTOMIES;  Surgeon: Ulises Gonzalez;  Location: [facility] OR    IR ANGIOGRAM THROUGH CATHETER FOLLOW UP  4/9/2021    IR ANGIOGRAM THROUGH CATHETER FOLLOW UP  4/10/2021    IR LOWER EXTREMITY ANGIOGRAM LEFT  4/8/2021    THROMBECTOMY LOWER EXTREMITY Left 4/12/2021    Procedure: Re-Exploration Left Lower Extremity With Popliteal and Tibial Embolectomy.;  Surgeon: Ulises Gonzalez;  Location: [facility] OR    wisdom teeth remova         Medications:  Current Outpatient Medications   Medication Sig Dispense Refill    levothyroxine (SYNTHROID/LEVOTHROID) 75 MCG tablet       busPIRone (BUSPAR) 10 MG tablet Take 10 mg by mouth 2 times daily      enoxaparin ANTICOAGULANT (LOVENOX) 100 MG/ML syringe Inject 1 mL (100 mg) Subcutaneous 2 times daily 60 mL 11    Ferrous Sulfate (IRON) 325 (65 Fe) MG tablet Take 1 tablet by mouth daily      Lactobacillus (PROBIOTIC ACIDOPHILUS PO) Take by mouth daily      peginterferon fouzia-2a (PEGASYS) 180 MCG/ML injection Inject 0.25 mLs (45 mcg) Subcutaneous every 7 days 4 mL 3    Prenatal MV-Min-Fe Fum-FA-DHA (PRENATAL 1 PO) Take 1 tablet by mouth      sertraline (ZOLOFT) 100 MG tablet Take 100 mg by mouth      STATIN NOT PRESCRIBED (INTENTIONAL) Please choose reason not prescribed from choices below.      Vitamin D3 (CHOLECALCIFEROL) 25 mcg (1000 units) tablet Take 1 tablet by mouth daily          Allergies:  Allergies   Allergen Reactions     Erythromycin Unknown    Pcn [Penicillins] Unknown       ROS:  A 10 point ROS is negative except as stated in the HPI    Social History:  Denies any tobacco use. No significant alcohol use. Denies any illicit drug use. Patient works as a  for [employer]. .    Family History:  Dad- T2 DM, hyperlipidemia, testicular cancer, melanoma  Mother- hyperlipidemia     Objective:  Vitals: /85 (BP Location: Left arm, Patient Position: Sitting, Cuff Size: Adult Large)   Pulse 80   Temp 97.8  F (36.6  C) (Oral)   Resp 18   Wt 131.2 kg (289 lb 4.8 oz)   SpO2 98%   BMI 42.72 kg/m      Exam:   Constitutional: Appears well, no distress  HEENT: Pupils equal and reactive to light. No scleral icterus or hemorrhage. Nares without evidence of telangiectasia. Mucous membranes moist with no wet purpura.   CV: regular rate and rhythm, no murmurs  Respiratory: clear  GI: gravid uterus- abdomen soft, nontender, without guarding or rebound.   Mus/Skele: no edema  Skin: no petechiae, no ecchymosis.  Neuro: CN II-XII intact. Normal gait. AOx3    Lab Results  Results for orders placed or performed in visit on 09/26/23   Comprehensive metabolic panel     Status: Abnormal   Result Value Ref Range    Sodium 133 (L) 135 - 145 mmol/L    Potassium 4.3 3.4 - 5.3 mmol/L    Carbon Dioxide (CO2) 21 (L) 22 - 29 mmol/L    Anion Gap 12 7 - 15 mmol/L    Urea Nitrogen 6.4 6.0 - 20.0 mg/dL    Creatinine 0.52 0.51 - 0.95 mg/dL    GFR Estimate >90 >60 mL/min/1.73m2    Calcium 9.4 8.6 - 10.0 mg/dL    Chloride 100 98 - 107 mmol/L    Glucose 100 (H) 70 - 99 mg/dL    Alkaline Phosphatase 108 (H) 35 - 104 U/L    AST 8 0 - 45 U/L    ALT 6 0 - 50 U/L    Protein Total 6.4 6.4 - 8.3 g/dL    Albumin 3.5 3.5 - 5.2 g/dL    Bilirubin Total <0.2 <=1.2 mg/dL   Lactate Dehydrogenase     Status: Normal   Result Value Ref Range    Lactate Dehydrogenase 123 0 - 250 U/L   Reticulocyte count     Status: Normal   Result Value Ref Range    %  Reticulocyte 2.0 0.5 - 2.0 %    Absolute Reticulocyte 0.069 0.025 - 0.095 10e6/uL   Ferritin     Status: Normal   Result Value Ref Range    Ferritin 27 6 - 175 ng/mL   Iron and iron binding capacity     Status: Abnormal   Result Value Ref Range    Iron 67 37 - 145 ug/dL    Iron Binding Capacity 471 (H) 240 - 430 ug/dL    Iron Sat Index 14 (L) 15 - 46 %   CBC with platelets and differential     Status: Abnormal   Result Value Ref Range    WBC Count 9.0 4.0 - 11.0 10e3/uL    RBC Count 3.39 (L) 3.80 - 5.20 10e6/uL    Hemoglobin 10.3 (L) 11.7 - 15.7 g/dL    Hematocrit 29.7 (L) 35.0 - 47.0 %    MCV 88 78 - 100 fL    MCH 30.4 26.5 - 33.0 pg    MCHC 34.7 31.5 - 36.5 g/dL    RDW 13.3 10.0 - 15.0 %    Platelet Count 235 150 - 450 10e3/uL    % Neutrophils 71 %    % Lymphocytes 22 %    % Monocytes 6 %    % Eosinophils 0 %    % Basophils 0 %    % Immature Granulocytes 1 %    NRBCs per 100 WBC 0 <1 /100    Absolute Neutrophils 6.4 1.6 - 8.3 10e3/uL    Absolute Lymphocytes 1.9 0.8 - 5.3 10e3/uL    Absolute Monocytes 0.5 0.0 - 1.3 10e3/uL    Absolute Eosinophils 0.0 0.0 - 0.7 10e3/uL    Absolute Basophils 0.0 0.0 - 0.2 10e3/uL    Absolute Immature Granulocytes 0.1 <=0.4 10e3/uL    Absolute NRBCs 0.0 10e3/uL   CBC with platelets differential     Status: Abnormal    Narrative    The following orders were created for panel order CBC with platelets differential.  Procedure                               Abnormality         Status                     ---------                               -----------         ------                     CBC with platelets and d...[[id number]]  Abnormal            Final result                 Please view results for these tests on the individual orders.       Imaging:  No recent relevant imaging    Sincerely,        Keily Irving MD

## 2023-09-29 NOTE — PROGRESS NOTES
[facility] Hematology Consultation    Outpatient Visit Note:    Patient: Sruthi Mac  MRN: [id number]  : 1990  STEVE: Sep 26, 2023    Reason for Consultation:  JAK2+ MPN    Assessment:  In summary, Sruthi Mac is a 32 year old woman with [id number] positive myeloproliferative neoplasm and arterial thrombosis, diagnosed in spring 2021.    1. [id number]+ MPN, likely ET v prefibrotic MF  High risk (IPSET>2) ET  2. 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 Third trimester pregnancy- MFM Dr. Hannah Dye (Hannah Dye, DO FACOG   [facility] (new practice in [place])     Sruthi is currently 34 weeks pregnant.     For anticoagulation-- Currently on Lovenox 100mg twice daily keeping an Xa level 0.6-1 and aspirin    Antepartum planning  - induction to be determined by MFM and patient- anticipate around /  - will hold Lovenox for 18-24 hrs prior   - ok to hold anticoagulation for entire induction and up to 12 h post-partum. If she were to need a  would also only hold up to 12 h post-  - nursing team should be instructed to monitor blood pressure and distal pulses (history of arterial clots)    Post-partum  - LMWH until she is done breastfeeding--- at that time we will change to an oral anticoagulant. She should NOT be off anticoagulation     Currently is NOT on interferon due to the cost of Pegasys. Last variant allele frequency (2023) was JAK2 V617F currently 3.5%, previously at 5% (2021).     Will repeat VAF and revisit after delivery as may qualify for [employer] financial support (could not use during pregnancy).     Ferritin is 27 and iron sat is 14. Has been taking oral iron supplement. At present has mild anemia without symptoms. Can consider IV iron sucrose post-partum.    Has ziopatch study done 21--- had rare PVCs. Now endorsing increased frequency. Will discuss with Cardiology collegues if repeat  study prior to induction is warranted.     Plan:  1. Continue with LMWH 100 mg subcutaneous BID- monitor level to titrate to 0.6-1  2. Will need to monitor her CBC monthly while pregnant.  Prefers lab appointments at [facility].  3. Continue oral iron supplement every other day-- will contact [facility] regarding IV iron post-partum  4. Follow-up with Dr. Irving as scheduled.    The patient is given our center's contact information and is instructed to call if she should have any further questions or concerns.  .     36 minutes spent on the date of the encounter doing chart review, review of test results, patient visit and documentation     Keily Irving MD/PhD   of Medicine  Division of Hematology, Oncology and Transplantation      ----------------------------------------------------------------------------------------------------------------------    History of Present Illness/Interval History:  Sruthi Mac is a 32 year old woman with history of depression and anxiety who presetned to  [employer] in April 2021 with acute onset of left leg pain. She was found to have left popliteal arterial occlusion that required extensive interventions. She was evaluated in June 2021 by my former collegue Dr. David due to thrombocytosis. Evaluation included MPN panel that revealed a [id number] mutation. Bone marrow biopsy was completed. She established care in Dr. Irving's clinic in late 2021. Please refer to her initial note for further details.     Doing well today. No issues with severe swelling in her legs. No issues with cramping. Feels normal movements.     Past Medical History:  Past Medical History:   Diagnosis Date    Acute occlusion of artery of lower extremity (H) 04/08/2021    Anxiety     Depression     Encounter for preconception consultation 10/19/2021    Essential thrombocythemia (H)     History of arterial thrombosis     Hyperlipidemia     JAK2 gene mutation     Limb ischemia 04/08/2021    Obesity      Panic attack     Subclinical hypothyroidism        Past Surgical History:  Past Surgical History:   Procedure Laterality Date    EMBOLECTOMY LOWER EXTREMITY Left 4/11/2021    Procedure: LEFT LEG ANTERIOR TIBIAL AND POSTERIOR TIBIAL EMBOLECTOMIES, FEMORAL POPITEAL VEIN PATCH, ANGIOGRAM, FASCIOTOMIES;  Surgeon: Ulises Gonzalez;  Location: [facility] OR    IR ANGIOGRAM THROUGH CATHETER FOLLOW UP  4/9/2021    IR ANGIOGRAM THROUGH CATHETER FOLLOW UP  4/10/2021    IR LOWER EXTREMITY ANGIOGRAM LEFT  4/8/2021    THROMBECTOMY LOWER EXTREMITY Left 4/12/2021    Procedure: Re-Exploration Left Lower Extremity With Popliteal and Tibial Embolectomy.;  Surgeon: Ulises Gonzalez;  Location: [facility] OR    wisdom teeth remova         Medications:  Current Outpatient Medications   Medication Sig Dispense Refill    levothyroxine (SYNTHROID/LEVOTHROID) 75 MCG tablet       busPIRone (BUSPAR) 10 MG tablet Take 10 mg by mouth 2 times daily      enoxaparin ANTICOAGULANT (LOVENOX) 100 MG/ML syringe Inject 1 mL (100 mg) Subcutaneous 2 times daily 60 mL 11    Ferrous Sulfate (IRON) 325 (65 Fe) MG tablet Take 1 tablet by mouth daily      Lactobacillus (PROBIOTIC ACIDOPHILUS PO) Take by mouth daily      peginterferon fouzia-2a (PEGASYS) 180 MCG/ML injection Inject 0.25 mLs (45 mcg) Subcutaneous every 7 days 4 mL 3    Prenatal MV-Min-Fe Fum-FA-DHA (PRENATAL 1 PO) Take 1 tablet by mouth      sertraline (ZOLOFT) 100 MG tablet Take 100 mg by mouth      STATIN NOT PRESCRIBED (INTENTIONAL) Please choose reason not prescribed from choices below.      Vitamin D3 (CHOLECALCIFEROL) 25 mcg (1000 units) tablet Take 1 tablet by mouth daily          Allergies:  Allergies   Allergen Reactions    Erythromycin Unknown    Pcn [Penicillins] Unknown       ROS:  A 10 point ROS is negative except as stated in the HPI    Social History:  Denies any tobacco use. No significant alcohol use. Denies any illicit drug use. Patient works as a  for [email or web address]  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 .    Family History:  Dad- T2 DM, hyperlipidemia, testicular cancer, melanoma  Mother- hyperlipidemia     Objective:  Vitals: /85 (BP Location: Left arm, Patient Position: Sitting, Cuff Size: Adult Large)   Pulse 80   Temp 97.8  F (36.6  C) (Oral)   Resp 18   Wt 131.2 kg (289 lb 4.8 oz)   SpO2 98%   BMI 42.72 kg/m      Exam:   Constitutional: Appears well, no distress  HEENT: Pupils equal and reactive to light. No scleral icterus or hemorrhage. Nares without evidence of telangiectasia. Mucous membranes moist with no wet purpura.   CV: regular rate and rhythm, no murmurs  Respiratory: clear  GI: gravid uterus- abdomen soft, nontender, without guarding or rebound.   Mus/Skele: no edema  Skin: no petechiae, no ecchymosis.  Neuro: CN II-XII intact. Normal gait. AOx3    Lab Results  Results for orders placed or performed in visit on 09/26/23   Comprehensive metabolic panel     Status: Abnormal   Result Value Ref Range    Sodium 133 (L) 135 - 145 mmol/L    Potassium 4.3 3.4 - 5.3 mmol/L    Carbon Dioxide (CO2) 21 (L) 22 - 29 mmol/L    Anion Gap 12 7 - 15 mmol/L    Urea Nitrogen 6.4 6.0 - 20.0 mg/dL    Creatinine 0.52 0.51 - 0.95 mg/dL    GFR Estimate >90 >60 mL/min/1.73m2    Calcium 9.4 8.6 - 10.0 mg/dL    Chloride 100 98 - 107 mmol/L    Glucose 100 (H) 70 - 99 mg/dL    Alkaline Phosphatase 108 (H) 35 - 104 U/L    AST 8 0 - 45 U/L    ALT 6 0 - 50 U/L    Protein Total 6.4 6.4 - 8.3 g/dL    Albumin 3.5 3.5 - 5.2 g/dL    Bilirubin Total <0.2 <=1.2 mg/dL   Lactate Dehydrogenase     Status: Normal   Result Value Ref Range    Lactate Dehydrogenase 123 0 - 250 U/L   Reticulocyte count     Status: Normal   Result Value Ref Range    % Reticulocyte 2.0 0.5 - 2.0 %    Absolute Reticulocyte 0.069 0.025 - 0.095 10e6/uL   Ferritin     Status: Normal   Result Value Ref Range    Ferritin 27 6 - 175 ng/mL   Iron and iron binding capacity     Status: Abnormal   Result Value Ref Range    Iron 67 37 - 145 ug/dL     Iron Binding Capacity 471 (H) 240 - 430 ug/dL    Iron Sat Index 14 (L) 15 - 46 %   CBC with platelets and differential     Status: Abnormal   Result Value Ref Range    WBC Count 9.0 4.0 - 11.0 10e3/uL    RBC Count 3.39 (L) 3.80 - 5.20 10e6/uL    Hemoglobin 10.3 (L) 11.7 - 15.7 g/dL    Hematocrit 29.7 (L) 35.0 - 47.0 %    MCV 88 78 - 100 fL    MCH 30.4 26.5 - 33.0 pg    MCHC 34.7 31.5 - 36.5 g/dL    RDW 13.3 10.0 - 15.0 %    Platelet Count 235 150 - 450 10e3/uL    % Neutrophils 71 %    % Lymphocytes 22 %    % Monocytes 6 %    % Eosinophils 0 %    % Basophils 0 %    % Immature Granulocytes 1 %    NRBCs per 100 WBC 0 <1 /100    Absolute Neutrophils 6.4 1.6 - 8.3 10e3/uL    Absolute Lymphocytes 1.9 0.8 - 5.3 10e3/uL    Absolute Monocytes 0.5 0.0 - 1.3 10e3/uL    Absolute Eosinophils 0.0 0.0 - 0.7 10e3/uL    Absolute Basophils 0.0 0.0 - 0.2 10e3/uL    Absolute Immature Granulocytes 0.1 <=0.4 10e3/uL    Absolute NRBCs 0.0 10e3/uL   CBC with platelets differential     Status: Abnormal    Narrative    The following orders were created for panel order CBC with platelets differential.  Procedure                               Abnormality         Status                     ---------                               -----------         ------                     CBC with platelets and d...[[id number]]  Abnormal            Final result                 Please view results for these tests on the individual orders.       Imaging:  No recent relevant imaging

## 2023-10-20 ENCOUNTER — ANESTHESIA (OUTPATIENT)
Dept: OBGYN | Facility: CLINIC | Age: 33
End: 2023-10-20
Payer: COMMERCIAL

## 2023-10-20 ENCOUNTER — HOSPITAL ENCOUNTER (INPATIENT)
Facility: CLINIC | Age: 33
LOS: 2 days | Discharge: HOME | End: 2023-10-23
Attending: SPECIALIST | Admitting: SPECIALIST
Payer: COMMERCIAL

## 2023-10-20 ENCOUNTER — ANESTHESIA EVENT (OUTPATIENT)
Dept: OBGYN | Facility: CLINIC | Age: 33
End: 2023-10-20
Payer: COMMERCIAL

## 2023-10-20 DIAGNOSIS — Z15.89 JAK2 V617F MUTATION: Chronic | ICD-10-CM

## 2023-10-20 DIAGNOSIS — Z98.891 S/P CESAREAN SECTION: Primary | ICD-10-CM

## 2023-10-20 DIAGNOSIS — O14.20 HEMOLYSIS, ELEVATED LIVER ENZYMES, AND LOW PLATELET (HELLP) SYNDROME DURING PREGNANCY, ANTEPARTUM: ICD-10-CM

## 2023-10-20 PROBLEM — Z36.89 ENCOUNTER FOR TRIAGE IN PREGNANT PATIENT: Status: RESOLVED | Noted: 2023-10-20 | Resolved: 2023-10-20

## 2023-10-20 PROBLEM — Z36.89 ENCOUNTER FOR TRIAGE IN PREGNANT PATIENT: Status: ACTIVE | Noted: 2023-10-20

## 2023-10-20 LAB
ABO/RH(D): NORMAL
ACANTHOCYTES BLD QL SMEAR: NORMAL
ALBUMIN MFR UR ELPH: 220 MG/DL
ALBUMIN SERPL BCG-MCNC: 3.6 G/DL (ref 3.5–5.2)
ALBUMIN UR-MCNC: 300 MG/DL
ALP SERPL-CCNC: 166 U/L (ref 35–104)
ALT SERPL W P-5'-P-CCNC: 123 U/L (ref 0–50)
ALT SERPL W P-5'-P-CCNC: 163 U/L (ref 0–50)
ANION GAP SERPL CALCULATED.3IONS-SCNC: 17 MMOL/L (ref 7–15)
ANTIBODY SCREEN: NEGATIVE
APPEARANCE UR: ABNORMAL
AST SERPL W P-5'-P-CCNC: 137 U/L (ref 0–45)
AST SERPL W P-5'-P-CCNC: 205 U/L (ref 0–45)
AUER BODIES BLD QL SMEAR: NORMAL
BACTERIA #/AREA URNS HPF: ABNORMAL /HPF
BASO STIPL BLD QL SMEAR: NORMAL
BILIRUB SERPL-MCNC: 0.6 MG/DL
BILIRUB UR QL STRIP: ABNORMAL
BITE CELLS BLD QL SMEAR: NORMAL
BLD PROD TYP BPU: NORMAL
BLD PROD TYP BPU: NORMAL
BLISTER CELLS BLD QL SMEAR: NORMAL
BLOOD COMPONENT TYPE: NORMAL
BLOOD COMPONENT TYPE: NORMAL
BUN SERPL-MCNC: 10.6 MG/DL (ref 6–20)
BURR CELLS BLD QL SMEAR: NORMAL
CALCIUM SERPL-MCNC: 9.8 MG/DL (ref 8.6–10)
CHLORIDE SERPL-SCNC: 97 MMOL/L (ref 98–107)
CODING SYSTEM: NORMAL
CODING SYSTEM: NORMAL
COLOR UR AUTO: YELLOW
CREAT SERPL-MCNC: 0.51 MG/DL (ref 0.51–0.95)
CREAT SERPL-MCNC: 0.67 MG/DL (ref 0.51–0.95)
CREAT UR-MCNC: 369.6 MG/DL
CROSSMATCH: NORMAL
CROSSMATCH: NORMAL
DACRYOCYTES BLD QL SMEAR: NORMAL
DEPRECATED HCO3 PLAS-SCNC: 21 MMOL/L (ref 22–29)
EGFRCR SERPLBLD CKD-EPI 2021: >90 ML/MIN/1.73M2
EGFRCR SERPLBLD CKD-EPI 2021: >90 ML/MIN/1.73M2
ELLIPTOCYTES BLD QL SMEAR: NORMAL
ERYTHROCYTE [DISTWIDTH] IN BLOOD BY AUTOMATED COUNT: 13.5 % (ref 10–15)
FRAGMENTS BLD QL SMEAR: NORMAL
GLUCOSE SERPL-MCNC: 79 MG/DL (ref 70–99)
GLUCOSE UR STRIP-MCNC: NEGATIVE MG/DL
HCT VFR BLD AUTO: 35.5 % (ref 35–47)
HGB BLD-MCNC: 11.5 G/DL (ref 11.7–15.7)
HGB BLD-MCNC: 11.9 G/DL (ref 11.7–15.7)
HGB C CRYSTALS: NORMAL
HGB UR QL STRIP: ABNORMAL
HOLD SPECIMEN: NORMAL
HOWELL-JOLLY BOD BLD QL SMEAR: NORMAL
HYALINE CASTS: 9 /LPF
KETONES UR STRIP-MCNC: >150 MG/DL
LEUKOCYTE ESTERASE UR QL STRIP: ABNORMAL
MCH RBC QN AUTO: 29.8 PG (ref 26.5–33)
MCHC RBC AUTO-ENTMCNC: 33.5 G/DL (ref 31.5–36.5)
MCV RBC AUTO: 89 FL (ref 78–100)
MUCOUS THREADS #/AREA URNS LPF: PRESENT /LPF
NEUTS HYPERSEG BLD QL SMEAR: NORMAL
NITRATE UR QL: NEGATIVE
PH UR STRIP: 6 [PH] (ref 5–7)
PLAT MORPH BLD: NORMAL
PLATELET # BLD AUTO: 109 10E3/UL (ref 150–450)
PLATELET # BLD AUTO: 150 10E3/UL (ref 150–450)
POLYCHROMASIA BLD QL SMEAR: NORMAL
POTASSIUM SERPL-SCNC: 3.6 MMOL/L (ref 3.4–5.3)
PROT SERPL-MCNC: 7 G/DL (ref 6.4–8.3)
PROT/CREAT 24H UR: 0.6 MG/MG CR (ref 0–0.2)
RBC # BLD AUTO: 3.99 10E6/UL (ref 3.8–5.2)
RBC AGGLUT BLD QL: NORMAL
RBC MORPH BLD: NORMAL
RBC URINE: 5 /HPF
ROULEAUX BLD QL SMEAR: NORMAL
SICKLE CELLS BLD QL SMEAR: NORMAL
SMUDGE CELLS BLD QL SMEAR: NORMAL
SODIUM SERPL-SCNC: 135 MMOL/L (ref 135–145)
SP GR UR STRIP: 1.03 (ref 1–1.03)
SPECIMEN EXPIRATION DATE: NORMAL
SPHEROCYTES BLD QL SMEAR: NORMAL
SQUAMOUS EPITHELIAL: 3 /HPF
STOMATOCYTES BLD QL SMEAR: NORMAL
T PALLIDUM AB SER QL: NONREACTIVE
TARGETS BLD QL SMEAR: NORMAL
TOXIC GRANULES BLD QL SMEAR: NORMAL
UNIT ABO/RH: NORMAL
UNIT ABO/RH: NORMAL
UNIT NUMBER: NORMAL
UNIT NUMBER: NORMAL
UNIT STATUS: NORMAL
UNIT STATUS: NORMAL
UNIT TYPE ISBT: 6200
UNIT TYPE ISBT: 6200
URATE SERPL-MCNC: 10.7 MG/DL (ref 2.4–5.7)
UROBILINOGEN UR STRIP-MCNC: 4 MG/DL
VARIANT LYMPHS BLD QL SMEAR: NORMAL
WBC # BLD AUTO: 11.9 10E3/UL (ref 4–11)
WBC URINE: 21 /HPF

## 2023-10-20 PROCEDURE — 87086 URINE CULTURE/COLONY COUNT: CPT | Performed by: SPECIALIST

## 2023-10-20 PROCEDURE — 86901 BLOOD TYPING SEROLOGIC RH(D): CPT | Performed by: SPECIALIST

## 2023-10-20 PROCEDURE — 36415 COLL VENOUS BLD VENIPUNCTURE: CPT | Performed by: SPECIALIST

## 2023-10-20 PROCEDURE — 258N000003 HC RX IP 258 OP 636: Performed by: SPECIALIST

## 2023-10-20 PROCEDURE — 86780 TREPONEMA PALLIDUM: CPT | Performed by: SPECIALIST

## 2023-10-20 PROCEDURE — 250N000011 HC RX IP 250 OP 636: Mod: JZ | Performed by: NURSE ANESTHETIST, CERTIFIED REGISTERED

## 2023-10-20 PROCEDURE — 84550 ASSAY OF BLOOD/URIC ACID: CPT | Performed by: SPECIALIST

## 2023-10-20 PROCEDURE — 84156 ASSAY OF PROTEIN URINE: CPT | Performed by: SPECIALIST

## 2023-10-20 PROCEDURE — 85049 AUTOMATED PLATELET COUNT: CPT | Performed by: SPECIALIST

## 2023-10-20 PROCEDURE — 3E0P7GC INTRODUCTION OF OTHER THERAPEUTIC SUBSTANCE INTO FEMALE REPRODUCTIVE, VIA NATURAL OR ARTIFICIAL OPENING: ICD-10-PCS | Performed by: SPECIALIST

## 2023-10-20 PROCEDURE — 84450 TRANSFERASE (AST) (SGOT): CPT | Performed by: SPECIALIST

## 2023-10-20 PROCEDURE — 250N000011 HC RX IP 250 OP 636: Mod: JZ

## 2023-10-20 PROCEDURE — 250N000013 HC RX MED GY IP 250 OP 250 PS 637: Performed by: SPECIALIST

## 2023-10-20 PROCEDURE — 85018 HEMOGLOBIN: CPT | Performed by: SPECIALIST

## 2023-10-20 PROCEDURE — 250N000009 HC RX 250: Performed by: SPECIALIST

## 2023-10-20 PROCEDURE — 85027 COMPLETE CBC AUTOMATED: CPT | Performed by: SPECIALIST

## 2023-10-20 PROCEDURE — 82565 ASSAY OF CREATININE: CPT | Performed by: SPECIALIST

## 2023-10-20 PROCEDURE — 86923 COMPATIBILITY TEST ELECTRIC: CPT | Performed by: SPECIALIST

## 2023-10-20 PROCEDURE — 272N000001 HC OR GENERAL SUPPLY STERILE: Performed by: SPECIALIST

## 2023-10-20 PROCEDURE — 84460 ALANINE AMINO (ALT) (SGPT): CPT | Performed by: SPECIALIST

## 2023-10-20 PROCEDURE — 250N000025 HC SEVOFLURANE, PER MIN: Performed by: SPECIALIST

## 2023-10-20 PROCEDURE — 250N000011 HC RX IP 250 OP 636: Mod: JZ | Performed by: SPECIALIST

## 2023-10-20 PROCEDURE — 250N000011 HC RX IP 250 OP 636: Performed by: SPECIALIST

## 2023-10-20 PROCEDURE — 258N000003 HC RX IP 258 OP 636: Performed by: NURSE ANESTHETIST, CERTIFIED REGISTERED

## 2023-10-20 PROCEDURE — 80053 COMPREHEN METABOLIC PANEL: CPT | Performed by: SPECIALIST

## 2023-10-20 PROCEDURE — 370N000017 HC ANESTHESIA TECHNICAL FEE, PER MIN: Performed by: SPECIALIST

## 2023-10-20 PROCEDURE — 360N000076 HC SURGERY LEVEL 3, PER MIN: Performed by: SPECIALIST

## 2023-10-20 PROCEDURE — 81001 URINALYSIS AUTO W/SCOPE: CPT | Performed by: SPECIALIST

## 2023-10-20 PROCEDURE — 86850 RBC ANTIBODY SCREEN: CPT | Performed by: SPECIALIST

## 2023-10-20 PROCEDURE — 250N000009 HC RX 250: Performed by: NURSE ANESTHETIST, CERTIFIED REGISTERED

## 2023-10-20 PROCEDURE — 710N000011 HC RECOVERY PHASE 1, LEVEL 3, PER MIN: Performed by: SPECIALIST

## 2023-10-20 RX ORDER — LIDOCAINE HYDROCHLORIDE 20 MG/ML
INJECTION, SOLUTION INFILTRATION; PERINEURAL PRN
Status: DISCONTINUED | OUTPATIENT
Start: 2023-10-20 | End: 2023-10-21

## 2023-10-20 RX ORDER — HYDROXYZINE HYDROCHLORIDE 25 MG/1
50 TABLET, FILM COATED ORAL
Status: DISCONTINUED | OUTPATIENT
Start: 2023-10-20 | End: 2023-10-21

## 2023-10-20 RX ORDER — TRANEXAMIC ACID 10 MG/ML
1 INJECTION, SOLUTION INTRAVENOUS EVERY 30 MIN PRN
Status: DISCONTINUED | OUTPATIENT
Start: 2023-10-20 | End: 2023-10-21 | Stop reason: HOSPADM

## 2023-10-20 RX ORDER — SODIUM CHLORIDE, SODIUM LACTATE, POTASSIUM CHLORIDE, CALCIUM CHLORIDE 600; 310; 30; 20 MG/100ML; MG/100ML; MG/100ML; MG/100ML
INJECTION, SOLUTION INTRAVENOUS CONTINUOUS
Status: DISCONTINUED | OUTPATIENT
Start: 2023-10-20 | End: 2023-10-21

## 2023-10-20 RX ORDER — OXYTOCIN/0.9 % SODIUM CHLORIDE 30/500 ML
340 PLASTIC BAG, INJECTION (ML) INTRAVENOUS CONTINUOUS PRN
Status: COMPLETED | OUTPATIENT
Start: 2023-10-20 | End: 2023-10-20

## 2023-10-20 RX ORDER — FENTANYL CITRATE 50 UG/ML
100 INJECTION, SOLUTION INTRAMUSCULAR; INTRAVENOUS
Status: DISCONTINUED | OUTPATIENT
Start: 2023-10-20 | End: 2023-10-21

## 2023-10-20 RX ORDER — NALOXONE HYDROCHLORIDE 0.4 MG/ML
0.4 INJECTION, SOLUTION INTRAMUSCULAR; INTRAVENOUS; SUBCUTANEOUS
Status: DISCONTINUED | OUTPATIENT
Start: 2023-10-20 | End: 2023-10-21

## 2023-10-20 RX ORDER — METHYLERGONOVINE MALEATE 0.2 MG/ML
200 INJECTION INTRAVENOUS
Status: DISCONTINUED | OUTPATIENT
Start: 2023-10-20 | End: 2023-10-21 | Stop reason: HOSPADM

## 2023-10-20 RX ORDER — CARBOPROST TROMETHAMINE 250 UG/ML
250 INJECTION, SOLUTION INTRAMUSCULAR
Status: DISCONTINUED | OUTPATIENT
Start: 2023-10-20 | End: 2023-10-21 | Stop reason: HOSPADM

## 2023-10-20 RX ORDER — LABETALOL HYDROCHLORIDE 5 MG/ML
20 INJECTION, SOLUTION INTRAVENOUS
Status: DISCONTINUED | OUTPATIENT
Start: 2023-10-20 | End: 2023-10-21

## 2023-10-20 RX ORDER — CITRIC ACID/SODIUM CITRATE 334-500MG
30 SOLUTION, ORAL ORAL
Status: COMPLETED | OUTPATIENT
Start: 2023-10-20 | End: 2023-10-20

## 2023-10-20 RX ORDER — METOCLOPRAMIDE 10 MG/1
10 TABLET ORAL EVERY 6 HOURS PRN
Status: DISCONTINUED | OUTPATIENT
Start: 2023-10-20 | End: 2023-10-21

## 2023-10-20 RX ORDER — OXYTOCIN 10 [USP'U]/ML
10 INJECTION, SOLUTION INTRAMUSCULAR; INTRAVENOUS
Status: DISCONTINUED | OUTPATIENT
Start: 2023-10-20 | End: 2023-10-21 | Stop reason: HOSPADM

## 2023-10-20 RX ORDER — MISOPROSTOL 100 UG/1
25 TABLET ORAL EVERY 4 HOURS PRN
Status: DISCONTINUED | OUTPATIENT
Start: 2023-10-20 | End: 2023-10-21

## 2023-10-20 RX ORDER — MAGNESIUM SULFATE HEPTAHYDRATE 40 MG/ML
4 INJECTION, SOLUTION INTRAVENOUS
Status: DISCONTINUED | OUTPATIENT
Start: 2023-10-20 | End: 2023-10-21

## 2023-10-20 RX ORDER — PROCHLORPERAZINE 25 MG
25 SUPPOSITORY, RECTAL RECTAL EVERY 12 HOURS PRN
Status: DISCONTINUED | OUTPATIENT
Start: 2023-10-20 | End: 2023-10-21

## 2023-10-20 RX ORDER — SERTRALINE HYDROCHLORIDE 25 MG/1
100 TABLET, FILM COATED ORAL DAILY
Status: DISCONTINUED | OUTPATIENT
Start: 2023-10-20 | End: 2023-10-21

## 2023-10-20 RX ORDER — MISOPROSTOL 200 UG/1
400 TABLET ORAL
Status: DISCONTINUED | OUTPATIENT
Start: 2023-10-20 | End: 2023-10-21 | Stop reason: HOSPADM

## 2023-10-20 RX ORDER — ONDANSETRON 2 MG/ML
4 INJECTION INTRAMUSCULAR; INTRAVENOUS EVERY 6 HOURS PRN
Status: DISCONTINUED | OUTPATIENT
Start: 2023-10-20 | End: 2023-10-21

## 2023-10-20 RX ORDER — ACETAMINOPHEN 325 MG/1
975 TABLET ORAL ONCE
Status: COMPLETED | OUTPATIENT
Start: 2023-10-20 | End: 2023-10-20

## 2023-10-20 RX ORDER — LIDOCAINE 40 MG/G
CREAM TOPICAL
Status: DISCONTINUED | OUTPATIENT
Start: 2023-10-20 | End: 2023-10-21 | Stop reason: HOSPADM

## 2023-10-20 RX ORDER — METOCLOPRAMIDE HYDROCHLORIDE 5 MG/ML
10 INJECTION INTRAMUSCULAR; INTRAVENOUS EVERY 6 HOURS PRN
Status: DISCONTINUED | OUTPATIENT
Start: 2023-10-20 | End: 2023-10-20

## 2023-10-20 RX ORDER — MAGNESIUM SULFATE HEPTAHYDRATE 40 MG/ML
INJECTION, SOLUTION INTRAVENOUS
Status: COMPLETED
Start: 2023-10-20 | End: 2023-10-20

## 2023-10-20 RX ORDER — CLINDAMYCIN PHOSPHATE 900 MG/50ML
900 INJECTION, SOLUTION INTRAVENOUS
Status: COMPLETED | OUTPATIENT
Start: 2023-10-20 | End: 2023-10-20

## 2023-10-20 RX ORDER — NALOXONE HYDROCHLORIDE 0.4 MG/ML
0.2 INJECTION, SOLUTION INTRAMUSCULAR; INTRAVENOUS; SUBCUTANEOUS
Status: DISCONTINUED | OUTPATIENT
Start: 2023-10-20 | End: 2023-10-21

## 2023-10-20 RX ORDER — PROPOFOL 10 MG/ML
INJECTION, EMULSION INTRAVENOUS PRN
Status: DISCONTINUED | OUTPATIENT
Start: 2023-10-20 | End: 2023-10-21

## 2023-10-20 RX ORDER — BUSPIRONE HYDROCHLORIDE 10 MG/1
10 TABLET ORAL DAILY
Status: DISCONTINUED | OUTPATIENT
Start: 2023-10-21 | End: 2023-10-21

## 2023-10-20 RX ORDER — TERBUTALINE SULFATE 1 MG/ML
0.25 INJECTION, SOLUTION SUBCUTANEOUS
Status: DISCONTINUED | OUTPATIENT
Start: 2023-10-20 | End: 2023-10-21

## 2023-10-20 RX ORDER — PROCHLORPERAZINE MALEATE 5 MG
10 TABLET ORAL EVERY 6 HOURS PRN
Status: DISCONTINUED | OUTPATIENT
Start: 2023-10-20 | End: 2023-10-21

## 2023-10-20 RX ORDER — LIDOCAINE 40 MG/G
CREAM TOPICAL
Status: DISCONTINUED | OUTPATIENT
Start: 2023-10-20 | End: 2023-10-21

## 2023-10-20 RX ORDER — MISOPROSTOL 200 UG/1
800 TABLET ORAL
Status: DISCONTINUED | OUTPATIENT
Start: 2023-10-20 | End: 2023-10-21 | Stop reason: HOSPADM

## 2023-10-20 RX ORDER — ONDANSETRON 2 MG/ML
INJECTION INTRAMUSCULAR; INTRAVENOUS PRN
Status: DISCONTINUED | OUTPATIENT
Start: 2023-10-20 | End: 2023-10-21

## 2023-10-20 RX ORDER — NIFEDIPINE 10 MG/1
10-20 CAPSULE ORAL
Status: DISCONTINUED | OUTPATIENT
Start: 2023-10-20 | End: 2023-10-21

## 2023-10-20 RX ORDER — ACETAMINOPHEN 325 MG/1
650 TABLET ORAL EVERY 4 HOURS PRN
Status: DISCONTINUED | OUTPATIENT
Start: 2023-10-20 | End: 2023-10-21

## 2023-10-20 RX ORDER — SODIUM CHLORIDE, SODIUM LACTATE, POTASSIUM CHLORIDE, CALCIUM CHLORIDE 600; 310; 30; 20 MG/100ML; MG/100ML; MG/100ML; MG/100ML
INJECTION, SOLUTION INTRAVENOUS CONTINUOUS
Status: DISCONTINUED | OUTPATIENT
Start: 2023-10-20 | End: 2023-10-21 | Stop reason: HOSPADM

## 2023-10-20 RX ORDER — MAGNESIUM SULFATE HEPTAHYDRATE 40 MG/ML
4 INJECTION, SOLUTION INTRAVENOUS ONCE
Status: COMPLETED | OUTPATIENT
Start: 2023-10-20 | End: 2023-10-20

## 2023-10-20 RX ORDER — CITRIC ACID/SODIUM CITRATE 334-500MG
30 SOLUTION, ORAL ORAL ONCE
Status: DISCONTINUED | OUTPATIENT
Start: 2023-10-20 | End: 2023-10-21

## 2023-10-20 RX ORDER — METOCLOPRAMIDE HYDROCHLORIDE 5 MG/ML
10 INJECTION INTRAMUSCULAR; INTRAVENOUS EVERY 6 HOURS PRN
Status: DISCONTINUED | OUTPATIENT
Start: 2023-10-20 | End: 2023-10-21

## 2023-10-20 RX ORDER — ONDANSETRON 2 MG/ML
INJECTION INTRAMUSCULAR; INTRAVENOUS
Status: DISCONTINUED
Start: 2023-10-20 | End: 2023-10-20 | Stop reason: WASHOUT

## 2023-10-20 RX ORDER — ONDANSETRON 2 MG/ML
4 INJECTION INTRAMUSCULAR; INTRAVENOUS EVERY 6 HOURS PRN
Status: DISCONTINUED | OUTPATIENT
Start: 2023-10-20 | End: 2023-10-20

## 2023-10-20 RX ORDER — ONDANSETRON 4 MG/1
4 TABLET, ORALLY DISINTEGRATING ORAL EVERY 6 HOURS PRN
Status: DISCONTINUED | OUTPATIENT
Start: 2023-10-20 | End: 2023-10-21

## 2023-10-20 RX ORDER — SODIUM CHLORIDE, SODIUM LACTATE, POTASSIUM CHLORIDE, CALCIUM CHLORIDE 600; 310; 30; 20 MG/100ML; MG/100ML; MG/100ML; MG/100ML
10-125 INJECTION, SOLUTION INTRAVENOUS CONTINUOUS
Status: DISCONTINUED | OUTPATIENT
Start: 2023-10-20 | End: 2023-10-21

## 2023-10-20 RX ORDER — MAGNESIUM SULFATE IN WATER 40 MG/ML
2 INJECTION, SOLUTION INTRAVENOUS CONTINUOUS
Status: DISCONTINUED | OUTPATIENT
Start: 2023-10-20 | End: 2023-10-23 | Stop reason: HOSPADM

## 2023-10-20 RX ORDER — CALCIUM GLUCONATE 94 MG/ML
1 INJECTION, SOLUTION INTRAVENOUS
Status: DISCONTINUED | OUTPATIENT
Start: 2023-10-20 | End: 2023-10-21

## 2023-10-20 RX ORDER — DEXAMETHASONE SODIUM PHOSPHATE 4 MG/ML
INJECTION, SOLUTION INTRA-ARTICULAR; INTRALESIONAL; INTRAMUSCULAR; INTRAVENOUS; SOFT TISSUE PRN
Status: DISCONTINUED | OUTPATIENT
Start: 2023-10-20 | End: 2023-10-21

## 2023-10-20 RX ORDER — MAGNESIUM SULFATE HEPTAHYDRATE 40 MG/ML
2 INJECTION, SOLUTION INTRAVENOUS
Status: DISCONTINUED | OUTPATIENT
Start: 2023-10-20 | End: 2023-10-21

## 2023-10-20 RX ADMIN — FENTANYL CITRATE 50 MCG: 50 INJECTION INTRAMUSCULAR; INTRAVENOUS at 23:59

## 2023-10-20 RX ADMIN — NIFEDIPINE 20 MG: 10 CAPSULE ORAL at 16:49

## 2023-10-20 RX ADMIN — METOCLOPRAMIDE 10 MG: 5 INJECTION, SOLUTION INTRAMUSCULAR; INTRAVENOUS at 11:55

## 2023-10-20 RX ADMIN — LIDOCAINE HYDROCHLORIDE 80 MG: 20 INJECTION, SOLUTION INFILTRATION; PERINEURAL at 23:44

## 2023-10-20 RX ADMIN — MAGNESIUM SULFATE HEPTAHYDRATE 4 G: 40 INJECTION, SOLUTION INTRAVENOUS at 17:27

## 2023-10-20 RX ADMIN — PROPOFOL 200 MG: 10 INJECTION, EMULSION INTRAVENOUS at 23:44

## 2023-10-20 RX ADMIN — FENTANYL CITRATE 100 MCG: 50 INJECTION INTRAMUSCULAR; INTRAVENOUS at 21:43

## 2023-10-20 RX ADMIN — SERTRALINE HYDROCHLORIDE 100 MG: 25 TABLET ORAL at 16:35

## 2023-10-20 RX ADMIN — NIFEDIPINE 10 MG: 10 CAPSULE ORAL at 20:50

## 2023-10-20 RX ADMIN — NIFEDIPINE 10 MG: 10 CAPSULE ORAL at 22:40

## 2023-10-20 RX ADMIN — SODIUM CHLORIDE, POTASSIUM CHLORIDE, SODIUM LACTATE AND CALCIUM CHLORIDE 125 ML/HR: 600; 310; 30; 20 INJECTION, SOLUTION INTRAVENOUS at 13:31

## 2023-10-20 RX ADMIN — PHENYLEPHRINE HYDROCHLORIDE 0.5 MCG/KG/MIN: 10 INJECTION INTRAVENOUS at 23:55

## 2023-10-20 RX ADMIN — LEVOTHYROXINE SODIUM 75 MCG: 75 TABLET ORAL at 16:26

## 2023-10-20 RX ADMIN — CLINDAMYCIN PHOSPHATE 900 MG: 900 INJECTION, SOLUTION INTRAVENOUS at 23:48

## 2023-10-20 RX ADMIN — ACETAMINOPHEN 650 MG: 325 TABLET, FILM COATED ORAL at 17:41

## 2023-10-20 RX ADMIN — SODIUM CHLORIDE, POTASSIUM CHLORIDE, SODIUM LACTATE AND CALCIUM CHLORIDE: 600; 310; 30; 20 INJECTION, SOLUTION INTRAVENOUS at 23:59

## 2023-10-20 RX ADMIN — MISOPROSTOL 25 MCG: 100 TABLET ORAL at 14:11

## 2023-10-20 RX ADMIN — SODIUM CITRATE AND CITRIC ACID MONOHYDRATE 30 ML: 500; 334 SOLUTION ORAL at 23:05

## 2023-10-20 RX ADMIN — NIFEDIPINE 20 MG: 10 CAPSULE ORAL at 23:04

## 2023-10-20 RX ADMIN — SODIUM CHLORIDE, POTASSIUM CHLORIDE, SODIUM LACTATE AND CALCIUM CHLORIDE 1000 ML: 600; 310; 30; 20 INJECTION, SOLUTION INTRAVENOUS at 11:34

## 2023-10-20 RX ADMIN — FENTANYL CITRATE 50 MCG: 50 INJECTION INTRAMUSCULAR; INTRAVENOUS at 23:44

## 2023-10-20 RX ADMIN — ONDANSETRON 4 MG: 2 INJECTION INTRAMUSCULAR; INTRAVENOUS at 23:56

## 2023-10-20 RX ADMIN — DEXAMETHASONE SODIUM PHOSPHATE 4 MG: 4 INJECTION, SOLUTION INTRA-ARTICULAR; INTRALESIONAL; INTRAMUSCULAR; INTRAVENOUS; SOFT TISSUE at 23:56

## 2023-10-20 RX ADMIN — NIFEDIPINE 10 MG: 10 CAPSULE ORAL at 16:24

## 2023-10-20 RX ADMIN — MISOPROSTOL 25 MCG: 100 TABLET ORAL at 18:05

## 2023-10-20 RX ADMIN — Medication 340 ML/HR: at 23:51

## 2023-10-20 RX ADMIN — ACETAMINOPHEN 975 MG: 325 TABLET, FILM COATED ORAL at 23:04

## 2023-10-20 RX ADMIN — SUCCINYLCHOLINE CHLORIDE 120 MG: 20 INJECTION, SOLUTION INTRAMUSCULAR; INTRAVENOUS; PARENTERAL at 23:44

## 2023-10-20 RX ADMIN — GENTAMICIN SULFATE 240 MG: 40 INJECTION, SOLUTION INTRAMUSCULAR; INTRAVENOUS at 23:21

## 2023-10-20 RX ADMIN — PROPOFOL 50 MG: 10 INJECTION, EMULSION INTRAVENOUS at 23:57

## 2023-10-20 RX ADMIN — MAGNESIUM SULFATE HEPTAHYDRATE 2 G/HR: 40 INJECTION, SOLUTION INTRAVENOUS at 17:59

## 2023-10-20 ASSESSMENT — ACTIVITIES OF DAILY LIVING (ADL)
ADLS_ACUITY_SCORE: 20

## 2023-10-20 ASSESSMENT — LIFESTYLE VARIABLES: TOBACCO_USE: 0

## 2023-10-20 NOTE — H&P
"OB ADMIT  Sruthi Beasley    CC: abd pain, not feeling well  HPI: Sruthi Beasley is a 32 year old  @ 38w1d presented to clinic today for routine prenatal care visit but also not feeling well. Started not feeling well last Friday 1 week ago - reported nasuea, abd pain at office naa then. Had nl BP in clinic so assumed vial illness. Came back to clinic 3 days ago after calling the night before still not feeling well, and again had nl BP. Was actaully feeling better that am while in clinic. Then started not feeling well again after that. WAs given zofran which helped \"Delay\" n/v but not feeling any better. Today reports having a hard time sleeping, nausea, vomiting, loss of appetite. Lost 7# in last week. Hasn't eaten much all week. Tried some toast 2 days ago but threw up. Drinking water but then has emesis.  Denies headache. Had upper abdominal/ epigastric discomfort midline.   Pt had BPP 10/10 in office today then sent to [facility] for IV hydration and labs. Labs showed elevated LFTS and Uric acid, and while having normal BPs in clinic, were elevated here. Therefore pt admitted for induction.  Pt's medical history complicated by JAK2  positive myeloproliferative neoplasm and arterial thrombosis, diagnosed in spring 2021 after having arterial blood clot in legs. She is managed on Lovenox 100mg BID, last dose at 8am today.   Pt was admitted and ripening started with 1 dose of vaginal Cytotec so far placed at 2pm. She is feeling some cramping since then. Feeling a little bit better since getting some IV fluids and reglan but overall not feeling well still. Wondering about having a primary . Getting nifeiepine now for severe range BP.     ROS: Denies ctx, LOF, VB, headhace. Had subjective fever/ chills earlier this week. +nausea, vaomitting, epigastric pain.\     Prenatal care with Dr Kirkpatrick since 8 weeks. Also followed with [facility] Dr Dye.   Dr Zamudio at [facility] I hematologist.     OB Risks: "   -Thrombosis condition with Jer-2 mutation. On Lovenox 100 bid. ~Nl CBCs this preg, mild anemia.   -BMI 40. Normal weekly BPPs since 32 weeks for this and above condidiont  -aniety managed on zoloft and busbar.     Growth US 34 weeks   EFW 63% AC 85%.    Prenatal labs: A+, Ab negative, rubella immune, VDRL NR, HepBsAg neg, HIV neg, GBS neg, genetic screening wnl, GCT passed    PMHx:  Active Ambulatory Problems     Diagnosis Date Noted    Essential thrombocythemia (H) 10/19/2021    JAK2 V617F mutation 10/19/2021    Long term current use of anticoagulant therapy 10/19/2021    Morbid obesity (H) 06/03/2022    Mild major depression, single episode (H24) 04/13/2016    Eevated lipoprotein a, was on statin prior to conceiving      Resolved Ambulatory Problems     Diagnosis Date Noted    Acute occlusion of artery of lower extremity (H24) 04/08/2021    Limb ischemia 04/08/2021     Past Medical History:   Diagnosis Date    Anxiety     Depression     History of arterial thrombosis     Hyperlipidemia     JAK2 gene mutation     Obesity     Panic attack     Subclinical hypothyroidism       4/2011 pt admitted here or Embolic occlusion of the left popliteal artery and tibial arteries with extensive distal embolization. Resulting limb ischemia. IR performed incomplete thrombectomy.   Additionally had Right Profunda femoral artery occluded at the origin. Peroneal artery occluded most of length.     PSxHx:  4/8/21:  IR for mechanical thrombectomy, lytic therapy.    4/11: open embolectomy left popliteal, anterior tibial, and tibioperoneal arteries with vein patch angioplasty and fasciotomies   4/12: reexploration of left leg with left popliteal/tibial embolectomy and left distal anterior and posterior tibial artery embolectomy     Meds:  PNV  bASA  Iron  Sertraline 100mg q am  Buspirone 10mg q am (rx bid, only takes am)  Levothyroxine 75 mg q am  Lovenox 100ng BID     Allergies   Allergen Reactions    Erythromycin Unknown    Pcn  [Penicillins] Unknown        Soc: and [facility] [employer].  to Jamal. , Negative tobacco, etoh, drugs      PE:   Patient Vitals for the past 12 hrs:   BP Temp Temp src Pulse Resp SpO2   10/20/23 1700 (!) 158/84 -- -- -- -- --   10/20/23 1645 (!) 164/88 -- -- -- -- --   10/20/23 1630 -- 98  F (36.7  C) -- -- -- --   10/20/23 1621 (!) 168/82 -- -- -- -- --   10/20/23 1608 (!) 178/87 -- -- -- -- --   10/20/23 1600 (!) 149/73 -- -- -- -- --   10/20/23 1530 (!) 157/81 -- -- -- -- --   10/20/23 1515 (!) 168/86 -- -- -- -- --   10/20/23 1445 (!) 159/85 -- -- -- -- --   10/20/23 1415 (!) 156/94 -- -- -- -- --   10/20/23 1400 (!) 146/77 -- -- -- -- --   10/20/23 1345 (!) 156/76 -- -- -- -- --   10/20/23 1330 (!) 173/87 -- -- -- -- --   10/20/23 1300 (!) 152/83 -- -- -- -- --   10/20/23 1252 (!) 147/76 97.4  F (36.3  C) Temporal 74 16 --   10/20/23 1245 (!) 147/76 -- -- -- -- --   10/20/23 1230 (!) 145/77 -- -- -- -- --   10/20/23 1215 (!) 165/88 -- -- -- -- --   10/20/23 1200 (!) 147/82 -- -- -- -- --   10/20/23 1145 (!) 162/86 -- -- -- -- --   10/20/23 1130 -- -- Temporal -- -- --       Gen: A&O x 3, NAD   CV reg  Lungs CTA  Abd: Gravid, NT, EFW 6-7# (estimate limited by body habitus, last growth scan 34 weeks 65%ile  Extrems: nt no edema  VE: 0/40/-3 soft    FHT: 145 with moderate variability, +accels, no decels  [place]: occ ctx      LABS:   Latest Reference Range & Units 10/20/23 11:44   Sodium 135 - 145 mmol/L 135   Potassium 3.4 - 5.3 mmol/L 3.6   Chloride 98 - 107 mmol/L 97 (L)   Carbon Dioxide (CO2) 22 - 29 mmol/L 21 (L)   Urea Nitrogen 6.0 - 20.0 mg/dL 10.6   Creatinine 0.51 - 0.95 mg/dL 0.67   GFR Estimate >60 mL/min/1.73m2 >90   Calcium 8.6 - 10.0 mg/dL 9.8   Anion Gap 7 - 15 mmol/L 17 (H)   Albumin 3.5 - 5.2 g/dL 3.6   Protein Total 6.4 - 8.3 g/dL 7.0   Alkaline Phosphatase 35 - 104 U/L 166 (H)   ALT 0 - 50 U/L 123 (H)   AST 0 - 45 U/L 137 (H)   Bilirubin Total <=1.2 mg/dL 0.6   (L): Data  is abnormally low  (H): Data is abnormally high    Uric Acid 10.7     Latest Reference Range & Units 10/20/23 11:44   WBC 4.0 - 11.0 10e3/uL 11.9 (H)   Hemoglobin 11.7 - 15.7 g/dL 11.9   Hematocrit 35.0 - 47.0 % 35.5   Platelet Count 150 - 450 10e3/uL 150   RBC Count 3.80 - 5.20 10e6/uL 3.99   MCV 78 - 100 fL 89   MCH 26.5 - 33.0 pg 29.8   MCHC 31.5 - 36.5 g/dL 33.5   RDW 10.0 - 15.0 % 13.5   (Last platelets  235)      Total Protein Urine mg/mg Creat  0.00 - 0.20 mg/mg Cr 0.60 High      UA:   Latest Reference Range & Units 10/20/23 13:26   Color Urine Colorless, Straw, Light Yellow, Yellow  Yellow   Appearance Urine Clear  Slightly Cloudy !   Glucose Urine Negative mg/dL Negative   Bilirubin Urine Negative  Small !   Ketones Urine Negative mg/dL >150 !   Specific Gravity Urine 1.003 - 1.035  1.030   pH Urine 5.0 - 7.0  6.0   Protein Albumin Urine Negative mg/dL 300 !   Urobilinogen mg/dL Normal, 2.0 mg/dL 4.0 !   Nitrite Urine Negative  Negative   Blood Urine Negative  Trace !   Leukocyte Esterase Urine Negative  Moderate !   WBC Urine <=5 /HPF 21 (H)   RBC Urine <=2 /HPF 5 (H)   Bacteria Urine None Seen /HPF Few !   Squamous Epithelial /HPF Urine <=1 /HPF 3 (H)   Mucus Urine None Seen /LPF Present !   Hyaline Casts <=2 /LPF 9 (H)   !: Data is abnormal  (H): Data is abnormally high    IMPRESSION:  32 year old  @38w1d  with pre-eclampsia with severe features, including severe range BPs and HELLP syndrome based on elevated LFTs.   Unfavorable cervix.  Thrombosis conditinon ( no current active clot) on Lovenox 100 bid, last dose 8am today  Also dehydrated from n/v all week  GBS neg    PLAN:  -She has received 1 dose of cytotec, next due ~6pm. Planned to place cervical ripening balloon but unable now with closed cervix.  -Treating for severe range BPs now with nifedipine.  -Starting magnesium sulfate for seizure prophylaxis.   -Discussed with anesthesia - c/s before 24 ours from therapeutic lovenox would be  under general anesthesia (prior to 8am tomorrow).  (No epidural until then either).  -Rec pt proceed with ripening for now however if BPs unable to be kept out of sever range with meds would proceed with delivery via c/s.   -If not in active labor by am pt may desire to proceed with primay c/s.   -Will repeat labs q 6 hour (can space out if stable. Strict I/Os  -Discussed care plan with RN as well as Dr Vail who is assuming care. Reveiwed call coverage with pt as well.       Electronically signed by  Rahel Kirkpatrick DO  5:00PM  10/20/2023  [facility]

## 2023-10-20 NOTE — CARE PLAN
At 1620 blood pressures noted to be in severe range. 10 mg nifedipine given, 20 minutes later b/p continues in severe range. 20 mg nifedipine given. Dr Kirkpatrick at bedside at 1650. Dr Kirkpatrick updated regarding b/p. SVE preformed. Pt unchanged. Plan to continue with cervical ripening and start mag sulfate. B/p came down to 140/150/80. No clonus noted, left reflex brisk, right normal. Magnesium started. RN to remain at bedside.

## 2023-10-21 PROBLEM — Z98.891 S/P CESAREAN SECTION: Status: ACTIVE | Noted: 2023-10-21

## 2023-10-21 LAB
ALT SERPL W P-5'-P-CCNC: 170 U/L (ref 0–50)
AST SERPL W P-5'-P-CCNC: 190 U/L (ref 0–45)
CREAT SERPL-MCNC: 0.44 MG/DL (ref 0.51–0.95)
EGFRCR SERPLBLD CKD-EPI 2021: >90 ML/MIN/1.73M2
ERYTHROCYTE [DISTWIDTH] IN BLOOD BY AUTOMATED COUNT: 13.7 % (ref 10–15)
HCT VFR BLD AUTO: 29 % (ref 35–47)
HGB BLD-MCNC: 10 G/DL (ref 11.7–15.7)
MCH RBC QN AUTO: 30.3 PG (ref 26.5–33)
MCHC RBC AUTO-ENTMCNC: 34.5 G/DL (ref 31.5–36.5)
MCV RBC AUTO: 88 FL (ref 78–100)
PLATELET # BLD AUTO: 82 10E3/UL (ref 150–450)
PLATELET # BLD AUTO: 90 10E3/UL (ref 150–450)
RBC # BLD AUTO: 3.3 10E6/UL (ref 3.8–5.2)
WBC # BLD AUTO: 11.1 10E3/UL (ref 4–11)

## 2023-10-21 PROCEDURE — 250N000011 HC RX IP 250 OP 636: Mod: JZ | Performed by: SPECIALIST

## 2023-10-21 PROCEDURE — 85027 COMPLETE CBC AUTOMATED: CPT | Performed by: SPECIALIST

## 2023-10-21 PROCEDURE — 250N000011 HC RX IP 250 OP 636

## 2023-10-21 PROCEDURE — 258N000003 HC RX IP 258 OP 636: Performed by: SPECIALIST

## 2023-10-21 PROCEDURE — 82565 ASSAY OF CREATININE: CPT | Performed by: SPECIALIST

## 2023-10-21 PROCEDURE — 36415 COLL VENOUS BLD VENIPUNCTURE: CPT | Performed by: SPECIALIST

## 2023-10-21 PROCEDURE — 250N000013 HC RX MED GY IP 250 OP 250 PS 637: Performed by: SPECIALIST

## 2023-10-21 PROCEDURE — 250N000011 HC RX IP 250 OP 636: Mod: JZ | Performed by: ANESTHESIOLOGY

## 2023-10-21 PROCEDURE — 84450 TRANSFERASE (AST) (SGOT): CPT | Performed by: SPECIALIST

## 2023-10-21 PROCEDURE — 250N000011 HC RX IP 250 OP 636: Performed by: NURSE ANESTHETIST, CERTIFIED REGISTERED

## 2023-10-21 PROCEDURE — 999N000016 HC STATISTIC ATTENDANCE AT DELIVERY

## 2023-10-21 PROCEDURE — 120N000012 HC R&B POSTPARTUM

## 2023-10-21 PROCEDURE — 85049 AUTOMATED PLATELET COUNT: CPT | Performed by: SPECIALIST

## 2023-10-21 PROCEDURE — 84460 ALANINE AMINO (ALT) (SGPT): CPT | Performed by: SPECIALIST

## 2023-10-21 RX ORDER — HYDROMORPHONE HCL IN WATER/PF 6 MG/30 ML
0.4 PATIENT CONTROLLED ANALGESIA SYRINGE INTRAVENOUS EVERY 5 MIN PRN
Status: DISCONTINUED | OUTPATIENT
Start: 2023-10-21 | End: 2023-10-21

## 2023-10-21 RX ORDER — LEVOTHYROXINE SODIUM 75 UG/1
75 TABLET ORAL
Status: DISCONTINUED | OUTPATIENT
Start: 2023-10-21 | End: 2023-10-23 | Stop reason: HOSPADM

## 2023-10-21 RX ORDER — DIPHENHYDRAMINE HYDROCHLORIDE 50 MG/ML
25 INJECTION INTRAMUSCULAR; INTRAVENOUS EVERY 6 HOURS PRN
Status: DISCONTINUED | OUTPATIENT
Start: 2023-10-21 | End: 2023-10-23 | Stop reason: HOSPADM

## 2023-10-21 RX ORDER — MISOPROSTOL 200 UG/1
400 TABLET ORAL
Status: DISCONTINUED | OUTPATIENT
Start: 2023-10-21 | End: 2023-10-23 | Stop reason: HOSPADM

## 2023-10-21 RX ORDER — MISOPROSTOL 200 UG/1
800 TABLET ORAL
Status: DISCONTINUED | OUTPATIENT
Start: 2023-10-21 | End: 2023-10-23 | Stop reason: HOSPADM

## 2023-10-21 RX ORDER — HYDROCORTISONE 25 MG/G
CREAM TOPICAL 3 TIMES DAILY PRN
Status: DISCONTINUED | OUTPATIENT
Start: 2023-10-21 | End: 2023-10-23 | Stop reason: HOSPADM

## 2023-10-21 RX ORDER — SIMETHICONE 80 MG
80 TABLET,CHEWABLE ORAL 4 TIMES DAILY PRN
Status: DISCONTINUED | OUTPATIENT
Start: 2023-10-21 | End: 2023-10-23 | Stop reason: HOSPADM

## 2023-10-21 RX ORDER — ACETAMINOPHEN 325 MG/1
975 TABLET ORAL EVERY 6 HOURS
Status: DISCONTINUED | OUTPATIENT
Start: 2023-10-21 | End: 2023-10-23 | Stop reason: HOSPADM

## 2023-10-21 RX ORDER — NALOXONE HYDROCHLORIDE 0.4 MG/ML
0.2 INJECTION, SOLUTION INTRAMUSCULAR; INTRAVENOUS; SUBCUTANEOUS
Status: DISCONTINUED | OUTPATIENT
Start: 2023-10-21 | End: 2023-10-23 | Stop reason: HOSPADM

## 2023-10-21 RX ORDER — METOCLOPRAMIDE 10 MG/1
10 TABLET ORAL EVERY 6 HOURS PRN
Status: DISCONTINUED | OUTPATIENT
Start: 2023-10-21 | End: 2023-10-23 | Stop reason: HOSPADM

## 2023-10-21 RX ORDER — MAGNESIUM SULFATE HEPTAHYDRATE 40 MG/ML
2 INJECTION, SOLUTION INTRAVENOUS
Status: DISCONTINUED | OUTPATIENT
Start: 2023-10-21 | End: 2023-10-23 | Stop reason: HOSPADM

## 2023-10-21 RX ORDER — LIDOCAINE 40 MG/G
CREAM TOPICAL
Status: DISCONTINUED | OUTPATIENT
Start: 2023-10-21 | End: 2023-10-23 | Stop reason: HOSPADM

## 2023-10-21 RX ORDER — PROCHLORPERAZINE 25 MG
25 SUPPOSITORY, RECTAL RECTAL EVERY 12 HOURS PRN
Status: DISCONTINUED | OUTPATIENT
Start: 2023-10-21 | End: 2023-10-23 | Stop reason: HOSPADM

## 2023-10-21 RX ORDER — METOCLOPRAMIDE HYDROCHLORIDE 5 MG/ML
10 INJECTION INTRAMUSCULAR; INTRAVENOUS EVERY 6 HOURS PRN
Status: DISCONTINUED | OUTPATIENT
Start: 2023-10-21 | End: 2023-10-23 | Stop reason: HOSPADM

## 2023-10-21 RX ORDER — AMOXICILLIN 250 MG
2 CAPSULE ORAL 2 TIMES DAILY
Status: DISCONTINUED | OUTPATIENT
Start: 2023-10-21 | End: 2023-10-21

## 2023-10-21 RX ORDER — ONDANSETRON 4 MG/1
4 TABLET, ORALLY DISINTEGRATING ORAL EVERY 6 HOURS PRN
Status: DISCONTINUED | OUTPATIENT
Start: 2023-10-21 | End: 2023-10-23 | Stop reason: HOSPADM

## 2023-10-21 RX ORDER — FENTANYL CITRATE 0.05 MG/ML
25 INJECTION, SOLUTION INTRAMUSCULAR; INTRAVENOUS EVERY 5 MIN PRN
Status: DISCONTINUED | OUTPATIENT
Start: 2023-10-21 | End: 2023-10-21

## 2023-10-21 RX ORDER — ONDANSETRON 2 MG/ML
4 INJECTION INTRAMUSCULAR; INTRAVENOUS EVERY 6 HOURS PRN
Status: DISCONTINUED | OUTPATIENT
Start: 2023-10-21 | End: 2023-10-23 | Stop reason: HOSPADM

## 2023-10-21 RX ORDER — ENOXAPARIN SODIUM 100 MG/ML
100 INJECTION SUBCUTANEOUS 2 TIMES DAILY
Status: DISCONTINUED | OUTPATIENT
Start: 2023-10-21 | End: 2023-10-21

## 2023-10-21 RX ORDER — FENTANYL CITRATE 0.05 MG/ML
50 INJECTION, SOLUTION INTRAMUSCULAR; INTRAVENOUS EVERY 5 MIN PRN
Status: DISCONTINUED | OUTPATIENT
Start: 2023-10-21 | End: 2023-10-21

## 2023-10-21 RX ORDER — BUSPIRONE HYDROCHLORIDE 10 MG/1
10 TABLET ORAL 2 TIMES DAILY
Status: DISCONTINUED | OUTPATIENT
Start: 2023-10-21 | End: 2023-10-23 | Stop reason: HOSPADM

## 2023-10-21 RX ORDER — NALOXONE HYDROCHLORIDE 0.4 MG/ML
0.4 INJECTION, SOLUTION INTRAMUSCULAR; INTRAVENOUS; SUBCUTANEOUS
Status: DISCONTINUED | OUTPATIENT
Start: 2023-10-21 | End: 2023-10-23 | Stop reason: HOSPADM

## 2023-10-21 RX ORDER — CALCIUM GLUCONATE 94 MG/ML
1 INJECTION, SOLUTION INTRAVENOUS
Status: DISCONTINUED | OUTPATIENT
Start: 2023-10-21 | End: 2023-10-23 | Stop reason: HOSPADM

## 2023-10-21 RX ORDER — ENOXAPARIN SODIUM 100 MG/ML
100 INJECTION SUBCUTANEOUS EVERY 12 HOURS
Status: DISCONTINUED | OUTPATIENT
Start: 2023-10-21 | End: 2023-10-22

## 2023-10-21 RX ORDER — AMOXICILLIN 250 MG
1 CAPSULE ORAL 2 TIMES DAILY
Status: DISCONTINUED | OUTPATIENT
Start: 2023-10-21 | End: 2023-10-23 | Stop reason: HOSPADM

## 2023-10-21 RX ORDER — MODIFIED LANOLIN
OINTMENT (GRAM) TOPICAL
Status: DISCONTINUED | OUTPATIENT
Start: 2023-10-21 | End: 2023-10-23 | Stop reason: HOSPADM

## 2023-10-21 RX ORDER — OXYTOCIN/0.9 % SODIUM CHLORIDE 30/500 ML
340 PLASTIC BAG, INJECTION (ML) INTRAVENOUS CONTINUOUS PRN
Status: DISCONTINUED | OUTPATIENT
Start: 2023-10-21 | End: 2023-10-23 | Stop reason: HOSPADM

## 2023-10-21 RX ORDER — OXYCODONE HYDROCHLORIDE 5 MG/1
5 TABLET ORAL EVERY 4 HOURS PRN
Status: DISCONTINUED | OUTPATIENT
Start: 2023-10-21 | End: 2023-10-23 | Stop reason: HOSPADM

## 2023-10-21 RX ORDER — MAGNESIUM SULFATE HEPTAHYDRATE 40 MG/ML
4 INJECTION, SOLUTION INTRAVENOUS
Status: DISCONTINUED | OUTPATIENT
Start: 2023-10-21 | End: 2023-10-23 | Stop reason: HOSPADM

## 2023-10-21 RX ORDER — DEXTROSE, SODIUM CHLORIDE, SODIUM LACTATE, POTASSIUM CHLORIDE, AND CALCIUM CHLORIDE 5; .6; .31; .03; .02 G/100ML; G/100ML; G/100ML; G/100ML; G/100ML
INJECTION, SOLUTION INTRAVENOUS CONTINUOUS
Status: DISCONTINUED | OUTPATIENT
Start: 2023-10-21 | End: 2023-10-23 | Stop reason: HOSPADM

## 2023-10-21 RX ORDER — AMOXICILLIN 250 MG
1 CAPSULE ORAL 2 TIMES DAILY
Status: DISCONTINUED | OUTPATIENT
Start: 2023-10-21 | End: 2023-10-21

## 2023-10-21 RX ORDER — DIPHENHYDRAMINE HCL 25 MG
25 CAPSULE ORAL EVERY 6 HOURS PRN
Status: DISCONTINUED | OUTPATIENT
Start: 2023-10-21 | End: 2023-10-23 | Stop reason: HOSPADM

## 2023-10-21 RX ORDER — POLYETHYLENE GLYCOL 3350 17 G/17G
17 POWDER, FOR SOLUTION ORAL DAILY
Status: DISCONTINUED | OUTPATIENT
Start: 2023-10-21 | End: 2023-10-23 | Stop reason: HOSPADM

## 2023-10-21 RX ORDER — SODIUM CHLORIDE, SODIUM LACTATE, POTASSIUM CHLORIDE, CALCIUM CHLORIDE 600; 310; 30; 20 MG/100ML; MG/100ML; MG/100ML; MG/100ML
INJECTION, SOLUTION INTRAVENOUS CONTINUOUS
Status: DISCONTINUED | OUTPATIENT
Start: 2023-10-21 | End: 2023-10-21

## 2023-10-21 RX ORDER — SERTRALINE HYDROCHLORIDE 100 MG/1
100 TABLET, FILM COATED ORAL DAILY
Status: DISCONTINUED | OUTPATIENT
Start: 2023-10-21 | End: 2023-10-23 | Stop reason: HOSPADM

## 2023-10-21 RX ORDER — TRANEXAMIC ACID 10 MG/ML
1 INJECTION, SOLUTION INTRAVENOUS EVERY 30 MIN PRN
Status: DISCONTINUED | OUTPATIENT
Start: 2023-10-21 | End: 2023-10-23 | Stop reason: HOSPADM

## 2023-10-21 RX ORDER — CARBOPROST TROMETHAMINE 250 UG/ML
250 INJECTION, SOLUTION INTRAMUSCULAR
Status: DISCONTINUED | OUTPATIENT
Start: 2023-10-21 | End: 2023-10-23 | Stop reason: HOSPADM

## 2023-10-21 RX ORDER — SODIUM CHLORIDE, SODIUM LACTATE, POTASSIUM CHLORIDE, CALCIUM CHLORIDE 600; 310; 30; 20 MG/100ML; MG/100ML; MG/100ML; MG/100ML
10-125 INJECTION, SOLUTION INTRAVENOUS CONTINUOUS
Status: DISCONTINUED | OUTPATIENT
Start: 2023-10-21 | End: 2023-10-23 | Stop reason: HOSPADM

## 2023-10-21 RX ORDER — BISACODYL 10 MG
10 SUPPOSITORY, RECTAL RECTAL DAILY PRN
Status: DISCONTINUED | OUTPATIENT
Start: 2023-10-23 | End: 2023-10-23 | Stop reason: HOSPADM

## 2023-10-21 RX ORDER — ONDANSETRON 4 MG/1
4 TABLET, ORALLY DISINTEGRATING ORAL EVERY 30 MIN PRN
Status: DISCONTINUED | OUTPATIENT
Start: 2023-10-21 | End: 2023-10-21

## 2023-10-21 RX ORDER — OXYTOCIN/0.9 % SODIUM CHLORIDE 30/500 ML
100-340 PLASTIC BAG, INJECTION (ML) INTRAVENOUS CONTINUOUS PRN
Status: DISCONTINUED | OUTPATIENT
Start: 2023-10-21 | End: 2023-10-21

## 2023-10-21 RX ORDER — ONDANSETRON 2 MG/ML
4 INJECTION INTRAMUSCULAR; INTRAVENOUS EVERY 30 MIN PRN
Status: DISCONTINUED | OUTPATIENT
Start: 2023-10-21 | End: 2023-10-21

## 2023-10-21 RX ORDER — PROCHLORPERAZINE MALEATE 10 MG
10 TABLET ORAL EVERY 6 HOURS PRN
Status: DISCONTINUED | OUTPATIENT
Start: 2023-10-21 | End: 2023-10-23 | Stop reason: HOSPADM

## 2023-10-21 RX ORDER — KETOROLAC TROMETHAMINE 30 MG/ML
30 INJECTION, SOLUTION INTRAMUSCULAR; INTRAVENOUS EVERY 6 HOURS
Status: DISCONTINUED | OUTPATIENT
Start: 2023-10-21 | End: 2023-10-21

## 2023-10-21 RX ORDER — OXYTOCIN 10 [USP'U]/ML
10 INJECTION, SOLUTION INTRAMUSCULAR; INTRAVENOUS
Status: DISCONTINUED | OUTPATIENT
Start: 2023-10-21 | End: 2023-10-21

## 2023-10-21 RX ORDER — AMOXICILLIN 250 MG
2 CAPSULE ORAL 2 TIMES DAILY
Status: DISCONTINUED | OUTPATIENT
Start: 2023-10-21 | End: 2023-10-23 | Stop reason: HOSPADM

## 2023-10-21 RX ORDER — IBUPROFEN 400 MG/1
800 TABLET, FILM COATED ORAL EVERY 6 HOURS
Status: DISCONTINUED | OUTPATIENT
Start: 2023-10-21 | End: 2023-10-23 | Stop reason: HOSPADM

## 2023-10-21 RX ORDER — HYDROMORPHONE HCL IN WATER/PF 6 MG/30 ML
0.2 PATIENT CONTROLLED ANALGESIA SYRINGE INTRAVENOUS EVERY 5 MIN PRN
Status: DISCONTINUED | OUTPATIENT
Start: 2023-10-21 | End: 2023-10-21

## 2023-10-21 RX ORDER — OXYTOCIN 10 [USP'U]/ML
10 INJECTION, SOLUTION INTRAMUSCULAR; INTRAVENOUS
Status: DISCONTINUED | OUTPATIENT
Start: 2023-10-21 | End: 2023-10-23 | Stop reason: HOSPADM

## 2023-10-21 RX ORDER — IBUPROFEN 400 MG/1
800 TABLET, FILM COATED ORAL EVERY 6 HOURS
Status: DISCONTINUED | OUTPATIENT
Start: 2023-10-22 | End: 2023-10-21

## 2023-10-21 RX ADMIN — SODIUM CHLORIDE, POTASSIUM CHLORIDE, SODIUM LACTATE AND CALCIUM CHLORIDE 75 ML/HR: 600; 310; 30; 20 INJECTION, SOLUTION INTRAVENOUS at 19:15

## 2023-10-21 RX ADMIN — HYDROMORPHONE HYDROCHLORIDE 0.2 MG: 0.2 INJECTION, SOLUTION INTRAMUSCULAR; INTRAVENOUS; SUBCUTANEOUS at 00:47

## 2023-10-21 RX ADMIN — OXYCODONE HYDROCHLORIDE 5 MG: 5 TABLET ORAL at 19:13

## 2023-10-21 RX ADMIN — LEVOTHYROXINE SODIUM 75 MCG: 75 TABLET ORAL at 10:29

## 2023-10-21 RX ADMIN — Medication: at 02:11

## 2023-10-21 RX ADMIN — MAGNESIUM SULFATE HEPTAHYDRATE 2 G/HR: 40 INJECTION, SOLUTION INTRAVENOUS at 03:02

## 2023-10-21 RX ADMIN — SENNOSIDES AND DOCUSATE SODIUM 1 TABLET: 50; 8.6 TABLET ORAL at 10:29

## 2023-10-21 RX ADMIN — POLYETHYLENE GLYCOL 3350 17 G: 17 POWDER, FOR SOLUTION ORAL at 10:30

## 2023-10-21 RX ADMIN — SODIUM CHLORIDE, POTASSIUM CHLORIDE, SODIUM LACTATE AND CALCIUM CHLORIDE 75 ML/HR: 600; 310; 30; 20 INJECTION, SOLUTION INTRAVENOUS at 09:42

## 2023-10-21 RX ADMIN — ACETAMINOPHEN 975 MG: 325 TABLET, FILM COATED ORAL at 19:13

## 2023-10-21 RX ADMIN — SODIUM CHLORIDE, POTASSIUM CHLORIDE, SODIUM LACTATE AND CALCIUM CHLORIDE 75 ML/HR: 600; 310; 30; 20 INJECTION, SOLUTION INTRAVENOUS at 15:21

## 2023-10-21 RX ADMIN — MAGNESIUM SULFATE HEPTAHYDRATE 2 G/HR: 40 INJECTION, SOLUTION INTRAVENOUS at 13:22

## 2023-10-21 RX ADMIN — HYDROMORPHONE HYDROCHLORIDE 0.5 MG: 1 INJECTION, SOLUTION INTRAMUSCULAR; INTRAVENOUS; SUBCUTANEOUS at 00:19

## 2023-10-21 RX ADMIN — ACETAMINOPHEN 975 MG: 325 TABLET, FILM COATED ORAL at 06:06

## 2023-10-21 RX ADMIN — SENNOSIDES AND DOCUSATE SODIUM 1 TABLET: 50; 8.6 TABLET ORAL at 19:13

## 2023-10-21 RX ADMIN — BUSPIRONE HYDROCHLORIDE 10 MG: 10 TABLET ORAL at 20:41

## 2023-10-21 RX ADMIN — SERTRALINE HYDROCHLORIDE 100 MG: 100 TABLET ORAL at 10:29

## 2023-10-21 RX ADMIN — BUSPIRONE HYDROCHLORIDE 10 MG: 10 TABLET ORAL at 10:30

## 2023-10-21 RX ADMIN — OXYCODONE HYDROCHLORIDE 5 MG: 5 TABLET ORAL at 14:38

## 2023-10-21 RX ADMIN — ACETAMINOPHEN 975 MG: 325 TABLET, FILM COATED ORAL at 13:16

## 2023-10-21 ASSESSMENT — ACTIVITIES OF DAILY LIVING (ADL)
ADLS_ACUITY_SCORE: 20
ADLS_ACUITY_SCORE: 24
ADLS_ACUITY_SCORE: 20
ADLS_ACUITY_SCORE: 24
ADLS_ACUITY_SCORE: 20
ADLS_ACUITY_SCORE: 20
ADLS_ACUITY_SCORE: 24

## 2023-10-21 NOTE — PLAN OF CARE
38+1  pt. presents to labor and delivery in room 229 as MAC pt with vomitting, dehydration X 1 week. Orders received for IV hydration and labwork. Pt states she has had moderate abd pain for a few days as well, and points to epigastric area when asked where pain is. Pt denies headache, states she had a fever earlier this week, and has brisk reflexes in both legs when assessed. BP's 162/86, 154/83, 147/82, and 165/88 on initial serial BP checks. EUM/US applied with pts consent and Dr. Kirkpatrick updated on pts condition. Orders received to admit pt for induction of labor, and possible magnesium sulfate treatment. Report given to Raffi Monroe RN, who assumed care at 1230 and moved pt to room 215 to begin labor induction.

## 2023-10-21 NOTE — PROGRESS NOTES
Pt admitted from L&D via bed at 0300. Pt arrived with baby and was accompanied by  and arrived with personal belongings. Report was taken from MCKINLEY Bello in L&D.  Pt is A&Ox3 and VSS on RA. Fundus is firm and midline.  Vaginal bleeding is scant. PIV patent and infusing. Martinez patent and draining. Dressing to lower abdomen CDI.  Pneumoboots in place to BLE.  Pt oriented to the room and call light system.

## 2023-10-21 NOTE — ANESTHESIA PREPROCEDURE EVALUATION
Anesthesia Pre-Procedure Evaluation    Patient: Sruthi Beasley   MRN: [id number] : 1990        Procedure : Procedure(s):   section          Past Medical History:   Diagnosis Date    Acute occlusion of artery of lower extremity (H24) 2021    Anxiety     Depression     Encounter for preconception consultation 10/19/2021    Essential thrombocythemia (H)     History of arterial thrombosis     Hyperlipidemia     JAK2 gene mutation     Limb ischemia 2021    Obesity     Panic attack     Subclinical hypothyroidism       Past Surgical History:   Procedure Laterality Date    EMBOLECTOMY LOWER EXTREMITY Left 2021    Procedure: LEFT LEG ANTERIOR TIBIAL AND POSTERIOR TIBIAL EMBOLECTOMIES, FEMORAL POPITEAL VEIN PATCH, ANGIOGRAM, FASCIOTOMIES;  Surgeon: Ulises Gonzalez;  Location: [facility] OR    IR ANGIOGRAM THROUGH CATHETER FOLLOW UP  2021    IR ANGIOGRAM THROUGH CATHETER FOLLOW UP  4/10/2021    IR LOWER EXTREMITY ANGIOGRAM LEFT  2021    THROMBECTOMY LOWER EXTREMITY Left 2021    Procedure: Re-Exploration Left Lower Extremity With Popliteal and Tibial Embolectomy.;  Surgeon: Ulises Gonzalez;  Location: [facility] OR    wisdom teeth remova        Allergies   Allergen Reactions    Erythromycin Unknown    Pcn [Penicillins] Unknown      Social History     Tobacco Use    Smoking status: Never    Smokeless tobacco: Never   Substance Use Topics    Alcohol use: Not Currently     Comment: rare      Wt Readings from Last 1 Encounters:   10/20/23 123.8 kg (273 lb)        Anesthesia Evaluation            ROS/MED HX  ENT/Pulmonary:     (+)     JAMES risk factors,   obese,                            (-) tobacco use and asthma   Neurologic:     (+)      migraines,                          Cardiovascular:     (+)  - - PIH and HELLP, BP Meds, Mg ++ gtt, Renal dysfunction and Severe  -  - -   Taking blood thinners (therapeutic lovenox (100mg BID) last dose 8am 10/19/23)                                    METS/Exercise Tolerance: >4 METS    Hematologic:     (+) History of blood clots (hx of arterial thrombus; Jak2 gene mutation),     anemia,          Musculoskeletal:       GI/Hepatic:     (+)             liver disease (transaminitis (secondary to pre-eclampsia)),       Renal/Genitourinary:     (+) renal disease (proteinuria),             Endo:     (+)          thyroid problem, hypothyroidism,    Obesity (morbid),       Psychiatric/Substance Use:     (+) psychiatric history anxiety and depression       Infectious Disease:       Malignancy:       Other:            Physical Exam    Airway        Mallampati: I   TM distance: > 3 FB   Neck ROM: full   Mouth opening: > 3 cm    Respiratory Devices and Support         Dental  no notable dental history     (+) Completely normal teeth      Cardiovascular          Rhythm and rate: regular and normal     Pulmonary   pulmonary exam normal                OUTSIDE LABS:  CBC:   Lab Results   Component Value Date    WBC 11.9 (H) 10/20/2023    WBC 9.0 09/26/2023    HGB 11.5 (L) 10/20/2023    HGB 11.9 10/20/2023    HCT 35.5 10/20/2023    HCT 29.7 (L) 09/26/2023     (L) 10/20/2023     10/20/2023     BMP:   Lab Results   Component Value Date     10/20/2023     (L) 09/26/2023    POTASSIUM 3.6 10/20/2023    POTASSIUM 4.3 09/26/2023    CHLORIDE 97 (L) 10/20/2023    CHLORIDE 100 09/26/2023    CO2 21 (L) 10/20/2023    CO2 21 (L) 09/26/2023    BUN 10.6 10/20/2023    BUN 6.4 09/26/2023    CR 0.51 10/20/2023    CR 0.67 10/20/2023    GLC 79 10/20/2023     (H) 09/26/2023     COAGS:   Lab Results   Component Value Date    PTT 69 (H) 04/14/2021    INR 1.01 09/16/2021     POC:   Lab Results   Component Value Date    BGM 95 04/13/2021    HCG Negative 04/11/2021    HCGS Negative 04/08/2021     HEPATIC:   Lab Results   Component Value Date    ALBUMIN 3.6 10/20/2023    PROTTOTAL 7.0 10/20/2023     (H) 10/20/2023     (H) 10/20/2023    ALKPHOS 166 (H)  10/20/2023    BILITOTAL 0.6 10/20/2023     OTHER:   Lab Results   Component Value Date    A1C 5.4 2021    CASSI 9.8 10/20/2023    TSH 3.21 08/15/2022    T4 1.02 04/10/2021       Anesthesia Plan    ASA Status:  3, emergent    NPO Status:  ELEVATED Aspiration Risk/Unknown (NPO but pregnant, obese, with GERD during pregnancy)    Anesthesia Type: General.     - Airway: ETT   Induction: RSI, Intravenous, Propofol.   Maintenance: Inhalation.   Techniques and Equipment:     - Airway: Video-Laryngoscope     - Lines/Monitors: 2nd IV     - Blood: T&C, Blood in Room (2 units PRBC in OR)     Consents    Anesthesia Plan(s) and associated risks, benefits, and realistic alternatives discussed. Questions answered and patient/representative(s) expressed understanding.     - Discussed:     - Discussed with:  Patient            Postoperative Care    Pain management: IV analgesics.   PONV prophylaxis: Ondansetron (or other 5HT-3), Dexamethasone or Solumedrol     Comments:    Other Comments: Unable to perform neuraxial anesthetic due to therapeutic lovenox dosing (<24hr from time of ).            Raleigh Cheng MD

## 2023-10-21 NOTE — LACTATION NOTE
This note was copied from a baby's chart.  Initial lactation visit with Jamal Negro and baby girl. Infant breastfeeding at time of visit. Sruthi reports infant has been breastfeeding well; bedside staff have been helpful with education, positioning and latch. Sruthi still has magnesium sulfate running; feeling ok but would like a visit tomorrow. Encouraged to call for assist or questions as they arise.

## 2023-10-21 NOTE — PROGRESS NOTES
Patient with SROM, now feeling uncomfortable with ctxs  Has had 4 doses of Nifedipine for severe range BPs. Her most recent labs showed further rise of LFTs and drop of platelets to 109. Based on worsening maternal status, will proceed with C/S now in spite of Lovenox within 24 hours    Discussed risks, procedure, post-op course for C/S. No questions. Informed consent obtained.    ASSESSMENT:  IUP at 38+1 weeks  HELLP  Morbid obesity  JAK2 mutation with h/o arterial thrombosis  Thrombocytosis    PLAN:   section under general anesthesia  Continue Magnesium  Repeat labs in am  Restart Lovenox 100 mg subcutaneous BID 12 hours after surgery

## 2023-10-21 NOTE — PLAN OF CARE
Problem: Labor  Goal: Hemostasis  Outcome: Met  Goal: Stable Fetal Wellbeing  Outcome: Met  Goal: Effective Progression to Delivery  Outcome: Met  Goal: Absence of Infection Signs and Symptoms  Outcome: Met  Goal: Acceptable Pain Control  Outcome: Met  Goal: Normal Uterine Contraction Pattern  Outcome: Met   Goal Outcome Evaluation: met - delivered

## 2023-10-21 NOTE — PROVIDER NOTIFICATION
10/21/23 0200   Provider Notification   Provider Name/Title Dr. Vail   Method of Notification Phone   Request Evaluate-Remote   Notification Reason Other     Dr. Vail called to clarify capnography order. She stated that it was not necessary and a pulse ox would suffice for monitoring while patient was on PCA. Order discontinued.

## 2023-10-21 NOTE — ANESTHESIA CARE TRANSFER NOTE
Patient: Sruthi Beasley    Procedure: Procedure(s):   section       Diagnosis: Indication for care or intervention related to labor and delivery [O75.9]  Diagnosis Additional Information: No value filed.    Anesthesia Type:   General     Note:    Oropharynx: oropharynx clear of all foreign objects and spontaneously breathing  Level of Consciousness: awake  Oxygen Supplementation: face mask  Level of Supplemental Oxygen (L/min / FiO2): 8  Independent Airway: airway patency satisfactory and stable  Dentition: dentition unchanged  Vital Signs Stable: post-procedure vital signs reviewed and stable  Report to RN Given: handoff report given  Patient transferred to: PACU    Handoff Report: Identifed the Patient, Identified the Reponsible Provider, Reviewed the pertinent medical history, Discussed the surgical course, Reviewed Intra-OP anesthesia mangement and issues during anesthesia, Set expectations for post-procedure period and Allowed opportunity for questions and acknowledgement of understanding      Vitals:  Vitals Value Taken Time   BP     Temp     Pulse 84 10/21/23 0030   Resp 15 10/21/23 0030   SpO2 94 % 10/21/23 0030   Vitals shown include unfiled device data.    Electronically Signed By: VESTA Schroeder CRNA  2023  12:31 AM

## 2023-10-21 NOTE — PLAN OF CARE
Updated Dr. Koehler of morning labs: platelets 82, hemoglobin 10.0, ALT: 170, AST: 190. New order to hold Lovenox dose at 1200 and recheck platelets tonight at 1900. Will continue to monitor.

## 2023-10-21 NOTE — PLAN OF CARE
Pt has MgS04 running at 2 grams an hour. VSS. Denies headache, epigastric pain and blurred vision. Dorsalis Pedis pulses normal. PCA pump discontinued. Pain controlled with Tylenol and Oxycodone. Ambulated in room, tolerated well. SCD boots on while in bed. Martinez in place. Breastfeeding improving with latch assist. Will continue to monitor.    Goal Outcome Evaluation:      Plan of Care Reviewed With: patient, spouse    Overall Patient Progress: no changeOverall Patient Progress: no change

## 2023-10-21 NOTE — PROVIDER NOTIFICATION
Dr Vail updated at 5840 regarding labs and blood pressures and pts request for fentanyl. Plan for possible c/s tonight. Received orders for fentanyl.

## 2023-10-21 NOTE — L&D DELIVERY NOTE
[facility] Obstetrics Operative Note    Pre-operative diagnosis: High liver enzymes low platelets (HELLP) syndome  Intrauterine pregnancy at 38+2 weeks gestation  Morbid obesity  JAK2 mutation with high risk for thrombosis  Lovenox dose <24 hrs ago   Post-operative diagnosis: Same   Procedure: Primary low transverse  section   Surgeon: SHANNEN DELGADO MD   Assistant(s): NOE AGUILAR MD   Anesthesia: General Endotracheal Anesthesia   Estimated blood loss: 100ml   Total IV fluids: (See anesthesia record)   Blood transfusion: No transfusion was given during surgery   Total urine output: (See anesthesia record)   Drains: Martinez catheter   Specimens: None   Findings: Live   Female  Cephalic presentation:  left occiput transverse  APGARS: 1 minute: 8   5 minute: 9  Weight: 6# 10 oz   Placenta: intact  Tubes: normal  Uterus: seedling myomata on fundus  Ovaries: normal   Complications: None   Condition: Infant stable, transferred to general care nursery  Mother stable, transfered to post-anesthesia recovery   Comments: Sruthi Beasley is a 32 year old P0 who presented for induction for elevated BP. Her history is significant for arterial thrombosis due to JAK2 mutation for which is is on chronic anticoagulation. Her last dose of Lovenox was 10/20 at 8 am. She received a dose of vaginal Cytotec but then decided that she would rather just have a C/S in the am. She had spontaneous ROM . She typically has elevated platelets but on admission they were 150 and on repeat were 109. She required 4 doses of Nifedipine for severe range BPs and her LFTs were rising. Based on worsening HELLP, the decision was made to proceed with  section. Informed consent was obtained.    Procedure:  The patient was brought to the OR.  Pneumo-boots were placed. IV Gentamicin and Clindamycin were given.The abdomen was prepped and draped. Time Out was performed. The patient was induced under general anesthesia.  A  Pfannenstiel incision was performed. The subcutaneous tissue was incised. The fascia was incised and the incision extended in a curvilinear manner. The subfascial space was bluntly and sharply developed. The rectus muscles were divided. The peritoneum was entered and the incision expanded. The visceral peritoneum was incised and the bladder flap developed. Retractors were placed into the incision.  The uterus was transversely incised in the ELIAZAR. The membranes were incised with return of clear fluid. The fetal head was flexed into the incision and delivered with fundal pressure. The infant was bulb suctioned on the abdomen. The remainder of the fetal body was delivered with fundal pressure. The cord was doubly clamped and cut and the infant handed to the [facility] in attendance. The placenta was delivered, intact with 3 CV. The uterus was exteriorized and manually debrided. The uterine incision was closed with a running locked suture of 0 Vicryl. The incision was imbricated with a horizontal Lembert stitch of 0 PDS. Hemostasis was excellent. The cul-de-sac was debrided and the uterus replaced into the pelvis. The pelvic gutters were debrided. The uterine incision was re-examined and hemostasis was good. The fascia was closed with 2 running sutures of 0 Vicryl. The subcutaneous tissue was irrigated and hemostasis assured. It was closed with a running suture of 3.0 Plain. The skin was approximated with staples.

## 2023-10-21 NOTE — PROVIDER NOTIFICATION
Received update from Dr Vail at 2230 that plan has been made to proceed with c/s. Dr Koehler en route to hospital to perform c/s. Estimated time 2330. Pt and  updated. Anesthesia at bedside at 2235. Abdomen prepped with fabio. Tylenol and bicitra given. Dr Koehler at bedside 2250. Consents signed. Report given to Eugenia Beckman RN.

## 2023-10-21 NOTE — PROGRESS NOTES
OB In House MD    Requested to assist at c/s by Dr. Koehler.   General anesthesia indicated due to Lovenox dose within the past 24 hours.  I was present for entire procedure to provide assistance.  See operative note for description of procedure.    Belinda Ballesteros MD, MD on 10/21/2023 at 12:23 AM

## 2023-10-21 NOTE — ANESTHESIA POSTPROCEDURE EVALUATION
Patient: Sruthi Beasley    Procedure: Procedure(s):   section       Anesthesia Type:  General    Note:  Disposition: Inpatient   Postop Pain Control: Uneventful            Sign Out: Well controlled pain   PONV: No   Neuro/Psych: Uneventful            Sign Out: Acceptable/Baseline neuro status   Airway/Respiratory: Uneventful            Sign Out: Acceptable/Baseline resp. status   CV/Hemodynamics: Uneventful            Sign Out: Acceptable CV status   Other NRE: NONE   DID A NON-ROUTINE EVENT OCCUR? No           Last vitals:  Vitals Value Taken Time   /66 10/21/23 0045   Temp 36.6  C (97.8  F) 10/21/23 0030   Pulse 77 10/21/23 0059   Resp 19 10/21/23 0059   SpO2 95 % 10/21/23 005   Vitals shown include unfiled device data.    Electronically Signed By: Raleigh Cheng MD  2023  1:17 AM

## 2023-10-21 NOTE — ANESTHESIA PROCEDURE NOTES
Airway         Procedure Start/Stop Times: 10/20/2023 11:47 PM  Staff -        Anesthesiologist:  Raleigh Cheng MD       CRNA: Ryann Landry APRN CRNA       Performed By: CRNAIndications and Patient Condition       Indications for airway management: anirudh-procedural and airway protection       Induction type:RSI       Mask difficulty assessment: 0 - not attempted    Final Airway Details       Final airway type: endotracheal airway       Successful airway: ETT - single  Endotracheal Airway Details        ETT size (mm): 6.5       Cuffed: yes       Successful intubation technique: video laryngoscopy       VL Blade Size: Glidescope 3       Grade View of Cords: 1       Adjucts: stylet       Position: Left       Measured from: lips       Secured at (cm): 21       Bite block used: None    Post intubation assessment        Placement verified by: capnometry, equal breath sounds and chest rise        Secured with: silk tape       Ease of procedure: easy       Dentition: Intact and Unchanged    Medication(s) Administered   Medication Administration Time: 10/20/2023 11:47 PM

## 2023-10-21 NOTE — PLAN OF CARE
Vital signs stable. Postpartum assessment WDL. Pt denies headache, vision changes, or epigastric pain. Lung sounds clear. Incision with bandage CDI. Pain controlled with dilaudid via PCA and Tylenol. PIV in R forearm and L hand. Mag infusing at 2g/hr, LR at 75ml/hr. Martinez in place with adequate output. Patient not OOB- still on frequents from C/S. SCD's in place, incentive spirometer given to pt and instructions reviewed. Pt expressed understanding and will use at least Q2 hrs. Breastfeeding on cue with assist. Patient and infant bonding well. Will continue with current plan of care.

## 2023-10-21 NOTE — PROGRESS NOTES
Patient transferred to  . Mag & PCA verified. Report given to Saundra SEN. Fundal assessment done. VSS, patient left in stable condition.

## 2023-10-21 NOTE — PROGRESS NOTES
[facility]   Obstetrics Post-Op / Progress Note         Assessment and Plan:    Assessment:   Post-operative day #1  Low transverse primary  section  L&D complications: High liver enzymes low platelets (HELP) syndome  High risk thrombosis      Doing well.  No immediate surgical complications identified.  Pain well-controlled.      Plan:   Ambulation encouraged  Discontinue PCA  Pain control measures as needed  Reportable signs and symptoms dicussed with the patient  Labs due this am  Continue Mag until 24 hours post-op            Interval History:     Doing well.  Pain is controlled.  No fevers.  No history of wound drainage, warmth or significant erythema.  Good appetite.  Denies chest pain, shortness of breath, nausea or vomiting.  Breastfeeding well x 1 only. No rest last night          Significant Problems:      Patient Active Problem List   Diagnosis    Essential thrombocythemia (H)    JAK2 V617F mutation    Long term current use of anticoagulant therapy    Morbid obesity (H)    Mild major depression, single episode (H24)    HELLP (hemolytic anemia/elev liver enzymes/low platelets in pregnancy)    S/P  section    Indication for care in labor and delivery, antepartum             Review of Systems:    The patient denies any chest pain, shortness of breath, excessive pain, fever, chills, purulent drainage from the wound, nausea or vomiting.          Medications:   All medications related to the patient's surgery have been reviewed  On PCA, no Toradol (RN held due to low platelets and good pain control)          Physical Exam:   All vitals stable  Blood pressure 138/84, pulse 75, temperature 98  F (36.7  C), temperature source Oral, resp. rate 16, weight 124.6 kg (274 lb 11.1 oz), SpO2 97%, unknown if currently breastfeeding.  Wound covered, incision dry.  Extremities- Pneumo-boots on, 1+ edema          Data:   All laboratory data related to this surgery reviewed  Lab Results    Component Value Date    HGB 11.5 (L) 10/20/2023       SHANNEN DELGADO MD

## 2023-10-22 LAB
ALBUMIN SERPL BCG-MCNC: 3.1 G/DL (ref 3.5–5.2)
ALP SERPL-CCNC: 132 U/L (ref 35–104)
ALT SERPL W P-5'-P-CCNC: 101 U/L (ref 0–50)
ANION GAP SERPL CALCULATED.3IONS-SCNC: 9 MMOL/L (ref 7–15)
AST SERPL W P-5'-P-CCNC: 51 U/L (ref 0–45)
BACTERIA UR CULT: NO GROWTH
BILIRUB SERPL-MCNC: 0.2 MG/DL
BUN SERPL-MCNC: 7 MG/DL (ref 6–20)
CALCIUM SERPL-MCNC: 7.9 MG/DL (ref 8.6–10)
CHLORIDE SERPL-SCNC: 101 MMOL/L (ref 98–107)
CREAT SERPL-MCNC: 0.49 MG/DL (ref 0.51–0.95)
DEPRECATED HCO3 PLAS-SCNC: 27 MMOL/L (ref 22–29)
EGFRCR SERPLBLD CKD-EPI 2021: >90 ML/MIN/1.73M2
ERYTHROCYTE [DISTWIDTH] IN BLOOD BY AUTOMATED COUNT: 14.1 % (ref 10–15)
GLUCOSE SERPL-MCNC: 91 MG/DL (ref 70–99)
HCT VFR BLD AUTO: 29 % (ref 35–47)
HGB BLD-MCNC: 10 G/DL (ref 11.7–15.7)
MCH RBC QN AUTO: 30.6 PG (ref 26.5–33)
MCHC RBC AUTO-ENTMCNC: 34.5 G/DL (ref 31.5–36.5)
MCV RBC AUTO: 89 FL (ref 78–100)
PLATELET # BLD AUTO: 126 10E3/UL (ref 150–450)
PLATELET # BLD AUTO: 96 10E3/UL (ref 150–450)
POTASSIUM SERPL-SCNC: 3.9 MMOL/L (ref 3.4–5.3)
PROT SERPL-MCNC: 5.9 G/DL (ref 6.4–8.3)
RBC # BLD AUTO: 3.27 10E6/UL (ref 3.8–5.2)
SODIUM SERPL-SCNC: 137 MMOL/L (ref 135–145)
WBC # BLD AUTO: 10 10E3/UL (ref 4–11)

## 2023-10-22 PROCEDURE — 250N000013 HC RX MED GY IP 250 OP 250 PS 637: Performed by: SPECIALIST

## 2023-10-22 PROCEDURE — 250N000011 HC RX IP 250 OP 636: Performed by: SPECIALIST

## 2023-10-22 PROCEDURE — 36415 COLL VENOUS BLD VENIPUNCTURE: CPT | Performed by: SPECIALIST

## 2023-10-22 PROCEDURE — 85049 AUTOMATED PLATELET COUNT: CPT | Performed by: SPECIALIST

## 2023-10-22 PROCEDURE — 80053 COMPREHEN METABOLIC PANEL: CPT | Performed by: SPECIALIST

## 2023-10-22 PROCEDURE — 120N000012 HC R&B POSTPARTUM

## 2023-10-22 RX ORDER — ENOXAPARIN SODIUM 100 MG/ML
100 INJECTION SUBCUTANEOUS EVERY 12 HOURS
Status: DISCONTINUED | OUTPATIENT
Start: 2023-10-22 | End: 2023-10-23 | Stop reason: HOSPADM

## 2023-10-22 RX ORDER — LABETALOL 200 MG/1
200 TABLET, FILM COATED ORAL EVERY 8 HOURS SCHEDULED
Status: DISCONTINUED | OUTPATIENT
Start: 2023-10-22 | End: 2023-10-23 | Stop reason: HOSPADM

## 2023-10-22 RX ADMIN — ACETAMINOPHEN 975 MG: 325 TABLET, FILM COATED ORAL at 01:04

## 2023-10-22 RX ADMIN — LABETALOL HYDROCHLORIDE 200 MG: 200 TABLET, FILM COATED ORAL at 14:56

## 2023-10-22 RX ADMIN — OXYCODONE HYDROCHLORIDE 5 MG: 5 TABLET ORAL at 08:07

## 2023-10-22 RX ADMIN — SIMETHICONE 80 MG: 80 TABLET, CHEWABLE ORAL at 14:04

## 2023-10-22 RX ADMIN — SERTRALINE HYDROCHLORIDE 100 MG: 100 TABLET ORAL at 08:07

## 2023-10-22 RX ADMIN — BUSPIRONE HYDROCHLORIDE 10 MG: 10 TABLET ORAL at 08:07

## 2023-10-22 RX ADMIN — PROCHLORPERAZINE EDISYLATE 10 MG: 5 INJECTION INTRAMUSCULAR; INTRAVENOUS at 12:41

## 2023-10-22 RX ADMIN — OXYCODONE HYDROCHLORIDE 5 MG: 5 TABLET ORAL at 00:04

## 2023-10-22 RX ADMIN — ONDANSETRON 4 MG: 4 TABLET, ORALLY DISINTEGRATING ORAL at 11:36

## 2023-10-22 RX ADMIN — BUSPIRONE HYDROCHLORIDE 10 MG: 10 TABLET ORAL at 21:30

## 2023-10-22 RX ADMIN — IBUPROFEN 800 MG: 400 TABLET ORAL at 19:11

## 2023-10-22 RX ADMIN — ENOXAPARIN SODIUM 100 MG: 100 INJECTION SUBCUTANEOUS at 13:55

## 2023-10-22 RX ADMIN — ACETAMINOPHEN 975 MG: 325 TABLET, FILM COATED ORAL at 19:11

## 2023-10-22 RX ADMIN — ACETAMINOPHEN 975 MG: 325 TABLET, FILM COATED ORAL at 06:57

## 2023-10-22 RX ADMIN — IBUPROFEN 800 MG: 400 TABLET ORAL at 13:24

## 2023-10-22 RX ADMIN — LEVOTHYROXINE SODIUM 75 MCG: 75 TABLET ORAL at 06:57

## 2023-10-22 RX ADMIN — ACETAMINOPHEN 975 MG: 325 TABLET, FILM COATED ORAL at 13:23

## 2023-10-22 RX ADMIN — LABETALOL HYDROCHLORIDE 200 MG: 200 TABLET, FILM COATED ORAL at 23:01

## 2023-10-22 RX ADMIN — SIMETHICONE 80 MG: 80 TABLET, CHEWABLE ORAL at 21:30

## 2023-10-22 RX ADMIN — SENNOSIDES AND DOCUSATE SODIUM 2 TABLET: 50; 8.6 TABLET ORAL at 08:06

## 2023-10-22 RX ADMIN — SENNOSIDES AND DOCUSATE SODIUM 1 TABLET: 50; 8.6 TABLET ORAL at 21:30

## 2023-10-22 RX ADMIN — POLYETHYLENE GLYCOL 3350 17 G: 17 POWDER, FOR SOLUTION ORAL at 08:06

## 2023-10-22 ASSESSMENT — ACTIVITIES OF DAILY LIVING (ADL)
ADLS_ACUITY_SCORE: 20
ADLS_ACUITY_SCORE: 23
ADLS_ACUITY_SCORE: 24
ADLS_ACUITY_SCORE: 23
ADLS_ACUITY_SCORE: 24
ADLS_ACUITY_SCORE: 24
ADLS_ACUITY_SCORE: 23
ADLS_ACUITY_SCORE: 23
ADLS_ACUITY_SCORE: 20
ADLS_ACUITY_SCORE: 20

## 2023-10-22 NOTE — PLAN OF CARE
Vital signs stable. Postpartum assessment WDL. Incision with dsg CDI. Pt denies headache, vision changes, or epigastric pain. Magnesium/LR infusion D/C'd at 2352. SCD's in place, pt using incentive spirometer independently. Pain controlled with Tylenol and Oxycodone. Patient ambulated to bathroom with A2, tolerated well only needing SBA. Martinez removed at 0455- DTV by 0855. Breastfeeding on cue with minimal assist. Patient and infant bonding well. Will continue with current plan of care.

## 2023-10-22 NOTE — LACTATION NOTE
This note was copied from a baby's chart.  Follow up lactation visit with Jamal Negro and baby girl Kristel. Infant due to breastfeed at time of visit. Assisted parents with unwrapping and waking infant. Sruthi reports football hold has been most comfortable so far. Assisted with latch on left breast. Infant rooting and latched, but not sucking consistently. Reviewed techniques for trying to keep baby awake and interested at breast. After several minutes, infant still not sucking vigorously. Suggested the following feeding plan:    Offer breast when cueing, waiting no longer than 3 hours in between feedings. If infant latches and has strong, coordinated suck, let infant breastfeed for unlimited time. If breastfeeding is not successful, parents should supplement infant with donor milk. Supplementation can be offered after breastfeeding sessions as well, especially if Kristel is still showing hunger cues. Sruthi should pump after feedings to increase milk supply.     Basic breastfeeding education provided including signs of proper latch, normal  feeding behaviors including sleepiness first 24 hours followed by increased alertness and cluster feeding, lactogenesis, pumping, etc. All questions answered.     Of note, Sruthi takes Buspar and Zoloft. Per [employer] Medications & Mother's Milk book, Buspar has limited safety data for lactating mothers, but is classified as L3 (no data- probably compatible), with behavioral changes, feeding problems and weight gain listed for infant monitoring.  Observation for improvement in feedings required.    Family receptive and appreciative of visit. Parents agree with feeding plan. Will have lactation team follow up Monday.

## 2023-10-22 NOTE — PLAN OF CARE
"VSS. Cont Mag infusing - 120's systolic & 60-70\"s Diastolic.   Denies any s/s pre-eclampsia. Reflexes normal. Absent Clonus.  Radial and Dorsalis Pedal pulses normal to palpitation.  RUSS LE Edema 2+.  Duglas reg diet. Martinez drained 1725+ yellow urine.  FF@ U, had large dark clot on pad, silver gulf ball size.Flow light to scant.  Incision C/D/I. Incisional pain well controlled with Tylenol & Oxycodone. Toradol discontinued (was held)and Lovenox being held due to low platelets. Dr. Koehler notified (see note) recheck platelets in am.  Up to bathroom with assist,one for HS /anirudh /cath /oral cares & tolerated well.     Breastfeeding  going well with help to latch and position.   left then came back this evening & at bedside, supportive of pt.  .Goal Outcome Evaluation:      Plan of Care Reviewed With: patient, spouse    Overall Patient Progress: no changeOverall Patient Progress: no change           "

## 2023-10-22 NOTE — PROVIDER NOTIFICATION
10/22/23 8484   Provider Notification   Provider Name/Title Dr. Vail   Method of Notification Phone   Request Evaluate-Remote   Notification Reason Status Update         RN went in at 1200 to do assessment- Pt complaining of nausea/ having a hard time deciphering if the pain she is having is epigastric pain/gas/incisional pain.  RN placed new IV and changed labs to stat labs.  BP's were found to be 140/150's/80's-90's.  IV compazine given with some relief. Labs were then paged out to MD and LFT's are lower and platelets 126. Order to recheck BP in an hr and update MD.  No new lab orders and will give lovenox.  Will continue to monitor.

## 2023-10-22 NOTE — PROVIDER NOTIFICATION
Dr ANYA Koehler notified Platelets of 90 from 1900 lab draw.  Plan/orders received :  Stop Toradol, Ok to start Ibuprofen.  Hold Lovenox.  Recheck Platelets @ 0600 10/22/23

## 2023-10-22 NOTE — PLAN OF CARE
Goal Outcome Evaluation:      Plan of Care Reviewed With: patient, spouse    Overall Patient Progress: no changeOverall Patient Progress: no change       Fundus firm and bleeding wnl.  VSS- BP's borderline elevated, order to call >160/110.  No clonus, reflexes +2, slight LLE. Lung sounds clear.  Pt able to shower and ambulate independently.  Voiding without difficulty- encouraged pt to drink more fluids.  Lost IV and new one replaced.  Pt at 1200 C/O nausea- IV compazine given and oral zofran (didn't have IV at time) with some improvement.  Pt did have 1 emesis with relief.  RN ambulated pt in hallway, gave oral simethicone and placed hot pack on abdomen due to not passing flatus and pt feeling like she needs to have a BM.  Distal pulses present.  Dressing in place. See previous MD notification.  Encouraged to call with questions or concerns.  Will continue to monitor.

## 2023-10-22 NOTE — PROGRESS NOTES
"[facility]   Obstetrics Post-Op / Progress Note         Assessment and Plan:    Assessment:   Post-operative day #2  Low transverse primary  section  L&D complications: High liver enzymes low platelets (HELP) syndome  High risk thrombosis      Doing well.  No immediate surgical complications identified.  Pain well-controlled.      Plan:   Will start lovenox 100 mg BID once platelets are >100k (will draw at noon today)  Zofran for nausea.  Bowel regiment  AST, ALT, hgb, plt to be drawn at noon today.            Interval History:     Doing well.  Pain is controlled.  She reports feeling nauseated.  She has not had flatus or a BM yet and feels \"yucky\".  Feels some gurgles in her stomach  Denies HA or visual changes.  Has been wearing sequential compression boots.          Significant Problems:      Patient Active Problem List   Diagnosis    Essential thrombocythemia (H)    JAK2 V617F mutation    Long term current use of anticoagulant therapy    Morbid obesity (H)    Mild major depression, single episode (H24)    HELLP (hemolytic anemia/elev liver enzymes/low platelets in pregnancy)    S/P  section    Indication for care in labor and delivery, antepartum             Review of Systems:    The patient denies any chest pain, shortness of breath, excessive pain, fever, chills, purulent drainage from the wound, nausea or vomiting.          Medications:   All medications related to the patient's surgery have been reviewed            Physical Exam:   All vitals stable  Blood pressure 132/81, pulse 68, temperature 97.8  F (36.6  C), temperature source Axillary, resp. rate 16, weight 122.7 kg (270 lb 9.6 oz), SpO2 97%, unknown if currently breastfeeding.  Wound covered, incision dry.  Extremities- Pneumo-boots just getting put back on after shower, 1+ edema          Data:   All laboratory data related to this surgery reviewed  Lab Results   Component Value Date    HGB 10.0 (L) 10/21/2023     Plt " 96k this am.  Repeat pending for noon.    Tricia Vail MD

## 2023-10-22 NOTE — PROVIDER NOTIFICATION
10/22/23 1442   Provider Notification   Provider Name/Title Dr. Vail   Method of Notification Phone   Request Evaluate-Remote   Notification Reason Status Update       MD notified regarding BP- MD wanted to know BP and was 145/94, telephone order received to start 200 mg TID labetalol.  Placed order with readback.

## 2023-10-22 NOTE — PLAN OF CARE
Goal Outcome Evaluation:      Plan of Care Reviewed With: patient, spouse    Overall Patient Progress: improvingOverall Patient Progress: improving     Vital signs stable. Postpartum assessment WDL. Incision Dressing C/D/I. Pain controlled with Tylenol and Ibuprofen. Patient ambulating independently  in the taylor. . Patient reports passing gas earlier and constipation. Breastfeeding on cue with 1x assist.  Encourage patient to sit in the chair to eat  meals.    Strict I&O . Started Labetalol today and now BP WNL,  112/63 HR 74. Denies symptoms and also not having the epi gastric pain. Patient is passing gas. Using IS 2500 ml. Patient and infant bonding well. Will continue with current plan of care.

## 2023-10-23 VITALS
TEMPERATURE: 98.3 F | DIASTOLIC BLOOD PRESSURE: 79 MMHG | HEART RATE: 73 BPM | SYSTOLIC BLOOD PRESSURE: 130 MMHG | BODY MASS INDEX: 39.65 KG/M2 | WEIGHT: 268.52 LBS | OXYGEN SATURATION: 97 % | RESPIRATION RATE: 16 BRPM

## 2023-10-23 PROBLEM — O14.20 HELLP SYNDROME: Status: ACTIVE | Noted: 2023-10-23

## 2023-10-23 PROCEDURE — 250N000013 HC RX MED GY IP 250 OP 250 PS 637: Performed by: SPECIALIST

## 2023-10-23 PROCEDURE — 250N000011 HC RX IP 250 OP 636: Performed by: SPECIALIST

## 2023-10-23 RX ORDER — LABETALOL 200 MG/1
200 TABLET, FILM COATED ORAL EVERY 8 HOURS
Qty: 60 TABLET | Refills: 0 | Status: SHIPPED | OUTPATIENT
Start: 2023-10-23 | End: 2024-08-22

## 2023-10-23 RX ORDER — IBUPROFEN 600 MG/1
600 TABLET, FILM COATED ORAL EVERY 6 HOURS PRN
Start: 2023-10-23 | End: 2024-08-22

## 2023-10-23 RX ORDER — DOCUSATE SODIUM 100 MG/1
100 CAPSULE, LIQUID FILLED ORAL 2 TIMES DAILY PRN
Start: 2023-10-23 | End: 2024-08-22

## 2023-10-23 RX ORDER — ENOXAPARIN SODIUM 100 MG/ML
100 INJECTION SUBCUTANEOUS EVERY 12 HOURS
Qty: 12 ML | Refills: 0 | Status: SHIPPED | OUTPATIENT
Start: 2023-10-23 | End: 2024-08-22

## 2023-10-23 RX ORDER — ACETAMINOPHEN 325 MG/1
325-650 TABLET ORAL EVERY 6 HOURS
Start: 2023-10-23 | End: 2024-08-22

## 2023-10-23 RX ADMIN — ACETAMINOPHEN 975 MG: 325 TABLET, FILM COATED ORAL at 01:17

## 2023-10-23 RX ADMIN — SENNOSIDES AND DOCUSATE SODIUM 2 TABLET: 50; 8.6 TABLET ORAL at 08:26

## 2023-10-23 RX ADMIN — ENOXAPARIN SODIUM 100 MG: 100 INJECTION SUBCUTANEOUS at 01:18

## 2023-10-23 RX ADMIN — BUSPIRONE HYDROCHLORIDE 10 MG: 10 TABLET ORAL at 08:27

## 2023-10-23 RX ADMIN — POLYETHYLENE GLYCOL 3350 17 G: 17 POWDER, FOR SOLUTION ORAL at 08:27

## 2023-10-23 RX ADMIN — IBUPROFEN 800 MG: 400 TABLET ORAL at 01:18

## 2023-10-23 RX ADMIN — LABETALOL HYDROCHLORIDE 200 MG: 200 TABLET, FILM COATED ORAL at 07:00

## 2023-10-23 RX ADMIN — IBUPROFEN 800 MG: 400 TABLET ORAL at 06:59

## 2023-10-23 RX ADMIN — SERTRALINE HYDROCHLORIDE 100 MG: 100 TABLET ORAL at 08:27

## 2023-10-23 RX ADMIN — LEVOTHYROXINE SODIUM 75 MCG: 75 TABLET ORAL at 07:00

## 2023-10-23 RX ADMIN — ACETAMINOPHEN 975 MG: 325 TABLET, FILM COATED ORAL at 12:58

## 2023-10-23 RX ADMIN — IBUPROFEN 800 MG: 400 TABLET ORAL at 12:58

## 2023-10-23 RX ADMIN — ENOXAPARIN SODIUM 100 MG: 100 INJECTION SUBCUTANEOUS at 13:04

## 2023-10-23 RX ADMIN — ACETAMINOPHEN 975 MG: 325 TABLET, FILM COATED ORAL at 06:59

## 2023-10-23 ASSESSMENT — ACTIVITIES OF DAILY LIVING (ADL)
ADLS_ACUITY_SCORE: 20

## 2023-10-23 NOTE — PROVIDER NOTIFICATION
Dr. Benavides paged  Pt requesting Lovenox to take home at discharge, unsure if prescription she has at home pharmacy will be filled.   Dr. Benavides will prescribe medication for patient.

## 2023-10-23 NOTE — PLAN OF CARE
Vital signs stable. Postpartum assessment WDL. Pt denies headache, vision changes, or epigastric pain. No clonus reflexes +2. Distal pulses present. Incision with dressing CDI. Pain controlled with Tylenol and Ibuprofen. PIV SL. AM labs scheduled. Patient ambulating independently. Breastfeeding on cue without assist. Patient and infant bonding well. Will continue with current plan of care.

## 2023-10-23 NOTE — DISCHARGE SUMMARY
[facility] Discharge Summary    Sruthi Beasley MRN# [id number]   Age: 32 year old YOB: 1990     Date of Admission:  10/20/2023  Date of Discharge::  10/23/2023  Admitting Physician:  Fatoumata Koehler MD  Discharge Physician:  Fay Benavides MD     Home clinic: [facility]          Admission Diagnoses:   IUP @ 38+1  HELLP  3.   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   Anxiety          Discharge Diagnosis:     S/p primary LTCS  HELLP - resolving  LENA-2 mutation on Lovenox  Anxiety          Procedures:     Procedure(s): Primary low transverse  section       No other procedures performed during this admission           Medications Prior to Admission:     Medications Prior to Admission   Medication Sig Dispense Refill Last Dose    busPIRone (BUSPAR) 10 MG tablet Take 10 mg by mouth 2 times daily   10/19/2023    enoxaparin ANTICOAGULANT (LOVENOX) 100 MG/ML syringe Inject 1 mL (100 mg) Subcutaneous 2 times daily 60 mL 11 10/20/2023    Lactobacillus (PROBIOTIC ACIDOPHILUS PO) Take by mouth daily   Past Week    levothyroxine (SYNTHROID/LEVOTHROID) 75 MCG tablet    10/19/2023    Prenatal MV-Min-Fe Fum-FA-DHA (PRENATAL 1 PO) Take 1 tablet by mouth   Past Week    sertraline (ZOLOFT) 100 MG tablet Take 100 mg by mouth   10/19/2023    Vitamin D3 (CHOLECALCIFEROL) 25 mcg (1000 units) tablet Take 1 tablet by mouth daily   Past Week    STATIN NOT PRESCRIBED (INTENTIONAL) Please choose reason not prescribed from choices below.       [DISCONTINUED] Ferrous Sulfate (IRON) 325 (65 Fe) MG tablet Take 1 tablet by mouth daily   Past Week             Discharge Medications:     Current Discharge Medication List        START taking these medications    Details   acetaminophen (TYLENOL) 325 MG tablet Take 1-2 tablets (325-650 mg) by mouth every 6 hours    Associated Diagnoses: S/P  section      docusate sodium (COLACE) 100 MG capsule Take 1 capsule (100 mg) by mouth 2 times daily as  needed for constipation    Associated Diagnoses: S/P  section      !! enoxaparin ANTICOAGULANT (LOVENOX) 40 MG/0.4ML syringe Inject 1 mL (100 mg) Subcutaneous every 12 hours  Qty: 12 mL, Refills: 0    Associated Diagnoses: JAK2 V617F mutation      ibuprofen (ADVIL/MOTRIN) 600 MG tablet Take 1 tablet (600 mg) by mouth every 6 hours as needed for moderate pain    Associated Diagnoses: S/P  section      labetalol (NORMODYNE) 200 MG tablet Take 1 tablet (200 mg) by mouth every 8 hours  Qty: 60 tablet, Refills: 0    Associated Diagnoses: Hemolysis, elevated liver enzymes, and low platelet (HELLP) syndrome during pregnancy, antepartum       !! - Potential duplicate medications found. Please discuss with provider.        CONTINUE these medications which have NOT CHANGED    Details   busPIRone (BUSPAR) 10 MG tablet Take 10 mg by mouth 2 times daily      !! 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 - Potential duplicate medications found. Please discuss with provider.        STOP taking these medications       Ferrous Sulfate (IRON) 325 (65 Fe) MG tablet Comments:   Reason for Stopping:                     Consultations:   No consultations were requested during this  admission except lactation          Brief History of Labor or Admission:     Sruthi is a 33 y/o G1 who was sent for evaluation at 38+1 due to nausea vomiting.  Triage evaluation quickly revealed she actually had HELLP syndrome.  She was admitted for ripening/IOL with magnesium started however with labs becoming more abnormal and patient preference decision was made to proceed with primary  section which was uncomplicated.  Please see operative not for details.         Hospital Course:   With respect to HELLP she completed a 24hr course of magnesium.  Her labs began trending in the appropriate direction.  On POD#2 when platelets were about 100K she was restarted on her Lovenox 100mg Q12H.  She was also started on Labetalol 200mg TID on POD#2 which was effective for managing her mild range blood pressures.  She had no notable LE swelling on day of discharge.  Surgically, she recovered as anticipated and experienced no post-operative complications. On discharge, her pain was well controlled. Vaginal bleeding is similar to peak menstrual flow.  Voiding without difficulty.  Ambulating well and tolerating a normal diet.  No fever or significant wound drainage.  She is working on breastfeeding.  Infant is stable.  +BM and flatus  She was discharged on post-partum day #3 with Labetalol, BP cuff and small supply of Lovenox (as she has more coming from her outpatient pharmacy).    Post-partum hemoglobin:   Hemoglobin   Date Value Ref Range Status   10/22/2023 10.0 (L) 11.7 - 15.7 g/dL Final   2021 12.2 11.7 - 15.7 g/dL Final             Discharge Instructions and Follow-Up:     Discharge diet: Regular   Discharge activity: Pelvic rest: abstain from intercourse and do not use tampons for 6 week(s)   Discharge follow-up: Follow up with Dr. Kirkpatrick in the office on Friday   Wound care: Dressing to be removed at office visit on Friday           Discharge Disposition:     Discharged to home       Attestation:  Total time: 40 minutes    Fay Benavides MD   Electronically signed by:  Fay Benavides  [facility] Office  Pager: [phone]  October 23, 2023

## 2023-10-23 NOTE — DISCHARGE SUMMARY
D: VSS, assessments WDL.   I: Pt. received complete discharge paperwork and home medications as filled by discharge pharmacy to include labetalol.  Pt was given B/p cuff to use at home. Pt. was given times of last dose for all discharge medications in writing on discharge medication sheets.  Discharge teaching included home medication, pain management, activity restrictions, postpartum cares, and signs and symptoms of infection.    A: Discharge outcomes on care plan met.  Mother states understanding and comfort with self and baby cares.  P: Pt. discharged to home.  Pt. was discharged with baby, and bands were checked at time of discharge.  Pt. was accompanied by , nurse and baby, and left with personal belongings.   Pt. to follow up with OB per MD order.  Pt. had no further questions at the time of discharge and no unmet needs were identified.

## 2023-10-23 NOTE — LACTATION NOTE
Routine Lactation visit with Sruthi & baby girl Kristel. Getting ready for discharge. Sruthi reports feeding is going much better. Sruthi is breastfeeding, then pumping and supplementing with donor milk after each feeding. Kristel is at 10% weight loss. She's breastfeeding better this morning, feeding for about 15 minutes per side with a good latch. Plan to increase supplementation amount to 20ml per feeding as Kristel was still acting hungry after last breastfeed and supplement.    Discussed cluster feeding, what it is and when to expect it, satiety cues, feeding cues, and reviewed Feeding Log for home use. Encouraged to review Breastfeeding section in Your Guide to Postpartum &  Care.    Reviewed milk supply and engorgement. Reviewed typical timeline of milk supply initiation and progression over first 3-5 days postpartum. Discussed comfort measures for engorgement, plugged duct treatment, and warning signs of breast infection. Encouraged continuing to pump with each feeding while baby is supplementing, to increase and protect milk supply. Gave donor milk at home information sheet at Sruthi's request and reviewed home supplementation options.    Feeding plan: Recommend short, frequent breast feedings: At least 8 - 12 times every 24 hours. Supplement with each feeding with expressed milk/donor milk or formula. Sruthi to pump with each feeding while baby is supplementing. Encouraged use of feeding log and to record feedings, and void/stool patterns. Sruthi has a breast pump for home use. Follow up with [place] [facility], recommend Lactation follow up due to feeding difficulties and supplementation at discharge. Reviewed outpatient lactation resources. Sruthi very appreciative of visit.    Lakesha Cohn RN-C, IBCLC, MNN

## 2023-10-23 NOTE — DISCHARGE INSTRUCTIONS
Warning Signs after Having a Baby    Keep this paper on your fridge or somewhere else where you can see it.    Call your provider if you have any of these symptoms up to 12 weeks after having your baby.    Thoughts of hurting yourself or your baby  Pain in your chest or trouble breathing  Severe headache not helped by pain medicine  Eyesight concerns (blurry vision, seeing spots or flashes of light, other changes to eyesight)  Fainting, shaking or other signs of a seizure    Call 9-1-1 if you feel that it is an emergency.     The symptoms below can happen to anyone after giving birth. They can be very serious. Call your provider if you have any of these warning signs.    My provider s phone number: _______________________    Losing too much blood (hemorrhage)    Call your provider if you soak through a pad in less than an hour or pass blood clots bigger than a golf ball. These may be signs that you are bleeding too much.    Blood clots in the legs or lungs    After you give birth, your body naturally clots its blood to help prevent blood loss. Sometimes this increased clotting can happen in other areas of the body, like the legs or lungs. This can block your blood flow and be very dangerous.     Call your provider if you:  Have a red, swollen spot on the back of your leg that is warm or painful when you touch it.   Are coughing up blood.     Infection    Call your provider if you have any of these symptoms:  Fever of 100.4 F (38 C) or higher.  Pain or redness around your stitches if you had an incision.   Any yellow, white, or green fluid coming from places where you had stitches or surgery.    Mood Problems (postpartum depression)    Many people feel sad or have mood changes after having a baby. But for some people, these mood swings are worse.     Call your provider right away if you feel so anxious or nervous that you can't care for yourself or your baby.    Preeclampsia (high blood pressure)    Even if you  didn't have high blood pressure when you were pregnant, you are at risk for the high blood pressure disease called preeclampsia. This risk can last up to 12 weeks after giving birth.     Call your provider if you have:   Pain on your right side under your rib cage  Sudden swelling in the hands and face    Remember: You know your body. If something doesn't feel right, get medical help.     For informational purposes only. Not to replace the advice of your health care provider. Copyright 2020 [facility]. All rights reserved. Clinically reviewed by Fay Langston, RNC-OB, MSN. [employer] [id number] - Rev .    Postop  Birth Instructions    Activity     Do not lift more than 10 pounds for 6 weeks after surgery.  Ask family and friends for help when you need it.  No driving until you have stopped taking your pain medications (usually two weeks after surgery).  No heavy exercise or activity for 6 weeks.  Don't do anything that will put a strain on your surgery site.  Don't strain when using the toilet.  Your care team may prescribe a stool softener if you have problems with your bowel movements.     To care for your incision:     Keep the incision clean and dry.  Do not soak your incision in water. No swimming or hot tubs until it has fully healed. You may soak in the bathtub if the water level is below your incision.  Do not use peroxide, gel, cream, lotion, or ointment on your incision.  Adjust your clothes to avoid pressure on your surgery site (check the elastic in your underwear for example).     You may see a small amount of clear or pink drainage and this is normal.  Check with your health care provider:     If the drainage increases or has an odor.  If the incision reddens, you have swelling, or develop a rash.  If you have increased pain and the medicine we prescribed doesn't help.  If you have a fever above 100.4 F (38 C) with or without chills when placing thermometer under your tongue.    The area around your incision (surgery wound), will feel numb.  This is normal. The numbness should go away in less than a year.     Keep your hands clean:  Always wash your hands before touching your incision (surgery wound). This helps reduce your risk of infection. If your hands aren't dirty, you may use an alcohol hand-rub to clean your hands. Keep your nails clean and short.    Call your healthcare provider if you have any of these symptoms:     You soak a sanitary pad with blood within 1 hour, or you see blood clots larger than a golf ball.  Bleeding that lasts more than 6 weeks.  Vaginal discharge that smells bad.  Severe pain, cramping or tenderness in your lower belly area.  A need to urinate more frequently (use the toilet more often), more urgently (use the toilet very quickly), or it burns when you urinate.  Nausea and vomiting.  Redness, swelling or pain around a vein in your leg.  Problems breastfeeding or a red or painful area on your breast.  Chest pain and cough or are gasping for air.  Problems with coping with sadness, anxiety or depression. If you have concerns about hurting yourself or the baby, call your provider immediately.    You have questions or concerns after you return home.

## 2023-10-23 NOTE — PROGRESS NOTES
Postpartum progress note  POD#3 s/p primary LTCS  HELLP  JAK2 Mutation - thrombosis risk    S: Pain is controlled.  Only needing Tylenol and Motrin.  Declines need for prescription for oxycodone.  Ambulating and voiding without difficulty.  +Flatus and BM and this has made her feel even better.  Tolerating a regular diet.  No nausea or emesis today.  Denies HA, vision changes, RUQ pain.  Lochia is appropriate.  Working on nursing/supplementing with donor milk.  Strongly desires discharge home today.  BPs appropriate.  Reviewed plan for discharge home with Labetalol and blood pressure cuff.  Instructions given.  Disc need for in office appt on Friday with Dr. Kirkpatrick.  She has another prescription for her Lovenox being filled but is waiting on her outpatient pharmacy.  Plan to give 5 days worth of medications to ensure she has enough in the interim.   Reviewed standard post  and postpartum discharge instructions as well.    O:  Vitals:    10/23/23 0119 10/23/23 0534 10/23/23 0700 10/23/23 0819   BP: 122/78 123/71 (!) 140/89 130/79   BP Location: Right arm Right arm  Right arm   Patient Position:    Semi-Del Rosario's   Cuff Size:    Adult Regular   Pulse: 62 52 83 73   Resp: 16 16  16   Temp: 98.1  F (36.7  C) 97.8  F (36.6  C)  98.3  F (36.8  C)   TempSrc: Oral Oral  Oral   SpO2:       Weight:   121.8 kg (268 lb 8.3 oz)      Intake/Output Summary (Last 24 hours) at 10/23/2023 0747  Last data filed at 10/23/2023 0730  Gross per 24 hour   Intake 1400 ml   Output 2150 ml   Net -750 ml     Weight -2lbs over 24 hrs    Gen: NAD  Resp: No increased work of breathing  Abd: appropriately tender to palpation, soft, nondistended  Incision: silver dressing in place, clean and dry  Ext: warm, well-perfused, no notable pedal edema bilaterally     Latest Reference Range & Units 10/20/23 21:00 10/21/23 07:10 10/22/23 12:31   Hemoglobin 11.7 - 15.7 g/dL 11.5 (L) 10.0 (L) 10.0 (L)      Latest Reference Range & Units 10/20/23 21:00  10/21/23 07:10 10/21/23 19:22 10/22/23 06:07 10/22/23 12:31   Platelet Count 150 - 450 10e3/uL 109 (L) 82 (L) 90 (L) 96 (L) 126 (L)      Latest Reference Range & Units 10/20/23 11:44 10/20/23 21:00 10/21/23 07:10 10/22/23 12:31   AST 0 - 45 U/L 137 (H) 205 (H) 190 (H) 51 (H)      Latest Reference Range & Units 10/20/23 11:44 10/20/23 21:00 10/21/23 07:10 10/22/23 12:31   ALT 0 - 50 U/L 123 (H) 163 (H) 170 (H) 101 (H)      Latest Reference Range & Units 10/20/23 11:44 10/20/23 21:00 10/21/23 07:10 10/22/23 12:31   Creatinine 0.51 - 0.95 mg/dL 0.67 0.51 0.44 (L) 0.49 (L)     A/P: 31 y/o G1 POD#3 s/p primary LTCS course complicated by HELLP in setting of high risk thrombosis condition, course improving  POD#3  -Routine care    2. HELLP  -S/p mag  -Labs trending appropriately  -Started on Labetalol 200mg TID on POD#2, BPs primarily normal range since that time  -Diuresing ok, weight down 2lbs, minimal LE edema  -Denies PIH symptoms    3. JAK2 Mutation  -Restarted on Lovenox 100mg Q12H on POD#2 after platelets >100K  -Nml pedal pulses    4. Rh positive/Rubella immune    5. Anxiety  -Continue Zoloft and Buspar  -Monitor mood closely postpartum    6. BCM  -To be discussed at postpartum visit    7. Dispo  -discharge home with Labetalol, BP cuff, small Lovenox supply with follow-up in the office this week    Electronically signed by:  Fay Benavides  [facility] Office  Pager: [phone]  October 23, 2023

## 2023-10-24 ENCOUNTER — TELEPHONE (OUTPATIENT)
Dept: FAMILY MEDICINE | Facility: CLINIC | Age: 33
End: 2023-10-24
Payer: COMMERCIAL

## 2023-10-24 NOTE — TELEPHONE ENCOUNTER
Reason for Call:  Appointment Request - hosp/ed F/U for     Patient requesting this type of appt:       Requested provider: [facility]    Reason patient unable to be scheduled: Not within requested timeframe    When does patient want to be seen/preferred time: 1-2 days    Comments: [facility] told her to make a follow-up appt 10/25/23 to check on infant born by cesaurean section 10/20/23.       --- Dr. Gonzalez, May I use your 1:30pm same day slot tomorrow (10/25) for a  hospital F/U appt?----      Call taken on 10/24/2023 at 11:46 AM by Corin Romero

## 2024-01-30 ENCOUNTER — ONCOLOGY VISIT (OUTPATIENT)
Dept: ONCOLOGY | Facility: CLINIC | Age: 34
End: 2024-01-30
Attending: INTERNAL MEDICINE
Payer: COMMERCIAL

## 2024-01-30 ENCOUNTER — APPOINTMENT (OUTPATIENT)
Dept: LAB | Facility: CLINIC | Age: 34
End: 2024-01-30
Payer: COMMERCIAL

## 2024-01-30 ENCOUNTER — PATIENT OUTREACH (OUTPATIENT)
Dept: ONCOLOGY | Facility: CLINIC | Age: 34
End: 2024-01-30

## 2024-01-30 VITALS
WEIGHT: 260.4 LBS | DIASTOLIC BLOOD PRESSURE: 75 MMHG | BODY MASS INDEX: 38.57 KG/M2 | TEMPERATURE: 97.8 F | SYSTOLIC BLOOD PRESSURE: 111 MMHG | HEART RATE: 84 BPM | OXYGEN SATURATION: 96 % | RESPIRATION RATE: 16 BRPM | HEIGHT: 69 IN

## 2024-01-30 DIAGNOSIS — Z15.89 JAK2 V617F MUTATION: ICD-10-CM

## 2024-01-30 DIAGNOSIS — D47.3 ESSENTIAL THROMBOCYTHEMIA (H): ICD-10-CM

## 2024-01-30 LAB
ALBUMIN SERPL BCG-MCNC: 4.6 G/DL (ref 3.5–5.2)
ALP SERPL-CCNC: 115 U/L (ref 40–150)
ALT SERPL W P-5'-P-CCNC: 14 U/L (ref 0–50)
ANION GAP SERPL CALCULATED.3IONS-SCNC: 13 MMOL/L (ref 7–15)
AST SERPL W P-5'-P-CCNC: 16 U/L (ref 0–45)
BASOPHILS # BLD AUTO: 0.1 10E3/UL (ref 0–0.2)
BASOPHILS NFR BLD AUTO: 1 %
BILIRUB SERPL-MCNC: <0.2 MG/DL
BUN SERPL-MCNC: 12.5 MG/DL (ref 6–20)
CALCIUM SERPL-MCNC: 10 MG/DL (ref 8.6–10)
CHLORIDE SERPL-SCNC: 101 MMOL/L (ref 98–107)
CREAT SERPL-MCNC: 0.62 MG/DL (ref 0.51–0.95)
DEPRECATED HCO3 PLAS-SCNC: 23 MMOL/L (ref 22–29)
EGFRCR SERPLBLD CKD-EPI 2021: >90 ML/MIN/1.73M2
EOSINOPHIL # BLD AUTO: 0.1 10E3/UL (ref 0–0.7)
EOSINOPHIL NFR BLD AUTO: 1 %
ERYTHROCYTE [DISTWIDTH] IN BLOOD BY AUTOMATED COUNT: 13.1 % (ref 10–15)
FERRITIN SERPL-MCNC: 28 NG/ML (ref 6–175)
GLUCOSE SERPL-MCNC: 86 MG/DL (ref 70–99)
HCT VFR BLD AUTO: 38.6 % (ref 35–47)
HGB BLD-MCNC: 12.7 G/DL (ref 11.7–15.7)
IMM GRANULOCYTES # BLD: 0 10E3/UL
IMM GRANULOCYTES NFR BLD: 0 %
IRON BINDING CAPACITY (ROCHE): 399 UG/DL (ref 240–430)
IRON SATN MFR SERPL: 10 % (ref 15–46)
IRON SERPL-MCNC: 39 UG/DL (ref 37–145)
LDH SERPL L TO P-CCNC: 171 U/L (ref 0–250)
LYMPHOCYTES # BLD AUTO: 2.4 10E3/UL (ref 0.8–5.3)
LYMPHOCYTES NFR BLD AUTO: 31 %
MCH RBC QN AUTO: 28.1 PG (ref 26.5–33)
MCHC RBC AUTO-ENTMCNC: 32.9 G/DL (ref 31.5–36.5)
MCV RBC AUTO: 85 FL (ref 78–100)
MONOCYTES # BLD AUTO: 0.4 10E3/UL (ref 0–1.3)
MONOCYTES NFR BLD AUTO: 5 %
NEUTROPHILS # BLD AUTO: 4.9 10E3/UL (ref 1.6–8.3)
NEUTROPHILS NFR BLD AUTO: 62 %
NRBC # BLD AUTO: 0 10E3/UL
NRBC BLD AUTO-RTO: 0 /100
PLATELET # BLD AUTO: 349 10E3/UL (ref 150–450)
POTASSIUM SERPL-SCNC: 4.5 MMOL/L (ref 3.4–5.3)
PROT SERPL-MCNC: 7.8 G/DL (ref 6.4–8.3)
RBC # BLD AUTO: 4.52 10E6/UL (ref 3.8–5.2)
RETICS # AUTO: 0.04 10E6/UL (ref 0.03–0.1)
RETICS/RBC NFR AUTO: 0.9 % (ref 0.5–2)
SODIUM SERPL-SCNC: 137 MMOL/L (ref 135–145)
WBC # BLD AUTO: 7.9 10E3/UL (ref 4–11)

## 2024-01-30 PROCEDURE — 82728 ASSAY OF FERRITIN: CPT | Performed by: INTERNAL MEDICINE

## 2024-01-30 PROCEDURE — 85045 AUTOMATED RETICULOCYTE COUNT: CPT | Performed by: INTERNAL MEDICINE

## 2024-01-30 PROCEDURE — 99214 OFFICE O/P EST MOD 30 MIN: CPT | Performed by: INTERNAL MEDICINE

## 2024-01-30 PROCEDURE — 83550 IRON BINDING TEST: CPT | Performed by: INTERNAL MEDICINE

## 2024-01-30 PROCEDURE — 99213 OFFICE O/P EST LOW 20 MIN: CPT | Performed by: INTERNAL MEDICINE

## 2024-01-30 PROCEDURE — 80053 COMPREHEN METABOLIC PANEL: CPT | Performed by: INTERNAL MEDICINE

## 2024-01-30 PROCEDURE — 83615 LACTATE (LD) (LDH) ENZYME: CPT | Performed by: INTERNAL MEDICINE

## 2024-01-30 PROCEDURE — 36415 COLL VENOUS BLD VENIPUNCTURE: CPT | Performed by: INTERNAL MEDICINE

## 2024-01-30 PROCEDURE — 85025 COMPLETE CBC W/AUTO DIFF WBC: CPT | Performed by: INTERNAL MEDICINE

## 2024-01-30 ASSESSMENT — PAIN SCALES - GENERAL: PAINLEVEL: NO PAIN (0)

## 2024-01-30 NOTE — LETTER
2024         RE: Sruthi Beasley  [address] [facility] [address]        Dear Colleague,    Thank you for referring your patient, Sruthi Beasley, to the [facility]. Please see a copy of my visit note below.            [facility] Hematology Consultation    Outpatient Visit Note:    Patient: Sruthi Mac  MRN: [id number]  : 1990  STEVE: 2024    Reason for Consultation:  JAK2+ MPN    Assessment:  In summary, Sruthi Mac is a 33 year old woman with [id number] positive myeloproliferative neoplasm and arterial thrombosis, diagnosed in spring 2021.    1. [id number]+ MPN, likely ET v prefibrotic MF  High risk (IPSET>2) ET  2. 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 S/p delivery, complicated by HELP syndrome, s/p  - M Dr. Hannah Dye (Hannah Dye, DO FACOG   [facility] (new practice in [place])   4. Breastfeeding    For anticoagulation-- Currently on Lovenox 100mg twice daily keeping an Xa level 0.6-1 and aspirin - given that she is breastfeeding will continue with this.       Currently is NOT on interferon due to the cost of Pegasys. Last variant allele frequency (2023) was JAK2 V617F currently 3.5%, previously at 5% (2021). --- we will repeat VAF in 2024    Ferritin is 2 and iron sat is 10. - recommend prenatal with iron. Is not presently anemic so would need PA for IV iron.       Plan:  1. Continue with LMWH 100 mg subcutaneous BID  2. Prenatal vitamin with iron  3. Labs in 3 months (molecular) and Dr. Irving in 4-5 monhts    The patient is given our center's contact information and is instructed to call if she should have any further questions or concerns.  .     36 minutes spent on the date of the encounter doing chart review, review of test results, patient visit and documentation     Keily Irving MD/PhD   of Medicine  Division of  Hematology, Oncology and Transplantation      ----------------------------------------------------------------------------------------------------------------------    History of Present Illness/Interval History:  Sruthi Mac is a 1 year old woman with history of depression and anxiety who presetned to [employer] in 2021 with acute onset of left leg pain. She was found to have left popliteal arterial occlusion that required extensive interventions. She was evaluated in 2021 by my former collegue Dr. David due to thrombocytosis. Evaluation included MPN panel that revealed a [id number] mutation. Bone marrow biopsy was completed. She established care in Dr. Irving's clinic in late . Please refer to her initial note for further details.     Sowmya Romano was delivered via  due to HELP syndrome. Has recovered and they are doing well. Sruthi is pumping. No issues with worsening leg pain or swelling. No issues with taking LMWH shots.     Myeloproliferative Neoplasm Symptom Assessment Form  Reference: Emasharda RM, Petr AC, TONIA Dow, et al Myeloproliferative neoplasm (MPN) symptoms assessment form total symptom score: prospective international assessment of an abbreviated symptom burden scoring systems among patients with MPNs. J. Clin Oncol 2012; 30: 3224-2601    Symptom 1-10 (0 if absent), 1 favorable, 10 unfavorable   Please rate your fatigue (weariness, tiredness) by choosing one number that best describes your WORSE level of fatigue during the past 24 hours 1   Filling up quickly when you eat 0   Abdominal discomfort 0   Inactivity 0   Problems with concentration 0   Night sweats 1   Itching 0   Bone pain 0   Fever 0   Unintentional weight loss over the last 6 months 0   Total score for her MPN symptom assessment form score is 2   (Mean score for ET 18.7 +/- 15.3, PV of 21.8 +/- 16.3, and MF 25.3 +/- 17.2)      Past Medical History:  Past Medical History:   Diagnosis Date    Acute  occlusion of artery of lower extremity (H24) 2021    Anxiety     Depression     Encounter for preconception consultation 10/19/2021    Essential thrombocythemia (H)     History of arterial thrombosis     Hyperlipidemia     JAK2 gene mutation     Limb ischemia 2021    Obesity     Panic attack     Subclinical hypothyroidism        Past Surgical History:  Past Surgical History:   Procedure Laterality Date     SECTION N/A 10/20/2023    Procedure:  section;  Surgeon: Fatoumata Koehler MD;  [facility]    EMBOLECTOMY LOWER EXTREMITY Left 2021    Procedure: LEFT LEG ANTERIOR TIBIAL AND POSTERIOR TIBIAL EMBOLECTOMIES, FEMORAL POPITEAL VEIN PATCH, ANGIOGRAM, FASCIOTOMIES;  Surgeon: Ulises Gonzalez;  Location: [facility] OR    IR ANGIOGRAM THROUGH CATHETER FOLLOW UP  2021    IR ANGIOGRAM THROUGH CATHETER FOLLOW UP  4/10/2021    IR LOWER EXTREMITY ANGIOGRAM LEFT  2021    THROMBECTOMY LOWER EXTREMITY Left 2021    Procedure: Re-Exploration Left Lower Extremity With Popliteal and Tibial Embolectomy.;  Surgeon: Ulises Gonzalez;  Location:  OR    wisdom teeth remova         Medications:  Current Outpatient Medications   Medication Sig Dispense Refill    acetaminophen (TYLENOL) 325 MG tablet Take 1-2 tablets (325-650 mg) by mouth every 6 hours      busPIRone (BUSPAR) 10 MG tablet Take 10 mg by mouth 2 times daily      docusate sodium (COLACE) 100 MG capsule Take 1 capsule (100 mg) by mouth 2 times daily as needed for constipation      enoxaparin ANTICOAGULANT (LOVENOX) 100 MG/ML syringe Inject 1 mL (100 mg) Subcutaneous 2 times daily 60 mL 11    ibuprofen (ADVIL/MOTRIN) 600 MG tablet Take 1 tablet (600 mg) by mouth every 6 hours as needed for moderate pain      labetalol (NORMODYNE) 200 MG tablet Take 1 tablet (200 mg) by mouth every 8 hours 60 tablet 0    Lactobacillus (PROBIOTIC ACIDOPHILUS PO) Take by mouth daily      levothyroxine (SYNTHROID/LEVOTHROID) 75 MCG  "tablet       Prenatal MV-Min-Fe Fum-FA-DHA (PRENATAL 1 PO) Take 1 tablet by mouth      sertraline (ZOLOFT) 100 MG tablet Take 100 mg by mouth      STATIN NOT PRESCRIBED (INTENTIONAL) Please choose reason not prescribed from choices below.      Vitamin D3 (CHOLECALCIFEROL) 25 mcg (1000 units) tablet Take 1 tablet by mouth daily      enoxaparin ANTICOAGULANT (LOVENOX) 40 MG/0.4ML syringe Inject 1 mL (100 mg) Subcutaneous every 12 hours (Patient not taking: Reported on 1/30/2024) 12 mL 0        Allergies:  Allergies   Allergen Reactions    Erythromycin Unknown    Pcn [Penicillins] Unknown       ROS:  A 10 point ROS is negative except as stated in the HPI    Social History:  Denies any tobacco use. No significant alcohol use. Denies any illicit drug use. Patient works as a  for [employer]. .    Family History:  Dad- T2 DM, hyperlipidemia, testicular cancer, melanoma  Mother- hyperlipidemia     Objective:  Vitals: /75 (BP Location: Left arm, Patient Position: Sitting, Cuff Size: Adult Large)   Pulse 84   Temp 97.8  F (36.6  C) (Oral)   Resp 16   Ht 1.753 m (5' 9.02\")   Wt 118.1 kg (260 lb 6.4 oz)   LMP 12/13/2023   SpO2 96%   BMI 38.44 kg/m      Exam:   Constitutional: Appears well, no distress  HEENT: Pupils equal and reactive to light. No scleral icterus or hemorrhage. Nares without evidence of telangiectasia. Mucous membranes moist with no wet purpura.   CV: regular rate and rhythm, no murmurs  Respiratory: clear  Mus/Skele: no edema  Skin: no petechiae, no ecchymosis.  Neuro: CN II-XII intact. Normal gait. AOx3    Lab Results  Results for orders placed or performed in visit on 01/30/24   Reticulocyte count     Status: Normal   Result Value Ref Range    % Reticulocyte 0.9 0.5 - 2.0 %    Absolute Reticulocyte 0.039 0.025 - 0.095 10e6/uL   CBC with platelets and differential     Status: None   Result Value Ref Range    WBC Count 7.9 4.0 - 11.0 10e3/uL    RBC Count 4.52 3.80 " - 5.20 10e6/uL    Hemoglobin 12.7 11.7 - 15.7 g/dL    Hematocrit 38.6 35.0 - 47.0 %    MCV 85 78 - 100 fL    MCH 28.1 26.5 - 33.0 pg    MCHC 32.9 31.5 - 36.5 g/dL    RDW 13.1 10.0 - 15.0 %    Platelet Count 349 150 - 450 10e3/uL    % Neutrophils 62 %    % Lymphocytes 31 %    % Monocytes 5 %    % Eosinophils 1 %    % Basophils 1 %    % Immature Granulocytes 0 %    NRBCs per 100 WBC 0 <1 /100    Absolute Neutrophils 4.9 1.6 - 8.3 10e3/uL    Absolute Lymphocytes 2.4 0.8 - 5.3 10e3/uL    Absolute Monocytes 0.4 0.0 - 1.3 10e3/uL    Absolute Eosinophils 0.1 0.0 - 0.7 10e3/uL    Absolute Basophils 0.1 0.0 - 0.2 10e3/uL    Absolute Immature Granulocytes 0.0 <=0.4 10e3/uL    Absolute NRBCs 0.0 10e3/uL   CBC with platelets differential     Status: None    Narrative    The following orders were created for panel order CBC with platelets differential.  Procedure                               Abnormality         Status                     ---------                               -----------         ------                     CBC with platelets and d...[[id number]]                      Final result                 Please view results for these tests on the individual orders.       Imaging:  No recent relevant imaging      Keily Irving MD

## 2024-01-30 NOTE — NURSING NOTE
"Oncology Rooming Note    January 30, 2024 5:19 PM   Sruthi Beasley is a 33 year old female who presents for:    Chief Complaint   Patient presents with    Blood Draw     Labs drawn via  by RN. VS taken.    Oncology Clinic Visit     [facility] RETURN - THROMBOCYTOSIS      Initial Vitals: /75 (BP Location: Left arm, Patient Position: Sitting, Cuff Size: Adult Large)   Pulse 84   Temp 97.8  F (36.6  C) (Oral)   Resp 16   Ht 1.753 m (5' 9.02\")   Wt 118.1 kg (260 lb 6.4 oz)   LMP 12/13/2023   SpO2 96%   BMI 38.44 kg/m   Estimated body mass index is 38.44 kg/m  as calculated from the following:    Height as of this encounter: 1.753 m (5' 9.02\").    Weight as of this encounter: 118.1 kg (260 lb 6.4 oz). Body surface area is 2.4 meters squared.  No Pain (0) Comment: Data Unavailable   Patient's last menstrual period was 12/13/2023.  Allergies reviewed: Yes  Medications reviewed: Yes    Medications: Medication refills not needed today.  Pharmacy name entered into [facility]:    [facility] - [place], [place] - [address] [place] [address] [facility] - [place], [place] - [address]    Frailty Screening:   Is the patient here for a new oncology consult visit in cancer care? 2. No      Khris Linder LPN              "

## 2024-01-30 NOTE — PATIENT INSTRUCTIONS
Continue on Lovenox     Labs in 3 months (molecular testing)    Follow up with Dr. Irving 4-5 months.       Keily Irving MD/PhD   of Medicine  Division of Hematology, Oncology and Transplantation    [address], Mail [id number]  [place] [address]    Clinic Scheduling and Nurse Line: [phone], option 5, option 2      RN Care Coordinator:   Vashti Llanos  [facility]  Direct line: (P) [phone]  (F) [phone]

## 2024-01-30 NOTE — PROGRESS NOTES
[facility]: Cancer Care                                                                                          Pt seen in clinic today, pt with new daughter and states motherhood going well.  Pt reports feeling well over past few months, healed well from c section.  No active concerns at this time.  MPN screen given to pt to complete for visit with Dr Irving.    Vashti Llanos, KELVINN, RN  RN Care Coordinator  [facility]

## 2024-01-30 NOTE — PROGRESS NOTES
[facility] Hematology Consultation    Outpatient Visit Note:    Patient: Sruthi Mac  MRN: [id number]  : 1990  STEVE: 2024    Reason for Consultation:  JAK2+ MPN    Assessment:  In summary, Sruthi Mac is a 33 year old woman with [id number] positive myeloproliferative neoplasm and arterial thrombosis, diagnosed in spring 2021.    1. [id number]+ MPN, likely ET v prefibrotic MF  High risk (IPSET>2) ET  2. 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 S/p delivery, complicated by HELP syndrome, s/p  - M Dr. Hannah Dye (Hannah Dye, DO FACOG   [facility] (new practice in [place])   4. Breastfeeding    For anticoagulation-- Currently on Lovenox 100mg twice daily keeping an Xa level 0.6-1 and aspirin - given that she is breastfeeding will continue with this.       Currently is NOT on interferon due to the cost of Pegasys. Last variant allele frequency (2023) was JAK2 V617F currently 3.5%, previously at 5% (2021). --- we will repeat VAF in 2024    Ferritin is 2 and iron sat is 10. - recommend prenatal with iron. Is not presently anemic so would need PA for IV iron.       Plan:  1. Continue with LMWH 100 mg subcutaneous BID  2. Prenatal vitamin with iron  3. Labs in 3 months (molecular) and Dr. Irving in 4-5 monhts    The patient is given our center's contact information and is instructed to call if she should have any further questions or concerns.  .     36 minutes spent on the date of the encounter doing chart review, review of test results, patient visit and documentation     Keily Irving MD/PhD   of Medicine  Division of Hematology, Oncology and Transplantation      ----------------------------------------------------------------------------------------------------------------------    History of Present Illness/Interval History:  Sruthi Mac is a 1 year old woman with history  of depression and anxiety who presetned to [facility] in 2021 with acute onset of left leg pain. She was found to have left popliteal arterial occlusion that required extensive interventions. She was evaluated in 2021 by my former collegue Dr. David due to thrombocytosis. Evaluation included MPN panel that revealed a [id number] mutation. Bone marrow biopsy was completed. She established care in Dr. Irving's clinic in late . Please refer to her initial note for further details.     Baby Rosalina was delivered via  due to HELP syndrome. Has recovered and they are doing well. Sruthi is pumping. No issues with worsening leg pain or swelling. No issues with taking LMWH shots.     Myeloproliferative Neoplasm Symptom Assessment Form  Reference: Emaunjamila RM, Petr AC, Victor M, TONIA, et al Myeloproliferative neoplasm (MPN) symptoms assessment form total symptom score: prospective international assessment of an abbreviated symptom burden scoring systems among patients with MPNs. J. Clin Oncol 2012; 30: 8285-6651    Symptom 1-10 (0 if absent), 1 favorable, 10 unfavorable   Please rate your fatigue (weariness, tiredness) by choosing one number that best describes your WORSE level of fatigue during the past 24 hours 1   Filling up quickly when you eat 0   Abdominal discomfort 0   Inactivity 0   Problems with concentration 0   Night sweats 1   Itching 0   Bone pain 0   Fever 0   Unintentional weight loss over the last 6 months 0   Total score for her MPN symptom assessment form score is 2   (Mean score for ET 18.7 +/- 15.3, PV of 21.8 +/- 16.3, and MF 25.3 +/- 17.2)      Past Medical History:  Past Medical History:   Diagnosis Date    Acute occlusion of artery of lower extremity (H24) 2021    Anxiety     Depression     Encounter for preconception consultation 10/19/2021    Essential thrombocythemia (H)     History of arterial thrombosis     Hyperlipidemia     JAK2 gene mutation     Limb ischemia 2021     Obesity     Panic attack     Subclinical hypothyroidism        Past Surgical History:  Past Surgical History:   Procedure Laterality Date     SECTION N/A 10/20/2023    Procedure:  section;  Surgeon: Fatoumata Koehler MD;  Location: [facility]    EMBOLECTOMY LOWER EXTREMITY Left 2021    Procedure: LEFT LEG ANTERIOR TIBIAL AND POSTERIOR TIBIAL EMBOLECTOMIES, FEMORAL POPITEAL VEIN PATCH, ANGIOGRAM, FASCIOTOMIES;  Surgeon: Ulises Gonzalez;  Location: [facility] OR    IR ANGIOGRAM THROUGH CATHETER FOLLOW UP  2021    IR ANGIOGRAM THROUGH CATHETER FOLLOW UP  4/10/2021    IR LOWER EXTREMITY ANGIOGRAM LEFT  2021    THROMBECTOMY LOWER EXTREMITY Left 2021    Procedure: Re-Exploration Left Lower Extremity With Popliteal and Tibial Embolectomy.;  Surgeon: Ulises Gonzalez;  Location:  OR    wisdom teeth remova         Medications:  Current Outpatient Medications   Medication Sig Dispense Refill    acetaminophen (TYLENOL) 325 MG tablet Take 1-2 tablets (325-650 mg) by mouth every 6 hours      busPIRone (BUSPAR) 10 MG tablet Take 10 mg by mouth 2 times daily      docusate sodium (COLACE) 100 MG capsule Take 1 capsule (100 mg) by mouth 2 times daily as needed for constipation      enoxaparin ANTICOAGULANT (LOVENOX) 100 MG/ML syringe Inject 1 mL (100 mg) Subcutaneous 2 times daily 60 mL 11    ibuprofen (ADVIL/MOTRIN) 600 MG tablet Take 1 tablet (600 mg) by mouth every 6 hours as needed for moderate pain      labetalol (NORMODYNE) 200 MG tablet Take 1 tablet (200 mg) by mouth every 8 hours 60 tablet 0    Lactobacillus (PROBIOTIC ACIDOPHILUS PO) Take by mouth daily      levothyroxine (SYNTHROID/LEVOTHROID) 75 MCG tablet       Prenatal MV-Min-Fe Fum-FA-DHA (PRENATAL 1 PO) Take 1 tablet by mouth      sertraline (ZOLOFT) 100 MG tablet Take 100 mg by mouth      STATIN NOT PRESCRIBED (INTENTIONAL) Please choose reason not prescribed from choices below.      Vitamin D3 (CHOLECALCIFEROL) 25  "mcg (1000 units) tablet Take 1 tablet by mouth daily      enoxaparin ANTICOAGULANT (LOVENOX) 40 MG/0.4ML syringe Inject 1 mL (100 mg) Subcutaneous every 12 hours (Patient not taking: Reported on 1/30/2024) 12 mL 0        Allergies:  Allergies   Allergen Reactions    Erythromycin Unknown    Pcn [Penicillins] Unknown       ROS:  A 10 point ROS is negative except as stated in the HPI    Social History:  Denies any tobacco use. No significant alcohol use. Denies any illicit drug use. Patient works as a  for [employer]. .    Family History:  Dad- T2 DM, hyperlipidemia, testicular cancer, melanoma  Mother- hyperlipidemia     Objective:  Vitals: /75 (BP Location: Left arm, Patient Position: Sitting, Cuff Size: Adult Large)   Pulse 84   Temp 97.8  F (36.6  C) (Oral)   Resp 16   Ht 1.753 m (5' 9.02\")   Wt 118.1 kg (260 lb 6.4 oz)   LMP 12/13/2023   SpO2 96%   BMI 38.44 kg/m      Exam:   Constitutional: Appears well, no distress  HEENT: Pupils equal and reactive to light. No scleral icterus or hemorrhage. Nares without evidence of telangiectasia. Mucous membranes moist with no wet purpura.   CV: regular rate and rhythm, no murmurs  Respiratory: clear  Mus/Skele: no edema  Skin: no petechiae, no ecchymosis.  Neuro: CN II-XII intact. Normal gait. AOx3    Lab Results  Results for orders placed or performed in visit on 01/30/24   Reticulocyte count     Status: Normal   Result Value Ref Range    % Reticulocyte 0.9 0.5 - 2.0 %    Absolute Reticulocyte 0.039 0.025 - 0.095 10e6/uL   CBC with platelets and differential     Status: None   Result Value Ref Range    WBC Count 7.9 4.0 - 11.0 10e3/uL    RBC Count 4.52 3.80 - 5.20 10e6/uL    Hemoglobin 12.7 11.7 - 15.7 g/dL    Hematocrit 38.6 35.0 - 47.0 %    MCV 85 78 - 100 fL    MCH 28.1 26.5 - 33.0 pg    MCHC 32.9 31.5 - 36.5 g/dL    RDW 13.1 10.0 - 15.0 %    Platelet Count 349 150 - 450 10e3/uL    % Neutrophils 62 %    % Lymphocytes 31 %    " % Monocytes 5 %    % Eosinophils 1 %    % Basophils 1 %    % Immature Granulocytes 0 %    NRBCs per 100 WBC 0 <1 /100    Absolute Neutrophils 4.9 1.6 - 8.3 10e3/uL    Absolute Lymphocytes 2.4 0.8 - 5.3 10e3/uL    Absolute Monocytes 0.4 0.0 - 1.3 10e3/uL    Absolute Eosinophils 0.1 0.0 - 0.7 10e3/uL    Absolute Basophils 0.1 0.0 - 0.2 10e3/uL    Absolute Immature Granulocytes 0.0 <=0.4 10e3/uL    Absolute NRBCs 0.0 10e3/uL   CBC with platelets differential     Status: None    Narrative    The following orders were created for panel order CBC with platelets differential.  Procedure                               Abnormality         Status                     ---------                               -----------         ------                     CBC with platelets and d...[[id number]]                      Final result                 Please view results for these tests on the individual orders.       Imaging:  No recent relevant imaging

## 2024-01-31 PROBLEM — O14.20 HELLP (HEMOLYTIC ANEMIA/ELEV LIVER ENZYMES/LOW PLATELETS IN PREGNANCY): Status: RESOLVED | Noted: 2023-10-20 | Resolved: 2024-01-31

## 2024-02-27 ENCOUNTER — MYC MEDICAL ADVICE (OUTPATIENT)
Dept: ONCOLOGY | Facility: CLINIC | Age: 34
End: 2024-02-27
Payer: COMMERCIAL

## 2024-02-27 DIAGNOSIS — I70.209 ACUTE OCCLUSION OF ARTERY OF LOWER EXTREMITY (H): ICD-10-CM

## 2024-02-27 DIAGNOSIS — Z79.01 LONG TERM CURRENT USE OF ANTICOAGULANT THERAPY: ICD-10-CM

## 2024-02-27 RX ORDER — ENOXAPARIN SODIUM 100 MG/ML
100 INJECTION SUBCUTANEOUS 2 TIMES DAILY
Qty: 60 ML | Refills: 11 | Status: SHIPPED | OUTPATIENT
Start: 2024-02-27

## 2024-02-27 NOTE — CONFIDENTIAL NOTE
"Enoxaparin 100mg/mL syringe  Last prescribing provider: Dr. Keily Irving     Last clinic visit date: 1/30/24    Recommendations for requested medication (if none, N/A): 1/30/24, \"For anticoagulation-- Currently on Lovenox 100mg twice daily keeping an Xa level 0.6-1 and aspirin - given that she is breastfeeding will continue with this.\"    Any other pertinent information (if none, N/A): Routing to on call provider, Dr. Lucas, as Dr. Irving is unavailable.    Refilled: Y/N, if NO, why?   "

## 2024-07-27 ENCOUNTER — HEALTH MAINTENANCE LETTER (OUTPATIENT)
Age: 34
End: 2024-07-27

## 2024-08-22 ENCOUNTER — LAB (OUTPATIENT)
Dept: LAB | Facility: CLINIC | Age: 34
End: 2024-08-22
Attending: INTERNAL MEDICINE
Payer: COMMERCIAL

## 2024-08-22 ENCOUNTER — ONCOLOGY VISIT (OUTPATIENT)
Dept: ONCOLOGY | Facility: CLINIC | Age: 34
End: 2024-08-22
Attending: INTERNAL MEDICINE
Payer: COMMERCIAL

## 2024-08-22 VITALS
HEART RATE: 69 BPM | SYSTOLIC BLOOD PRESSURE: 120 MMHG | OXYGEN SATURATION: 99 % | WEIGHT: 276.7 LBS | RESPIRATION RATE: 16 BRPM | DIASTOLIC BLOOD PRESSURE: 81 MMHG | BODY MASS INDEX: 40.84 KG/M2 | TEMPERATURE: 98.1 F

## 2024-08-22 DIAGNOSIS — Z15.89 JAK2 V617F MUTATION: ICD-10-CM

## 2024-08-22 DIAGNOSIS — D47.3 ESSENTIAL THROMBOCYTHEMIA (H): Primary | ICD-10-CM

## 2024-08-22 DIAGNOSIS — I70.209 ACUTE OCCLUSION OF ARTERY OF LOWER EXTREMITY (H): ICD-10-CM

## 2024-08-22 LAB
ALBUMIN SERPL BCG-MCNC: 4.4 G/DL (ref 3.5–5.2)
ALP SERPL-CCNC: 104 U/L (ref 40–150)
ALT SERPL W P-5'-P-CCNC: 9 U/L (ref 0–50)
ANION GAP SERPL CALCULATED.3IONS-SCNC: 11 MMOL/L (ref 7–15)
AST SERPL W P-5'-P-CCNC: 13 U/L (ref 0–45)
BASOPHILS # BLD AUTO: 0.1 10E3/UL (ref 0–0.2)
BASOPHILS NFR BLD AUTO: 1 %
BILIRUB SERPL-MCNC: 0.2 MG/DL
BUN SERPL-MCNC: 9.9 MG/DL (ref 6–20)
CALCIUM SERPL-MCNC: 9.4 MG/DL (ref 8.8–10.4)
CHLORIDE SERPL-SCNC: 102 MMOL/L (ref 98–107)
CHOLEST SERPL-MCNC: 279 MG/DL
CREAT SERPL-MCNC: 0.61 MG/DL (ref 0.51–0.95)
EGFRCR SERPLBLD CKD-EPI 2021: >90 ML/MIN/1.73M2
EOSINOPHIL # BLD AUTO: 0.1 10E3/UL (ref 0–0.7)
EOSINOPHIL NFR BLD AUTO: 1 %
ERYTHROCYTE [DISTWIDTH] IN BLOOD BY AUTOMATED COUNT: 13.5 % (ref 10–15)
FASTING STATUS PATIENT QL REPORTED: ABNORMAL
FASTING STATUS PATIENT QL REPORTED: NORMAL
FERRITIN SERPL-MCNC: 50 NG/ML (ref 6–175)
GLUCOSE SERPL-MCNC: 83 MG/DL (ref 70–99)
HCO3 SERPL-SCNC: 24 MMOL/L (ref 22–29)
HCT VFR BLD AUTO: 35.9 % (ref 35–47)
HDLC SERPL-MCNC: 43 MG/DL
HGB BLD-MCNC: 12 G/DL (ref 11.7–15.7)
IMM GRANULOCYTES # BLD: 0 10E3/UL
IMM GRANULOCYTES NFR BLD: 1 %
INTERPRETATION: NORMAL
IRON BINDING CAPACITY (ROCHE): 326 UG/DL (ref 240–430)
IRON SATN MFR SERPL: 24 % (ref 15–46)
IRON SERPL-MCNC: 79 UG/DL (ref 37–145)
LDH SERPL L TO P-CCNC: 163 U/L (ref 0–250)
LDLC SERPL CALC-MCNC: 158 MG/DL
LYMPHOCYTES # BLD AUTO: 2.1 10E3/UL (ref 0.8–5.3)
LYMPHOCYTES NFR BLD AUTO: 32 %
MCH RBC QN AUTO: 28.3 PG (ref 26.5–33)
MCHC RBC AUTO-ENTMCNC: 33.4 G/DL (ref 31.5–36.5)
MCV RBC AUTO: 85 FL (ref 78–100)
MONOCYTES # BLD AUTO: 0.4 10E3/UL (ref 0–1.3)
MONOCYTES NFR BLD AUTO: 6 %
NEUTROPHILS # BLD AUTO: 3.9 10E3/UL (ref 1.6–8.3)
NEUTROPHILS NFR BLD AUTO: 59 %
NONHDLC SERPL-MCNC: 236 MG/DL
NRBC # BLD AUTO: 0 10E3/UL
NRBC BLD AUTO-RTO: 0 /100
PLATELET # BLD AUTO: 327 10E3/UL (ref 150–450)
POTASSIUM SERPL-SCNC: 4.4 MMOL/L (ref 3.4–5.3)
PROT SERPL-MCNC: 7.4 G/DL (ref 6.4–8.3)
RBC # BLD AUTO: 4.24 10E6/UL (ref 3.8–5.2)
RETICS # AUTO: 0.06 10E6/UL (ref 0.03–0.1)
RETICS/RBC NFR AUTO: 1.4 % (ref 0.5–2)
SIGNIFICANT RESULTS: NORMAL
SODIUM SERPL-SCNC: 137 MMOL/L (ref 135–145)
SPECIMEN DESCRIPTION: NORMAL
T4 FREE SERPL-MCNC: 1.01 NG/DL (ref 0.9–1.7)
TEST DETAILS, MDL: NORMAL
TRIGL SERPL-MCNC: 389 MG/DL
TSH SERPL DL<=0.005 MIU/L-ACNC: 4.21 UIU/ML (ref 0.3–4.2)
VIT B12 SERPL-MCNC: 497 PG/ML (ref 232–1245)
WBC # BLD AUTO: 6.6 10E3/UL (ref 4–11)

## 2024-08-22 PROCEDURE — 36415 COLL VENOUS BLD VENIPUNCTURE: CPT | Performed by: INTERNAL MEDICINE

## 2024-08-22 PROCEDURE — 83615 LACTATE (LD) (LDH) ENZYME: CPT | Performed by: INTERNAL MEDICINE

## 2024-08-22 PROCEDURE — 81339 MPL GENE SEQ ALYS EXON 10: CPT

## 2024-08-22 PROCEDURE — G0452 MOLECULAR PATHOLOGY INTERPR: HCPCS | Mod: 26 | Performed by: STUDENT IN AN ORGANIZED HEALTH CARE EDUCATION/TRAINING PROGRAM

## 2024-08-22 PROCEDURE — 85045 AUTOMATED RETICULOCYTE COUNT: CPT | Performed by: INTERNAL MEDICINE

## 2024-08-22 PROCEDURE — 81338 MPL GENE COMMON VARIANTS: CPT

## 2024-08-22 PROCEDURE — 85025 COMPLETE CBC W/AUTO DIFF WBC: CPT | Performed by: INTERNAL MEDICINE

## 2024-08-22 PROCEDURE — 84439 ASSAY OF FREE THYROXINE: CPT | Performed by: INTERNAL MEDICINE

## 2024-08-22 PROCEDURE — 82728 ASSAY OF FERRITIN: CPT | Performed by: INTERNAL MEDICINE

## 2024-08-22 PROCEDURE — 99213 OFFICE O/P EST LOW 20 MIN: CPT | Performed by: INTERNAL MEDICINE

## 2024-08-22 PROCEDURE — 83718 ASSAY OF LIPOPROTEIN: CPT | Performed by: INTERNAL MEDICINE

## 2024-08-22 PROCEDURE — 83550 IRON BINDING TEST: CPT | Performed by: INTERNAL MEDICINE

## 2024-08-22 PROCEDURE — 84443 ASSAY THYROID STIM HORMONE: CPT | Performed by: INTERNAL MEDICINE

## 2024-08-22 PROCEDURE — G2211 COMPLEX E/M VISIT ADD ON: HCPCS | Performed by: INTERNAL MEDICINE

## 2024-08-22 PROCEDURE — 82607 VITAMIN B-12: CPT | Performed by: INTERNAL MEDICINE

## 2024-08-22 PROCEDURE — 82374 ASSAY BLOOD CARBON DIOXIDE: CPT | Performed by: INTERNAL MEDICINE

## 2024-08-22 PROCEDURE — 81270 JAK2 GENE: CPT | Performed by: INTERNAL MEDICINE

## 2024-08-22 RX ORDER — ASPIRIN 81 MG/1
81 TABLET ORAL DAILY
COMMUNITY
Start: 2024-08-22

## 2024-08-22 ASSESSMENT — PAIN SCALES - GENERAL: PAINLEVEL: NO PAIN (0)

## 2024-08-22 NOTE — PATIENT INSTRUCTIONS
Continue with Lovenox 100 mg twice a day    Continue with baby aspirin 81 mg      Keily Irving MD/PhD  [facility]  of Medicine  Division of Hematology, Oncology and Transplantation    [facility]  [address] [place] [address], Mail [id number]  [place] [address]    Clinic Scheduling and Nurse Line: [phone], option 5, option 2    [facility] RN Care Coordinator:   Marlena Butler  Direct line:   (P) [phone]  (F) [phone]

## 2024-08-22 NOTE — LETTER
"2024      Sruthi Beasley  [address] [facility] [address]      Dear Colleague,    Thank you for referring your patient, Sruthi Beasley, to the [facility]. Please see a copy of my visit note below.            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    1. [id number]+ MPN, likely ET v prefibrotic MF  High risk (IPSET>2) ET  2. 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 History of HEELP syndrome and  S/p delivery, complicated by HELP syndrome, s/p     For anticoagulation-- Currently on Lovenox 100mg twice daily. Not following anti-Xa levels at present. Continue with aspirin.   At present patient and spouse are attempting to conceive again. Will keep on LMWH-- however if shot fatigue develops can consider timed Xarelto breaks.     Currently is NOT on interferon due to the cost of Pegasys. Last variant allele frequency (2023) was JAK2 V617F currently 3.5%, previously at 5% (2021). --- repeat VAF pending    Ferritin improved. Continue with prenatal with iron.        For her left foot/leg \"tighness\" --- discussed after visit with Dr. Da Silva in PMR--- would benefit from evaluation and potential orthotic to prevent foot drop and falls.       Plan:  1. Continue with LMWH 100 mg subcutaneous BID  2. Labs in 3 months and follow up with Dr. Irving in 6 months.     The patient is given our center's contact information and is instructed to call if she should have any further questions or concerns.  .     25 minutes spent on the date of the encounter doing chart review, review of test results, patient visit and documentation "     Keily Irving MD/PhD   of Medicine  Division of Hematology, Oncology and Transplantation      ----------------------------------------------------------------------------------------------------------------------    History of Present Illness/Interval History:  Sruthi Mac is a 33 year old woman with history of depression and anxiety who presetned to gestigon in April 2021 with acute onset of left leg pain. She was found to have left popliteal arterial occlusion that required extensive interventions. She was evaluated in June 2021 by my former collegue Dr. David due to thrombocytosis. Evaluation included MPN panel that revealed a [id number] mutation. Bone marrow biopsy was completed. She established care in Dr. Irving's clinic in late 2021. Please refer to her initial note for further details.     Sowmya Romano is 10 months old and growing quickly. Sleeps well so Sruthi is not having too much fatigue. No issues with worsening leg pain or swelling. No issues with taking LMWH shots.  Is noting some problems with concentrations and has to focus more on work-related tasks (emails, etc).     Left leg feels like it is tight--- can not flex foot- extensor and flexion. No falls or stumbles. Feels like she has to talk as an older person.     Periods are backs. Cycles are heavier 1-2 days. Cycles are regular. First few cycles were longer.     Is still taking Lovenox right now.   Is     Myeloproliferative Neoplasm Symptom Assessment Form  Reference: Emaunjamila RM, Petr AC, Victor M, HL, et al Myeloproliferative neoplasm (MPN) symptoms assessment form total symptom score: prospective international assessment of an abbreviated symptom burden scoring systems among patients with MPNs. J. Clin Oncol 2012; 30: 4320-1604    Symptom 1-10 (0 if absent), 1 favorable, 10 unfavorable   Please rate your fatigue (weariness, tiredness) by choosing one number that best describes your WORSE level of fatigue  during the past 24 hours 1>0   Filling up quickly when you eat 0   Abdominal discomfort 0   Inactivity 0   Problems with concentration 0>2   Night sweats 1   Itching 0   Bone pain 0   Fever 0   Unintentional weight loss over the last 6 months 0   Total score for her MPN symptom assessment form score is 2   (Mean score for ET 18.7 +/- 15.3, PV of 21.8 +/- 16.3, and MF 25.3 +/- 17.2)      Past Medical History:  Past Medical History:   Diagnosis Date     Acute occlusion of artery of lower extremity (H24) 2021     Anxiety      Depression      Encounter for preconception consultation 10/19/2021     Essential thrombocythemia (H)      HELLP (hemolytic anemia/elev liver enzymes/low platelets in pregnancy) 10/20/2023     History of arterial thrombosis      Hyperlipidemia      JAK2 gene mutation      Limb ischemia 2021     Obesity      Panic attack      Subclinical hypothyroidism        Past Surgical History:  Past Surgical History:   Procedure Laterality Date      SECTION N/A 10/20/2023    Procedure:  section;  Surgeon: Fatoumata Koehler MD;  Location:  L+D     EMBOLECTOMY LOWER EXTREMITY Left 2021    Procedure: LEFT LEG ANTERIOR TIBIAL AND POSTERIOR TIBIAL EMBOLECTOMIES, FEMORAL POPITEAL VEIN PATCH, ANGIOGRAM, FASCIOTOMIES;  Surgeon: Ulises Gonzalez;  Location:  OR     IR ANGIOGRAM THROUGH CATHETER FOLLOW UP  2021     IR ANGIOGRAM THROUGH CATHETER FOLLOW UP  4/10/2021     IR LOWER EXTREMITY ANGIOGRAM LEFT  2021     THROMBECTOMY LOWER EXTREMITY Left 2021    Procedure: Re-Exploration Left Lower Extremity With Popliteal and Tibial Embolectomy.;  Surgeon: Ulises Gonzalez;  Location:  OR     wisdom teeth remova         Medications:  Current Outpatient Medications   Medication Sig Dispense Refill     busPIRone (BUSPAR) 10 MG tablet Take 10 mg by mouth 2 times daily       CHELATED MAGNESIUM PO Take 1 tablet by mouth daily. Pt unsure of the dose        enoxaparin ANTICOAGULANT (LOVENOX) 100 MG/ML syringe Inject 1 mL (100 mg) Subcutaneous 2 times daily 60 mL 11     Lactobacillus (PROBIOTIC ACIDOPHILUS PO) Take by mouth daily       levothyroxine (SYNTHROID/LEVOTHROID) 75 MCG tablet Take 75 mcg by mouth daily.       Prenatal MV-Min-Fe Fum-FA-DHA (PRENATAL 1 PO) Take 1 tablet by mouth       sertraline (ZOLOFT) 100 MG tablet Take 100 mg by mouth       acetaminophen (TYLENOL) 325 MG tablet Take 1-2 tablets (325-650 mg) by mouth every 6 hours (Patient not taking: Reported on 8/22/2024)       docusate sodium (COLACE) 100 MG capsule Take 1 capsule (100 mg) by mouth 2 times daily as needed for constipation (Patient not taking: Reported on 8/22/2024)       enoxaparin ANTICOAGULANT (LOVENOX) 40 MG/0.4ML syringe Inject 1 mL (100 mg) Subcutaneous every 12 hours (Patient not taking: Reported on 1/30/2024) 12 mL 0     ibuprofen (ADVIL/MOTRIN) 600 MG tablet Take 1 tablet (600 mg) by mouth every 6 hours as needed for moderate pain (Patient not taking: Reported on 8/22/2024)       labetalol (NORMODYNE) 200 MG tablet Take 1 tablet (200 mg) by mouth every 8 hours (Patient not taking: Reported on 8/22/2024) 60 tablet 0     STATIN NOT PRESCRIBED (INTENTIONAL) Please choose reason not prescribed from choices below. (Patient not taking: Reported on 8/22/2024)       Vitamin D3 (CHOLECALCIFEROL) 25 mcg (1000 units) tablet Take 1 tablet by mouth daily (Patient not taking: Reported on 8/22/2024)          Allergies:  Allergies   Allergen Reactions     Erythromycin Unknown     Pcn [Penicillins] Unknown       ROS:  A 10 point ROS is negative except as stated in the HPI    Social History:  Denies any tobacco use. No significant alcohol use. Denies any illicit drug use. Patient works as a  for [employer] .    Family History:  Dad- T2 DM, hyperlipidemia, testicular cancer, melanoma  Mother- hyperlipidemia     Objective:  Vitals: /81   Pulse 69   Temp 98.1  F  (36.7  C) (Oral)   Resp 16   Wt 125.5 kg (276 lb 11.2 oz)   LMP 08/07/2024   SpO2 99%   BMI 40.84 kg/m      Exam:   Constitutional: Appears well, no distress  HEENT: Pupils equal and reactive to light. No scleral icterus or hemorrhage. Nares without evidence of telangiectasia. Mucous membranes moist with no wet purpura.   CV: regular rate and rhythm, no murmurs  Respiratory: clear  Mus/Skele: no edema  Skin: no petechiae, no ecchymosis.  Neuro: CN II-XII intact. Normal gait. AOx3    Lab Results  Results for orders placed or performed in visit on 08/22/24   Ferritin     Status: Normal   Result Value Ref Range    Ferritin 50 6 - 175 ng/mL   TSH with free T4 reflex     Status: Abnormal   Result Value Ref Range    TSH 4.21 (H) 0.30 - 4.20 uIU/mL   Vitamin B12     Status: Normal   Result Value Ref Range    Vitamin B12 497 232 - 1,245 pg/mL   Lactate Dehydrogenase     Status: Normal   Result Value Ref Range    Lactate Dehydrogenase 163 0 - 250 U/L   Lipid panel reflex to direct LDL Fasting     Status: Abnormal   Result Value Ref Range    Cholesterol 279 (H) <200 mg/dL    Triglycerides 389 (H) <150 mg/dL    Direct Measure HDL 43 (L) >=50 mg/dL    LDL Cholesterol Calculated 158 (H) <=100 mg/dL    Non HDL Cholesterol 236 (H) <130 mg/dL    Patient Fasting > 8hrs? Unknown     Narrative    Cholesterol  Desirable:  <200 mg/dL    Triglycerides  Normal:  Less than 150 mg/dL  Borderline High:  150-199 mg/dL  High:  200-499 mg/dL  Very High:  Greater than or equal to 500 mg/dL    Direct Measure HDL  Female:  Greater than or equal to 50 mg/dL   Male:  Greater than or equal to 40 mg/dL    LDL Cholesterol  Desirable:  <100mg/dL  Above Desirable:  100-129 mg/dL   Borderline High:  130-159 mg/dL   High:  160-189 mg/dL   Very High:  >= 190 mg/dL    Non HDL Cholesterol  Desirable:  130 mg/dL  Above Desirable:  130-159 mg/dL  Borderline High:  160-189 mg/dL  High:  190-219 mg/dL  Very High:  Greater than or equal to 220 mg/dL    Comprehensive metabolic panel     Status: None   Result Value Ref Range    Sodium 137 135 - 145 mmol/L    Potassium 4.4 3.4 - 5.3 mmol/L    Carbon Dioxide (CO2) 24 22 - 29 mmol/L    Anion Gap 11 7 - 15 mmol/L    Urea Nitrogen 9.9 6.0 - 20.0 mg/dL    Creatinine 0.61 0.51 - 0.95 mg/dL    GFR Estimate >90 >60 mL/min/1.73m2    Calcium 9.4 8.8 - 10.4 mg/dL    Chloride 102 98 - 107 mmol/L    Glucose 83 70 - 99 mg/dL    Alkaline Phosphatase 104 40 - 150 U/L    AST 13 0 - 45 U/L    ALT 9 0 - 50 U/L    Protein Total 7.4 6.4 - 8.3 g/dL    Albumin 4.4 3.5 - 5.2 g/dL    Bilirubin Total 0.2 <=1.2 mg/dL    Patient Fasting > 8hrs? Unknown    Reticulocyte count     Status: Normal   Result Value Ref Range    % Reticulocyte 1.4 0.5 - 2.0 %    Absolute Reticulocyte 0.058 0.025 - 0.095 10e6/uL   Iron and iron binding capacity     Status: Normal   Result Value Ref Range    Iron 79 37 - 145 ug/dL    Iron Binding Capacity 326 240 - 430 ug/dL    Iron Sat Index 24 15 - 46 %   CBC with platelets and differential     Status: None   Result Value Ref Range    WBC Count 6.6 4.0 - 11.0 10e3/uL    RBC Count 4.24 3.80 - 5.20 10e6/uL    Hemoglobin 12.0 11.7 - 15.7 g/dL    Hematocrit 35.9 35.0 - 47.0 %    MCV 85 78 - 100 fL    MCH 28.3 26.5 - 33.0 pg    MCHC 33.4 31.5 - 36.5 g/dL    RDW 13.5 10.0 - 15.0 %    Platelet Count 327 150 - 450 10e3/uL    % Neutrophils 59 %    % Lymphocytes 32 %    % Monocytes 6 %    % Eosinophils 1 %    % Basophils 1 %    % Immature Granulocytes 1 %    NRBCs per 100 WBC 0 <1 /100    Absolute Neutrophils 3.9 1.6 - 8.3 10e3/uL    Absolute Lymphocytes 2.1 0.8 - 5.3 10e3/uL    Absolute Monocytes 0.4 0.0 - 1.3 10e3/uL    Absolute Eosinophils 0.1 0.0 - 0.7 10e3/uL    Absolute Basophils 0.1 0.0 - 0.2 10e3/uL    Absolute Immature Granulocytes 0.0 <=0.4 10e3/uL    Absolute NRBCs 0.0 10e3/uL   T4 free     Status: Normal   Result Value Ref Range    Free T4 1.01 0.90 - 1.70 ng/dL   CBC with Platelets & Differential     Status: None     Narrative    The following orders were created for panel order CBC with Platelets & Differential.  Procedure                               Abnormality         Status                     ---------                               -----------         ------                     CBC with platelets and d...[[id number]]                      Final result                 Please view results for these tests on the individual orders.       Imaging:  No recent relevant imaging      Again, thank you for allowing me to participate in the care of your patient.        Sincerely,        Keily Irving MD

## 2024-08-22 NOTE — NURSING NOTE
Chief Complaint   Patient presents with    Blood Draw     Labs drawn via  by RN in lab. VS taken.      Labs drawn via venipuncture. Vital signs taken. Checked into next appointment.   Brii Parks RN

## 2024-08-22 NOTE — NURSING NOTE
"Oncology Rooming Note    August 22, 2024 11:01 AM   Sruthi Beasley is a 33 year old female who presents for:    Chief Complaint   Patient presents with    Blood Draw     Labs drawn via  by RN in lab. VS taken.     Oncology Clinic Visit     Thrombocytosis, JAK2 V617F mutation     Initial Vitals: /81   Pulse 69   Temp 98.1  F (36.7  C) (Oral)   Resp 16   Wt 125.5 kg (276 lb 11.2 oz)   LMP 08/07/2024   SpO2 99%   BMI 40.84 kg/m   Estimated body mass index is 40.84 kg/m  as calculated from the following:    Height as of 1/30/24: 1.753 m (5' 9.02\").    Weight as of this encounter: 125.5 kg (276 lb 11.2 oz). Body surface area is 2.47 meters squared.  No Pain (0) Comment: Data Unavailable   Patient's last menstrual period was 08/07/2024.  Allergies reviewed: Yes  Medications reviewed: Yes    Medications: Medication refills not needed today.  Pharmacy name entered into [facility]:    [facility] [place], [place] - [address] [place] [address] [facility] - [place], [place] - [address]    Frailty Screening:   Is the patient here for a new oncology consult visit in cancer care? 2. No      Clinical concerns: Patient states no new concerns to discuss with provider.  Dr. Irving was NOT notified.      Aaron Dodson EMT            "

## 2024-08-22 NOTE — PROGRESS NOTES
"        [facility] Consultation    Outpatient Visit Note:    Patient: Sruthi Mac  MRN: [id number]  : 1990  STEVE: Aug 22, 2024    Reason for Consultation:  JAK2+ MPN    Assessment:  In summary, Sruthi Mac is a 33 year old woman with [id number] positive myeloproliferative neoplasm and arterial thrombosis, diagnosed in spring 2021.    1. [id number] MPN, likely ET v prefibrotic MF  High risk (IPSET>2) ET  2. 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 History of HEELP syndrome and  S/p delivery, complicated by HELP syndrome, s/p     For anticoagulation-- Currently on Lovenox 100mg twice daily. Not following anti-Xa levels at present. Continue with aspirin.   At present patient and spouse are attempting to conceive again. Will keep on LMWH-- however if shot fatigue develops can consider timed Xarelto breaks.     Currently is NOT on interferon due to the cost of Pegasys. Last variant allele frequency (2023) was JAK2 [id number] currently 3.5%, previously at 5% (2021). --- repeat VAF pending    Ferritin improved. Continue with prenatal with iron.        For her left foot/leg \"tighness\" --- discussed after visit with Dr. Da Silva in PMR--- would benefit from evaluation and potential orthotic to prevent foot drop and falls.       Plan:  1. Continue with LMWH 100 mg subcutaneous BID  2. Labs in 3 months and follow up with Dr. Irving in 6 months.     The patient is given our center's contact information and is instructed to call if she should have any further questions or concerns.  .     25 minutes spent on the date of the encounter doing chart review, review of test results, patient visit and documentation     Keily Irving MD/PhD   of Medicine  Division of Hematology, Oncology and Transplantation      ----------------------------------------------------------------------------------------------------------------------    History of " Present Illness/Interval History:  Sruthi Mac is a 33 year old woman with history of depression and anxiety who presetned to  [facility] in April 2021 with acute onset of left leg pain. She was found to have left popliteal arterial occlusion that required extensive interventions. She was evaluated in June 2021 by my former collegue Dr. David due to thrombocytosis. Evaluation included MPN panel that revealed a [id number] mutation. Bone marrow biopsy was completed. She established care in Dr. Irving's clinic in late 2021. Please refer to her initial note for further details.     Sowmya Romano is 10 months old and growing quickly. Sleeps well so Sruthi is not having too much fatigue. No issues with worsening leg pain or swelling. No issues with taking LMWH shots.  Is noting some problems with concentrations and has to focus more on work-related tasks (emails, etc).     Left leg feels like it is tight--- can not flex foot- extensor and flexion. No falls or stumbles. Feels like she has to talk as an older person.     Periods are backs. Cycles are heavier 1-2 days. Cycles are regular. First few cycles were longer.     Is still taking Lovenox right now.   Is     Myeloproliferative Neoplasm Symptom Assessment Form  Reference: Emaunual RM, Petr AC, Victor M, HL, et al Myeloproliferative neoplasm (MPN) symptoms assessment form total symptom score: prospective international assessment of an abbreviated symptom burden scoring systems among patients with MPNs. J. Clin Oncol 2012; 30: 0617-1919    Symptom 1-10 (0 if absent), 1 favorable, 10 unfavorable   Please rate your fatigue (weariness, tiredness) by choosing one number that best describes your WORSE level of fatigue during the past 24 hours 1>0   Filling up quickly when you eat 0   Abdominal discomfort 0   Inactivity 0   Problems with concentration 0>2   Night sweats 1   Itching 0   Bone pain 0   Fever 0   Unintentional weight loss over the last 6 months 0   Total  score for her MPN symptom assessment form score is 2   (Mean score for ET 18.7 +/- 15.3, PV of 21.8 +/- 16.3, and MF 25.3 +/- 17.2)      Past Medical History:  Past Medical History:   Diagnosis Date    Acute occlusion of artery of lower extremity (H24) 2021    Anxiety     Depression     Encounter for preconception consultation 10/19/2021    Essential thrombocythemia (H)     HELLP (hemolytic anemia/elev liver enzymes/low platelets in pregnancy) 10/20/2023    History of arterial thrombosis     Hyperlipidemia     JAK2 gene mutation     Limb ischemia 2021    Obesity     Panic attack     Subclinical hypothyroidism        Past Surgical History:  Past Surgical History:   Procedure Laterality Date     SECTION N/A 10/20/2023    Procedure:  section;  Surgeon: Fatoumata Koehler MD;  Location: [facility] L[facility]    EMBOLECTOMY LOWER EXTREMITY Left 2021    Procedure: LEFT LEG ANTERIOR TIBIAL AND POSTERIOR TIBIAL EMBOLECTOMIES, FEMORAL POPITEAL VEIN PATCH, ANGIOGRAM, FASCIOTOMIES;  Surgeon: Ulises Gonzalez;  Location: [facility] OR    IR ANGIOGRAM THROUGH CATHETER FOLLOW UP  2021    IR ANGIOGRAM THROUGH CATHETER FOLLOW UP  4/10/2021    IR LOWER EXTREMITY ANGIOGRAM LEFT  2021    THROMBECTOMY LOWER EXTREMITY Left 2021    Procedure: Re-Exploration Left Lower Extremity With Popliteal and Tibial Embolectomy.;  Surgeon: Ulises Gonzalez;  Location:  OR    wisdom teeth remova         Medications:  Current Outpatient Medications   Medication Sig Dispense Refill    busPIRone (BUSPAR) 10 MG tablet Take 10 mg by mouth 2 times daily      CHELATED MAGNESIUM PO Take 1 tablet by mouth daily. Pt unsure of the dose      enoxaparin ANTICOAGULANT (LOVENOX) 100 MG/ML syringe Inject 1 mL (100 mg) Subcutaneous 2 times daily 60 mL 11    Lactobacillus (PROBIOTIC ACIDOPHILUS PO) Take by mouth daily      levothyroxine (SYNTHROID/LEVOTHROID) 75 MCG tablet Take 75 mcg by mouth daily.      Prenatal MV-Min-Fe  Fum-FA-DHA (PRENATAL 1 PO) Take 1 tablet by mouth      sertraline (ZOLOFT) 100 MG tablet Take 100 mg by mouth      acetaminophen (TYLENOL) 325 MG tablet Take 1-2 tablets (325-650 mg) by mouth every 6 hours (Patient not taking: Reported on 8/22/2024)      docusate sodium (COLACE) 100 MG capsule Take 1 capsule (100 mg) by mouth 2 times daily as needed for constipation (Patient not taking: Reported on 8/22/2024)      enoxaparin ANTICOAGULANT (LOVENOX) 40 MG/0.4ML syringe Inject 1 mL (100 mg) Subcutaneous every 12 hours (Patient not taking: Reported on 1/30/2024) 12 mL 0    ibuprofen (ADVIL/MOTRIN) 600 MG tablet Take 1 tablet (600 mg) by mouth every 6 hours as needed for moderate pain (Patient not taking: Reported on 8/22/2024)      labetalol (NORMODYNE) 200 MG tablet Take 1 tablet (200 mg) by mouth every 8 hours (Patient not taking: Reported on 8/22/2024) 60 tablet 0    STATIN NOT PRESCRIBED (INTENTIONAL) Please choose reason not prescribed from choices below. (Patient not taking: Reported on 8/22/2024)      Vitamin D3 (CHOLECALCIFEROL) 25 mcg (1000 units) tablet Take 1 tablet by mouth daily (Patient not taking: Reported on 8/22/2024)          Allergies:  Allergies   Allergen Reactions    Erythromycin Unknown    Pcn [Penicillins] Unknown       ROS:  A 10 point ROS is negative except as stated in the HPI    Social History:  Denies any tobacco use. No significant alcohol use. Denies any illicit drug use. Patient works as a  for [employer]. .    Family History:  Dad- T2 DM, hyperlipidemia, testicular cancer, melanoma  Mother- hyperlipidemia     Objective:  Vitals: /81   Pulse 69   Temp 98.1  F (36.7  C) (Oral)   Resp 16   Wt 125.5 kg (276 lb 11.2 oz)   LMP 08/07/2024   SpO2 99%   BMI 40.84 kg/m      Exam:   Constitutional: Appears well, no distress  HEENT: Pupils equal and reactive to light. No scleral icterus or hemorrhage. Nares without evidence of telangiectasia. Mucous  membranes moist with no wet purpura.   CV: regular rate and rhythm, no murmurs  Respiratory: clear  Mus/Skele: no edema  Skin: no petechiae, no ecchymosis.  Neuro: CN II-XII intact. Normal gait. AOx3    Lab Results  Results for orders placed or performed in visit on 08/22/24   Ferritin     Status: Normal   Result Value Ref Range    Ferritin 50 6 - 175 ng/mL   TSH with free T4 reflex     Status: Abnormal   Result Value Ref Range    TSH 4.21 (H) 0.30 - 4.20 uIU/mL   Vitamin B12     Status: Normal   Result Value Ref Range    Vitamin B12 497 232 - 1,245 pg/mL   Lactate Dehydrogenase     Status: Normal   Result Value Ref Range    Lactate Dehydrogenase 163 0 - 250 U/L   Lipid panel reflex to direct LDL Fasting     Status: Abnormal   Result Value Ref Range    Cholesterol 279 (H) <200 mg/dL    Triglycerides 389 (H) <150 mg/dL    Direct Measure HDL 43 (L) >=50 mg/dL    LDL Cholesterol Calculated 158 (H) <=100 mg/dL    Non HDL Cholesterol 236 (H) <130 mg/dL    Patient Fasting > 8hrs? Unknown     Narrative    Cholesterol  Desirable:  <200 mg/dL    Triglycerides  Normal:  Less than 150 mg/dL  Borderline High:  150-199 mg/dL  High:  200-499 mg/dL  Very High:  Greater than or equal to 500 mg/dL    Direct Measure HDL  Female:  Greater than or equal to 50 mg/dL   Male:  Greater than or equal to 40 mg/dL    LDL Cholesterol  Desirable:  <100mg/dL  Above Desirable:  100-129 mg/dL   Borderline High:  130-159 mg/dL   High:  160-189 mg/dL   Very High:  >= 190 mg/dL    Non HDL Cholesterol  Desirable:  130 mg/dL  Above Desirable:  130-159 mg/dL  Borderline High:  160-189 mg/dL  High:  190-219 mg/dL  Very High:  Greater than or equal to 220 mg/dL   Comprehensive metabolic panel     Status: None   Result Value Ref Range    Sodium 137 135 - 145 mmol/L    Potassium 4.4 3.4 - 5.3 mmol/L    Carbon Dioxide (CO2) 24 22 - 29 mmol/L    Anion Gap 11 7 - 15 mmol/L    Urea Nitrogen 9.9 6.0 - 20.0 mg/dL    Creatinine 0.61 0.51 - 0.95 mg/dL    GFR  Estimate >90 >60 mL/min/1.73m2    Calcium 9.4 8.8 - 10.4 mg/dL    Chloride 102 98 - 107 mmol/L    Glucose 83 70 - 99 mg/dL    Alkaline Phosphatase 104 40 - 150 U/L    AST 13 0 - 45 U/L    ALT 9 0 - 50 U/L    Protein Total 7.4 6.4 - 8.3 g/dL    Albumin 4.4 3.5 - 5.2 g/dL    Bilirubin Total 0.2 <=1.2 mg/dL    Patient Fasting > 8hrs? Unknown    Reticulocyte count     Status: Normal   Result Value Ref Range    % Reticulocyte 1.4 0.5 - 2.0 %    Absolute Reticulocyte 0.058 0.025 - 0.095 10e6/uL   Iron and iron binding capacity     Status: Normal   Result Value Ref Range    Iron 79 37 - 145 ug/dL    Iron Binding Capacity 326 240 - 430 ug/dL    Iron Sat Index 24 15 - 46 %   CBC with platelets and differential     Status: None   Result Value Ref Range    WBC Count 6.6 4.0 - 11.0 10e3/uL    RBC Count 4.24 3.80 - 5.20 10e6/uL    Hemoglobin 12.0 11.7 - 15.7 g/dL    Hematocrit 35.9 35.0 - 47.0 %    MCV 85 78 - 100 fL    MCH 28.3 26.5 - 33.0 pg    MCHC 33.4 31.5 - 36.5 g/dL    RDW 13.5 10.0 - 15.0 %    Platelet Count 327 150 - 450 10e3/uL    % Neutrophils 59 %    % Lymphocytes 32 %    % Monocytes 6 %    % Eosinophils 1 %    % Basophils 1 %    % Immature Granulocytes 1 %    NRBCs per 100 WBC 0 <1 /100    Absolute Neutrophils 3.9 1.6 - 8.3 10e3/uL    Absolute Lymphocytes 2.1 0.8 - 5.3 10e3/uL    Absolute Monocytes 0.4 0.0 - 1.3 10e3/uL    Absolute Eosinophils 0.1 0.0 - 0.7 10e3/uL    Absolute Basophils 0.1 0.0 - 0.2 10e3/uL    Absolute Immature Granulocytes 0.0 <=0.4 10e3/uL    Absolute NRBCs 0.0 10e3/uL   T4 free     Status: Normal   Result Value Ref Range    Free T4 1.01 0.90 - 1.70 ng/dL   CBC with Platelets & Differential     Status: None    Narrative    The following orders were created for panel order CBC with Platelets & Differential.  Procedure                               Abnormality         Status                     ---------                               -----------         ------                     CBC with platelets  and dLito..[[id number]]                      Final result                 Please view results for these tests on the individual orders.       Imaging:  No recent relevant imaging

## 2024-08-27 ENCOUNTER — PATIENT OUTREACH (OUTPATIENT)
Dept: ONCOLOGY | Facility: CLINIC | Age: 34
End: 2024-08-27
Payer: COMMERCIAL

## 2024-08-27 DIAGNOSIS — D47.3 ESSENTIAL THROMBOCYTHEMIA (H): Primary | Chronic | ICD-10-CM

## 2024-08-27 DIAGNOSIS — M21.379 FOOT DROP: ICD-10-CM

## 2024-08-27 NOTE — TELEPHONE ENCOUNTER
[facility]: Physical Medicine and Rehabilitation                                                                                     Referral to Dr Wendy Da Silva for decreased mobility due to foot drop/nerve parethesia from cancer-associated thrombosis leg damage     Recommend to see in person for evaluation of foot drop    Marlena Butler LPN  Oncology PM&R Care Coordination  [phone]

## 2024-09-06 ENCOUNTER — OFFICE VISIT (OUTPATIENT)
Dept: DERMATOLOGY | Facility: CLINIC | Age: 34
End: 2024-09-06
Payer: COMMERCIAL

## 2024-09-06 DIAGNOSIS — D18.01 ANGIOMA OF SKIN: ICD-10-CM

## 2024-09-06 DIAGNOSIS — D22.9 NEVUS: Primary | ICD-10-CM

## 2024-09-06 DIAGNOSIS — D48.5 NEOPLASM OF UNCERTAIN BEHAVIOR OF SKIN: ICD-10-CM

## 2024-09-06 DIAGNOSIS — L81.4 LENTIGO: ICD-10-CM

## 2024-09-06 PROCEDURE — 11102 TANGNTL BX SKIN SINGLE LES: CPT | Performed by: PHYSICIAN ASSISTANT

## 2024-09-06 PROCEDURE — 88305 TISSUE EXAM BY PATHOLOGIST: CPT | Mod: 59 | Performed by: PATHOLOGY

## 2024-09-06 PROCEDURE — 11103 TANGNTL BX SKIN EA SEP/ADDL: CPT | Performed by: PHYSICIAN ASSISTANT

## 2024-09-06 PROCEDURE — 88342 IMHCHEM/IMCYTCHM 1ST ANTB: CPT | Performed by: PATHOLOGY

## 2024-09-06 PROCEDURE — 99203 OFFICE O/P NEW LOW 30 MIN: CPT | Mod: 25 | Performed by: PHYSICIAN ASSISTANT

## 2024-09-06 NOTE — PROGRESS NOTES
HPI:   Chief complaints: Sruthi Beasley is a pleasant 33 year old female who presents for Full skin cancer screening to rule out skin cancer   Last Skin Exam: n/a      1st Baseline: yes  Personal HX of Skin Cancer: No   Personal HX of Malignant Melanoma: No   Family HX of Skin Cancer / Malignant Melanoma: maternal grandfather with a history of melanoma; father with skin cancer unsure which type  Personal HX of Atypical Moles:   no  Risk factors: history of sun exposure and burns; many nevi  New / Changing lesions:Yes mole on the vulva  Social History: has an almost 2 yo daughter and a 7 yo step daughter  On review of systems, there are no further skin complaints, patient is feeling otherwise well.   ROS of the following were done and are negative: Constitutional, Eyes, Ears, Nose,   Mouth, Throat, Cardiovascular, Respiratory, GI, Genitourinary, Musculoskeletal,   Psychiatric, Endocrine, Allergic/Immunologic.    PHYSICAL EXAM:   LMP 08/07/2024   Skin exam performed as follows: Type 2 skin. Mood appropriate  Alert and Oriented X 3. Well developed, well nourished in no distress.  General appearance: Normal  Head including face: Normal  Eyes: conjunctiva and lids: Normal  Mouth: Lips, teeth, gums: Normal  Neck: Normal  Chest-breast/axillae: Normal  Back: Normal  Extremities: digits/nails (clubbing): Normal  Eccrine and Apocrine glands: Normal  Right upper extremity: Normal  Left upper extremity: Normal  Right lower extremity: Normal  Left lower extremity: Normal  Skin: Scalp and body hair: See below    Pt deferred exam of breasts, groin, buttocks: No    Other physical findings:  1. Multiple pigmented macules on extremities and trunk  2. Multiple pigmented macules on face, trunk and extremities  3. Multiple vascular papules on trunk, arms and legs  4. 3 mm medium brown macule on the left plantar surface  5. 12 mm tan fleshy papule on the left mons pubis  6. 8 mm irregular brown macule on the left lateral mid  back  7. 6 mm pink brown macule on the right upper back       Except as noted above, no other signs of skin cancer or melanoma.     ASSESSMENT/PLAN:   Benign Full skin cancer screening today. . Patient with history of none  Advised on monthly self exams and 1 year  Patient Education: Appropriate brochures given.    Multiple benign appearing melanocytic nevi on arms, legs and trunk. Discussed ABCDEs of melanoma and sunscreen. Makes >100 nevi advised on need for a yearly FSE.   Multiple lentigos on arms, legs and trunk. Advised benign, no treatment needed.  Multiple scattered angiomas. Advised benign, no treatment needed.   Benign appearing nevus on the left plantar foot - will monitor for stability. Photograph taken for reference  Dermal nevus r/o atypicality on the left mons pubis. Shave biopsy performed.  Area cleaned and anesthetized with 1% lidocaine with epinephrine.  Dermablade used to remove the lesion and sent to pathology. Bleeding was cauterized. Pt tolerated procedure well with no complications.   R/o atypical nevus on the left lateral mid back, right upper back. Shave biopsy performed.  Area cleaned and anesthetized with 1% lidocaine with epinephrine.  Dermablade used to remove the lesion and sent to pathology. Bleeding was cauterized. Pt tolerated procedure well with no complications.             Follow-up: yearly/PRN sooner    1.) Patient was asked about new and changing moles. YES  2.) Patient received a complete physical skin examination: YES  3.) Patient was counseled to perform a monthly self skin examination: YES  Scribed By: Angélica Spear, MS, DARI

## 2024-09-06 NOTE — PATIENT INSTRUCTIONS
Wound Care After a Biopsy    What is a skin biopsy?  A skin biopsy allows the doctor to examine a very small piece of tissue under the microscope to determine the diagnosis and the best treatment for the skin condition. A local anesthetic (numbing medicine) is injected with a very small needle into the skin area to be tested. A small piece of skin is taken from the area. Sometimes a suture (stitch) is used.     What are the risks of a skin biopsy?  I will experience scar, bleeding, swelling, pain, crusting and redness. I may experience incomplete removal or recurrence. Risks of this procedure are excessive bleeding, bruising, infection, nerve damage, numbness, thick (hypertrophic or keloidal) scar and non-diagnostic biopsy.    How should I care for my wound for the first 24 hours?  Keep the wound dry and covered for 24 hours  If it bleeds, hold direct pressure on the area for 15 minutes. If bleeding does not stop, call us or go to the emergency room  Avoid strenuous exercise the first 1-2 days or as your doctor instructs you    How should I care for the wound after 24 hours?  After 24 hours, remove the bandage  You may bathe or shower as normal  If you had a scalp biopsy, you can shampoo as usual and can use shower water to clean the biopsy site daily  Clean the wound once a day with gentle soap and water  Do not scrub, be gentle  Apply white petroleum/Vaseline after cleaning the wound with a cotton swab or a clean finger, and keep the site covered with a Bandaid /bandage. Bandages are not necessary with a scalp biopsy  If you are unable to cover the site with a Bandaid /bandage, re-apply ointment 2-3 times a day to keep the site moist. Moisture will help with healing  Avoid strenuous activity for first 1-2 days  Avoid lakes, rivers, pools, and oceans until the stitches are removed or the site is healed    How do I clean my wound?  Wash hands thoroughly with soap or use hand  before all wound care  Clean  the wound with gentle soap and water  Apply white petroleum/Vaseline  to wound after it is clean  Replace the Bandaid /bandage to keep the wound covered for the first few days or as instructed by your doctor  If you had a scalp biopsy, warm shower water to the area on a daily basis should suffice    What should I use to clean my wound?   Cotton-tipped applicators (Qtips )  White petroleum jelly (Vaseline ). Use a clean new container and use Q-tips to apply.  Bandaids  as needed  Gentle soap     How should I care for my wound long term?  Do not get your wound dirty  Keep up with wound care for one week or until the area is healed.  You may return to our clinic for this or you may have it done locally at your doctor s office.  A small scab will form and fall off by itself when the area is completely healed. The area will be red and will become pink in color as it heals. Sun protection is very important for how your scar will turn out. Sunscreen with an SPF 30 or greater is recommended once the area is healed.  You should have some soreness but it should be mild and slowly go away over several days. Talk to your doctor about using tylenol for pain,    When should I call my doctor?  If you have increased:   Pain or swelling  Pus or drainage (clear or slightly yellow drainage is ok)  Temperature over 100F  Spreading redness or warmth around wound    When will I hear about my results?  The biopsy results can take 2 weeks to come back.  Your results will automatically release to [facility] before your provider has even reviewed them.  The clinic will call you with the results, send you a [facility] message, or have you schedule a follow-up clinic or phone time to discuss the results.  Contact our clinics if you do not hear from us in 2 weeks.    Who should I call with questions?  [facility]: [phone]  [facility]: [phone]  For urgent needs outside of  business hours call the [facility] at [phone] and ask for the dermatology resident on Wound Care Instructions     FOR SUPERFICIAL WOUNDS     [facility]  [phone]                 AFTER 24 HOURS YOU SHOULD REMOVE THE BANDAGE AND BEGIN DAILY DRESSING CHANGES AS FOLLOWS:     1) Remove Dressing.     2) Clean and dry the area with tap water using a Q-tip or sterile gauze pad.     3) Apply Vaseline, Aquaphor, Polysporin ointment or Bacitracin ointment over entire wound.  Do NOT use Neosporin ointment.     4) Cover the wound with a band-aid, or a sterile non-stick gauze pad and micropore paper tape    REPEAT THESE INSTRUCTIONS AT LEAST ONCE A DAY UNTIL THE WOUND HAS COMPLETELY HEALED.    It is an old wives tale that a wound heals better when it is exposed to air and allowed to dry out. The wound will heal faster with a better cosmetic result if it is kept moist with ointment and covered with a bandage.    **Do not let the wound dry out.**    Supplies Needed:      *Cotton tipped applicators (Q-tips)    *Vaseline, Aquaphor, Polysporin or Bacitracin Ointment (NOT NEOSPORIN)    *Band-aids or non-stick gauze pads and micropore paper tape.      PATIENT INFORMATION:    During the healing process you will notice a number of changes. All wounds develop a small halo of redness surrounding the wound.  This means healing is occurring. Severe itching with extensive redness usually indicates sensitivity to the ointment or bandage tape used to dress the wound.  You should call our office if this develops.      Swelling  and/or discoloration around your surgical site is common, particularly when performed around the eye.    All wounds normally drain.  The larger the wound the more drainage there will be.  After 7-10 days, you will notice the wound beginning to shrink and new skin will begin to grow.  The wound is healed when you can see skin has formed over the entire area.  A healed wound has  a healthy, shiny look to the surface and is red to dark pink in color to normalize.  Wounds may take approximately 4-6 weeks to heal.  Larger wounds may take 6-8 weeks.  After the wound is healed you may discontinue dressing changes.    You may experience a sensation of tightness as your wound heals. This is normal and will gradually subside.    Your healed wound may be sensitive to temperature changes. This sensitivity improves with time, but if you re having a lot of discomfort, try to avoid temperature extremes.    Patients frequently experience itching after their wound appears to have healed because of the continue healing under the skin.  Plain Vaseline will help relieve the itching.      POSSIBLE COMPLICATIONS    BLEEDING:    Leave the bandage in place.  Use tightly rolled up gauze or a cloth to apply direct pressure over the bandage for 30  minutes.  Reapply pressure for an additional 30 minutes if necessary  Use additional gauze and tape to maintain pressure once the bleeding has stopped.

## 2024-09-06 NOTE — LETTER
9/6/2024      Sruthi Beasley  [address] [facility] [address]      Dear Colleague,    Thank you for referring your patient, Sruthi Beasley, to the [facility]. Please see a copy of my visit note below.    HPI:   Chief complaints: Sruthi Beasley is a pleasant 33 year old female who presents for Full skin cancer screening to rule out skin cancer   Last Skin Exam: n/a      1st Baseline: yes  Personal HX of Skin Cancer: No   Personal HX of Malignant Melanoma: No   Family HX of Skin Cancer / Malignant Melanoma: maternal grandfather with a history of melanoma; father with skin cancer unsure which type  Personal HX of Atypical Moles:   no  Risk factors: history of sun exposure and burns; many nevi  New / Changing lesions:Yes mole on the vulva  Social History: has an almost 2 yo daughter and a 5 yo step daughter  On review of systems, there are no further skin complaints, patient is feeling otherwise well.   ROS of the following were done and are negative: Constitutional, Eyes, Ears, Nose,   Mouth, Throat, Cardiovascular, Respiratory, GI, Genitourinary, Musculoskeletal,   Psychiatric, Endocrine, Allergic/Immunologic.    PHYSICAL EXAM:   LMP 08/07/2024   Skin exam performed as follows: Type 2 skin. Mood appropriate  Alert and Oriented X 3. Well developed, well nourished in no distress.  General appearance: Normal  Head including face: Normal  Eyes: conjunctiva and lids: Normal  Mouth: Lips, teeth, gums: Normal  Neck: Normal  Chest-breast/axillae: Normal  Back: Normal  Extremities: digits/nails (clubbing): Normal  Eccrine and Apocrine glands: Normal  Right upper extremity: Normal  Left upper extremity: Normal  Right lower extremity: Normal  Left lower extremity: Normal  Skin: Scalp and body hair: See below    Pt deferred exam of breasts, groin, buttocks: No    Other physical findings:  1. Multiple pigmented macules on extremities and trunk  2. Multiple pigmented macules on  face, trunk and extremities  3. Multiple vascular papules on trunk, arms and legs  4. 3 mm medium brown macule on the left plantar surface  5. 12 mm tan fleshy papule on the left mons pubis  6. 8 mm irregular brown macule on the left lateral mid back  7. 6 mm pink brown macule on the right upper back       Except as noted above, no other signs of skin cancer or melanoma.     ASSESSMENT/PLAN:   Benign Full skin cancer screening today. . Patient with history of none  Advised on monthly self exams and 1 year  Patient Education: Appropriate brochures given.    Multiple benign appearing melanocytic nevi on arms, legs and trunk. Discussed ABCDEs of melanoma and sunscreen. Makes >100 nevi advised on need for a yearly FSE.   Multiple lentigos on arms, legs and trunk. Advised benign, no treatment needed.  Multiple scattered angiomas. Advised benign, no treatment needed.   Benign appearing nevus on the left plantar foot - will monitor for stability. Photograph taken for reference  Dermal nevus r/o atypicality on the left mons pubis. Shave biopsy performed.  Area cleaned and anesthetized with 1% lidocaine with epinephrine.  Dermablade used to remove the lesion and sent to pathology. Bleeding was cauterized. Pt tolerated procedure well with no complications.   R/o atypical nevus on the left lateral mid back, right upper back. Shave biopsy performed.  Area cleaned and anesthetized with 1% lidocaine with epinephrine.  Dermablade used to remove the lesion and sent to pathology. Bleeding was cauterized. Pt tolerated procedure well with no complications.             Follow-up: yearly/PRN sooner    1.) Patient was asked about new and changing moles. YES  2.) Patient received a complete physical skin examination: YES  3.) Patient was counseled to perform a monthly self skin examination: YES  Scribed By: Angélica Spear, MS, PA-C      Again, thank you for allowing me to participate in the care of your patient.         Sincerely,        Angélica Peres PA-C

## 2024-09-11 LAB
PATH REPORT.COMMENTS IMP SPEC: NORMAL
PATH REPORT.COMMENTS IMP SPEC: NORMAL
PATH REPORT.FINAL DX SPEC: NORMAL
PATH REPORT.GROSS SPEC: NORMAL
PATH REPORT.MICROSCOPIC SPEC OTHER STN: NORMAL
PATH REPORT.RELEVANT HX SPEC: NORMAL

## 2024-11-12 ENCOUNTER — DOCUMENTATION ONLY (OUTPATIENT)
Dept: LAB | Facility: CLINIC | Age: 34
End: 2024-11-12
Payer: COMMERCIAL

## 2024-11-12 DIAGNOSIS — D47.3 ESSENTIAL THROMBOCYTHEMIA (H): Primary | ICD-10-CM

## 2024-11-12 NOTE — PROGRESS NOTES
Belindaalyssatyrone Beasley has an upcoming lab appointment:    Future Appointments   Date Time Provider Department Center   11/18/2024  1:00 PM [facility]   2/25/2025  3:45 PM  [facility]   2/25/2025  4:15 PM Keily Irving MD [facility]   9/12/2025 10:15 AM Angélica Spear PA-C [facility]     Patient is scheduled for the following lab(s):   Scheduling and Arrival Information   Scheduling Notes: [facility]   Made On:  Change Notes: 8/29/2024 11:01 AM  8/29/2024 11:01 AM By:  By: APURVA JOHNSON MARYAN       There is no order available. Please review and place either future orders or HMPO (Review of Health Maintenance Protocol Orders), as appropriate.    Health Maintenance Due   Topic    ANNUAL REVIEW OF HM ORDERS     HIV SCREENING     HEPATITIS C SCREENING      If no labs are needed at this time please have the Care Team contact patient regarding this information and cancel lab appointment. Thank you.     Shari SELLERS

## 2025-02-24 ENCOUNTER — LAB (OUTPATIENT)
Dept: LAB | Facility: CLINIC | Age: 35
End: 2025-02-24
Payer: COMMERCIAL

## 2025-02-24 DIAGNOSIS — D47.3 ESSENTIAL THROMBOCYTHEMIA (H): ICD-10-CM

## 2025-02-24 LAB
BASOPHILS # BLD AUTO: 0 10E3/UL (ref 0–0.2)
BASOPHILS NFR BLD AUTO: 1 %
EOSINOPHIL # BLD AUTO: 0.1 10E3/UL (ref 0–0.7)
EOSINOPHIL NFR BLD AUTO: 1 %
ERYTHROCYTE [DISTWIDTH] IN BLOOD BY AUTOMATED COUNT: 13 % (ref 10–15)
HCT VFR BLD AUTO: 36.9 % (ref 35–47)
HGB BLD-MCNC: 12.5 G/DL (ref 11.7–15.7)
IMM GRANULOCYTES # BLD: 0 10E3/UL
IMM GRANULOCYTES NFR BLD: 0 %
LYMPHOCYTES # BLD AUTO: 1.9 10E3/UL (ref 0.8–5.3)
LYMPHOCYTES NFR BLD AUTO: 24 %
MCH RBC QN AUTO: 28.2 PG (ref 26.5–33)
MCHC RBC AUTO-ENTMCNC: 33.9 G/DL (ref 31.5–36.5)
MCV RBC AUTO: 83 FL (ref 78–100)
MONOCYTES # BLD AUTO: 0.4 10E3/UL (ref 0–1.3)
MONOCYTES NFR BLD AUTO: 5 %
NEUTROPHILS # BLD AUTO: 5.6 10E3/UL (ref 1.6–8.3)
NEUTROPHILS NFR BLD AUTO: 70 %
PLATELET # BLD AUTO: 391 10E3/UL (ref 150–450)
RBC # BLD AUTO: 4.43 10E6/UL (ref 3.8–5.2)
RETICS # AUTO: 0.06 10E6/UL (ref 0.03–0.1)
RETICS/RBC NFR AUTO: 1.3 % (ref 0.5–2)
WBC # BLD AUTO: 8.1 10E3/UL (ref 4–11)

## 2025-02-24 PROCEDURE — 83540 ASSAY OF IRON: CPT

## 2025-02-24 PROCEDURE — 80053 COMPREHEN METABOLIC PANEL: CPT

## 2025-02-24 PROCEDURE — 83615 LACTATE (LD) (LDH) ENZYME: CPT

## 2025-02-24 PROCEDURE — 85025 COMPLETE CBC W/AUTO DIFF WBC: CPT

## 2025-02-24 PROCEDURE — 82728 ASSAY OF FERRITIN: CPT

## 2025-02-24 PROCEDURE — 85045 AUTOMATED RETICULOCYTE COUNT: CPT

## 2025-02-24 PROCEDURE — 83550 IRON BINDING TEST: CPT

## 2025-02-24 PROCEDURE — 36415 COLL VENOUS BLD VENIPUNCTURE: CPT

## 2025-02-25 LAB
ALBUMIN SERPL BCG-MCNC: 4.6 G/DL (ref 3.5–5.2)
ALP SERPL-CCNC: 101 U/L (ref 40–150)
ALT SERPL W P-5'-P-CCNC: 14 U/L (ref 0–50)
ANION GAP SERPL CALCULATED.3IONS-SCNC: 13 MMOL/L (ref 7–15)
AST SERPL W P-5'-P-CCNC: 18 U/L (ref 0–45)
BILIRUB SERPL-MCNC: 0.2 MG/DL
BUN SERPL-MCNC: 12.4 MG/DL (ref 6–20)
CALCIUM SERPL-MCNC: 10.1 MG/DL (ref 8.8–10.4)
CHLORIDE SERPL-SCNC: 101 MMOL/L (ref 98–107)
CREAT SERPL-MCNC: 0.68 MG/DL (ref 0.51–0.95)
EGFRCR SERPLBLD CKD-EPI 2021: >90 ML/MIN/1.73M2
FERRITIN SERPL-MCNC: 45 NG/ML (ref 6–175)
GLUCOSE SERPL-MCNC: 99 MG/DL (ref 70–99)
HCO3 SERPL-SCNC: 23 MMOL/L (ref 22–29)
IRON BINDING CAPACITY (ROCHE): 355 UG/DL (ref 240–430)
IRON SATN MFR SERPL: 17 % (ref 15–46)
IRON SERPL-MCNC: 60 UG/DL (ref 37–145)
LDH SERPL L TO P-CCNC: 176 U/L (ref 0–250)
POTASSIUM SERPL-SCNC: 4.5 MMOL/L (ref 3.4–5.3)
PROT SERPL-MCNC: 7.8 G/DL (ref 6.4–8.3)
SODIUM SERPL-SCNC: 137 MMOL/L (ref 135–145)

## 2025-05-01 ENCOUNTER — MYC MEDICAL ADVICE (OUTPATIENT)
Dept: ONCOLOGY | Facility: CLINIC | Age: 35
End: 2025-05-01
Payer: COMMERCIAL

## 2025-05-01 DIAGNOSIS — I70.209: ICD-10-CM

## 2025-05-01 DIAGNOSIS — Z79.01 LONG TERM CURRENT USE OF ANTICOAGULANT THERAPY: ICD-10-CM

## 2025-05-01 RX ORDER — ENOXAPARIN SODIUM 100 MG/ML
100 INJECTION SUBCUTANEOUS 2 TIMES DAILY
Qty: 60 ML | Refills: 1 | Status: SHIPPED | OUTPATIENT
Start: 2025-05-01

## 2025-05-01 NOTE — TELEPHONE ENCOUNTER
"Lovenox 100mg/mL syringe  Last prescribing provider: Dr. Verna Lucas    Last clinic visit date: 25 w/ Dr. Irving    Recommendations for requested medication (if none, N/A): 25, \"For anticoagulation-- Currently on Lovenox 100mg twice daily. Not following anti-Xa levels at present. Will increase to aspirin 81mg BID. At present patient and spouse are attempting to conceive again. Will keep on LMWH-- however if shot fatigue develops can consider timed Xarelto breaks.\"    Any other pertinent information (if none, N/A): previous rx  and pt is now out of medication. She is leaving town early Monday, hoping to have filled by then.   Routing to Dr. Khalil, on call, as Dr. Irving is out of office.     Refilled: Y/N, if NO, why?    "

## 2025-07-15 DIAGNOSIS — O09.90 HIGH-RISK PREGNANCY, UNSPECIFIED TRIMESTER: ICD-10-CM

## 2025-07-15 DIAGNOSIS — Z15.89 JAK2 V617F MUTATION: ICD-10-CM

## 2025-07-15 DIAGNOSIS — D47.3 ESSENTIAL THROMBOCYTHEMIA (H): ICD-10-CM

## 2025-07-15 DIAGNOSIS — Z79.01 LONG TERM CURRENT USE OF ANTICOAGULANT THERAPY: Primary | ICD-10-CM

## 2025-07-18 PROCEDURE — 81339 MPL GENE SEQ ALYS EXON 10: CPT | Performed by: INTERNAL MEDICINE

## 2025-07-18 PROCEDURE — G0452 MOLECULAR PATHOLOGY INTERPR: HCPCS | Mod: 26 | Performed by: PATHOLOGY

## 2025-08-05 ENCOUNTER — MYC MEDICAL ADVICE (OUTPATIENT)
Dept: ONCOLOGY | Facility: CLINIC | Age: 35
End: 2025-08-05
Payer: COMMERCIAL

## 2025-08-05 DIAGNOSIS — Z79.01 LONG TERM CURRENT USE OF ANTICOAGULANT THERAPY: ICD-10-CM

## 2025-08-05 DIAGNOSIS — I70.209: ICD-10-CM

## 2025-08-06 RX ORDER — ENOXAPARIN SODIUM 100 MG/ML
100 INJECTION SUBCUTANEOUS 2 TIMES DAILY
Qty: 60 ML | Refills: 1 | Status: SHIPPED | OUTPATIENT
Start: 2025-08-06

## 2025-08-18 ENCOUNTER — LAB (OUTPATIENT)
Dept: LAB | Facility: CLINIC | Age: 35
End: 2025-08-18
Payer: COMMERCIAL

## 2025-08-18 DIAGNOSIS — D47.3 ESSENTIAL THROMBOCYTHEMIA (H): ICD-10-CM

## 2025-08-18 LAB
ALBUMIN SERPL BCG-MCNC: 4.1 G/DL (ref 3.5–5.2)
ALP SERPL-CCNC: 79 U/L (ref 40–150)
ALT SERPL W P-5'-P-CCNC: 7 U/L (ref 0–50)
ANION GAP SERPL CALCULATED.3IONS-SCNC: 9 MMOL/L (ref 7–15)
AST SERPL W P-5'-P-CCNC: 14 U/L (ref 0–45)
BASOPHILS # BLD AUTO: 0.04 10E3/UL (ref 0–0.2)
BASOPHILS NFR BLD AUTO: 0.5 %
BILIRUB SERPL-MCNC: 0.2 MG/DL
BUN SERPL-MCNC: 6.3 MG/DL (ref 6–20)
CALCIUM SERPL-MCNC: 9.9 MG/DL (ref 8.8–10.4)
CHLORIDE SERPL-SCNC: 100 MMOL/L (ref 98–107)
CREAT SERPL-MCNC: 0.44 MG/DL (ref 0.51–0.95)
EGFRCR SERPLBLD CKD-EPI 2021: >90 ML/MIN/1.73M2
EOSINOPHIL # BLD AUTO: 0.05 10E3/UL (ref 0–0.7)
EOSINOPHIL NFR BLD AUTO: 0.6 %
ERYTHROCYTE [DISTWIDTH] IN BLOOD BY AUTOMATED COUNT: 13.6 % (ref 10–15)
FERRITIN SERPL-MCNC: 50 NG/ML (ref 6–175)
GLUCOSE SERPL-MCNC: 90 MG/DL (ref 70–99)
HCO3 SERPL-SCNC: 24 MMOL/L (ref 22–29)
HCT VFR BLD AUTO: 30.7 % (ref 35–47)
HGB BLD-MCNC: 11 G/DL (ref 11.7–15.7)
IMM GRANULOCYTES # BLD: 0.05 10E3/UL
IMM GRANULOCYTES NFR BLD: 0.6 %
IRON BINDING CAPACITY (ROCHE): 373 UG/DL (ref 240–430)
IRON SATN MFR SERPL: 17 % (ref 15–46)
IRON SERPL-MCNC: 65 UG/DL (ref 37–145)
LDH SERPL L TO P-CCNC: 127 U/L (ref 0–250)
LYMPHOCYTES # BLD AUTO: 1.66 10E3/UL (ref 0.8–5.3)
LYMPHOCYTES NFR BLD AUTO: 19.2 %
MCH RBC QN AUTO: 29.9 PG (ref 26.5–33)
MCHC RBC AUTO-ENTMCNC: 35.8 G/DL (ref 31.5–36.5)
MCV RBC AUTO: 83.4 FL (ref 78–100)
MONOCYTES # BLD AUTO: 0.44 10E3/UL (ref 0–1.3)
MONOCYTES NFR BLD AUTO: 5.1 %
NEUTROPHILS # BLD AUTO: 6.4 10E3/UL (ref 1.6–8.3)
NEUTROPHILS NFR BLD AUTO: 74 %
PLATELET # BLD AUTO: 317 10E3/UL (ref 150–450)
POTASSIUM SERPL-SCNC: 4.5 MMOL/L (ref 3.4–5.3)
PROT SERPL-MCNC: 7.1 G/DL (ref 6.4–8.3)
RBC # BLD AUTO: 3.68 10E6/UL (ref 3.8–5.2)
RETICS # AUTO: 0.07 10E6/UL (ref 0.03–0.1)
RETICS/RBC NFR AUTO: 1.85 % (ref 0.5–2)
SODIUM SERPL-SCNC: 133 MMOL/L (ref 135–145)
WBC # BLD AUTO: 8.64 10E3/UL (ref 4–11)

## 2025-08-18 PROCEDURE — 83540 ASSAY OF IRON: CPT

## 2025-08-18 PROCEDURE — 85045 AUTOMATED RETICULOCYTE COUNT: CPT

## 2025-08-18 PROCEDURE — 36415 COLL VENOUS BLD VENIPUNCTURE: CPT

## 2025-08-18 PROCEDURE — 85025 COMPLETE CBC W/AUTO DIFF WBC: CPT

## 2025-08-18 PROCEDURE — 80053 COMPREHEN METABOLIC PANEL: CPT

## 2025-08-18 PROCEDURE — 83550 IRON BINDING TEST: CPT

## 2025-08-18 PROCEDURE — 82728 ASSAY OF FERRITIN: CPT

## 2025-08-18 PROCEDURE — 83615 LACTATE (LD) (LDH) ENZYME: CPT

## 2025-08-19 ENCOUNTER — ONCOLOGY VISIT (OUTPATIENT)
Dept: ONCOLOGY | Facility: CLINIC | Age: 35
End: 2025-08-19
Attending: INTERNAL MEDICINE
Payer: COMMERCIAL

## 2025-08-19 VITALS — BODY MASS INDEX: 40.71 KG/M2 | RESPIRATION RATE: 16 BRPM | WEIGHT: 275.8 LBS

## 2025-08-19 DIAGNOSIS — O09.90 HIGH-RISK PREGNANCY, UNSPECIFIED TRIMESTER: Primary | ICD-10-CM

## 2025-08-19 DIAGNOSIS — Z98.891 S/P CESAREAN SECTION: ICD-10-CM

## 2025-08-19 DIAGNOSIS — Z86.79 HISTORY OF ARTERIAL OCCLUSION: ICD-10-CM

## 2025-08-19 DIAGNOSIS — Z79.01 LONG TERM CURRENT USE OF ANTICOAGULANT THERAPY: ICD-10-CM

## 2025-08-19 DIAGNOSIS — Z15.89 JAK2 V617F MUTATION: Chronic | ICD-10-CM

## 2025-08-19 DIAGNOSIS — D47.3 ESSENTIAL THROMBOCYTHEMIA (H): Chronic | ICD-10-CM

## 2025-08-19 PROCEDURE — 99213 OFFICE O/P EST LOW 20 MIN: CPT | Performed by: INTERNAL MEDICINE

## 2025-08-19 RX ORDER — ENOXAPARIN SODIUM 100 MG/ML
100 INJECTION SUBCUTANEOUS 2 TIMES DAILY
Qty: 180 ML | Refills: 1 | Status: SHIPPED | OUTPATIENT
Start: 2025-08-19 | End: 2026-02-15

## 2025-08-21 PROBLEM — O09.90 HIGH-RISK PREGNANCY, UNSPECIFIED TRIMESTER: Status: ACTIVE | Noted: 2025-08-21

## (undated) DEVICE — CLIP ETHICON LIGACLIP SM BLUE LT100

## (undated) DEVICE — Device

## (undated) DEVICE — PREP CHLORAPREP 26ML TINTED HI-LITE ORANGE 930815

## (undated) DEVICE — DRSG KERLIX 4 1/2"X4YDS ROLL 6715

## (undated) DEVICE — SU VICRYL 3-0 CT-1 36" J338H

## (undated) DEVICE — STPL SKIN 35W 6.9MM  PXW35

## (undated) DEVICE — RETR PANNICULUS TRAXI FOR PT POSITIONING PRS-1030

## (undated) DEVICE — DRSG STERI STRIP 1/2X4" R1547

## (undated) DEVICE — PREP CHLORAPREP 26ML TINTED ORANGE  260815

## (undated) DEVICE — SU SILK 3-0 TIE 24" SA74H

## (undated) DEVICE — DRSG ABDOMINAL 07 1/2X8" 7197D

## (undated) DEVICE — SU MONOCRYL 4-0 PS-2 18" UND Y496G

## (undated) DEVICE — ESU GROUND PAD UNIVERSAL W/O CORD

## (undated) DEVICE — LINEN TOWEL PACK X5 5464

## (undated) DEVICE — SU PDS II 0 CT 36" Z358T

## (undated) DEVICE — DRSG AQUACEL AG 3.5X9.75" HYDROFIBER 412011

## (undated) DEVICE — SU PROLENE 7-0 BV-1DA 4X24" M8702

## (undated) DEVICE — GEL ULTRASOUND AQUASONIC 20GM 01-01

## (undated) DEVICE — RAD RX ISOVUE 300 (50ML) 61% IOPAMIDOL CHARGE PER ML

## (undated) DEVICE — NDL 19GA 1.5"

## (undated) DEVICE — DRSG GAUZE 4X4" 3033

## (undated) DEVICE — GLOVE PROTEXIS W/NEU-THERA 7.5  2D73TE75

## (undated) DEVICE — BLADE CLIPPER 4406

## (undated) DEVICE — CATH FOGARTY EMBOLECTOMY 2FR 60CM LATEX 120602FP

## (undated) DEVICE — CATH TRAY FOLEY 16FR BARDEX W/DRAIN BAG STATLOCK 300316A

## (undated) DEVICE — ESU ELEC BLADE 4" COATED

## (undated) DEVICE — VESSEL LOOPS YELLOW MINI 31145660

## (undated) DEVICE — DECANTER BAG 2002S

## (undated) DEVICE — NDL ANGIOCATH 20GA 1.25" 4056

## (undated) DEVICE — SU ETHILON 3-0 FS-1 18" 663G

## (undated) DEVICE — CATH FOGARTY EMBOLECTOMY 3FR 80CM LATEX 120803FP

## (undated) DEVICE — SYR 10ML SLIP TIP W/O NDL

## (undated) DEVICE — PACK VASCULAR SCV15VAFSB

## (undated) DEVICE — SU VICRYL 3-0 SH 27" J316H

## (undated) DEVICE — PACK C-SECTION LF PL15OTA83B

## (undated) DEVICE — SYR 30ML LL W/O NDL 302832

## (undated) DEVICE — SOL NACL 0.9% INJ 250ML BAG 2B1322Q

## (undated) DEVICE — LINEN C-SECTION 5415

## (undated) DEVICE — SYR 03ML LL W/O NDL 309657

## (undated) DEVICE — SOL WATER IRRIG 1000ML BOTTLE 2F7114

## (undated) DEVICE — SYR 20ML LL W/O NDL 302830

## (undated) DEVICE — SU PROLENE 7-0 BV-1DA 24" 8702H

## (undated) DEVICE — SU VICRYL 0 CT 36" J358H

## (undated) DEVICE — SU SILK 2-0 TIE 24" SA75H

## (undated) DEVICE — NDL ANGIOCATH 18GA 1.25" 4055

## (undated) DEVICE — DRAPE SHEET REV FOLD 3/4 9349

## (undated) DEVICE — SOL NACL 0.9% IRRIG 1000ML BOTTLE 2F7124

## (undated) DEVICE — SU ETHILON 3-0 FS-1 18" 669H

## (undated) RX ORDER — FENTANYL CITRATE 50 UG/ML
INJECTION, SOLUTION INTRAMUSCULAR; INTRAVENOUS
Status: DISPENSED
Start: 2021-04-10

## (undated) RX ORDER — HYDROMORPHONE HYDROCHLORIDE 1 MG/ML
INJECTION, SOLUTION INTRAMUSCULAR; INTRAVENOUS; SUBCUTANEOUS
Status: DISPENSED
Start: 2021-04-11

## (undated) RX ORDER — ONDANSETRON 2 MG/ML
INJECTION INTRAMUSCULAR; INTRAVENOUS
Status: DISPENSED
Start: 2021-04-11

## (undated) RX ORDER — PROPOFOL 10 MG/ML
INJECTION, EMULSION INTRAVENOUS
Status: DISPENSED
Start: 2023-10-20

## (undated) RX ORDER — FENTANYL CITRATE 50 UG/ML
INJECTION, SOLUTION INTRAMUSCULAR; INTRAVENOUS
Status: DISPENSED
Start: 2021-04-11

## (undated) RX ORDER — HYDROMORPHONE HYDROCHLORIDE 1 MG/ML
INJECTION, SOLUTION INTRAMUSCULAR; INTRAVENOUS; SUBCUTANEOUS
Status: DISPENSED
Start: 2021-04-12

## (undated) RX ORDER — DEXAMETHASONE SODIUM PHOSPHATE 4 MG/ML
INJECTION, SOLUTION INTRA-ARTICULAR; INTRALESIONAL; INTRAMUSCULAR; INTRAVENOUS; SOFT TISSUE
Status: DISPENSED
Start: 2021-04-11

## (undated) RX ORDER — PROPOFOL 10 MG/ML
INJECTION, EMULSION INTRAVENOUS
Status: DISPENSED
Start: 2021-04-12

## (undated) RX ORDER — HYDROMORPHONE HYDROCHLORIDE 1 MG/ML
INJECTION, SOLUTION INTRAMUSCULAR; INTRAVENOUS; SUBCUTANEOUS
Status: DISPENSED
Start: 2023-10-20

## (undated) RX ORDER — HEPARIN SODIUM 200 [USP'U]/100ML
INJECTION, SOLUTION INTRAVENOUS
Status: DISPENSED
Start: 2021-04-09

## (undated) RX ORDER — ONDANSETRON 2 MG/ML
INJECTION INTRAMUSCULAR; INTRAVENOUS
Status: DISPENSED
Start: 2021-04-12

## (undated) RX ORDER — LIDOCAINE HYDROCHLORIDE 40 MG/ML
SOLUTION TOPICAL
Status: DISPENSED
Start: 2021-04-13

## (undated) RX ORDER — LIDOCAINE HYDROCHLORIDE 10 MG/ML
INJECTION, SOLUTION INFILTRATION; PERINEURAL
Status: DISPENSED
Start: 2021-04-10

## (undated) RX ORDER — FENTANYL CITRATE 50 UG/ML
INJECTION, SOLUTION INTRAMUSCULAR; INTRAVENOUS
Status: DISPENSED
Start: 2021-04-08

## (undated) RX ORDER — LIDOCAINE HYDROCHLORIDE 20 MG/ML
INJECTION, SOLUTION EPIDURAL; INFILTRATION; INTRACAUDAL; PERINEURAL
Status: DISPENSED
Start: 2021-04-11

## (undated) RX ORDER — HYDROMORPHONE HCL IN WATER/PF 6 MG/30 ML
PATIENT CONTROLLED ANALGESIA SYRINGE INTRAVENOUS
Status: DISPENSED
Start: 2023-10-21

## (undated) RX ORDER — SODIUM CHLORIDE 9 MG/ML
INJECTION, SOLUTION INTRAVENOUS
Status: DISPENSED
Start: 2021-09-16

## (undated) RX ORDER — FLUMAZENIL 0.1 MG/ML
INJECTION, SOLUTION INTRAVENOUS
Status: DISPENSED
Start: 2021-04-13

## (undated) RX ORDER — FENTANYL CITRATE 50 UG/ML
INJECTION, SOLUTION INTRAMUSCULAR; INTRAVENOUS
Status: DISPENSED
Start: 2021-04-13

## (undated) RX ORDER — CLOPIDOGREL BISULFATE 75 MG/1
TABLET ORAL
Status: DISPENSED
Start: 2021-04-12

## (undated) RX ORDER — HEPARIN SODIUM 200 [USP'U]/100ML
INJECTION, SOLUTION INTRAVENOUS
Status: DISPENSED
Start: 2021-04-08

## (undated) RX ORDER — PROPOFOL 10 MG/ML
INJECTION, EMULSION INTRAVENOUS
Status: DISPENSED
Start: 2021-04-11

## (undated) RX ORDER — LIDOCAINE HYDROCHLORIDE 20 MG/ML
INJECTION, SOLUTION EPIDURAL; INFILTRATION; INTRACAUDAL; PERINEURAL
Status: DISPENSED
Start: 2021-04-12

## (undated) RX ORDER — FENTANYL CITRATE 50 UG/ML
INJECTION, SOLUTION INTRAMUSCULAR; INTRAVENOUS
Status: DISPENSED
Start: 2021-04-12

## (undated) RX ORDER — FENTANYL CITRATE 50 UG/ML
INJECTION, SOLUTION INTRAMUSCULAR; INTRAVENOUS
Status: DISPENSED
Start: 2021-09-16

## (undated) RX ORDER — HEPARIN SODIUM 1000 [USP'U]/ML
INJECTION, SOLUTION INTRAVENOUS; SUBCUTANEOUS
Status: DISPENSED
Start: 2021-04-11

## (undated) RX ORDER — NEOSTIGMINE METHYLSULFATE 1 MG/ML
VIAL (ML) INJECTION
Status: DISPENSED
Start: 2021-04-11

## (undated) RX ORDER — CLINDAMYCIN PHOSPHATE 900 MG/50ML
INJECTION, SOLUTION INTRAVENOUS
Status: DISPENSED
Start: 2021-04-11

## (undated) RX ORDER — CLOPIDOGREL BISULFATE 75 MG/1
TABLET ORAL
Status: DISPENSED
Start: 2021-04-11

## (undated) RX ORDER — CHLOROPROCAINE HYDROCHLORIDE 30 MG/ML
INJECTION, SOLUTION EPIDURAL; INFILTRATION; INTRACAUDAL; PERINEURAL
Status: DISPENSED
Start: 2023-10-20

## (undated) RX ORDER — LIDOCAINE HCL/EPINEPHRINE/PF 2%-1:200K
VIAL (ML) INJECTION
Status: DISPENSED
Start: 2023-10-20

## (undated) RX ORDER — LIDOCAINE HYDROCHLORIDE 10 MG/ML
INJECTION, SOLUTION EPIDURAL; INFILTRATION; INTRACAUDAL; PERINEURAL
Status: DISPENSED
Start: 2021-09-16

## (undated) RX ORDER — BUPIVACAINE HYDROCHLORIDE 5 MG/ML
INJECTION, SOLUTION EPIDURAL; INTRACAUDAL
Status: DISPENSED
Start: 2021-04-11

## (undated) RX ORDER — NALOXONE HYDROCHLORIDE 0.4 MG/ML
INJECTION, SOLUTION INTRAMUSCULAR; INTRAVENOUS; SUBCUTANEOUS
Status: DISPENSED
Start: 2021-04-13

## (undated) RX ORDER — OXYTOCIN/0.9 % SODIUM CHLORIDE 30/500 ML
PLASTIC BAG, INJECTION (ML) INTRAVENOUS
Status: DISPENSED
Start: 2023-10-20

## (undated) RX ORDER — LIDOCAINE HYDROCHLORIDE 10 MG/ML
INJECTION, SOLUTION INFILTRATION; PERINEURAL
Status: DISPENSED
Start: 2021-04-08

## (undated) RX ORDER — ASPIRIN 81 MG/1
TABLET ORAL
Status: DISPENSED
Start: 2021-04-11

## (undated) RX ORDER — DEXAMETHASONE SODIUM PHOSPHATE 4 MG/ML
INJECTION, SOLUTION INTRA-ARTICULAR; INTRALESIONAL; INTRAMUSCULAR; INTRAVENOUS; SOFT TISSUE
Status: DISPENSED
Start: 2021-04-12

## (undated) RX ORDER — GLYCOPYRROLATE 0.2 MG/ML
INJECTION, SOLUTION INTRAMUSCULAR; INTRAVENOUS
Status: DISPENSED
Start: 2021-04-13

## (undated) RX ORDER — FENTANYL CITRATE 50 UG/ML
INJECTION, SOLUTION INTRAMUSCULAR; INTRAVENOUS
Status: DISPENSED
Start: 2023-10-20

## (undated) RX ORDER — HEPARIN SODIUM 200 [USP'U]/100ML
INJECTION, SOLUTION INTRAVENOUS
Status: DISPENSED
Start: 2021-04-10

## (undated) RX ORDER — HEPARIN SODIUM 10000 [USP'U]/100ML
INJECTION, SOLUTION INTRAVENOUS
Status: DISPENSED
Start: 2021-04-08

## (undated) RX ORDER — LIDOCAINE HYDROCHLORIDE 10 MG/ML
INJECTION, SOLUTION INFILTRATION; PERINEURAL
Status: DISPENSED
Start: 2021-04-09

## (undated) RX ORDER — GLYCOPYRROLATE 0.2 MG/ML
INJECTION, SOLUTION INTRAMUSCULAR; INTRAVENOUS
Status: DISPENSED
Start: 2021-04-11

## (undated) RX ORDER — FENTANYL CITRATE 50 UG/ML
INJECTION, SOLUTION INTRAMUSCULAR; INTRAVENOUS
Status: DISPENSED
Start: 2021-04-09